# Patient Record
Sex: MALE | Race: WHITE | NOT HISPANIC OR LATINO | Employment: OTHER | URBAN - METROPOLITAN AREA
[De-identification: names, ages, dates, MRNs, and addresses within clinical notes are randomized per-mention and may not be internally consistent; named-entity substitution may affect disease eponyms.]

---

## 2017-06-10 PROBLEM — G61.0 GUILLAIN-BARRE SYNDROME: Status: ACTIVE | Noted: 2017-06-10

## 2017-06-14 ENCOUNTER — HOSPITAL ENCOUNTER (INPATIENT)
Facility: HOSPITAL | Age: 72
LOS: 30 days | Discharge: NURSING, REHAB OR LONG-TERM CARE | DRG: 004 | End: 2017-07-14
Attending: PSYCHIATRY & NEUROLOGY | Admitting: PSYCHIATRY & NEUROLOGY
Payer: MEDICARE

## 2017-06-14 DIAGNOSIS — R21 RASH: ICD-10-CM

## 2017-06-14 DIAGNOSIS — G93.40 ENCEPHALOPATHY: ICD-10-CM

## 2017-06-14 DIAGNOSIS — R40.2433 GLASGOW COMA SCALE TOTAL SCORE 3-8, AT HOSPITAL ADMISSION: ICD-10-CM

## 2017-06-14 DIAGNOSIS — I35.9 NONRHEUMATIC AORTIC VALVE DISORDER: ICD-10-CM

## 2017-06-14 DIAGNOSIS — I67.1 BRAIN ANEURYSM: ICD-10-CM

## 2017-06-14 DIAGNOSIS — J96.01 ACUTE RESPIRATORY FAILURE WITH HYPOXIA AND HYPERCAPNIA: ICD-10-CM

## 2017-06-14 DIAGNOSIS — G93.40 ACUTE ENCEPHALOPATHY: ICD-10-CM

## 2017-06-14 DIAGNOSIS — E86.1 HYPOVOLEMIA: ICD-10-CM

## 2017-06-14 DIAGNOSIS — R00.1 BRADYCARDIA: ICD-10-CM

## 2017-06-14 DIAGNOSIS — R13.12 DYSPHAGIA, OROPHARYNGEAL: ICD-10-CM

## 2017-06-14 DIAGNOSIS — J96.02 ACUTE RESPIRATORY FAILURE WITH HYPOXIA AND HYPERCAPNIA: ICD-10-CM

## 2017-06-14 DIAGNOSIS — G61.0 GUILLAIN-BARRE SYNDROME: Primary | ICD-10-CM

## 2017-06-14 DIAGNOSIS — I47.20 V-TACH: ICD-10-CM

## 2017-06-14 DIAGNOSIS — G82.50: ICD-10-CM

## 2017-06-14 LAB
ALBUMIN SERPL BCP-MCNC: 1.9 G/DL
ALLENS TEST: ABNORMAL
ALLENS TEST: ABNORMAL
ALP SERPL-CCNC: 68 U/L
ALT SERPL W/O P-5'-P-CCNC: 10 U/L
ANION GAP SERPL CALC-SCNC: 4 MMOL/L
AST SERPL-CCNC: 18 U/L
BACTERIA #/AREA URNS AUTO: ABNORMAL /HPF
BASOPHILS # BLD AUTO: 0.01 K/UL
BASOPHILS NFR BLD: 0.1 %
BILIRUB SERPL-MCNC: 0.3 MG/DL
BILIRUB UR QL STRIP: NEGATIVE
BUN SERPL-MCNC: 32 MG/DL
CALCIUM SERPL-MCNC: 8.3 MG/DL
CHLORIDE SERPL-SCNC: 103 MMOL/L
CLARITY UR REFRACT.AUTO: CLEAR
CO2 SERPL-SCNC: 30 MMOL/L
COLOR UR AUTO: YELLOW
CREAT SERPL-MCNC: 0.8 MG/DL
DELSYS: ABNORMAL
DELSYS: ABNORMAL
DIFFERENTIAL METHOD: ABNORMAL
EOSINOPHIL # BLD AUTO: 0 K/UL
EOSINOPHIL NFR BLD: 0 %
ERYTHROCYTE [DISTWIDTH] IN BLOOD BY AUTOMATED COUNT: 13 %
ERYTHROCYTE [SEDIMENTATION RATE] IN BLOOD BY WESTERGREN METHOD: 18 MM/H
ERYTHROCYTE [SEDIMENTATION RATE] IN BLOOD BY WESTERGREN METHOD: 18 MM/H
EST. GFR  (AFRICAN AMERICAN): >60 ML/MIN/1.73 M^2
EST. GFR  (NON AFRICAN AMERICAN): >60 ML/MIN/1.73 M^2
ESTIMATED AVG GLUCOSE: 131 MG/DL
FIO2: 40
FIO2: 40
GLUCOSE SERPL-MCNC: 163 MG/DL
GLUCOSE UR QL STRIP: ABNORMAL
HBA1C MFR BLD HPLC: 6.2 %
HCO3 UR-SCNC: 32.3 MMOL/L (ref 24–28)
HCO3 UR-SCNC: 32.5 MMOL/L (ref 24–28)
HCT VFR BLD AUTO: 35.8 %
HGB BLD-MCNC: 11.9 G/DL
HGB UR QL STRIP: ABNORMAL
HYALINE CASTS UR QL AUTO: 0 /LPF
INR PPP: 1.2
KETONES UR QL STRIP: NEGATIVE
LEUKOCYTE ESTERASE UR QL STRIP: ABNORMAL
LYMPHOCYTES # BLD AUTO: 0.9 K/UL
LYMPHOCYTES NFR BLD: 7.5 %
MAGNESIUM SERPL-MCNC: 2.6 MG/DL
MCH RBC QN AUTO: 32.8 PG
MCHC RBC AUTO-ENTMCNC: 33.2 %
MCV RBC AUTO: 99 FL
MICROSCOPIC COMMENT: ABNORMAL
MIN VOL: 10.7
MODE: ABNORMAL
MODE: ABNORMAL
MONOCYTES # BLD AUTO: 1 K/UL
MONOCYTES NFR BLD: 7.7 %
NEUTROPHILS # BLD AUTO: 10.3 K/UL
NEUTROPHILS NFR BLD: 84 %
NITRITE UR QL STRIP: NEGATIVE
PCO2 BLDA: 49 MMHG (ref 35–45)
PCO2 BLDA: 55.6 MMHG (ref 35–45)
PEEP: 8
PEEP: 8
PH SMN: 7.37 [PH] (ref 7.35–7.45)
PH SMN: 7.43 [PH] (ref 7.35–7.45)
PH UR STRIP: 6 [PH] (ref 5–8)
PHOSPHATE SERPL-MCNC: 2.7 MG/DL
PIP: 20
PLATELET # BLD AUTO: 244 K/UL
PMV BLD AUTO: 9.5 FL
PO2 BLDA: 92 MMHG (ref 80–100)
PO2 BLDA: 98 MMHG (ref 80–100)
POC BE: 7 MMOL/L
POC BE: 8 MMOL/L
POC SATURATED O2: 97 % (ref 95–100)
POC SATURATED O2: 98 % (ref 95–100)
POC TCO2: 34 MMOL/L (ref 23–27)
POC TCO2: 34 MMOL/L (ref 23–27)
POTASSIUM SERPL-SCNC: 4.1 MMOL/L
PROT SERPL-MCNC: 7.8 G/DL
PROT UR QL STRIP: ABNORMAL
PROTHROMBIN TIME: 12.2 SEC
PS: 10
RBC # BLD AUTO: 3.63 M/UL
RBC #/AREA URNS AUTO: 7 /HPF (ref 0–4)
SAMPLE: ABNORMAL
SAMPLE: ABNORMAL
SITE: ABNORMAL
SITE: ABNORMAL
SODIUM SERPL-SCNC: 137 MMOL/L
SP GR UR STRIP: 1.02 (ref 1–1.03)
SP02: 97
SP02: 98
SQUAMOUS #/AREA URNS AUTO: 0 /HPF
T4 FREE SERPL-MCNC: 0.69 NG/DL
TSH SERPL DL<=0.005 MIU/L-ACNC: 1.45 UIU/ML
URN SPEC COLLECT METH UR: ABNORMAL
UROBILINOGEN UR STRIP-ACNC: NEGATIVE EU/DL
VT: 500
VT: 550
WBC # BLD AUTO: 12.29 K/UL
WBC #/AREA URNS AUTO: 6 /HPF (ref 0–5)

## 2017-06-14 PROCEDURE — 85025 COMPLETE CBC W/AUTO DIFF WBC: CPT

## 2017-06-14 PROCEDURE — 63600175 PHARM REV CODE 636 W HCPCS: Performed by: PSYCHIATRY & NEUROLOGY

## 2017-06-14 PROCEDURE — 27000221 HC OXYGEN, UP TO 24 HOURS

## 2017-06-14 PROCEDURE — 5A1955Z RESPIRATORY VENTILATION, GREATER THAN 96 CONSECUTIVE HOURS: ICD-10-PCS | Performed by: PSYCHIATRY & NEUROLOGY

## 2017-06-14 PROCEDURE — 84443 ASSAY THYROID STIM HORMONE: CPT

## 2017-06-14 PROCEDURE — 87070 CULTURE OTHR SPECIMN AEROBIC: CPT

## 2017-06-14 PROCEDURE — 25000003 PHARM REV CODE 250: Performed by: PSYCHIATRY & NEUROLOGY

## 2017-06-14 PROCEDURE — 84439 ASSAY OF FREE THYROXINE: CPT

## 2017-06-14 PROCEDURE — 94002 VENT MGMT INPAT INIT DAY: CPT

## 2017-06-14 PROCEDURE — 87205 SMEAR GRAM STAIN: CPT

## 2017-06-14 PROCEDURE — 99900035 HC TECH TIME PER 15 MIN (STAT)

## 2017-06-14 PROCEDURE — 87186 SC STD MICRODIL/AGAR DIL: CPT

## 2017-06-14 PROCEDURE — 80053 COMPREHEN METABOLIC PANEL: CPT

## 2017-06-14 PROCEDURE — 87040 BLOOD CULTURE FOR BACTERIA: CPT

## 2017-06-14 PROCEDURE — 20000000 HC ICU ROOM

## 2017-06-14 PROCEDURE — 81001 URINALYSIS AUTO W/SCOPE: CPT

## 2017-06-14 PROCEDURE — 84100 ASSAY OF PHOSPHORUS: CPT

## 2017-06-14 PROCEDURE — 94003 VENT MGMT INPAT SUBQ DAY: CPT

## 2017-06-14 PROCEDURE — 25000003 PHARM REV CODE 250: Performed by: NURSE PRACTITIONER

## 2017-06-14 PROCEDURE — 93005 ELECTROCARDIOGRAM TRACING: CPT

## 2017-06-14 PROCEDURE — 85610 PROTHROMBIN TIME: CPT

## 2017-06-14 PROCEDURE — 83036 HEMOGLOBIN GLYCOSYLATED A1C: CPT

## 2017-06-14 PROCEDURE — 27200966 HC CLOSED SUCTION SYSTEM

## 2017-06-14 PROCEDURE — 87077 CULTURE AEROBIC IDENTIFY: CPT

## 2017-06-14 PROCEDURE — 99291 CRITICAL CARE FIRST HOUR: CPT | Mod: GC,,, | Performed by: PSYCHIATRY & NEUROLOGY

## 2017-06-14 PROCEDURE — 93010 ELECTROCARDIOGRAM REPORT: CPT | Mod: S$GLB,,, | Performed by: INTERNAL MEDICINE

## 2017-06-14 PROCEDURE — 83735 ASSAY OF MAGNESIUM: CPT

## 2017-06-14 PROCEDURE — 99900026 HC AIRWAY MAINTENANCE (STAT)

## 2017-06-14 RX ORDER — CHLORHEXIDINE GLUCONATE ORAL RINSE 1.2 MG/ML
15 SOLUTION DENTAL 4 TIMES DAILY
Status: DISCONTINUED | OUTPATIENT
Start: 2017-06-14 | End: 2017-07-14 | Stop reason: HOSPADM

## 2017-06-14 RX ORDER — SODIUM CHLORIDE 9 MG/ML
INJECTION, SOLUTION INTRAVENOUS CONTINUOUS
Status: DISCONTINUED | OUTPATIENT
Start: 2017-06-14 | End: 2017-07-01

## 2017-06-14 RX ORDER — HEPARIN SODIUM 5000 [USP'U]/ML
5000 INJECTION, SOLUTION INTRAVENOUS; SUBCUTANEOUS EVERY 8 HOURS
Status: DISCONTINUED | OUTPATIENT
Start: 2017-06-14 | End: 2017-06-17

## 2017-06-14 RX ORDER — METOPROLOL TARTRATE 25 MG/1
12.5 TABLET ORAL 2 TIMES DAILY
Status: DISCONTINUED | OUTPATIENT
Start: 2017-06-14 | End: 2017-06-14

## 2017-06-14 RX ORDER — AMOXICILLIN 250 MG
1 CAPSULE ORAL DAILY PRN
Status: DISCONTINUED | OUTPATIENT
Start: 2017-06-14 | End: 2017-06-14

## 2017-06-14 RX ORDER — LEVOTHYROXINE SODIUM 75 UG/1
75 TABLET ORAL
Status: DISCONTINUED | OUTPATIENT
Start: 2017-06-15 | End: 2017-06-15

## 2017-06-14 RX ORDER — LEVOTHYROXINE SODIUM 75 UG/1
75 TABLET ORAL
Status: DISCONTINUED | OUTPATIENT
Start: 2017-06-14 | End: 2017-06-14

## 2017-06-14 RX ORDER — LEVOTHYROXINE SODIUM 75 UG/1
75 TABLET ORAL DAILY
Status: ON HOLD | COMMUNITY
End: 2017-07-13 | Stop reason: HOSPADM

## 2017-06-14 RX ORDER — CEFEPIME HYDROCHLORIDE 1 G/50ML
1 INJECTION, SOLUTION INTRAVENOUS
Status: DISCONTINUED | OUTPATIENT
Start: 2017-06-14 | End: 2017-06-21

## 2017-06-14 RX ORDER — PITAVASTATIN CALCIUM 1.04 MG/1
1 TABLET, FILM COATED ORAL DAILY
Status: ON HOLD | COMMUNITY
End: 2017-09-12 | Stop reason: HOSPADM

## 2017-06-14 RX ORDER — FAMOTIDINE 20 MG/1
20 TABLET, FILM COATED ORAL 2 TIMES DAILY
Status: DISCONTINUED | OUTPATIENT
Start: 2017-06-14 | End: 2017-06-14

## 2017-06-14 RX ORDER — ONDANSETRON 2 MG/ML
4 INJECTION INTRAMUSCULAR; INTRAVENOUS EVERY 8 HOURS PRN
Status: DISCONTINUED | OUTPATIENT
Start: 2017-06-14 | End: 2017-07-14 | Stop reason: HOSPADM

## 2017-06-14 RX ORDER — CLOPIDOGREL BISULFATE 75 MG/1
75 TABLET ORAL DAILY
Status: ON HOLD | COMMUNITY
End: 2017-07-13 | Stop reason: HOSPADM

## 2017-06-14 RX ORDER — AMOXICILLIN 250 MG
1 CAPSULE ORAL DAILY
Status: DISCONTINUED | OUTPATIENT
Start: 2017-06-14 | End: 2017-06-14

## 2017-06-14 RX ORDER — ACETAMINOPHEN 325 MG/1
650 TABLET ORAL EVERY 6 HOURS PRN
Status: DISCONTINUED | OUTPATIENT
Start: 2017-06-14 | End: 2017-07-07

## 2017-06-14 RX ORDER — ASPIRIN 325 MG
325 TABLET ORAL DAILY
Status: DISCONTINUED | OUTPATIENT
Start: 2017-06-14 | End: 2017-06-14

## 2017-06-14 RX ORDER — AMOXICILLIN 250 MG
1 CAPSULE ORAL DAILY
Status: DISCONTINUED | OUTPATIENT
Start: 2017-06-15 | End: 2017-06-26

## 2017-06-14 RX ORDER — OMEGA-3-ACID ETHYL ESTERS 1 G/1
2 CAPSULE, LIQUID FILLED ORAL 2 TIMES DAILY
Status: ON HOLD | COMMUNITY
End: 2017-07-13 | Stop reason: HOSPADM

## 2017-06-14 RX ORDER — ASPIRIN 325 MG
325 TABLET ORAL DAILY
Status: DISCONTINUED | OUTPATIENT
Start: 2017-06-15 | End: 2017-06-17

## 2017-06-14 RX ORDER — FAMOTIDINE 20 MG/1
20 TABLET, FILM COATED ORAL 2 TIMES DAILY
Status: DISCONTINUED | OUTPATIENT
Start: 2017-06-14 | End: 2017-06-25

## 2017-06-14 RX ORDER — METOPROLOL TARTRATE 25 MG/1
12.5 TABLET ORAL 2 TIMES DAILY
Status: DISCONTINUED | OUTPATIENT
Start: 2017-06-14 | End: 2017-06-28

## 2017-06-14 RX ORDER — HYDRALAZINE HYDROCHLORIDE 20 MG/ML
10 INJECTION INTRAMUSCULAR; INTRAVENOUS EVERY 6 HOURS PRN
Status: DISCONTINUED | OUTPATIENT
Start: 2017-06-14 | End: 2017-07-14 | Stop reason: HOSPADM

## 2017-06-14 RX ORDER — FENTANYL CITRAT/DEXTROSE 5%/PF 100 MCG/10
PATIENT CONTROLLED ANALGESIA SYRINGE INTRAVENOUS CONTINUOUS
Status: DISCONTINUED | OUTPATIENT
Start: 2017-06-14 | End: 2017-06-15

## 2017-06-14 RX ORDER — METOPROLOL SUCCINATE 25 MG/1
25 TABLET, EXTENDED RELEASE ORAL DAILY
Status: ON HOLD | COMMUNITY
End: 2017-07-13 | Stop reason: HOSPADM

## 2017-06-14 RX ORDER — CEFEPIME HYDROCHLORIDE 1 G/50ML
1 INJECTION, SOLUTION INTRAVENOUS ONCE
Status: COMPLETED | OUTPATIENT
Start: 2017-06-14 | End: 2017-06-14

## 2017-06-14 RX ADMIN — CHLORHEXIDINE GLUCONATE 15 ML: 1.2 RINSE ORAL at 05:06

## 2017-06-14 RX ADMIN — LEVOTHYROXINE SODIUM 75 MCG: 75 TABLET ORAL at 08:06

## 2017-06-14 RX ADMIN — Medication 12.5 MG: at 09:06

## 2017-06-14 RX ADMIN — ACETAMINOPHEN 650 MG: 325 TABLET ORAL at 11:06

## 2017-06-14 RX ADMIN — HEPARIN SODIUM 5000 UNITS: 5000 INJECTION, SOLUTION INTRAVENOUS; SUBCUTANEOUS at 04:06

## 2017-06-14 RX ADMIN — SODIUM CHLORIDE: 0.9 INJECTION, SOLUTION INTRAVENOUS at 05:06

## 2017-06-14 RX ADMIN — HYDRALAZINE HYDROCHLORIDE 10 MG: 20 INJECTION INTRAMUSCULAR; INTRAVENOUS at 04:06

## 2017-06-14 RX ADMIN — HEPARIN SODIUM 5000 UNITS: 5000 INJECTION, SOLUTION INTRAVENOUS; SUBCUTANEOUS at 09:06

## 2017-06-14 RX ADMIN — VANCOMYCIN HYDROCHLORIDE 1250 MG: 100 INJECTION, POWDER, LYOPHILIZED, FOR SOLUTION INTRAVENOUS at 06:06

## 2017-06-14 RX ADMIN — CEFEPIME HYDROCHLORIDE 1 G: 1 INJECTION, SOLUTION INTRAVENOUS at 05:06

## 2017-06-14 RX ADMIN — Medication 12.5 MG: at 08:06

## 2017-06-14 RX ADMIN — FAMOTIDINE 20 MG: 20 TABLET, FILM COATED ORAL at 08:06

## 2017-06-14 RX ADMIN — Medication 50 MCG: at 08:06

## 2017-06-14 RX ADMIN — CHLORHEXIDINE GLUCONATE 15 ML: 1.2 RINSE ORAL at 11:06

## 2017-06-14 RX ADMIN — STANDARDIZED SENNA CONCENTRATE AND DOCUSATE SODIUM 1 TABLET: 8.6; 5 TABLET, FILM COATED ORAL at 08:06

## 2017-06-14 RX ADMIN — VANCOMYCIN HYDROCHLORIDE 1250 MG: 100 INJECTION, POWDER, LYOPHILIZED, FOR SOLUTION INTRAVENOUS at 08:06

## 2017-06-14 RX ADMIN — HEPARIN SODIUM 5000 UNITS: 5000 INJECTION, SOLUTION INTRAVENOUS; SUBCUTANEOUS at 08:06

## 2017-06-14 RX ADMIN — ASPIRIN 325 MG ORAL TABLET 325 MG: 325 PILL ORAL at 08:06

## 2017-06-14 RX ADMIN — FAMOTIDINE 20 MG: 20 TABLET, FILM COATED ORAL at 09:06

## 2017-06-14 NOTE — PROGRESS NOTES
Received intubated pt to room 7090 and placed on  at documented settings.SIze 8.0 ETT secured with commercial tube manuel 24 cm at the lip on the right. Pt tolerated well. Will continue to monitor.

## 2017-06-14 NOTE — H&P
History & Physical  Neurocritical Care    Admit Date: 6/14/2017  LOS: 0    Code Status: No Order     CC: <principal problem not specified>    SUBJECTIVE:     History of Present Illness:   Hx was mainly taken from medical records.  72 year old male with PMH significant for CHARLES, CAD, skin cancer, hypothyroidism, and HLP who was transferred from OSH for further evaluation and management of rapidly progressive ascending paralysis.   The patient was in a trip to [facility] recently included hiking and camping. When he got back he started to have tinglings of the feet and hand that quickly progressed to legs weakness. No clear hx of tick bites or skin lesions.  Off note, few days prior to admission to OSH he had prostate biopsy and was treated with cipro.  In OSH he was diagnosed with GBS and was started on IVIG. After admission his weakness got worse to the point that he had to be intubated     - He completed 5 days of IVIG on 6/12/17  - LP was done in OSH and was reported as 0 rbc, 0 wbc, protein 34, glucose 97  - Lyme ab panel was sent and results are pending.  - He was diagnosed with pneumonia and started on abx  -P Per family he had an MRI brain. cspine and T spine done in OSH and was unremarkable       No past medical history on file.  No past surgical history on file.  No current facility-administered medications on file prior to encounter.      No current outpatient prescriptions on file prior to encounter.     Review of patient's allergies indicates:  Allergies not on file  No family history on file.  Social History   Substance Use Topics    Smoking status: Not on file    Smokeless tobacco: Not on file    Alcohol use Not on file      Review of Symptoms:  Review of Systems   Unable to perform ROS: Intubated       OBJECTIVE:   Vital Signs (Most Recent):        Vital Signs (24h Range):        ICP/CPP (Last 24h):        I & O (Last 24h):  No intake or output data in the 24 hours ending 06/14/17  0006    Physical Exam:  Intubated, NAD  No jaundice  Lungs are clear to auscultation, good air entry bilateral  Normal S1/S2, no murmurs.  Abdomen is soft, lax, hypoactiveBS.  +1 pulse in DP and PT bilateral.  No peripheral edema.    Neuro:  Intubated. No spontaneous movements  Doesn't follow commands   PERRL, EOMI with VOR  Motor: No motor response to noxious stimuli   Diffuse hypotonia  Absent DTR diffusely     Vent Data:        Lines/Drains/Airway:          Nutrition/Tube Feeds:   Current Diet Order   No orders of the defined types were placed in this encounter.       Labs:  ABG: No results for input(s): PH, PO2, PCO2, HCO3, POCSATURATED, BE in the last 24 hours.  BMP:No results for input(s): NA, K, CL, CO2, BUN, CREATININE, GLU, MG, PHOS in the last 24 hours.  LFT: No results found for: AST, ALT, GGT, ALKPHOS, BILITOT, ALBUMIN, PROT  CBC: No results found for: WBC, HGB, HCT, MCV, PLT  Microbiology x 7d:   Microbiology Results (last 7 days)     ** No results found for the last 168 hours. **            ASSESSMENT/PLAN:     1. Suspected GBS  2. Rule out lyme disease  3. Acute respiratory failure  4. HCAP  5. Hx of CAD    A/P:  - Admit to NCC  - Q1H neuro checks  - Request MRI from OSH  - Finished course of IVIG.   - Consult neuromuscular in am   - Get an EMG  - Plan to repeat LP in few days  - F/U lyme serology from OSH.   - Continue current vent settings and f/u CXR and ABG  - SBP<160.  - IVF while NPO  - Continue broad spectrum abx and get cultures   - Check CBC, coag, CMP  - [facility]    Uninterrupted Critical Care/Counseling Time (not including procedures): 40 minutes

## 2017-06-14 NOTE — PROGRESS NOTES
Notified MD Murray that pt had 421 ml of urine upon bladder scan. New orders given will continue to monitor.

## 2017-06-14 NOTE — PROGRESS NOTES
Patient arrived from [facility] via [facility] to [facility] room 7090. NCC notified. VSS. Will continue to monitor.

## 2017-06-14 NOTE — PLAN OF CARE
This CM spoke with patient brother-in-law, able to provide most information. Spouse not at bedside.       06/14/17 1505   Discharge Assessment   Assessment Type Discharge Planning Assessment   Confirmed/corrected address and phone number on facesheet? Yes   Assessment information obtained from? Caregiver;Medical Record   Expected Length of Stay (days) 3   Communicated expected length of stay with patient/caregiver yes   Prior to hospitilization cognitive status: Alert/Oriented   Prior to hospitalization functional status: Independent   Current cognitive status: Unable to Assess   Current Functional Status: Completely Dependent   Arrived From [facility] hospital  ([facility])   Lives With spouse   Able to Return to Prior Arrangements unable to determine at this time (comments)   Is patient able to care for self after discharge? Unable to determine at this time (comments)   How many people do you have in your home that can help with your care after discharge? 1   Who are your caregiver(s) and their phone number(s)? (patient spouse, not at bedside)   Patient's perception of discharge disposition other (comments)  ([place])   Readmission Within The Last 30 Days current reason for admission unrelated to previous admission   Patient currently being followed by outpatient case management? No   Patient currently receives home health services? No   Does the patient currently use HME? No   Patient currently receives private duty nursing? No   Patient currently receives any other outside agency services? No   Equipment Currently Used at Home none   Do you have any problems affording any of your prescribed medications? No   Is the patient taking medications as prescribed? yes   Do you have any financial concerns preventing you from receiving the healthcare you need? No   Does the patient have transportation to healthcare appointments? Yes   Transportation Available family or friend will provide   On Dialysis? No   Does the  patient receive services at the [facility]? No   Are there any open cases? No   Discharge Plan A Long-term acute care facility (LTAC)   Discharge Plan B Skilled Nursing Facility   Patient/Family In Agreement With Plan unable to assess       Shauna Contreras CM  [id number]

## 2017-06-14 NOTE — PLAN OF CARE
Problem: Patient Care Overview  Goal: Plan of Care Review  Outcome: Ongoing (interventions implemented as appropriate)  POC reviewed with pt and family at 1600. Pt verbalized understanding. Questions and concerns addressed. No acute events today. Pt progressing toward goals. Will continue to monitor. See flowsheets for full assessment and VS info.

## 2017-06-15 PROBLEM — J96.02 ACUTE RESPIRATORY FAILURE WITH HYPOXIA AND HYPERCAPNIA: Status: ACTIVE | Noted: 2017-06-15

## 2017-06-15 PROBLEM — G82.50: Status: ACTIVE | Noted: 2017-06-15

## 2017-06-15 PROBLEM — I67.1 BRAIN ANEURYSM: Status: ACTIVE | Noted: 2017-06-15

## 2017-06-15 PROBLEM — J96.01 ACUTE RESPIRATORY FAILURE WITH HYPOXIA AND HYPERCAPNIA: Status: ACTIVE | Noted: 2017-06-15

## 2017-06-15 PROBLEM — E86.1 HYPOVOLEMIA: Status: ACTIVE | Noted: 2017-06-15

## 2017-06-15 PROBLEM — R40.2430 GLASGOW COMA SCALE TOTAL SCORE 3-8: Status: ACTIVE | Noted: 2017-06-15

## 2017-06-15 LAB
ALBUMIN SERPL BCP-MCNC: 1.9 G/DL
ALLENS TEST: ABNORMAL
ALP SERPL-CCNC: 63 U/L
ALT SERPL W/O P-5'-P-CCNC: 8 U/L
ANION GAP SERPL CALC-SCNC: 6 MMOL/L
AST SERPL-CCNC: 17 U/L
BASOPHILS # BLD AUTO: 0.02 K/UL
BASOPHILS NFR BLD: 0.2 %
BILIRUB SERPL-MCNC: 0.4 MG/DL
BUN SERPL-MCNC: 36 MG/DL
CALCIUM SERPL-MCNC: 8.1 MG/DL
CHLORIDE SERPL-SCNC: 106 MMOL/L
CO2 SERPL-SCNC: 30 MMOL/L
CREAT SERPL-MCNC: 0.7 MG/DL
DELSYS: ABNORMAL
DIFFERENTIAL METHOD: ABNORMAL
EOSINOPHIL # BLD AUTO: 0 K/UL
EOSINOPHIL NFR BLD: 0 %
ERYTHROCYTE [DISTWIDTH] IN BLOOD BY AUTOMATED COUNT: 13.5 %
ERYTHROCYTE [SEDIMENTATION RATE] IN BLOOD BY WESTERGREN METHOD: 18 MM/H
EST. GFR  (AFRICAN AMERICAN): >60 ML/MIN/1.73 M^2
EST. GFR  (NON AFRICAN AMERICAN): >60 ML/MIN/1.73 M^2
FIO2: 40
FOLATE SERPL-MCNC: 14.5 NG/ML
GLUCOSE SERPL-MCNC: 136 MG/DL
HCO3 UR-SCNC: 30.5 MMOL/L (ref 24–28)
HCT VFR BLD AUTO: 35.3 %
HGB BLD-MCNC: 11.6 G/DL
LYMPHOCYTES # BLD AUTO: 1.1 K/UL
LYMPHOCYTES NFR BLD: 12.7 %
MAGNESIUM SERPL-MCNC: 2.5 MG/DL
MCH RBC QN AUTO: 32.8 PG
MCHC RBC AUTO-ENTMCNC: 32.9 %
MCV RBC AUTO: 100 FL
MIN VOL: 10.5
MODE: ABNORMAL
MONOCYTES # BLD AUTO: 1 K/UL
MONOCYTES NFR BLD: 11.2 %
NEUTROPHILS # BLD AUTO: 6.6 K/UL
NEUTROPHILS NFR BLD: 75 %
PCO2 BLDA: 42.7 MMHG (ref 35–45)
PEEP: 8
PH SMN: 7.46 [PH] (ref 7.35–7.45)
PHOSPHATE SERPL-MCNC: 2.7 MG/DL
PIP: 21
PLATELET # BLD AUTO: 218 K/UL
PMV BLD AUTO: 9.2 FL
PO2 BLDA: 134 MMHG (ref 80–100)
POC BE: 7 MMOL/L
POC SATURATED O2: 99 % (ref 95–100)
POC TCO2: 32 MMOL/L (ref 23–27)
POCT GLUCOSE: 146 MG/DL (ref 70–110)
POCT GLUCOSE: 165 MG/DL (ref 70–110)
POTASSIUM SERPL-SCNC: 3.9 MMOL/L
PROT SERPL-MCNC: 7.3 G/DL
RBC # BLD AUTO: 3.54 M/UL
SAMPLE: ABNORMAL
SITE: ABNORMAL
SODIUM SERPL-SCNC: 142 MMOL/L
SP02: 100
VT: 55
WBC # BLD AUTO: 8.77 K/UL

## 2017-06-15 PROCEDURE — 63600175 PHARM REV CODE 636 W HCPCS: Performed by: PSYCHIATRY & NEUROLOGY

## 2017-06-15 PROCEDURE — 99900035 HC TECH TIME PER 15 MIN (STAT)

## 2017-06-15 PROCEDURE — 27000221 HC OXYGEN, UP TO 24 HOURS

## 2017-06-15 PROCEDURE — 63600175 PHARM REV CODE 636 W HCPCS: Performed by: NURSE PRACTITIONER

## 2017-06-15 PROCEDURE — 25000003 PHARM REV CODE 250: Performed by: PSYCHIATRY & NEUROLOGY

## 2017-06-15 PROCEDURE — 83519 RIA NONANTIBODY: CPT | Mod: 59

## 2017-06-15 PROCEDURE — 80053 COMPREHEN METABOLIC PANEL: CPT

## 2017-06-15 PROCEDURE — 20000000 HC ICU ROOM

## 2017-06-15 PROCEDURE — 84100 ASSAY OF PHOSPHORUS: CPT

## 2017-06-15 PROCEDURE — 95951 PR EEG MONITORING/VIDEORECORD: CPT | Mod: 26,,, | Performed by: PSYCHIATRY & NEUROLOGY

## 2017-06-15 PROCEDURE — 95957 EEG DIGITAL ANALYSIS: CPT

## 2017-06-15 PROCEDURE — 36600 WITHDRAWAL OF ARTERIAL BLOOD: CPT

## 2017-06-15 PROCEDURE — 82746 ASSAY OF FOLIC ACID SERUM: CPT

## 2017-06-15 PROCEDURE — 99222 1ST HOSP IP/OBS MODERATE 55: CPT | Mod: ,,, | Performed by: PHYSICIAN ASSISTANT

## 2017-06-15 PROCEDURE — 85025 COMPLETE CBC W/AUTO DIFF WBC: CPT

## 2017-06-15 PROCEDURE — 99900026 HC AIRWAY MAINTENANCE (STAT)

## 2017-06-15 PROCEDURE — 99233 SBSQ HOSP IP/OBS HIGH 50: CPT | Mod: ,,, | Performed by: PSYCHIATRY & NEUROLOGY

## 2017-06-15 PROCEDURE — 86256 FLUORESCENT ANTIBODY TITER: CPT

## 2017-06-15 PROCEDURE — 86617 LYME DISEASE ANTIBODY: CPT | Mod: 91

## 2017-06-15 PROCEDURE — 25000003 PHARM REV CODE 250: Performed by: NURSE PRACTITIONER

## 2017-06-15 PROCEDURE — 95951 HC EEG MONITORING/VIDEO RECORD: CPT

## 2017-06-15 PROCEDURE — 97530 THERAPEUTIC ACTIVITIES: CPT

## 2017-06-15 PROCEDURE — 97162 PT EVAL MOD COMPLEX 30 MIN: CPT

## 2017-06-15 PROCEDURE — 86038 ANTINUCLEAR ANTIBODIES: CPT

## 2017-06-15 PROCEDURE — 82803 BLOOD GASES ANY COMBINATION: CPT

## 2017-06-15 PROCEDURE — 84425 ASSAY OF VITAMIN B-1: CPT

## 2017-06-15 PROCEDURE — 83735 ASSAY OF MAGNESIUM: CPT

## 2017-06-15 PROCEDURE — A9585 GADOBUTROL INJECTION: HCPCS | Performed by: PSYCHIATRY & NEUROLOGY

## 2017-06-15 PROCEDURE — 99233 SBSQ HOSP IP/OBS HIGH 50: CPT | Mod: GC,,, | Performed by: PSYCHIATRY & NEUROLOGY

## 2017-06-15 PROCEDURE — 97163 PT EVAL HIGH COMPLEX 45 MIN: CPT

## 2017-06-15 PROCEDURE — 94003 VENT MGMT INPAT SUBQ DAY: CPT

## 2017-06-15 PROCEDURE — 25500020 PHARM REV CODE 255: Performed by: PSYCHIATRY & NEUROLOGY

## 2017-06-15 PROCEDURE — 99291 CRITICAL CARE FIRST HOUR: CPT | Mod: ,,, | Performed by: ANESTHESIOLOGY

## 2017-06-15 RX ORDER — FENTANYL CITRAT/DEXTROSE 5%/PF 100 MCG/10
PATIENT CONTROLLED ANALGESIA SYRINGE INTRAVENOUS CONTINUOUS
Status: DISCONTINUED | OUTPATIENT
Start: 2017-06-15 | End: 2017-06-16

## 2017-06-15 RX ORDER — SYRING-NEEDL,DISP,INSUL,0.3 ML 29 G X1/2"
296 SYRINGE, EMPTY DISPOSABLE MISCELLANEOUS ONCE
Status: COMPLETED | OUTPATIENT
Start: 2017-06-15 | End: 2017-06-15

## 2017-06-15 RX ORDER — THIAMINE HCL 100 MG
100 TABLET ORAL DAILY
Status: DISCONTINUED | OUTPATIENT
Start: 2017-06-16 | End: 2017-06-19

## 2017-06-15 RX ORDER — FOLIC ACID 5 MG/ML
1 INJECTION, SOLUTION INTRAMUSCULAR; INTRAVENOUS; SUBCUTANEOUS ONCE
Status: DISCONTINUED | OUTPATIENT
Start: 2017-06-15 | End: 2017-06-15

## 2017-06-15 RX ORDER — LEVOTHYROXINE SODIUM ANHYDROUS 100 UG/5ML
40 INJECTION, POWDER, LYOPHILIZED, FOR SOLUTION INTRAVENOUS DAILY
Status: DISCONTINUED | OUTPATIENT
Start: 2017-06-15 | End: 2017-06-17

## 2017-06-15 RX ORDER — GADOBUTROL 604.72 MG/ML
9 INJECTION INTRAVENOUS
Status: COMPLETED | OUTPATIENT
Start: 2017-06-15 | End: 2017-06-15

## 2017-06-15 RX ADMIN — CHLORHEXIDINE GLUCONATE 15 ML: 1.2 RINSE ORAL at 05:06

## 2017-06-15 RX ADMIN — CEFEPIME HYDROCHLORIDE 1 G: 1 INJECTION, SOLUTION INTRAVENOUS at 05:06

## 2017-06-15 RX ADMIN — HEPARIN SODIUM 5000 UNITS: 5000 INJECTION, SOLUTION INTRAVENOUS; SUBCUTANEOUS at 01:06

## 2017-06-15 RX ADMIN — MAGESIUM CITRATE 296 ML: 1.75 LIQUID ORAL at 09:06

## 2017-06-15 RX ADMIN — THIAMINE HYDROCHLORIDE 100 MG: 100 INJECTION, SOLUTION INTRAMUSCULAR; INTRAVENOUS at 01:06

## 2017-06-15 RX ADMIN — HEPARIN SODIUM 5000 UNITS: 5000 INJECTION, SOLUTION INTRAVENOUS; SUBCUTANEOUS at 05:06

## 2017-06-15 RX ADMIN — FAMOTIDINE 20 MG: 20 TABLET, FILM COATED ORAL at 08:06

## 2017-06-15 RX ADMIN — Medication 50 MCG: at 09:06

## 2017-06-15 RX ADMIN — VANCOMYCIN HYDROCHLORIDE 1250 MG: 100 INJECTION, POWDER, LYOPHILIZED, FOR SOLUTION INTRAVENOUS at 06:06

## 2017-06-15 RX ADMIN — CHLORHEXIDINE GLUCONATE 15 ML: 1.2 RINSE ORAL at 12:06

## 2017-06-15 RX ADMIN — Medication 12.5 MG: at 08:06

## 2017-06-15 RX ADMIN — LEVOTHYROXINE SODIUM 75 MCG: 75 TABLET ORAL at 05:06

## 2017-06-15 RX ADMIN — HEPARIN SODIUM 5000 UNITS: 5000 INJECTION, SOLUTION INTRAVENOUS; SUBCUTANEOUS at 09:06

## 2017-06-15 RX ADMIN — HYDRALAZINE HYDROCHLORIDE 10 MG: 20 INJECTION INTRAMUSCULAR; INTRAVENOUS at 03:06

## 2017-06-15 RX ADMIN — CHLORHEXIDINE GLUCONATE 15 ML: 1.2 RINSE ORAL at 11:06

## 2017-06-15 RX ADMIN — STANDARDIZED SENNA CONCENTRATE AND DOCUSATE SODIUM 1 TABLET: 8.6; 5 TABLET, FILM COATED ORAL at 08:06

## 2017-06-15 RX ADMIN — ASPIRIN 325 MG ORAL TABLET 325 MG: 325 PILL ORAL at 08:06

## 2017-06-15 RX ADMIN — GADOBUTROL 9 ML: 604.72 INJECTION INTRAVENOUS at 12:06

## 2017-06-15 RX ADMIN — LEVOTHYROXINE SODIUM ANHYDROUS 40 MCG: 100 INJECTION, POWDER, LYOPHILIZED, FOR SOLUTION INTRAVENOUS at 03:06

## 2017-06-15 RX ADMIN — FOLIC ACID 1 MG: 5 INJECTION, SOLUTION INTRAMUSCULAR; INTRAVENOUS; SUBCUTANEOUS at 12:06

## 2017-06-15 NOTE — PT/OT/SLP EVAL
Physical Therapy  Evaluation    Rosalio Cam   MRN: [id number]   Admitting Diagnosis: Guillain-McLouth syndrome    PT Received On: 06/15/17  PT Start Time: 1255     PT Stop Time: 1321    PT Total Time (min): 26 min       Billable Minutes:  Evaluation 10 and Therapeutic Activity 16     Personal factors/comorbidities: none known. Due to the listed comorbidities the patient cannot fully participate in PT POC.  Body systems elements affected: head, lower extremities, upper extremities, trunk, musculoskeletal system, neuromuscular system, cardiovascular, pulmonary system, integumentary system; orientation/level of consciousness  Clinical Presentation: evolving  Functional Outcome Tools: AMPAC, MMT, ROM  Evaluation Complexity Level: MOD      Diagnosis: Guillain-McLouth syndrome      History reviewed. No pertinent past medical history.   No past surgical history on file.    Referring physician: James Blacmkon NP  Date referred to PT: 06/14/17     General Precautions: Standard, fall, NPO  Orthopedic Precautions: N/A   Braces: N/A       Do you have any cultural, spiritual, Yazidi conflicts, given your current situation?: none reported    Patient History:  Pt (I) with all mobility and ADLs, including driving prior to admit. Will confirm living environment next visit.   DME owned (not currently used): none    Previous Level of Function:  Ambulation Skills: independent  Transfer Skills: independent  ADL Skills: independent  Work/Leisure Activity: independent    Subjective:  Communicated with RN prior to session.  Pt intubated and sedated this visit; only appropriate for family education and PROM.   Family present and agreeable to PT visit.     Chief Complaint: unable to state  Patient/Family goals: For pt to get better.     Pain/Comfort  Pain Rating 1: 0/10  Pain Rating Post-Intervention 1: 0/10      Objective:   Patient found with: blood pressure cuff, peripheral IV, ventilator, pulse ox (continuous), telemetry, SCD (condom  catheter)     Cognitive Exam:  Oriented to: intubated/sedated    Follows Commands/attention: does not follow commands  Communication: intubated  Safety awareness/insight to disability: impaired    Physical Exam:  Postural examination/scapula alignment: unable to assess this visit    Skin integrity: Visible skin intact  Edema: Mild bilateral hands    Sensation:   Impaired    Upper Extremity Range of Motion:  Right Upper Extremity: WFL PROM  Left Upper Extremity: WFL PROM    Upper Extremity Strength:  Unable to accurately assess; normal tone noted throughout    Lower Extremity Range of Motion:  Right Lower Extremity: WFL PROM  Left Lower Extremity: WFL PROM    Lower Extremity Strength:  Unable to accurately assess; normal tone noted throughout      Fine motor coordination:  Impaired    Gross motor coordination: Impaired    Functional Mobility:  Not appropriate due to intubated    Therapeutic Activities and Exercises:  Performed PROM to all extremities. Educated pt's son on appropriate handling, stretching, and positioning of extremities. Son demonstrated techniques well. Will review next time.     Education:  Education provided to pt's family regarding: PT role/POC. Verbalized understanding.     Whiteboard updated with correct mobility information. RN/PCT notified.  Not appropriate for transfers at this time due to medical condition.      AM-PAC 6 CLICK MOBILITY  How much help from another person does this patient currently need?   1 = Unable, Total/Dependent Assistance  2 = A lot, Maximum/Moderate Assistance  3 = A little, Minimum/Contact Guard/Supervision  4 = None, Modified [place]/Independent    Turning over in bed (including adjusting bedclothes, sheets and blankets)?: 1  Sitting down on and standing up from a chair with arms (e.g., wheelchair, bedside commode, etc.): 1  Moving from lying on back to sitting on the side of the bed?: 1  Moving to and from a bed to a chair (including a wheelchair)?: 1  Need to  walk in hospital room?: 1  Climbing 3-5 steps with a railing?: 1  Total Score: 6     AM-PAC Raw Score CMS G-Code Modifier Level of Impairment Assistance   6 % Total / Unable   7 - 9 CM 80 - 100% Maximal Assist   10 - 14 CL 60 - 80% Moderate Assist   15 - 19 CK 40 - 60% Moderate Assist   20 - 22 CJ 20 - 40% Minimal Assist   23 CI 1-20% SBA / CGA   24 CH 0% Independent/ Mod I     Patient left supine with all lines intact and call button in reach.    Assessment:   Rosalio Cam is a 72 y.o. male with a medical diagnosis of Guillain-Sarona syndrome and presents with decrease cognition, strength, balance, coordination, and respiratory status impairing overall mobility from PLOF.  Pt tolerated session well, with VSS.  Pt would benefit from continued skilled physical therapy for the listed impairments to improve functional independence and overall safety with mobility prior to d/c. PT recommends d/c disposition to TBD pending progress and extubation.       Rehab identified problem list/impairments: Rehab identified problem list/impairments: weakness, impaired endurance, impaired sensation, gait instability, impaired functional mobilty, impaired self care skills, impaired balance, visual deficits, impaired cognition, decreased lower extremity function, decreased upper extremity function, decreased coordination, decreased safety awareness, impaired fine motor, impaired cardiopulmonary response to activity    Rehab potential is fair.    Activity tolerance: Fair    Discharge recommendations: Discharge Facility/Level Of Care Needs:  (TBD pending extubation/progress)     Barriers to discharge: Barriers to Discharge: Inaccessible home environment, Decreased caregiver support    Equipment recommendations: Equipment Needed After Discharge:  (TBD)     GOALS:    Physical Therapy Goals        Problem: Physical Therapy Goal    Goal Priority Disciplines Outcome Goal Variances Interventions   Physical Therapy Goal     PT/OT, PT       Description:  Goals to be met by: 17    Patient will increase functional independence with mobility by performin. Patient's family will be Independent in performing PROM exercises to all extremities to maintain ROM and decrease swelling. NOT MET  2. Patient's family will demonstrate knowledge/understanding of proper positioning of patient to decrease adverse risks. NOT MET  3. Patient's family will be Independent in performing Passive stretching to maintain ROM and decrease muscle tightness and/or tone. NOT MET     **PT will reassess and establish goals as appropriate when pt is able to participate in EOB/OOB activity**                        PLAN:    Patient to be seen 3 x/week to address the above listed problems via therapeutic activities, therapeutic exercises, neuromuscular re-education  Plan of Care expires: 07/15/17  Plan of Care reviewed with: patient, spouse, family, son          Abigail Stanley, PT  06/15/2017

## 2017-06-15 NOTE — PLAN OF CARE
Problem: Patient Care Overview  Goal: Plan of Care Review  Outcome: Ongoing (interventions implemented as appropriate)  POC reviewed with pt and spouse at 0500. Spouse verbalized understanding. No acute events overnight. Pt progressing toward goals. Will continue to monitor. See flowsheets for full assessment and VS info

## 2017-06-15 NOTE — PROGRESS NOTES
NCC at bedside, MD aware of , other VSS. No new orders. Also notified of positive respiratory culture. Will continue to monitor.

## 2017-06-15 NOTE — PROGRESS NOTES
SCARLETT Gonzalez notified that pt is unable to go to MRI until noon tomorrow since spinal orders are for without contrast. Pt received contrast today at noon, MRI tech states must wait 24 hours for contrast to clear pt's system. Will continue to monitor.

## 2017-06-15 NOTE — HPI
"Mr Rosalio Cam is a 72 year old male with PMH significant for CHARLES, CAD, skin cancer, hypothyroidism, and HLP who was transferred from OSH for further evaluation and management of rapidly progressive ascending paralysis.   Please, note that patient is comatose on the vent, can not contribute to his clinical information, thus all history was obtained from the chart " The patient was in a trip to [place] and [place] recently included hiking and camping. When he got back he started to have tinglings of the feet and hand that quickly progressed to legs weakness. No clear hx of tick bites or skin lesions. Off note, few days prior to admission to OSH he had prostate biopsy and was treated with cipro. In OSH he was diagnosed with GBS and was started on IVIG. After admission his weakness got worse to the point that he had to be intubated    He completed 5 days of IVIG on 6/12/17. LP was done in OSH and was reported as 0 rbc, 0 wbc, protein 34, glucose 97. Lyme ab panel was sent and results are pending. He was diagnosed with pneumonia and started on antibiotics".  Mr Cam has declining mental status that doesn't seem to be resulting from his GBS, and consequently had MRI/MRA brain today.  MRI brain showed no acute abnormality but the presence of a non enhancing 3.6 cm mass centered over the right greater and lesser wings of the sphenoid abutting the ICA, A1 and M1 segments, displacing the right optic nerve. No brain edema.  MRA brain: bilateral supraclinoid internal carotid artery aneurysms. On the right there is a 10 mm aneurysm and separate 2 mm aneurysm.  On the left the supraclinoid ICA aneurysm measures about 7 x 6 mm.  Differential considerations include a thrombosed aneurysm associated with right ICA aneurysm aneurysm,  exophytic hamartoma, or or an exophytic nonenhancing primary brain tumor such as low-grade glioma or oligodendroglioma.  "

## 2017-06-15 NOTE — ASSESSMENT & PLAN NOTE
73 y/o male admitted to the NCC unit with GBS with lack of clinical improvement after a 5 day cycle of IVIG, persistent declined in mental status and abnormal MRI/MRA brain findings as described above.  Patient is profoundly comatose with absent corneal reflexes, no gag, and inability to breath over the vent.  Interesting findings on MRI/MRA brain seem to be unrelated to patient initial clinical presentation. Large primary non enhancing brain tumor without surrounding vasogenic edema seems to be more likely, but question of thrombosed gigantic thrombosed aneurysm associated with right ICA aneurysm aneurysm hs also been rised. Bilateral supraclinoid ICA aneurysm.  Patient is critically ill. Cerebral angiogram seems like the logical next step but unsure if it will  in the context of patient profound comatose state.

## 2017-06-15 NOTE — SUBJECTIVE & OBJECTIVE
History reviewed. No pertinent past medical history.  No past surgical history on file.  No family history on file.  Social History   Substance Use Topics    Smoking status: Unknown If Ever Smoked    Smokeless tobacco: Not on file    Alcohol use Not on file     Review of patient's allergies indicates:  No Known Allergies  Medications: I have reviewed the current medication administration record.    Prescriptions Prior to Admission   Medication Sig Dispense Refill Last Dose    clopidogrel (PLAVIX) 75 mg tablet Take 75 mg by mouth once daily.       levothyroxine (SYNTHROID) 75 MCG tablet Take 75 mcg by mouth once daily.       metoprolol succinate (TOPROL-XL) 25 MG 24 hr tablet Take 25 mg by mouth once daily.       omega-3 acid ethyl esters (LOVAZA) 1 gram capsule Take 2 g by mouth 2 (two) times daily.       pitavastatin 1 mg Tab tablet Take 1 mg by mouth once daily.        Physical exam:  Review of Systems   Reason unable to perform ROS: Coma.   Constitutional: Negative for fever.   HENT: Negative for facial swelling.    Eyes: Negative for discharge.   Respiratory:        Intubated on the vent   Cardiovascular: Negative.    Gastrointestinal: Negative for abdominal distention.   Endocrine:        Unable to assess due to mental status   Genitourinary: Negative for hematuria.   Musculoskeletal: Negative for joint swelling.   Skin: Negative for rash.   Allergic/Immunologic: Negative for immunocompromised state.   Neurological: Negative for tremors, seizures and facial asymmetry.   Hematological: Does not bruise/bleed easily.   Psychiatric/Behavioral:        Unable to assess due to mental status     Objective:     Vital Signs (Most Recent):  Temp: 99 °F (37.2 °C) (06/15/17 1505)  Pulse: 80 (06/15/17 1544)  Resp: 18 (06/15/17 1544)  BP: (!) 156/88 (06/15/17 1505)  SpO2: 100 % (06/15/17 1544)    Vital Signs Range (Last 24H):  Temp:  [98.2 °F (36.8 °C)-99.8 °F (37.7 °C)]   Pulse:  []   Resp:  [18]   BP:  (106-173)/()   SpO2:  [96 %-100 %]     Physical Exam   Constitutional: He appears well-developed and well-nourished.   HENT:   Head: Normocephalic.   Eyes: Pupils are equal, round, and reactive to light.   Neck: Normal range of motion. Neck supple.   Cardiovascular: Normal rate and regular rhythm.    Pulmonary/Chest: Breath sounds normal.   Abdominal: Soft. He exhibits no mass.   Genitourinary:   Genitourinary Comments: No tested   Musculoskeletal: He exhibits no edema or deformity.   Neurological:   Comatose   Skin: Skin is warm. No rash noted.   Psychiatric:   Unable to assess due to coma       Neurological Exam: off sedatives  Mental status: profoundly comatose, intubated on the vent  CN 2-12: pupils 4 mm, reactive. Does not blink to threat. EOM present. Absent corneal reflex bilaterally. Does not breath over the vent.No gag. Tongue: intubated.  Motor: no spontaneous movements  Sensory: no reaction to pain  Tone: flaccid all over  DTR's: unable to elicit  Plantars: mute  Coordination and gait: unable to test due to mental status      NIH Stroke Scale:  Interval: baseline (upon arrival/admit)  Level of Consciousness: 3 - coma  LOC Questions: 2 - answers none correctly  LOC Commands: 2 - performs neither correctly  Best Gaze: 0 - normal  Visual: 0 - no visual loss  Facial Palsy: 0 - normal  Motor Left Arm: 4 - no movement  Motor Right Arm: 4 - no movement  Motor Left Le - no movement  Motor Right Le - no movement  Limb Ataxia: 0 - absent  Sensory: 2 - severe to total loss  Best Language: 3 - mute  Dysarthria: UN - intubated or other barrier  Extinction and Inattention: 2 - complete neglect  NIH Stroke Scale Total: 30  Modified Independence Scale:   Timeline: Prior to symptoms onset  Modified Independence Score: 0 - no symptoms        Laboratory:  No labs today for review    Diagnostic Results:  Brain Imaging: MRI brain with and without contrast 6/15/17 showed a 3.6 cm nonenhancing mass centered over the right  greater and lesser wings of the sphenoid abutting the ICA, A1 and M1 segments, displacing the right optic nerve.     Cerebrovascular Imaging: MRA brain 6/15/17 bilateral supraclinoid internal carotid artery aneurysms.  On the right there is a 10 mm aneurysm in separate 2 mm aneurysm.  On the left the supraclinoid ICA aneurysm measures about 7 x 6 mm.     Cervical Vascular Imaging: no available    Cardiac Evaluation:   EKG/Telemetry: Sinus rhythm

## 2017-06-15 NOTE — PLAN OF CARE
Problem: Physical Therapy Goal  Goal: Physical Therapy Goal  Goals to be met by: 17    Patient will increase functional independence with mobility by performin. Patient's family will be Independent in performing PROM exercises to all extremities to maintain ROM and decrease swelling. NOT MET  2. Patient's family will demonstrate knowledge/understanding of proper positioning of patient to decrease adverse risks. NOT MET  3. Patient's family will be Independent in performing Passive stretching to maintain ROM and decrease muscle tightness and/or tone. NOT MET     **PT will reassess and establish goals as appropriate when pt is able to participate in EOB/OOB activity**            PT evaluation completed. POC and goals established as appropriate at this time. Pt is only appropriate for 1 discipline to receive ROM and family education at this time. Will increase frequency when pt is appropriate to participate in more EOB/OOB activity.     Abigail Stanley, PT, DPT  06/15/2017

## 2017-06-15 NOTE — PROGRESS NOTES
ICU Progress Note  Neurocritical Care    Admit Date: 6/14/2017  LOS: 1    CC: Guillain-Cleveland syndrome    Code Status: Full Code     SUBJECTIVE:     Interval History/Significant Events:   Comatose  No pupillary reflex no corneal  Hypokalemia  Elect. abnl  Acute resp failure; hypoxemia and hypercarbia  Hypercarbia  Hypothyroidism  Bacterial pneumonia; pseudomonas present omn admission    Medications:  Continuous Infusions:   sodium chloride 0.9% 75 mL/hr at 06/15/17 0905    fentanyl 5 mL/hr at 06/15/17 0905     Scheduled Meds:   aspirin  325 mg Per OG tube Daily    ceFEPime (MAXIPIME) IVPB  1 g Intravenous Q12H    chlorhexidine  15 mL Mouth/Throat QID    famotidine  20 mg Per OG tube BID    heparin (porcine)  5,000 Units Subcutaneous Q8H    levothyroxine  75 mcg Per OG tube Before breakfast    metoprolol tartrate  12.5 mg Per OG tube BID    senna-docusate 8.6-50 mg  1 tablet Per OG tube Daily    vancomycin (VANCOCIN) IVPB  15 mg/kg (Dosing Weight) Intravenous Q12H     PRN Meds:.acetaminophen, hydrALAZINE, ondansetron    OBJECTIVE:   Vital Signs (Most Recent):   Temp: 98.2 °F (36.8 °C) (06/15/17 0705)  Pulse: 83 (06/15/17 0906)  Resp: 18 (06/15/17 0906)  BP: (!) 140/79 (06/15/17 0906)  SpO2: 98 % (06/15/17 0906)    Vital Signs (24h Range):   Temp:  [98.2 °F (36.8 °C)-99.8 °F (37.7 °C)] 98.2 °F (36.8 °C)  Pulse:  [] 83  Resp:  [18] 18  SpO2:  [96 %-99 %] 98 %  BP: (106-173)/() 140/79    ICP/CPP (Last 24h):        I & O (Last 24h):   Intake/Output Summary (Last 24 hours) at 06/15/17 0940  Last data filed at 06/15/17 0905   Gross per 24 hour   Intake          2706.67 ml   Output             1700 ml   Net          1006.67 ml     Physical Exam:  GA: Alert, comfortable, no acute distress.   HEENT: No scleral icterus or JVD.   Pulmonary: Clear to auscultation A/P/L. No wheezing, crackles, or rhonchi.  Cardiac: RRR S1 & S2 w/o rubs/murmurs/gallops.   Abdominal: Bowel sounds present x 4. No appreciable  hepatosplenomegaly.  Skin: No jaundice, rashes, or visible lesions.  Neuro:  Comatose  GCS : 3  No corneal  No pupillary reflexes  No gag /cough      Vent Data:   Vent Mode: A/C  Oxygen Concentration (%):  [40] 40  Resp Rate Total:  [18 br/min-22 br/min] 18 br/min  Vt Set:  [550 mL] 550 mL  PEEP/CPAP:  [8 cmH20] 8 cmH20  Pressure Support:  [10 cmH20] 10 cmH20  Mean Airway Pressure:  [12 bwF75-03 cmH20] 13 cmH20    Lines/Drains/Airway:        Airway - Non-Surgical 06/13/17 Endotracheal Tube (Active)   Secured at 23 cm 6/15/2017  9:06 AM   Measured At Lips 6/15/2017  9:06 AM   Secured Location Center 6/15/2017  9:06 AM   Secured by Commercial tube manuel 6/15/2017  9:06 AM   Bite Block none 6/15/2017  9:06 AM   Site Condition Cool;Dry 6/15/2017  9:06 AM   Status Intact;Secured;Patent 6/15/2017  9:06 AM   Site Assessment Clean;Dry 6/15/2017  9:06 AM   Cuff Pressure 25 cm H2O 6/15/2017  8:24 AM           PICC Double Lumen right brachial (Active)   Site Assessment Clean;Dry;Intact;No redness;No swelling 6/15/2017  3:00 AM   Lumen 1 Status Infusing 6/15/2017  3:00 AM   Lumen 2 Status Infusing 6/15/2017  3:00 AM   Dressing Type Transparent 6/15/2017  3:00 AM   Dressing Status Biopatch in place;Clean;Dry;Intact 6/15/2017  3:00 AM   Dressing Intervention Dressing reinforced 6/15/2017  3:00 AM   Dressing Change Due 06/21/17 6/15/2017  3:00 AM   Daily Line Review Performed 6/15/2017  3:00 AM             NG/OG Tube 06/09/17 0130 (Active)   Placement Check placement verified by aspirate characteristics;placement verified by distal tube length measurement;placement verified by x-ray 6/15/2017  3:00 AM   Distal Tube Length (cm) 65 6/14/2017  7:05 AM   Tolerance no signs/symptoms of discomfort 6/15/2017  3:00 AM   Securement taped to commercial device 6/15/2017  3:00 AM   Clamp Status/Tolerance clamped 6/15/2017  3:00 AM   Suction Setting/Drainage Method dependent drainage 6/15/2017  3:00 AM   Insertion Site Appearance no redness,  warmth, tenderness, skin breakdown, drainage 6/15/2017  3:00 AM   Drainage None 6/15/2017  3:00 AM   Flush/Irrigation flushed w/;water;no resistance met 6/15/2017  3:00 AM   Intake (mL) 75 mL 6/15/2017  8:05 AM   Residual Amount (ml) 0 ml 6/14/2017  7:05 AM       Male External Urinary Catheter 06/14/17 0003 Medium (Active)   Collection Container Urimeter 6/15/2017  3:00 AM   Securement Method secured to top of thigh w/ adhesive device 6/15/2017  3:00 AM   Skin no redness;no breakdown;penis/scrotum cleansed w/ soap and water;skin barrier applied 6/15/2017  3:00 AM   Tolerance no signs/symptoms of discomfort 6/15/2017  3:00 AM   Output (mL) 210 mL 6/15/2017  3:00 AM     Nutrition/Tube Feeds (if NPO state why): npo for scans    Labs:  ABG: No results for input(s): PH, PO2, PCO2, HCO3, POCSATURATED, BE in the last 24 hours.  BMP:  Recent Labs  Lab 06/15/17  0242      K 3.9      CO2 30*   BUN 36*   CREATININE 0.7   *   MG 2.5   PHOS 2.7     LFT: Lab Results   Component Value Date    AST 17 06/15/2017    ALT 8 (L) 06/15/2017    ALKPHOS 63 06/15/2017    BILITOT 0.4 06/15/2017    ALBUMIN 1.9 (L) 06/15/2017    PROT 7.3 06/15/2017     CBC:   Lab Results   Component Value Date    WBC 8.77 06/15/2017    HGB 11.6 (L) 06/15/2017    HCT 35.3 (L) 06/15/2017     (H) 06/15/2017     06/15/2017     Microbiology x 7d:   Microbiology Results (last 7 days)     Procedure Component Value Units Date/Time    Blood culture [[id number]] Collected:  06/14/17 0336    Order Status:  Completed Specimen:  Blood from Peripheral, Wrist, Left Updated:  06/15/17 0812     Blood Culture, Routine No Growth to date     Blood Culture, Routine No Growth to date    Culture, Respiratory with Gram Stain [[id number]] Collected:  06/14/17 0500    Order Status:  Completed Specimen:  Respiratory from Endotracheal Aspirate Updated:  06/14/17 1007     Gram Stain (Respiratory) <10 epithelial cells per low power field.     Gram Stain  (Respiratory) Moderate WBC's     Gram Stain (Respiratory) Few Gram negative rods        Imaging:  Cxr; repeat ;   KUB ; constipated    I personally reviewed the above image.    ASSESSMENT/PLAN:     Active Hospital Problems    Diagnosis    *Guillain-Angwin syndrome      Plan:  Neurology consult  synthroid IV  follow abg's  D/c fentanyl  MRI stat  Thiamine/ folic acid stat  MRI ; review  Very concerned about continued comatose state needs stat work-up  Follow lyme results  Thiamine and FA levels  Discussed with Dr Chavez in detail    Total cc time > 32 mins        critically ill; very concerned about continued neurological state.  Time spent communicating with allied services and discussion with family

## 2017-06-15 NOTE — LOPA/MORA/SWTA/AOC/AEB
Thank you for the referral. This patient may be a candidate for donation. Please call [facility] ([phone]) with plans for Brain Death Studies and we will assess for candidacy at that time. Thanks.

## 2017-06-15 NOTE — CONSULTS
Consult Note      Reason for Consult:  Possible GBS     SUBJECTIVE:     History of Present Illness: (From chart)  72 year old male with PMH significant for CHARLES, CAD, skin cancer, hypothyroidism, and HLP who was transferred from OSH for further evaluation and management of rapidly progressive ascending paralysis.   The patient was in a trip to [facility] recently included hiking and camping. When he got back he started to have tinglings of the feet and hand that quickly progressed to legs weakness. No clear hx of tick bites or skin lesions. Off note, few days prior to admission to OSH he had prostate biopsy and was treated with cipro. In OSH he was diagnosed with GBS and was started on IVIG. After admission his weakness got worse to the point that he had to be intubated      - He completed 5 days of IVIG on 6/12/17  - LP was done in OSH and was reported as 0 rbc, 0 wbc, protein 34, glucose 97  - Lyme ab panel was sent and results are pending.  - He was diagnosed with pneumonia and started on abx  - Per family he had an MRI brain. cspine and T spine done in OSH and was unremarkable     History reviewed. No pertinent past medical history.  No past surgical history on file.  No family history on file.  Social History   Substance Use Topics    Smoking status: Unknown If Ever Smoked    Smokeless tobacco: Not on file    Alcohol use Not on file       Review of Systems:  Unable to assess     OBJECTIVE:     Vital Signs:  Temp:  [98.2 °F (36.8 °C)-99.2 °F (37.3 °C)]   Pulse:  []   Resp:  [18]   BP: (106-173)/()   SpO2:  [97 %-99 %]     Physical Exam:  GA: Alert, comfortable, no acute distress.   HEENT: No scleral icterus or JVD.   Pulmonary: Clear to auscultation A/P/L. No wheezing, crackles, or rhonchi.  Cardiac: RRR S1 & S2 w/o rubs/murmurs/gallops.   Abdominal: Bowel sounds present x 4. No appreciable hepatosplenomegaly.  Skin: No jaundice, rashes, or visible lesions.    Neurologic Exam:    Mental status: In coma. No response on verbal or painful stimulus    Cranial nerves: Pupil B/L equal Sluggish reactive, corneal negative, gag negative, oculocephalic negative. Occasional roving eye movements    Sensory: Unable to assess. No response to pain    Motor:Not moving any extremity. No response to pain   Reflexes: Areflexia all over except right ankle reflex 1+, L3 1+ B/L  and C4 1+ B/L   Coordination: Unable to assess  Gait: Unable to assess     Laboratory:  CBC:   Recent Labs  Lab 06/15/17  0242   WBC 8.77   RBC 3.54*   HGB 11.6*   HCT 35.3*      *   MCH 32.8*   MCHC 32.9     CMP:   Recent Labs  Lab 06/15/17  0242   *   CALCIUM 8.1*   ALBUMIN 1.9*   PROT 7.3      K 3.9   CO2 30*      BUN 36*   CREATININE 0.7   ALKPHOS 63   ALT 8*   AST 17   BILITOT 0.4       Diagnostic Results:  MRI Brain:  Bilateral supraclinoid internal carotid artery aneurysms.  On the right there is a 10 mm aneurysm in separate 2 mm aneurysm.  On the left the supraclinoid ICA aneurysm measures about 7 x 6 mm.  3.6 cm nonenhancing mass centered over the right greater and lesser wings of the sphenoid abutting the ICA, A1 and M1 segments, displacing the right optic nerve.  Differential considerations include a thrombosed aneurysm associated with right ICA aneurysm aneurysm,  exophytic hamartoma, or or an exophytic nonenhancing primary brain tumor such as low-grade glioma or oligodendroglioma.    Further investigation to rule out thrombosed aneurysm could be obtained with conventional angiography if clinically warranted.    ASSESSMENT/PLAN:     72 year old male with PMH significant for CHARLES, CAD, skin cancer, hypothyroidism, and HLP who was transferred from [facility] for further evaluation and management of rapidly progressive ascending paralysis and currently in coma.     -- MRI Brain: 3.6 cm nonenhancing mass centered over the right greater and lesser wings of the sphenoid abutting the ICA, A1 and M1  segments, displacing the right optic nerve. ?? primary brain tumor such as low-grade glioma or oligodendroglioma.     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  (https://www.ncbi.nlm.nih.gov/pmc/articles/KTE5213646/pdf/sbygenl02643[address]5095.pdf)    (https://[employer].Keepy/oup/backfile/Content_public/Journal/brain/126/10/10.1093/brain/crj611/2/eua637.pdf?Opljcmn=[email or web address]&Signature=EnNUfS-YbgC2UBYdwT93Jf1a1pLQyXc~jCxU3NxRF7c1hfoV5SoK4qnK6pc4qysTYuGG6nE8EueQwAc2i889pDT7cjFryBV2f5gVuSajWZD4SF04d~RbRMd3GMgCE6sdlScqnxAEAltThyVCTV53-yQ-jL2-lEsep1Oo44MctWJEXfNW8b9TBJPSuV4VcJoEUChLZ-TYOxlOmxNiRwcE8MpqjqIhhoHNVNF1LKw~ilutwRUMalohdcIJsn4-cDOicSzvtZk8jEPJP0d~xtqMpmxDn9vqY6hj9F9AEvx36109NFl-olbX~T1GwuREdEz2zoxgOUApvX6KqSzswtp6UQ__&Elizabeth-Pair-Id=[id number])    -- Neoplastic/Paraneoplastic process considering mass on brain MRI concerning for primary brain tumor   -- Rule out Lyme disease   -- Rule out viral pathology - Zika - herpes vs other   -- Could be just due to long sedation that was OSH       Plan:     -- Recommend starting acyclovir   -- Consider LP and send basic lab Plus autoimmune encephalitis panel and viral panel in CSF  -- MRI C and T spine with and without contrast   -- Consider CT Chest and abdomen pelvis to rule out primary source of tumor    -- Will consider solumedrol 1 g daily for 5 days if no improvement in next 24 hours   -- Will consider PLEX for 5 days  if no improvement in next 24 hours   -- Follow paraneoplastic panel, GQ1b and lyme panel   -- Case discussed with Dr Chavez   -- Will follow        Navneet Castano MD  Neurology Resident   [facility] [address] [place] [address]  Pager: [phone]      ======================  Patient seen and examined.  I agree with the history, exam, assessment and plan within the resident's note as stated above.  Note has been edited by me to  reflect my work and changes.    Discussed with Dr. Brice and Dr. Lancaster as ? of DWI abnml on imaging.  History of ascending numbness, tingling and weakness over 24-48 hours, rapidly progressive, accompanied by radicular back pain.  THen comatose since last Friday.    MRI does show ? R temporal tumor versus thrombosed vein.  Present at OSH films, not clearly directly related to current syndrome.    Exam improved to restoration of pupils and roving eye movements in the setting of off propofol, but secondary progression of Moss variant GBS (Bickerstaff's) or other infectious (viral - need to check for varicellic lesions on his back) or autoimmune is in the picture.    Plan as above.  Will follow with [facility].    Yusuf Chavez MD, MPH  Division of Movement and Memory Disorders  [facility]

## 2017-06-15 NOTE — PT/OT/SLP PROGRESS
Occupational Therapy      Rosalio Cam  MRN: [id number]    OT orders acknowledged.  Patient appropriate for 1 discipline only at this time. Physical therapy to follow.   Will follow up for OT when appropriate. Thank you for the consult.     KATHY Palomino  6/15/2017

## 2017-06-15 NOTE — CONSULTS
"[facility]  Vascular Neurology  Comprehensive Stroke Center  Consult Note      Inpatient consult to Vascular (Stroke) Neurology  Consult performed by: SANDY GANDHI  Consult ordered by: ANTHONY SMALLWOOD  Reason for consult: brain aneurysm        Subjective:     History of Present Illness:  Mr Rosalio Cam is a 72 year old male with PMH significant for CHARLES, CAD, skin cancer, hypothyroidism, and HLP who was transferred from OSH for further evaluation and management of rapidly progressive ascending paralysis.   Please, note that patient is comatose on the vent, can not contribute to his clinical information, thus all history was obtained from the chart " The patient was in a trip to [place] and [place] recently included hiking and camping. When he got back he started to have tinglings of the feet and hand that quickly progressed to legs weakness. No clear hx of tick bites or skin lesions. Off note, few days prior to admission to OSH he had prostate biopsy and was treated with cipro. In OSH he was diagnosed with GBS and was started on IVIG. After admission his weakness got worse to the point that he had to be intubated    He completed 5 days of IVIG on 6/12/17. LP was done in OSH and was reported as 0 rbc, 0 wbc, protein 34, glucose 97. Lyme ab panel was sent and results are pending. He was diagnosed with pneumonia and started on antibiotics".  Mr Cam has declining mental status that doesn't seem to be resulting from his GBS, and consequently had MRI/MRA brain today.  MRI brain showed no acute abnormality but the presence of a non enhancing 3.6 cm mass centered over the right greater and lesser wings of the sphenoid abutting the ICA, A1 and M1 segments, displacing the right optic nerve. No brain edema.  MRA brain: bilateral supraclinoid internal carotid artery aneurysms. On the right there is a 10 mm aneurysm and separate 2 mm aneurysm.  On the left the supraclinoid ICA aneurysm " measures about 7 x 6 mm.  Differential considerations include a thrombosed aneurysm associated with right ICA aneurysm aneurysm,  exophytic hamartoma, or or an exophytic nonenhancing primary brain tumor such as low-grade glioma or oligodendroglioma.         History reviewed. No pertinent past medical history.  No past surgical history on file.  No family history on file.  Social History   Substance Use Topics    Smoking status: Unknown If Ever Smoked    Smokeless tobacco: Not on file    Alcohol use Not on file     Review of patient's allergies indicates:  No Known Allergies  Medications: I have reviewed the current medication administration record.    Prescriptions Prior to Admission   Medication Sig Dispense Refill Last Dose    clopidogrel (PLAVIX) 75 mg tablet Take 75 mg by mouth once daily.       levothyroxine (SYNTHROID) 75 MCG tablet Take 75 mcg by mouth once daily.       metoprolol succinate (TOPROL-XL) 25 MG 24 hr tablet Take 25 mg by mouth once daily.       omega-3 acid ethyl esters (LOVAZA) 1 gram capsule Take 2 g by mouth 2 (two) times daily.       pitavastatin 1 mg Tab tablet Take 1 mg by mouth once daily.        Physical exam:  Review of Systems   Reason unable to perform ROS: Coma.   Constitutional: Negative for fever.   HENT: Negative for facial swelling.    Eyes: Negative for discharge.   Respiratory:        Intubated on the vent   Cardiovascular: Negative.    Gastrointestinal: Negative for abdominal distention.   Endocrine:        Unable to assess due to mental status   Genitourinary: Negative for hematuria.   Musculoskeletal: Negative for joint swelling.   Skin: Negative for rash.   Allergic/Immunologic: Negative for immunocompromised state.   Neurological: Negative for tremors, seizures and facial asymmetry.   Hematological: Does not bruise/bleed easily.   Psychiatric/Behavioral:        Unable to assess due to mental status     Objective:     Vital Signs (Most Recent):  Temp: 99 °F (37.2  °C) (06/15/17 1505)  Pulse: 80 (06/15/17 1544)  Resp: 18 (06/15/17 1544)  BP: (!) 156/88 (06/15/17 1505)  SpO2: 100 % (06/15/17 1544)    Vital Signs Range (Last 24H):  Temp:  [98.2 °F (36.8 °C)-99.8 °F (37.7 °C)]   Pulse:  []   Resp:  [18]   BP: (106-173)/()   SpO2:  [96 %-100 %]     Physical Exam   Constitutional: He appears well-developed and well-nourished.   HENT:   Head: Normocephalic.   Eyes: Pupils are equal, round, and reactive to light.   Neck: Normal range of motion. Neck supple.   Cardiovascular: Normal rate and regular rhythm.    Pulmonary/Chest: Breath sounds normal.   Abdominal: Soft. He exhibits no mass.   Genitourinary:   Genitourinary Comments: No tested   Musculoskeletal: He exhibits no edema or deformity.   Neurological:   Comatose   Skin: Skin is warm. No rash noted.   Psychiatric:   Unable to assess due to coma       Neurological Exam: off sedatives  Mental status: profoundly comatose, intubated on the vent  CN 2-12: pupils 4 mm, reactive. Does not blink to threat. EOM present. Absent corneal reflex bilaterally. Does not breath over the vent.No gag. Tongue: intubated.  Motor: no spontaneous movements  Sensory: no reaction to pain  Tone: flaccid all over  DTR's: unable to elicit  Plantars: mute  Coordination and gait: unable to test due to mental status      NIH Stroke Scale:  Interval: baseline (upon arrival/admit)  Level of Consciousness: 3 - coma  LOC Questions: 2 - answers none correctly  LOC Commands: 2 - performs neither correctly  Best Gaze: 0 - normal  Visual: 0 - no visual loss  Facial Palsy: 0 - normal  Motor Left Arm: 4 - no movement  Motor Right Arm: 4 - no movement  Motor Left Le - no movement  Motor Right Le - no movement  Limb Ataxia: 0 - absent  Sensory: 2 - severe to total loss  Best Language: 3 - mute  Dysarthria: UN - intubated or other barrier  Extinction and Inattention: 2 - complete neglect  NIH Stroke Scale Total: 30  Modified Boone Scale:   Timeline:  Prior to symptoms onset  Modified Farnaz Score: 0 - no symptoms        Laboratory:  No labs today for review    Diagnostic Results:  Brain Imaging: MRI brain with and without contrast 6/15/17 showed a 3.6 cm nonenhancing mass centered over the right greater and lesser wings of the sphenoid abutting the ICA, A1 and M1 segments, displacing the right optic nerve.     Cerebrovascular Imaging: MRA brain 6/15/17 bilateral supraclinoid internal carotid artery aneurysms.  On the right there is a 10 mm aneurysm in separate 2 mm aneurysm.  On the left the supraclinoid ICA aneurysm measures about 7 x 6 mm.     Cervical Vascular Imaging: no available    Cardiac Evaluation:   EKG/Telemetry: Sinus rhythm    Assessment/Plan:     Patient is a 72 y.o. year old male with:    Brain aneurysm    71 y/o male admitted to the [facility] unit with GBS with lack of clinical improvement after a 5 day cycle of IVIG, persistent declined in mental status and abnormal MRI/MRA brain findings as described above.  Patient is profoundly comatose with absent corneal reflexes, no gag, and inability to breath over the vent.  Interesting findings on MRI/MRA brain seem to be unrelated to patient initial clinical presentation. Large primary non enhancing brain tumor without surrounding vasogenic edema seems to be more likely, but question of thrombosed gigantic thrombosed aneurysm associated with right ICA aneurysm aneurysm hs also been rised. Bilateral supraclinoid ICA aneurysm.  Patient is critically ill. Cerebral angiogram seems like the logical next step but unsure if it will  in the context of patient profound comatose state.              Thrombolysis Candidate? No  1. Contraindications: Outside of treament window, presentation no consistent with stroke    Interventional Revascularization Candidate?  No; No large vessel occlusion    Research Candidate? No    Omar Juarez MD  [facility]  Department of Vascular Neurology    [facility]

## 2017-06-16 PROBLEM — E03.9 ACQUIRED HYPOTHYROIDISM: Status: ACTIVE | Noted: 2017-06-16

## 2017-06-16 PROBLEM — G93.89 BRAIN MASS: Status: ACTIVE | Noted: 2017-06-16

## 2017-06-16 LAB
ALBUMIN SERPL BCP-MCNC: 2 G/DL
ALP SERPL-CCNC: 64 U/L
ALT SERPL W/O P-5'-P-CCNC: 14 U/L
ANION GAP SERPL CALC-SCNC: 8 MMOL/L
AST SERPL-CCNC: 28 U/L
B BURGDOR AB PATRN SER IB-IMP: NORMAL
B BURGDOR IGG PATRN SER IB-IMP: NORMAL KDA
B BURGDOR IGG SER QL IB: NEGATIVE
B BURGDOR IGM PATRN SER IB-IMP: NORMAL KDA
B BURGDOR IGM SER QL IB: NEGATIVE
BACTERIA SPEC AEROBE CULT: NORMAL
BASOPHILS # BLD AUTO: 0.02 K/UL
BASOPHILS NFR BLD: 0.2 %
BILIRUB SERPL-MCNC: 0.4 MG/DL
BUN SERPL-MCNC: 35 MG/DL
CALCIUM SERPL-MCNC: 8.2 MG/DL
CHLORIDE SERPL-SCNC: 108 MMOL/L
CO2 SERPL-SCNC: 29 MMOL/L
CORTIS SERPL-MCNC: 13.3 UG/DL
CORTIS SERPL-MCNC: 26.4 UG/DL
CORTIS SERPL-MCNC: 30.4 UG/DL
CREAT SERPL-MCNC: 0.7 MG/DL
DIFFERENTIAL METHOD: ABNORMAL
EOSINOPHIL # BLD AUTO: 0 K/UL
EOSINOPHIL NFR BLD: 0.3 %
ERYTHROCYTE [DISTWIDTH] IN BLOOD BY AUTOMATED COUNT: 13.7 %
EST. GFR  (AFRICAN AMERICAN): >60 ML/MIN/1.73 M^2
EST. GFR  (NON AFRICAN AMERICAN): >60 ML/MIN/1.73 M^2
GLUCOSE SERPL-MCNC: 140 MG/DL
GRAM STN SPEC: NORMAL
HCT VFR BLD AUTO: 36.4 %
HGB BLD-MCNC: 11.6 G/DL
LYMPHOCYTES # BLD AUTO: 1.2 K/UL
LYMPHOCYTES NFR BLD: 11.5 %
MAGNESIUM SERPL-MCNC: 2.7 MG/DL
MCH RBC QN AUTO: 32.4 PG
MCHC RBC AUTO-ENTMCNC: 31.9 %
MCV RBC AUTO: 102 FL
MONOCYTES # BLD AUTO: 1 K/UL
MONOCYTES NFR BLD: 9.1 %
NEUTROPHILS # BLD AUTO: 8.3 K/UL
NEUTROPHILS NFR BLD: 77.6 %
PHOSPHATE SERPL-MCNC: 4 MG/DL
PLATELET # BLD AUTO: 230 K/UL
PMV BLD AUTO: 9.8 FL
POTASSIUM SERPL-SCNC: 4.2 MMOL/L
PROT SERPL-MCNC: 7.1 G/DL
RBC # BLD AUTO: 3.58 M/UL
SODIUM SERPL-SCNC: 145 MMOL/L
VANCOMYCIN TROUGH SERPL-MCNC: 8.2 UG/ML
WBC # BLD AUTO: 10.71 K/UL

## 2017-06-16 PROCEDURE — 83735 ASSAY OF MAGNESIUM: CPT

## 2017-06-16 PROCEDURE — 63600175 PHARM REV CODE 636 W HCPCS: Performed by: NURSE PRACTITIONER

## 2017-06-16 PROCEDURE — 25000003 PHARM REV CODE 250: Performed by: PSYCHIATRY & NEUROLOGY

## 2017-06-16 PROCEDURE — 84100 ASSAY OF PHOSPHORUS: CPT

## 2017-06-16 PROCEDURE — 63600175 PHARM REV CODE 636 W HCPCS: Performed by: PSYCHIATRY & NEUROLOGY

## 2017-06-16 PROCEDURE — 99291 CRITICAL CARE FIRST HOUR: CPT | Mod: ,,, | Performed by: ANESTHESIOLOGY

## 2017-06-16 PROCEDURE — 99900026 HC AIRWAY MAINTENANCE (STAT)

## 2017-06-16 PROCEDURE — 99232 SBSQ HOSP IP/OBS MODERATE 35: CPT | Mod: ,,, | Performed by: NEUROLOGICAL SURGERY

## 2017-06-16 PROCEDURE — 94003 VENT MGMT INPAT SUBQ DAY: CPT

## 2017-06-16 PROCEDURE — 25000003 PHARM REV CODE 250: Performed by: NURSE PRACTITIONER

## 2017-06-16 PROCEDURE — 85025 COMPLETE CBC W/AUTO DIFF WBC: CPT

## 2017-06-16 PROCEDURE — 99232 SBSQ HOSP IP/OBS MODERATE 35: CPT | Mod: ,,, | Performed by: PSYCHIATRY & NEUROLOGY

## 2017-06-16 PROCEDURE — 80202 ASSAY OF VANCOMYCIN: CPT

## 2017-06-16 PROCEDURE — 99900035 HC TECH TIME PER 15 MIN (STAT)

## 2017-06-16 PROCEDURE — 20000000 HC ICU ROOM

## 2017-06-16 PROCEDURE — 83520 IMMUNOASSAY QUANT NOS NONAB: CPT

## 2017-06-16 PROCEDURE — 82533 TOTAL CORTISOL: CPT | Mod: 91

## 2017-06-16 PROCEDURE — 36415 COLL VENOUS BLD VENIPUNCTURE: CPT

## 2017-06-16 PROCEDURE — 80053 COMPREHEN METABOLIC PANEL: CPT

## 2017-06-16 RX ORDER — COSYNTROPIN 0.25 MG/ML
0.25 INJECTION, POWDER, FOR SOLUTION INTRAMUSCULAR; INTRAVENOUS ONCE
Status: COMPLETED | OUTPATIENT
Start: 2017-06-16 | End: 2017-06-16

## 2017-06-16 RX ORDER — DEXMEDETOMIDINE HYDROCHLORIDE 4 UG/ML
0.2 INJECTION, SOLUTION INTRAVENOUS CONTINUOUS
Status: DISCONTINUED | OUTPATIENT
Start: 2017-06-16 | End: 2017-06-16

## 2017-06-16 RX ADMIN — STANDARDIZED SENNA CONCENTRATE AND DOCUSATE SODIUM 1 TABLET: 8.6; 5 TABLET, FILM COATED ORAL at 08:06

## 2017-06-16 RX ADMIN — ASPIRIN 325 MG ORAL TABLET 325 MG: 325 PILL ORAL at 08:06

## 2017-06-16 RX ADMIN — ACYCLOVIR SODIUM 790 MG: 50 INJECTION, SOLUTION INTRAVENOUS at 07:06

## 2017-06-16 RX ADMIN — Medication 1 TABLET: at 08:06

## 2017-06-16 RX ADMIN — LEVOTHYROXINE SODIUM ANHYDROUS 40 MCG: 100 INJECTION, POWDER, LYOPHILIZED, FOR SOLUTION INTRAVENOUS at 08:06

## 2017-06-16 RX ADMIN — VANCOMYCIN HYDROCHLORIDE 1500 MG: 100 INJECTION, POWDER, LYOPHILIZED, FOR SOLUTION INTRAVENOUS at 10:06

## 2017-06-16 RX ADMIN — FAMOTIDINE 20 MG: 20 TABLET, FILM COATED ORAL at 08:06

## 2017-06-16 RX ADMIN — HEPARIN SODIUM 5000 UNITS: 5000 INJECTION, SOLUTION INTRAVENOUS; SUBCUTANEOUS at 10:06

## 2017-06-16 RX ADMIN — CEFEPIME HYDROCHLORIDE 1 G: 1 INJECTION, SOLUTION INTRAVENOUS at 05:06

## 2017-06-16 RX ADMIN — CHLORHEXIDINE GLUCONATE 15 ML: 1.2 RINSE ORAL at 11:06

## 2017-06-16 RX ADMIN — Medication 12.5 MG: at 08:06

## 2017-06-16 RX ADMIN — HEPARIN SODIUM 5000 UNITS: 5000 INJECTION, SOLUTION INTRAVENOUS; SUBCUTANEOUS at 02:06

## 2017-06-16 RX ADMIN — ACETAMINOPHEN 650 MG: 325 TABLET ORAL at 03:06

## 2017-06-16 RX ADMIN — HEPARIN SODIUM 5000 UNITS: 5000 INJECTION, SOLUTION INTRAVENOUS; SUBCUTANEOUS at 05:06

## 2017-06-16 RX ADMIN — COSYNTROPIN 0.25 MG: 0.25 INJECTION, POWDER, LYOPHILIZED, FOR SOLUTION INTRAVENOUS at 10:06

## 2017-06-16 RX ADMIN — ACYCLOVIR SODIUM 790 MG: 50 INJECTION, SOLUTION INTRAVENOUS at 11:06

## 2017-06-16 RX ADMIN — Medication 25 MCG/HR: at 07:06

## 2017-06-16 RX ADMIN — CEFEPIME HYDROCHLORIDE 1 G: 1 INJECTION, SOLUTION INTRAVENOUS at 06:06

## 2017-06-16 RX ADMIN — CHLORHEXIDINE GLUCONATE 15 ML: 1.2 RINSE ORAL at 05:06

## 2017-06-16 RX ADMIN — THIAMINE HCL TAB 100 MG 100 MG: 100 TAB at 08:06

## 2017-06-16 RX ADMIN — CHLORHEXIDINE GLUCONATE 15 ML: 1.2 RINSE ORAL at 12:06

## 2017-06-16 NOTE — HPI
72 year old male with PMHx CAD, skin cancer, hypothyroidism, and HLP who was transferred from OSH for further evaluation and management of rapidly progressive ascending paralysis.  No family present during exam, history obtained primarily from chart.  Patient was on a camping trip to [place] and [place] recently. When he returned he started to have paraesthesias of the feet and hand that quickly progressed to legs weakness. No clear hx of tick bites or skin lesions.  Additionally, a few days prior to admission to OSH he had prostate biopsy and was treated with cipro.  Pt presented to OSH and was diagnosed with GBS and was started on IVIG, he completed 5 days of treatment which ended on 6/12/17.  Pt also being treated for pneumonia.  After admission his weakness got worse to the point that he had to be intubated.  Pt has had poor neurologic exam since.   - LP was done in OSH and was reported as 0 rbc, 0 wbc, protein 34, glucose 97.  Lyme ab panel was sent and results are pending.  MRI Brain done today shows non enhancing lesion abutting the right optic nerve.  Neurosurgery consulted for evaluation.

## 2017-06-16 NOTE — ASSESSMENT & PLAN NOTE
72 male with acute ascending paralysis, newly found sphenoid mass, and B/L ICA aneursyms  - Pt with poor neurologic status  - Doubtful that sphenoid lesion is cause of patients poor neurologic condition.  No evidence of acute hemorrhage or aneurysm rupture.  No neurosurgical interventions indicated at this time, would consider further work up with angiogram if patients condition improves  - Continue current management / work up per [facility] and neurology  - Paraneoplastic panel, GQ1b and lyme panel pending  - MRI C/T spine ordered  - Will continue to follow, please call with questions  - Discussed with Dr. Gomes

## 2017-06-16 NOTE — PLAN OF CARE
Problem: Patient Care Overview  Goal: Plan of Care Review  Outcome: Ongoing (interventions implemented as appropriate)  POC reviewed with pt and spouse at 0500. Spouse verbalized understanding. Questions and concerns addressed. No acute events overnight. Pt progressing toward goals. Will continue to monitor. See flowsheets for full assessment and VS info

## 2017-06-16 NOTE — SUBJECTIVE & OBJECTIVE
Interval History: NAEON    Medications:  Continuous Infusions:   sodium chloride 0.9% 75 mL/hr at 17 1000     Scheduled Meds:   acyclovir  10 mg/kg Intravenous Q8H    aspirin  325 mg Per OG tube Daily    ceFEPime (MAXIPIME) IVPB  1 g Intravenous Q12H    chlorhexidine  15 mL Mouth/Throat QID    famotidine  20 mg Per OG tube BID    folic acid-vit B6-vit B12 2.5-25-2 mg  1 tablet Oral Daily    heparin (porcine)  5,000 Units Subcutaneous Q8H    levothyroxine  40 mcg Intravenous Daily    metoprolol tartrate  12.5 mg Per OG tube BID    senna-docusate 8.6-50 mg  1 tablet Per OG tube Daily    thiamine  100 mg Oral Daily    vancomycin (VANCOCIN) IVPB  1,500 mg Intravenous Q12H     PRN Meds:acetaminophen, hydrALAZINE, ondansetron     Review of Systems  Objective:     Weight: 79.1 kg (174 lb 6.1 oz)  Body mass index is 23.01 kg/m².  Vital Signs (Most Recent):  Temp: 99.2 °F (37.3 °C) (17 0702)  Pulse: 79 (17 1000)  Resp: 18 (17 1000)  BP: (!) 155/91 (17 1000)  SpO2: 98 % (17 1000) Vital Signs (24h Range):  Temp:  [98.5 °F (36.9 °C)-99.2 °F (37.3 °C)] 99.2 °F (37.3 °C)  Pulse:  [68-91] 79  Resp:  [18] 18  SpO2:  [96 %-100 %] 98 %  BP: (121-167)/(65-97) 155/91              Temp (24hrs), Av °F (37.2 °C), Min:98.5 °F (36.9 °C), Max:99.2 °F (37.3 °C)    Vent Mode: A/C  Oxygen Concentration (%):  [40] 40  Resp Rate Total:  [17 br/min-18 br/min] 17 br/min  Vt Set:  [550 mL] 550 mL  PEEP/CPAP:  [8 cmH20] 8 cmH20  Mean Airway Pressure:  [12 dmO78-25 cmH20] 12 cmH20      Date 17 0700 - 17 0659   Shift 4279-4266 1956-7284 4404-9208 24 Hour Total   I  N  T  A  K  E   I.V.  (mL/kg) 315  (4)   315  (4)    Shift Total  (mL/kg) 315  (4)   315  (4)   O  U  T  P  U  T   Urine  (mL/kg/hr) 170   170    Shift Total  (mL/kg) 170  (2.1)   170  (2.1)   Weight (kg) 79.1 79.1 79.1 79.1          NG/OG Tube 17 0130 (Active)   Placement Check placement verified by distal tube length  measurement 6/16/2017  7:01 AM   Distal Tube Length (cm) 65 6/16/2017  7:01 AM   Tolerance no signs/symptoms of discomfort 6/16/2017  7:01 AM   Securement taped to commercial device 6/16/2017  7:01 AM   Clamp Status/Tolerance clamped 6/16/2017  7:01 AM   Suction Setting/Drainage Method dependent drainage 6/15/2017  3:00 AM   Insertion Site Appearance no redness, warmth, tenderness, skin breakdown, drainage 6/15/2017  3:05 PM   Drainage None 6/15/2017  3:00 AM   Flush/Irrigation flushed w/;water;no resistance met 6/16/2017  7:01 AM   Intake (mL) 75 mL 6/15/2017  8:05 AM   Residual Amount (ml) 0 ml 6/15/2017  7:05 AM       Male External Urinary Catheter 06/14/17 0003 Medium (Active)   Collection Container Urimeter 6/16/2017  7:01 AM   Securement Method secured to top of thigh w/ tape 6/16/2017  7:01 AM   Skin no redness 6/16/2017  7:01 AM   Tolerance no signs/symptoms of discomfort 6/16/2017  7:01 AM   Output (mL) 35 mL 6/16/2017  9:00 AM       Neurosurgery Physical Exam   E1VTM1  PERRL, no corneals, no cough/gag  No response to deep pain.     Significant Labs:    Recent Labs  Lab 06/16/17  0213   *      K 4.2      CO2 29   BUN 35*   CREATININE 0.7   CALCIUM 8.2*   MG 2.7*       Recent Labs  Lab 06/15/17  0242 06/16/17  0213   WBC 8.77 10.71   HGB 11.6* 11.6*   HCT 35.3* 36.4*    230     No results for input(s): LABPT, INR, APTT in the last 48 hours.  Microbiology Results (last 7 days)     Procedure Component Value Units Date/Time    Culture, Respiratory with Gram Stain [[id number]]  (Susceptibility) Collected:  06/14/17 0500    Order Status:  Completed Specimen:  Respiratory from Endotracheal Aspirate Updated:  06/16/17 1030     Respiratory Culture --     PSEUDOMONAS AERUGINOSA  Few       Gram Stain (Respiratory) <10 epithelial cells per low power field.     Gram Stain (Respiratory) Moderate WBC's     Gram Stain (Respiratory) Few Gram negative rods    Blood culture [[id number]] Collected:   06/14/17 0336    Order Status:  Completed Specimen:  Blood from Peripheral, Wrist, Left Updated:  06/16/17 0812     Blood Culture, Routine No Growth to date     Blood Culture, Routine No Growth to date     Blood Culture, Routine No Growth to date            Significant Diagnostics:

## 2017-06-16 NOTE — PROCEDURES
ICU EEG/VIDEO MONITORING REPORT    Rosalio Cam  [id number]  1945    DATE OF SERVICE: 6/15-16/17    DATE OF ADMISSION: 6/14/2017 12:01 AM    ADMITTING PROVIDER: Pepe Salgado MD    REASON FOR CONSULT: 71yo M with multiple medical co-morbidities, admitted with GBS.    MEDICATIONS:   Current Facility-Administered Medications   Medication    0.9%  NaCl infusion    acetaminophen tablet 650 mg    acyclovir (ZOVIRAX) 791 mg in dextrose 5 % 250 mL IVPB    aspirin tablet 325 mg    cefepime in dextrose 5 % 1 gram/50 mL IVPB 1 g    chlorhexidine 0.12 % solution 15 mL    cosyntropin injection 0.25 mg    famotidine tablet 20 mg    folic acid-vit B6-vit B12 2.5-25-2 mg tablet 1 tablet    heparin (porcine) injection 5,000 Units    hydrALAZINE injection 10 mg    levothyroxine injection 40 mcg    metoprolol tartrate (LOPRESSOR) split tablet 12.5 mg    ondansetron injection 4 mg    senna-docusate 8.6-50 mg per tablet 1 tablet    thiamine tablet 100 mg    vancomycin (VANCOCIN) 1,500 mg in dextrose 5 % 250 mL IVPB     METHODOLOGY   Electroencephalographic (EEG) recording is with electrodes placed according to the International 10-20 placement system.  Thirty two (32) channels of digital signal are simultaneously recorded from the scalp and may include EKG, EMG, and/or eye monitors.   Recording band pass was 0.1 to 512 hz.  Digital video recording of the patient is simultaneously recorded with the EEG.  The nursing staff report clinical symptoms and may press an event button when the patient has symptoms of clinical interest to the treating physicians.  EEG and video recording is stored and archived in digital format.  The entire recording is visually reviewed and the times identified by computer analysis as being spikes or seizures are reviewed again.  Activation procedures which include photic stimulation, hyperventilation and instructing patients to perform simple task are done in selected patients.    Compresses spectral analysis (CSA) is also performed on the activity recorded from each individual channel.  This is displayed as a power display of frequencies from 0 to 30 Hz over time.   The CSA analysis is done and displayed continuously.  This is reviewed for asymmetries in power between homologous areas of the scalp and for presence of changes in power which canbe seen when seizures occur.  Sections of suspected abnormalities on the CSA is then compared with the original EEG recording.     [employer] software was also utilized in the review of this study.  This software suite analyzes the EEG recording in multiple domains.  Coherence and rhythmicity is computed to identify EEG sections which may contain organized seizures.  Each channel undergoes analysis to detect presence of spike and sharp waves which have special and morphological characteristic of epileptic activity.  The routine EEG recording is converted from spacial into frequency domain.  This is then displayed comparing homologous areas to identify areas of significant asymmetry.  Algorithm to identify non-cortically generated artifact is used to separate eye movement, EMG and other artifact from the EEG.      Recording Times  Start on 6/15/17, 14:39  Stop on 6/16/17, 10:38    A total of 19 hours and 48 minutes of EEG was recorded.    EEG FINDINGS  Background activity:   The background rhythm was characterized by low voltage (<10uV) delta-theta (2-6Hz) activity   No posterior dominant rhythm seen.   Symmetry and continuity: the background was continuous and symmetric    Sleep:   Not seen.    Abnormal activity:   No epileptiform discharges, periodic discharges, lateralized rhythmic delta activity or electrographic seizures were seen.  Irregular heart rate noted on EKG.    IMPRESSION:   This is an abnormal C-EEG due to the low amplitude moderate to severe generalized slowing seen, suggestive of underlying moderate to severe diffuse or multifocal  cerebral dysfunction.    No epileptiform activity or electrographic seizures seen.    Irregular heart rate noted on EKG.  CLINICAL CORRELATION IS RECOMMENDED.    Brissa Saucedo MD, MBA(), [facility].  Neurology-Epilepsy.

## 2017-06-16 NOTE — PROGRESS NOTES
Patient ordered for spinal MRI, MRI department contacted by this nurse to arrange a time to come down. MRI tech states that they will not be able to scan patient until after shift change. SCARLETT Gonzalez notified. Will continue to monitor.

## 2017-06-16 NOTE — PROGRESS NOTES
Progress Note      Reason for Consult:  Possible GBS     SUBJECTIVE:     Interval History:   No acute events overnight. No change in his condition from yesterday.     History of Present Illness: (From chart)  72 year old male with PMH significant for CHARLES, CAD, skin cancer, hypothyroidism, and HLP who was transferred from OSH for further evaluation and management of rapidly progressive ascending paralysis.   The patient was in a trip to [facility] recently included hiking and camping. When he got back he started to have tinglings of the feet and hand that quickly progressed to legs weakness. No clear hx of tick bites or skin lesions. Off note, few days prior to admission to OSH he had prostate biopsy and was treated with cipro. In OSH he was diagnosed with GBS and was started on IVIG. After admission his weakness got worse to the point that he had to be intubated      - He completed 5 days of IVIG on 6/12/17  - LP was done in OSH and was reported as 0 rbc, 0 wbc, protein 34, glucose 97  - Lyme ab panel was sent and results are pending.  - He was diagnosed with pneumonia and started on abx  - Per family he had an MRI brain. cspine and T spine done in OSH and was unremarkable     History reviewed. No pertinent past medical history.  No past surgical history on file.  No family history on file.  Social History   Substance Use Topics    Smoking status: Unknown If Ever Smoked    Smokeless tobacco: Not on file    Alcohol use Not on file       Review of Systems:  Unable to assess     OBJECTIVE:     Vital Signs:  Temp:  [99 °F (37.2 °C)-99.5 °F (37.5 °C)]   Pulse:  [71-90]   Resp:  [18]   BP: (121-170)/(69-94)   SpO2:  [96 %-98 %]     Physical Exam:  GA: Alert, comfortable, no acute distress.   HEENT: No scleral icterus or JVD.   Pulmonary: Clear to auscultation A/P/L. No wheezing, crackles, or rhonchi.  Cardiac: RRR S1 & S2 w/o rubs/murmurs/gallops.   Abdominal: Bowel sounds present x 4. No appreciable  hepatosplenomegaly.  Skin: No jaundice, rashes, or visible lesions.    Neurologic Exam:   Mental status: In coma. No response on verbal or painful stimulus    Cranial nerves: Pupil B/L equal Sluggish reactive, corneal negative, gag negative, oculocephalic negative.   Sensory: Unable to assess. No response to pain    Motor:Not moving any extremity. No response to pain   Reflexes: Areflexia all over except right ankle reflex 1+, L3 1+ B/L  and C4 1+ B/L   Coordination: Unable to assess  Gait: Unable to assess     Laboratory:  CBC:     Recent Labs  Lab 06/16/17 0213   WBC 10.71   RBC 3.58*   HGB 11.6*   HCT 36.4*      *   MCH 32.4*   MCHC 31.9*     CMP:     Recent Labs  Lab 06/16/17 0213   *   CALCIUM 8.2*   ALBUMIN 2.0*   PROT 7.1      K 4.2   CO2 29      BUN 35*   CREATININE 0.7   ALKPHOS 64   ALT 14   AST 28   BILITOT 0.4       Diagnostic Results:  MRI Brain:  Bilateral supraclinoid internal carotid artery aneurysms.  On the right there is a 10 mm aneurysm in separate 2 mm aneurysm.  On the left the supraclinoid ICA aneurysm measures about 7 x 6 mm.  3.6 cm nonenhancing mass centered over the right greater and lesser wings of the sphenoid abutting the ICA, A1 and M1 segments, displacing the right optic nerve.  Differential considerations include a thrombosed aneurysm associated with right ICA aneurysm aneurysm,  exophytic hamartoma, or or an exophytic nonenhancing primary brain tumor such as low-grade glioma or oligodendroglioma.    Further investigation to rule out thrombosed aneurysm could be obtained with conventional angiography if clinically warranted.    ASSESSMENT/PLAN:     72 year old male with PMH significant for CHARLES, CAD, skin cancer, hypothyroidism, and HLP who was transferred from History of ascending numbness, tingling and weakness over 24-48 hours, rapidly progressive, accompanied by radicular back pain. Then comatose since last Friday.    -- MRI Brain: 3.6 cm  nonenhancing mass centered over the right greater and lesser wings of the sphenoid abutting the ICA, A1 and M1 segments, displacing the right optic nerve. ?? primary brain tumor such as low-grade glioma or oligodendroglioma.   -- OSH films, not clearly directly related to current syndrome.    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  (https://www.ncbi.nlm.nih.gov/pmc/articles/RNX4074717/pdf/uhfvfkr34752-0957.pdf)    (https://[email or web address]/backfile/Content_public/Journal/[email or web address]/10/[email or web address]/brain/[email or web address]?Rutymsm=[id number]&Signature=EnNUfS-IxoG6FNHtnO45Gj0k2dCUcHk~oXhP1IrIS1q4xhhK7JsU6ixG3vt2acfKFwQS7zS2InfBqZn9c567oVI2ooSttSF7x3gYzPljQFD2DY74n~YwIYc1OAiQP0nhcPaxhhKIAmdPsfPYDG59-eV-wO8-zZmxe9Iu20MenTGMKdZT0s6ADGTOoS8WtKlRMHzNS-JOKesZkuVaMivS5OcqdqGmfaJUFWY2MLk~ilutwRUMalohdcIJsn4-eVKstCcmaZn7oLFLH7l~tkfXdwaFk1drT4nl7L1ULxm96161GMu-ufmV~T1GwuREdEz2zoxgOUApvX6KqSzswtp6UQ__&Elizabeth-Pair-Id=[id number])    -- Neoplastic/Paraneoplastic process considering mass on brain MRI concerning for primary brain tumor   -- Rule out Lyme disease   -- Rule out viral pathology - Zika - herpes vs other   -- Could be just due to long sedation that was OSH       Plan:     -- Continue acyclovir   -- Consider LP and send basic lab Plus autoimmune encephalitis panel and viral panel in CSF  -- Follow MRI C and T spine with and without contrast   -- Consider CT Chest and abdomen pelvis to rule out primary source of tumor     -- Recommend solumedrol 1 g daily for 5 days if no improvement by tomorrow morning   -- Recommend PLEX for 5 days if no improvement by tomorrow morning    -- Follow paraneoplastic panel, GQ1b and lyme panel   -- Check for varicellic lesions on his back  -- Case discussed with Dr Chavez   -- Will follow        Navneet Castano MD  Neurology Resident   [facility]  [address]  [place] [address]  Pager: [phone]    ================  Patient seen and examined.  I agree with the history, exam, assessment and plan within the resident's note as stated above.  Note has been edited by me to reflect my work and changes.    Yusuf Chavez MD, MPH  Division of Movement and Memory Disorders  [facility]

## 2017-06-16 NOTE — PROCEDURES
[facility] [place] [facility]     PRELIMINARY C-EEG REPORT:  Review: 6/15/17, 14:39 (start) - 20:04     Generalized low voltage (<10uV) delta-theta (2-6Hz) activity seen throughout.  No epileptiform activity or electrographic seizures seen.  Irregular heart rate noted on EKG.    Full report to follow.  Will continue to monitor.     Thank you for involving us in the care of this patient.  Brissa Saucedo MD, YOVANA (), FACNS.  Neurology-Epilepsy.

## 2017-06-16 NOTE — PROGRESS NOTES
[facility]  Neurosurgery  Progress Note    Subjective:     History of Present Illness: 72 year old male with PMHx CAD, skin cancer, hypothyroidism, and HLP who was transferred from OSH for further evaluation and management of rapidly progressive ascending paralysis.  No family present during exam, history obtained primarily from chart.  Patient was on a camping trip to [place] and [place] recently. When he returned he started to have paraesthesias of the feet and hand that quickly progressed to legs weakness. No clear hx of tick bites or skin lesions.  Additionally, a few days prior to admission to OSH he had prostate biopsy and was treated with cipro.  Pt presented to OSH and was diagnosed with GBS and was started on IVIG, he completed 5 days of treatment which ended on 6/12/17.  Pt also being treated for pneumonia.  After admission his weakness got worse to the point that he had to be intubated.  Pt has had poor neurologic exam since.   - LP was done in OSH and was reported as 0 rbc, 0 wbc, protein 34, glucose 97.  Lyme ab panel was sent and results are pending.  MRI Brain done today shows non enhancing lesion abutting the right optic nerve.  Neurosurgery consulted for evaluation.             Post-Op Info:  * No surgery found *         Interval History: NAEON    Medications:  Continuous Infusions:   sodium chloride 0.9% 75 mL/hr at 06/16/17 1000     Scheduled Meds:   acyclovir  10 mg/kg Intravenous Q8H    aspirin  325 mg Per OG tube Daily    ceFEPime (MAXIPIME) IVPB  1 g Intravenous Q12H    chlorhexidine  15 mL Mouth/Throat QID    famotidine  20 mg Per OG tube BID    folic acid-vit B6-vit B12 2.5-25-2 mg  1 tablet Oral Daily    heparin (porcine)  5,000 Units Subcutaneous Q8H    levothyroxine  40 mcg Intravenous Daily    metoprolol tartrate  12.5 mg Per OG tube BID    senna-docusate 8.6-50 mg  1 tablet Per OG tube Daily    thiamine  100 mg Oral Daily    vancomycin  (VANCOCIN) IVPB  1,500 mg Intravenous Q12H     PRN Meds:acetaminophen, hydrALAZINE, ondansetron     Review of Systems  Objective:     Weight: 79.1 kg (174 lb 6.1 oz)  Body mass index is 23.01 kg/m².  Vital Signs (Most Recent):  Temp: 99.2 °F (37.3 °C) (17 0702)  Pulse: 79 (17 1000)  Resp: 18 (17 1000)  BP: (!) 155/91 (17 1000)  SpO2: 98 % (17 1000) Vital Signs (24h Range):  Temp:  [98.5 °F (36.9 °C)-99.2 °F (37.3 °C)] 99.2 °F (37.3 °C)  Pulse:  [68-91] 79  Resp:  [18] 18  SpO2:  [96 %-100 %] 98 %  BP: (121-167)/(65-97) 155/91              Temp (24hrs), Av °F (37.2 °C), Min:98.5 °F (36.9 °C), Max:99.2 °F (37.3 °C)    Vent Mode: A/C  Oxygen Concentration (%):  [40] 40  Resp Rate Total:  [17 br/min-18 br/min] 17 br/min  Vt Set:  [550 mL] 550 mL  PEEP/CPAP:  [8 cmH20] 8 cmH20  Mean Airway Pressure:  [12 fhX06-82 cmH20] 12 cmH20      Date 17 07 - 17 0659   Shift 0951-7739 6341-6299 4883-2317 24 Hour Total   I  N  T  A  K  E   I.V.  (mL/kg) 315  (4)   315  (4)    Shift Total  (mL/kg) 315  (4)   315  (4)   O  U  T  P  U  T   Urine  (mL/kg/hr) 170   170    Shift Total  (mL/kg) 170  (2.1)   170  (2.1)   Weight (kg) 79.1 79.1 79.1 79.1          NG/OG Tube 17 0130 (Active)   Placement Check placement verified by distal tube length measurement 2017  7:01 AM   Distal Tube Length (cm) 65 2017  7:01 AM   Tolerance no signs/symptoms of discomfort 2017  7:01 AM   Securement taped to commercial device 2017  7:01 AM   Clamp Status/Tolerance clamped 2017  7:01 AM   Suction Setting/Drainage Method dependent drainage 6/15/2017  3:00 AM   Insertion Site Appearance no redness, warmth, tenderness, skin breakdown, drainage 6/15/2017  3:05 PM   Drainage None 6/15/2017  3:00 AM   Flush/Irrigation flushed w/;water;no resistance met 2017  7:01 AM   Intake (mL) 75 mL 6/15/2017  8:05 AM   Residual Amount (ml) 0 ml 6/15/2017  7:05 AM       Male External Urinary  Catheter 06/14/17 0003 Medium (Active)   Collection Container Urimeter 6/16/2017  7:01 AM   Securement Method secured to top of thigh w/ tape 6/16/2017  7:01 AM   Skin no redness 6/16/2017  7:01 AM   Tolerance no signs/symptoms of discomfort 6/16/2017  7:01 AM   Output (mL) 35 mL 6/16/2017  9:00 AM       Neurosurgery Physical Exam   E1VTM1  PERRL, no corneals, no cough/gag  No response to deep pain.     Significant Labs:    Recent Labs  Lab 06/16/17  0213   *      K 4.2      CO2 29   BUN 35*   CREATININE 0.7   CALCIUM 8.2*   MG 2.7*       Recent Labs  Lab 06/15/17  0242 06/16/17 0213   WBC 8.77 10.71   HGB 11.6* 11.6*   HCT 35.3* 36.4*    230     No results for input(s): LABPT, INR, APTT in the last 48 hours.  Microbiology Results (last 7 days)     Procedure Component Value Units Date/Time    Culture, Respiratory with Gram Stain [[id number]]  (Susceptibility) Collected:  06/14/17 0500    Order Status:  Completed Specimen:  Respiratory from Endotracheal Aspirate Updated:  06/16/17 1030     Respiratory Culture --     PSEUDOMONAS AERUGINOSA  Few       Gram Stain (Respiratory) <10 epithelial cells per low power field.     Gram Stain (Respiratory) Moderate WBC's     Gram Stain (Respiratory) Few Gram negative rods    Blood culture [[id number]] Collected:  06/14/17 0336    Order Status:  Completed Specimen:  Blood from Peripheral, Wrist, Left Updated:  06/16/17 0812     Blood Culture, Routine No Growth to date     Blood Culture, Routine No Growth to date     Blood Culture, Routine No Growth to date            Significant Diagnostics:      Assessment/Plan:     Flaccid quadriplegia    72 male with acute ascending paralysis, newly found sphenoid mass, and B/L ICA aneursyms  - Pt with poor neurologic status  - Doubtful that sphenoid lesion is cause of patients poor neurologic condition.  No evidence of acute hemorrhage or aneurysm rupture.  No neurosurgical interventions indicated at this time, would  consider further work up with angiogram if patients condition improves  - Continue current management / work up per [facility] and neurology  - Paraneoplastic panel, GQ1b and lyme panel pending  - Fu MRI C/T spine  - Consider repeating LP.                  Thai King, DO  Neurosurgery  [facility]

## 2017-06-16 NOTE — HOSPITAL COURSE
6/16: Remains intubated and obtunded.  6/17: MRI C/T/L spine  6/18: Received PLEX yesterday  6/19: LP done yesterday with elevated opening pressure of 28.  6/21: continued ICU care.   6/23: started on versed overnight.

## 2017-06-16 NOTE — ASSESSMENT & PLAN NOTE
72 male with acute ascending paralysis, newly found sphenoid mass, and B/L ICA aneursyms  - Pt with poor neurologic status  - Doubtful that sphenoid lesion is cause of patients poor neurologic condition.  No evidence of acute hemorrhage or aneurysm rupture.  No neurosurgical interventions indicated at this time, would consider further work up with angiogram if patients condition improves  - Continue current management / work up per Ncc and neurology  - Paraneoplastic panel, GQ1b and lyme panel pending  - Fu MRI C/T spine  - Consider repeating LP.

## 2017-06-16 NOTE — PROGRESS NOTES
ICU Progress Note  Neurocritical Care    Admit Date: 6/14/2017  LOS: 2    CC: Guillain-Stringer syndrome    Code Status: Full Code     SUBJECTIVE:     Interval History/Significant Events:   No chaghe in exam  MRI scan findings noted and discussed in detail with neurology and neurosurgery  Adult ftt  Encephalitis  adult ftt  paralyzed right al-diaphragm  Elect. abnl      Medications:  Continuous Infusions:   sodium chloride 0.9% 75 mL/hr at 06/16/17 0900    dexmedetomidine (PRECEDEX) infusion       Scheduled Meds:   aspirin  325 mg Per OG tube Daily    ceFEPime (MAXIPIME) IVPB  1 g Intravenous Q12H    chlorhexidine  15 mL Mouth/Throat QID    famotidine  20 mg Per OG tube BID    folic acid-vit B6-vit B12 2.5-25-2 mg  1 tablet Oral Daily    heparin (porcine)  5,000 Units Subcutaneous Q8H    levothyroxine  40 mcg Intravenous Daily    metoprolol tartrate  12.5 mg Per OG tube BID    senna-docusate 8.6-50 mg  1 tablet Per OG tube Daily    thiamine  100 mg Oral Daily    vancomycin (VANCOCIN) IVPB  1,500 mg Intravenous Q12H     PRN Meds:.acetaminophen, hydrALAZINE, ondansetron    OBJECTIVE:   Vital Signs (Most Recent):   Temp: 99.2 °F (37.3 °C) (06/16/17 0702)  Pulse: 85 (06/16/17 0916)  Resp: 18 (06/16/17 0916)  BP: (!) 152/86 (06/16/17 0900)  SpO2: 98 % (06/16/17 0916)    Vital Signs (24h Range):   Temp:  [98.5 °F (36.9 °C)-99.2 °F (37.3 °C)] 99.2 °F (37.3 °C)  Pulse:  [] 85  Resp:  [18] 18  SpO2:  [96 %-100 %] 98 %  BP: (121-167)/(65-97) 152/86    ICP/CPP (Last 24h):        I & O (Last 24h):   Intake/Output Summary (Last 24 hours) at 06/16/17 1002  Last data filed at 06/16/17 0900   Gross per 24 hour   Intake             2405 ml   Output             2375 ml   Net               30 ml     Physical Exam:  GA: Alert, comfortable, no acute distress.   HEENT: No scleral icterus or JVD.   Pulmonary: Clear to auscultation A/P/L. No wheezing, crackles, or rhonchi.  Cardiac: RRR S1 & S2 w/o rubs/murmurs/gallops.    Abdominal: Bowel sounds present x 4. No appreciable hepatosplenomegaly.  Skin: No jaundice, rashes, or visible lesions.  Neuro:  Pin point pupils   Sluggish reaction  Not moving any extremity to noxious stimuli  No breathing above the vent        Vent Data:   Vent Mode: A/C  Oxygen Concentration (%):  [40] 40  Resp Rate Total:  [17 br/min-18 br/min] 17 br/min  Vt Set:  [550 mL] 550 mL  PEEP/CPAP:  [8 cmH20] 8 cmH20  Mean Airway Pressure:  [12 ayN03-48 cmH20] 12 cmH20    Lines/Drains/Airway:   R brachial PICC : ? days       Airway - Non-Surgical 06/13/17 Endotracheal Tube (Active)   Secured at 24 cm 6/16/2017  8:45 AM   Measured At Lips 6/16/2017  8:45 AM   Secured Location Center 6/15/2017  8:31 PM   Secured by Commercial tube manuel 6/16/2017  3:00 AM   Bite Block none 6/15/2017  8:31 PM   Site Condition Dry 6/15/2017  8:31 PM   Status Intact;Secured;Patent 6/15/2017  8:31 PM   Site Assessment Clean;Dry;No bleeding 6/15/2017  8:31 PM   Cuff Pressure 28 cm H2O 6/15/2017  8:31 PM           PICC Double Lumen right brachial (Active)   Site Assessment Clean;Dry;Intact 6/16/2017  7:01 AM   Lumen 1 Status Infusing 6/16/2017  7:01 AM   Lumen 2 Status Infusing 6/16/2017  7:01 AM   Dressing Type Transparent 6/16/2017  7:01 AM   Dressing Status Biopatch in place 6/16/2017  7:01 AM   Dressing Intervention Dressing reinforced 6/16/2017  3:00 AM   Dressing Change Due 06/21/17 6/16/2017  7:01 AM   Daily Line Review Performed 6/16/2017  7:01 AM             NG/OG Tube 06/09/17 0130 (Active)   Placement Check placement verified by distal tube length measurement 6/16/2017  7:01 AM   Distal Tube Length (cm) 65 6/16/2017  7:01 AM   Tolerance no signs/symptoms of discomfort 6/16/2017  7:01 AM   Securement taped to commercial device 6/16/2017  7:01 AM   Clamp Status/Tolerance clamped 6/16/2017  7:01 AM   Suction Setting/Drainage Method dependent drainage 6/15/2017  3:00 AM   Insertion Site Appearance no redness, warmth, tenderness,  skin breakdown, drainage 6/15/2017  3:05 PM   Drainage None 6/15/2017  3:00 AM   Flush/Irrigation flushed w/;water;no resistance met 6/16/2017  7:01 AM   Intake (mL) 75 mL 6/15/2017  8:05 AM   Residual Amount (ml) 0 ml 6/15/2017  7:05 AM       Male External Urinary Catheter 06/14/17 0003 Medium (Active)   Collection Container Urimeter 6/16/2017  7:01 AM   Securement Method secured to top of thigh w/ tape 6/16/2017  7:01 AM   Skin no redness 6/16/2017  7:01 AM   Tolerance no signs/symptoms of discomfort 6/16/2017  7:01 AM   Output (mL) 35 mL 6/16/2017  9:00 AM     Nutrition/Tube Feeds (if NPO state why): start t feeds after MRI      Labs:  ABG:   Recent Labs  Lab 06/15/17  1326   PH 7.462*   PO2 134*   PCO2 42.7   HCO3 30.5*   POCSATURATED 99   BE 7     BMP:  Recent Labs  Lab 06/16/17  0213      K 4.2      CO2 29   BUN 35*   CREATININE 0.7   *   MG 2.7*   PHOS 4.0     LFT: Lab Results   Component Value Date    AST 28 06/16/2017    ALT 14 06/16/2017    ALKPHOS 64 06/16/2017    BILITOT 0.4 06/16/2017    ALBUMIN 2.0 (L) 06/16/2017    PROT 7.1 06/16/2017     CBC:   Lab Results   Component Value Date    WBC 10.71 06/16/2017    HGB 11.6 (L) 06/16/2017    HCT 36.4 (L) 06/16/2017     (H) 06/16/2017     06/16/2017     Microbiology x 7d:   Microbiology Results (last 7 days)     Procedure Component Value Units Date/Time    Blood culture [[id number]] Collected:  06/14/17 0336    Order Status:  Completed Specimen:  Blood from Peripheral, Wrist, Left Updated:  06/16/17 0812     Blood Culture, Routine No Growth to date     Blood Culture, Routine No Growth to date     Blood Culture, Routine No Growth to date    Culture, Respiratory with Gram Stain [[id number]] Collected:  06/14/17 0500    Order Status:  Completed Specimen:  Respiratory from Endotracheal Aspirate Updated:  06/15/17 0948     Respiratory Culture --     PRESUMPTIVE PSEUDOMONAS SPECIES  Few  Identification and susceptibility pending        Gram Stain (Respiratory) <10 epithelial cells per low power field.     Gram Stain (Respiratory) Moderate WBC's     Gram Stain (Respiratory) Few Gram negative rods        Imaging:  CXR: paralyzed right hemidiaphragm    I personally reviewed the above image.    ASSESSMENT/PLAN:     Active Hospital Problems    Diagnosis    *Guillain-[place] syndrome    Acute respiratory failure with hypoxia and hypercapnia    Juan coma scale total score 3-8    Hypovolemia    Flaccid quadriplegia    Brain aneurysm      Plan:  Add acy  MRI spine  LP  Start t feeds  D/c fentanyl  precedex if needed  Cortisol stim test    critically ill ; very complex case.  Time spent in management and discussion of case in with neurosurgery staff Dr Gomes and Neurology staff Dr. Chavez      Total time > 32 mins

## 2017-06-16 NOTE — SUBJECTIVE & OBJECTIVE
Prescriptions Prior to Admission   Medication Sig Dispense Refill Last Dose    clopidogrel (PLAVIX) 75 mg tablet Take 75 mg by mouth once daily.       levothyroxine (SYNTHROID) 75 MCG tablet Take 75 mcg by mouth once daily.       metoprolol succinate (TOPROL-XL) 25 MG 24 hr tablet Take 25 mg by mouth once daily.       omega-3 acid ethyl esters (LOVAZA) 1 gram capsule Take 2 g by mouth 2 (two) times daily.       pitavastatin 1 mg Tab tablet Take 1 mg by mouth once daily.          Review of patient's allergies indicates:  No Known Allergies    History reviewed. No pertinent past medical history.  No past surgical history on file.  Family History     None        Social History Main Topics    Smoking status: Unknown If Ever Smoked    Smokeless tobacco: Not on file    Alcohol use Not on file    Drug use: Unknown    Sexual activity: Not on file     Review of Systems   Unable to obtain 2/2 intubation/mental status  Objective:     Weight: 84.6 kg (186 lb 8.2 oz)  Body mass index is 24.61 kg/m².  Vital Signs (Most Recent):  Temp: 99 °F (37.2 °C) (06/15/17 1505)  Pulse: 69 (06/15/17 2056)  Resp: 18 (06/15/17 2056)  BP: (!) 160/96 (06/15/17 1805)  SpO2: 100 % (06/15/17 2056) Vital Signs (24h Range):  Temp:  [98.2 °F (36.8 °C)-99.8 °F (37.7 °C)] 99 °F (37.2 °C)  Pulse:  [] 69  Resp:  [18] 18  SpO2:  [97 %-100 %] 100 %  BP: (106-173)/() 160/96            Temp (24hrs), Av.9 °F (37.2 °C), Min:98.2 °F (36.8 °C), Max:99.8 °F (37.7 °C)    Vent Mode: A/C  Oxygen Concentration (%):  [40] 40  Resp Rate Total:  [18 br/min] 18 br/min  Vt Set:  [550 mL] 550 mL  PEEP/CPAP:  [8 cmH20] 8 cmH20  Mean Airway Pressure:  [12 zrG02-01 cmH20] 13 cmH20      Date 06/15/17 07 - 17 0659   Shift 4008-8179 2765-2494 7793-1946 24 Hour Total   I  N  T  A  K  E   I.V.  (mL/kg) 635  (7.5) 320  (3.8)  955  (11.3)    NG/   105    IV Piggyback 400 300  700    Shift Total  (mL/kg) 1140  (13.5) 620  (7.3)  1760  (20.8)    O  U  T  P  U  T   Urine  (mL/kg/hr) 300  (0.4) 700  1000    Shift Total  (mL/kg) 300  (3.5) 700  (8.3)  1000  (11.8)   Weight (kg) 84.6 84.6 84.6 84.6          NG/OG Tube 06/09/17 0130 (Active)   Placement Check placement verified by distal tube length measurement 6/15/2017  3:05 PM   Distal Tube Length (cm) 65 6/15/2017  7:05 AM   Tolerance no signs/symptoms of discomfort 6/15/2017  3:05 PM   Securement taped to commercial device 6/15/2017  3:05 PM   Clamp Status/Tolerance clamped;no emesis;no residual;no restlessness;no abdominal distention 6/15/2017  3:05 PM   Suction Setting/Drainage Method dependent drainage 6/15/2017  3:00 AM   Insertion Site Appearance no redness, warmth, tenderness, skin breakdown, drainage 6/15/2017  3:05 PM   Drainage None 6/15/2017  3:00 AM   Flush/Irrigation flushed w/;water;no resistance met 6/15/2017  3:05 PM   Intake (mL) 75 mL 6/15/2017  8:05 AM   Residual Amount (ml) 0 ml 6/15/2017  7:05 AM       Male External Urinary Catheter 06/14/17 0003 Medium (Active)   Collection Container Urimeter 6/15/2017  3:05 PM   Securement Method secured to top of thigh w/ adhesive device 6/15/2017  3:05 PM   Skin no redness;no breakdown 6/15/2017  3:05 PM   Tolerance no signs/symptoms of discomfort 6/15/2017  3:05 PM   Output (mL) 300 mL 6/15/2017  6:30 PM       Neurosurgery Physical Exam   General: no distress  Neurologic: Pt is intubated, off sedation  Head: normocephalic  GCS: Motor: 1/Verbal: 1/Eyes: 1  Cranial nerves: PERRL 3 -->2  No response to painful stimuli  Pronator Drift: unable to assess  Finger to nose unable to assess  Lungs:  On vent  Abdomen: soft  Extremities: no cyanosis or edema, or clubbing      Significant Labs:    Recent Labs  Lab 06/15/17  0242   *      K 3.9      CO2 30*   BUN 36*   CREATININE 0.7   CALCIUM 8.1*   MG 2.5       Recent Labs  Lab 06/14/17  0138 06/15/17  0242   WBC 12.29 8.77   HGB 11.9* 11.6*   HCT 35.8* 35.3*    218       Recent  Labs  Lab 06/14/17  0138   INR 1.2     Microbiology Results (last 7 days)     Procedure Component Value Units Date/Time    Culture, Respiratory with Gram Stain [[id number]] Collected:  06/14/17 0500    Order Status:  Completed Specimen:  Respiratory from Endotracheal Aspirate Updated:  06/15/17 0948     Respiratory Culture --     PRESUMPTIVE PSEUDOMONAS SPECIES  Few  Identification and susceptibility pending       Gram Stain (Respiratory) <10 epithelial cells per low power field.     Gram Stain (Respiratory) Moderate WBC's     Gram Stain (Respiratory) Few Gram negative rods    Blood culture [[id number]] Collected:  06/14/17 0336    Order Status:  Completed Specimen:  Blood from Peripheral, Wrist, Left Updated:  06/15/17 0812     Blood Culture, Routine No Growth to date     Blood Culture, Routine No Growth to date        Significant Diagnostics:  MRI: Mri Brain W Wo Contrast    Result Date: 6/15/2017  Bilateral supraclinoid internal carotid artery aneurysms.  On the right there is a 10 mm aneurysm in separate 2 mm aneurysm.  On the left the supraclinoid ICA aneurysm measures about 7 x 6 mm. 3.6 cm nonenhancing mass centered over the right greater and lesser wings of the sphenoid abutting the ICA, A1 and M1 segments, displacing the right optic nerve.  Differential considerations include a thrombosed aneurysm associated with right ICA aneurysm aneurysm,  exophytic hamartoma, or or an exophytic nonenhancing primary brain tumor such as low-grade glioma or oligodendroglioma. Further investigation to rule out thrombosed aneurysm could be obtained with conventional angiography if clinically warranted.   Electronically signed by: AMANDO BROCK MD Date: 06/15/17 Time: 13:45

## 2017-06-16 NOTE — CONSULTS
[facility]  Neurosurgery  Consult Note    Consults  Subjective:     Chief Complaint/Reason for Admission: Brain lesion    History of Present Illness: 72 year old male with PMHx CAD, skin cancer, hypothyroidism, and HLP who was transferred from OSH for further evaluation and management of rapidly progressive ascending paralysis.  No family present during exam, history obtained primarily from chart.  Patient was on a camping trip to [place] and [place] recently. When he returned he started to have paraesthesias of the feet and hand that quickly progressed to legs weakness. No clear hx of tick bites or skin lesions.  Additionally, a few days prior to admission to OSH he had prostate biopsy and was treated with cipro.  Pt presented to OSH and was diagnosed with GBS and was started on IVIG, he completed 5 days of treatment which ended on 6/12/17.  Pt also being treated for pneumonia.  After admission his weakness got worse to the point that he had to be intubated.  Pt has had poor neurologic exam since.   - LP was done in OSH and was reported as 0 rbc, 0 wbc, protein 34, glucose 97.  Lyme ab panel was sent and results are pending.  MRI Brain done today shows non enhancing lesion abutting the right optic nerve.  Neurosurgery consulted for evaluation.             Prescriptions Prior to Admission   Medication Sig Dispense Refill Last Dose    clopidogrel (PLAVIX) 75 mg tablet Take 75 mg by mouth once daily.       levothyroxine (SYNTHROID) 75 MCG tablet Take 75 mcg by mouth once daily.       metoprolol succinate (TOPROL-XL) 25 MG 24 hr tablet Take 25 mg by mouth once daily.       omega-3 acid ethyl esters (LOVAZA) 1 gram capsule Take 2 g by mouth 2 (two) times daily.       pitavastatin 1 mg Tab tablet Take 1 mg by mouth once daily.          Review of patient's allergies indicates:  No Known Allergies    History reviewed. No pertinent past medical history.  No past surgical history on  file.  Family History     None        Social History Main Topics    Smoking status: Unknown If Ever Smoked    Smokeless tobacco: Not on file    Alcohol use Not on file    Drug use: Unknown    Sexual activity: Not on file     Review of Systems   Unable to obtain 2/2 intubation/mental status  Objective:     Weight: 84.6 kg (186 lb 8.2 oz)  Body mass index is 24.61 kg/m².  Vital Signs (Most Recent):  Temp: 99 °F (37.2 °C) (06/15/17 1505)  Pulse: 69 (06/15/17 2056)  Resp: 18 (06/15/17 2056)  BP: (!) 160/96 (06/15/17 180)  SpO2: 100 % (06/15/17 2056) Vital Signs (24h Range):  Temp:  [98.2 °F (36.8 °C)-99.8 °F (37.7 °C)] 99 °F (37.2 °C)  Pulse:  [] 69  Resp:  [18] 18  SpO2:  [97 %-100 %] 100 %  BP: (106-173)/() 160/96            Temp (24hrs), Av.9 °F (37.2 °C), Min:98.2 °F (36.8 °C), Max:99.8 °F (37.7 °C)    Vent Mode: A/C  Oxygen Concentration (%):  [40] 40  Resp Rate Total:  [18 br/min] 18 br/min  Vt Set:  [550 mL] 550 mL  PEEP/CPAP:  [8 cmH20] 8 cmH20  Mean Airway Pressure:  [12 rcB31-08 cmH20] 13 cmH20      Date 06/15/17 07 - 17 0659   Shift 9385-4989 3938-6543 8571-3627 24 Hour Total   I  N  T  A  K  E   I.V.  (mL/kg) 635  (7.5) 320  (3.8)  955  (11.3)    NG/   105    IV Piggyback 400 300  700    Shift Total  (mL/kg) 1140  (13.5) 620  (7.3)  1760  (20.8)   O  U  T  P  U  T   Urine  (mL/kg/hr) 300  (0.4) 700  1000    Shift Total  (mL/kg) 300  (3.5) 700  (8.3)  1000  (11.8)   Weight (kg) 84.6 84.6 84.6 84.6          NG/OG Tube 17 0130 (Active)   Placement Check placement verified by distal tube length measurement 6/15/2017  3:05 PM   Distal Tube Length (cm) 65 6/15/2017  7:05 AM   Tolerance no signs/symptoms of discomfort 6/15/2017  3:05 PM   Securement taped to commercial device 6/15/2017  3:05 PM   Clamp Status/Tolerance clamped;no emesis;no residual;no restlessness;no abdominal distention 6/15/2017  3:05 PM   Suction Setting/Drainage Method dependent drainage 6/15/2017  3:00  AM   Insertion Site Appearance no redness, warmth, tenderness, skin breakdown, drainage 6/15/2017  3:05 PM   Drainage None 6/15/2017  3:00 AM   Flush/Irrigation flushed w/;water;no resistance met 6/15/2017  3:05 PM   Intake (mL) 75 mL 6/15/2017  8:05 AM   Residual Amount (ml) 0 ml 6/15/2017  7:05 AM       Male External Urinary Catheter 06/14/17 0003 Medium (Active)   Collection Container Urimeter 6/15/2017  3:05 PM   Securement Method secured to top of thigh w/ adhesive device 6/15/2017  3:05 PM   Skin no redness;no breakdown 6/15/2017  3:05 PM   Tolerance no signs/symptoms of discomfort 6/15/2017  3:05 PM   Output (mL) 300 mL 6/15/2017  6:30 PM       Neurosurgery Physical Exam   General: no distress  Neurologic: Pt is intubated, off sedation  Head: normocephalic  GCS: Motor: 1/Verbal: 1/Eyes: 1  Cranial nerves: PERRL 3 -->2  No response to painful stimuli  Pronator Drift: unable to assess  Finger to nose unable to assess  Lungs:  On vent  Abdomen: soft  Extremities: no cyanosis or edema, or clubbing      Significant Labs:    Recent Labs  Lab 06/15/17  0242   *      K 3.9      CO2 30*   BUN 36*   CREATININE 0.7   CALCIUM 8.1*   MG 2.5       Recent Labs  Lab 06/14/17  0138 06/15/17  0242   WBC 12.29 8.77   HGB 11.9* 11.6*   HCT 35.8* 35.3*    218       Recent Labs  Lab 06/14/17  0138   INR 1.2     Microbiology Results (last 7 days)     Procedure Component Value Units Date/Time    Culture, Respiratory with Gram Stain [[id number]] Collected:  06/14/17 0500    Order Status:  Completed Specimen:  Respiratory from Endotracheal Aspirate Updated:  06/15/17 0948     Respiratory Culture --     PRESUMPTIVE PSEUDOMONAS SPECIES  Few  Identification and susceptibility pending       Gram Stain (Respiratory) <10 epithelial cells per low power field.     Gram Stain (Respiratory) Moderate WBC's     Gram Stain (Respiratory) Few Gram negative rods    Blood culture [[id number]] Collected:  06/14/17 0336    Order  Status:  Completed Specimen:  Blood from Peripheral, Wrist, Left Updated:  06/15/17 0812     Blood Culture, Routine No Growth to date     Blood Culture, Routine No Growth to date        Significant Diagnostics:  MRI: Mri Brain W Wo Contrast    Result Date: 6/15/2017  Bilateral supraclinoid internal carotid artery aneurysms.  On the right there is a 10 mm aneurysm in separate 2 mm aneurysm.  On the left the supraclinoid ICA aneurysm measures about 7 x 6 mm. 3.6 cm nonenhancing mass centered over the right greater and lesser wings of the sphenoid abutting the ICA, A1 and M1 segments, displacing the right optic nerve.  Differential considerations include a thrombosed aneurysm associated with right ICA aneurysm aneurysm,  exophytic hamartoma, or or an exophytic nonenhancing primary brain tumor such as low-grade glioma or oligodendroglioma. Further investigation to rule out thrombosed aneurysm could be obtained with conventional angiography if clinically warranted.   Electronically signed by: AMANDO BROCK MD Date: 06/15/17 Time: 13:45     Assessment/Plan:     Flaccid quadriplegia    72 male with acute ascending paralysis, newly found sphenoid mass, and B/L ICA aneursyms  - Pt with poor neurologic status  - Doubtful that sphenoid lesion is cause of patients poor neurologic condition.  No evidence of acute hemorrhage or aneurysm rupture.  No neurosurgical interventions indicated at this time, would consider further work up with angiogram if patients condition improves  - Continue current management / work up per [facility] and neurology  - Paraneoplastic panel, GQ1b and lyme panel pending  - MRI C/T spine ordered  - Will continue to follow, please call with questions  - Discussed with TRACIE Moreno  Neurosurgery  [facility]

## 2017-06-17 LAB
ALBUMIN SERPL BCP-MCNC: 1.9 G/DL
ALLENS TEST: ABNORMAL
ALP SERPL-CCNC: 61 U/L
ALT SERPL W/O P-5'-P-CCNC: 26 U/L
ANION GAP SERPL CALC-SCNC: 4 MMOL/L
AST SERPL-CCNC: 40 U/L
BASOPHILS # BLD AUTO: 0.01 K/UL
BASOPHILS NFR BLD: 0.1 %
BILIRUB SERPL-MCNC: 0.4 MG/DL
BUN SERPL-MCNC: 34 MG/DL
CALCIUM SERPL-MCNC: 8.1 MG/DL
CHLORIDE SERPL-SCNC: 107 MMOL/L
CO2 SERPL-SCNC: 33 MMOL/L
CREAT SERPL-MCNC: 0.8 MG/DL
DELSYS: ABNORMAL
DIASTOLIC DYSFUNCTION: NO
DIFFERENTIAL METHOD: ABNORMAL
EOSINOPHIL # BLD AUTO: 0 K/UL
EOSINOPHIL NFR BLD: 0.4 %
ERYTHROCYTE [DISTWIDTH] IN BLOOD BY AUTOMATED COUNT: 13.6 %
ERYTHROCYTE [SEDIMENTATION RATE] IN BLOOD BY WESTERGREN METHOD: 18 MM/H
EST. GFR  (AFRICAN AMERICAN): >60 ML/MIN/1.73 M^2
EST. GFR  (NON AFRICAN AMERICAN): >60 ML/MIN/1.73 M^2
ESTIMATED PA SYSTOLIC PRESSURE: 10.76
FIO2: 40
GLOBAL PERICARDIAL EFFUSION: NORMAL
GLUCOSE SERPL-MCNC: 155 MG/DL
HCO3 UR-SCNC: 33.2 MMOL/L (ref 24–28)
HCT VFR BLD AUTO: 33 %
HGB BLD-MCNC: 10.8 G/DL
LYMPHOCYTES # BLD AUTO: 1.6 K/UL
LYMPHOCYTES NFR BLD: 20.2 %
MAGNESIUM SERPL-MCNC: 2.7 MG/DL
MCH RBC QN AUTO: 33.2 PG
MCHC RBC AUTO-ENTMCNC: 32.7 %
MCV RBC AUTO: 102 FL
MODE: ABNORMAL
MONOCYTES # BLD AUTO: 0.6 K/UL
MONOCYTES NFR BLD: 7.8 %
NEUTROPHILS # BLD AUTO: 5.4 K/UL
NEUTROPHILS NFR BLD: 69.6 %
PCO2 BLDA: 45.7 MMHG (ref 35–45)
PEEP: 8
PH SMN: 7.47 [PH] (ref 7.35–7.45)
PHOSPHATE SERPL-MCNC: 3.4 MG/DL
PLATELET # BLD AUTO: 223 K/UL
PMV BLD AUTO: 9.5 FL
PO2 BLDA: 96 MMHG (ref 80–100)
POC BE: 9 MMOL/L
POC SATURATED O2: 98 % (ref 95–100)
POC TCO2: 35 MMOL/L (ref 23–27)
POCT GLUCOSE: 169 MG/DL (ref 70–110)
POTASSIUM SERPL-SCNC: 4.4 MMOL/L
PROT SERPL-MCNC: 6.4 G/DL
RBC # BLD AUTO: 3.25 M/UL
RETIRED EF AND QEF - SEE NOTES: 50 (ref 55–65)
SAMPLE: ABNORMAL
SITE: ABNORMAL
SODIUM SERPL-SCNC: 144 MMOL/L
SP02: 99
T4 SERPL-MCNC: 5 UG/DL
TRICUSPID VALVE REGURGITATION: NORMAL
TSH SERPL DL<=0.005 MIU/L-ACNC: 3.48 UIU/ML
VANCOMYCIN TROUGH SERPL-MCNC: 8.6 UG/ML
VT: 550
WBC # BLD AUTO: 7.72 K/UL

## 2017-06-17 PROCEDURE — 25000003 PHARM REV CODE 250: Performed by: PSYCHIATRY & NEUROLOGY

## 2017-06-17 PROCEDURE — 25000003 PHARM REV CODE 250: Performed by: NURSE PRACTITIONER

## 2017-06-17 PROCEDURE — 63600175 PHARM REV CODE 636 W HCPCS: Performed by: NURSE PRACTITIONER

## 2017-06-17 PROCEDURE — 99232 SBSQ HOSP IP/OBS MODERATE 35: CPT | Mod: ,,, | Performed by: NEUROLOGICAL SURGERY

## 2017-06-17 PROCEDURE — 84443 ASSAY THYROID STIM HORMONE: CPT

## 2017-06-17 PROCEDURE — 6A551Z3 PHERESIS OF PLASMA, MULTIPLE: ICD-10-PCS | Performed by: PATHOLOGY

## 2017-06-17 PROCEDURE — 36415 COLL VENOUS BLD VENIPUNCTURE: CPT

## 2017-06-17 PROCEDURE — 84100 ASSAY OF PHOSPHORUS: CPT

## 2017-06-17 PROCEDURE — 83735 ASSAY OF MAGNESIUM: CPT

## 2017-06-17 PROCEDURE — 27200966 HC CLOSED SUCTION SYSTEM

## 2017-06-17 PROCEDURE — 27000221 HC OXYGEN, UP TO 24 HOURS

## 2017-06-17 PROCEDURE — 96365 THER/PROPH/DIAG IV INF INIT: CPT

## 2017-06-17 PROCEDURE — 36514 APHERESIS PLASMA: CPT

## 2017-06-17 PROCEDURE — 99233 SBSQ HOSP IP/OBS HIGH 50: CPT | Mod: ,,, | Performed by: PSYCHIATRY & NEUROLOGY

## 2017-06-17 PROCEDURE — 80202 ASSAY OF VANCOMYCIN: CPT

## 2017-06-17 PROCEDURE — 99900026 HC AIRWAY MAINTENANCE (STAT)

## 2017-06-17 PROCEDURE — 94761 N-INVAS EAR/PLS OXIMETRY MLT: CPT

## 2017-06-17 PROCEDURE — 80053 COMPREHEN METABOLIC PANEL: CPT

## 2017-06-17 PROCEDURE — 36514 APHERESIS PLASMA: CPT | Mod: ,,, | Performed by: PATHOLOGY

## 2017-06-17 PROCEDURE — 63600175 PHARM REV CODE 636 W HCPCS: Performed by: PSYCHIATRY & NEUROLOGY

## 2017-06-17 PROCEDURE — 82803 BLOOD GASES ANY COMBINATION: CPT

## 2017-06-17 PROCEDURE — 63600175 PHARM REV CODE 636 W HCPCS: Performed by: PATHOLOGY

## 2017-06-17 PROCEDURE — 99233 SBSQ HOSP IP/OBS HIGH 50: CPT | Mod: 25,,, | Performed by: ANESTHESIOLOGY

## 2017-06-17 PROCEDURE — 36600 WITHDRAWAL OF ARTERIAL BLOOD: CPT

## 2017-06-17 PROCEDURE — 25000003 PHARM REV CODE 250: Performed by: PATHOLOGY

## 2017-06-17 PROCEDURE — 84436 ASSAY OF TOTAL THYROXINE: CPT

## 2017-06-17 PROCEDURE — 80500 PR  LAB PATHOLOGY CONSULT-LTD: CPT | Mod: ,,, | Performed by: PATHOLOGY

## 2017-06-17 PROCEDURE — 20000000 HC ICU ROOM

## 2017-06-17 PROCEDURE — 85025 COMPLETE CBC W/AUTO DIFF WBC: CPT

## 2017-06-17 PROCEDURE — 96366 THER/PROPH/DIAG IV INF ADDON: CPT

## 2017-06-17 PROCEDURE — 93306 TTE W/DOPPLER COMPLETE: CPT | Mod: 26,,, | Performed by: INTERNAL MEDICINE

## 2017-06-17 PROCEDURE — 93306 TTE W/DOPPLER COMPLETE: CPT

## 2017-06-17 PROCEDURE — P9045 ALBUMIN (HUMAN), 5%, 250 ML: HCPCS | Performed by: PATHOLOGY

## 2017-06-17 RX ORDER — ALBUMIN HUMAN 50 G/1000ML
200 SOLUTION INTRAVENOUS ONCE
Status: COMPLETED | OUTPATIENT
Start: 2017-06-17 | End: 2017-06-17

## 2017-06-17 RX ORDER — ACYCLOVIR 200 MG/5ML
40 SUSPENSION, ORAL (FINAL DOSE FORM) ORAL DAILY
Status: DISCONTINUED | OUTPATIENT
Start: 2017-06-18 | End: 2017-06-18 | Stop reason: DRUGHIGH

## 2017-06-17 RX ORDER — HEPARIN SODIUM 1000 [USP'U]/ML
1000 INJECTION INTRAVENOUS; SUBCUTANEOUS
Status: DISCONTINUED | OUTPATIENT
Start: 2017-06-17 | End: 2017-07-14 | Stop reason: HOSPADM

## 2017-06-17 RX ORDER — MODAFINIL 100 MG/1
200 TABLET ORAL 2 TIMES DAILY
Status: DISCONTINUED | OUTPATIENT
Start: 2017-06-17 | End: 2017-06-19

## 2017-06-17 RX ORDER — HYDROCODONE BITARTRATE AND ACETAMINOPHEN 500; 5 MG/1; MG/1
TABLET ORAL
Status: ACTIVE | OUTPATIENT
Start: 2017-06-17 | End: 2017-06-18

## 2017-06-17 RX ADMIN — CHLORHEXIDINE GLUCONATE 15 ML: 1.2 RINSE ORAL at 06:06

## 2017-06-17 RX ADMIN — VANCOMYCIN HYDROCHLORIDE 1500 MG: 100 INJECTION, POWDER, LYOPHILIZED, FOR SOLUTION INTRAVENOUS at 09:06

## 2017-06-17 RX ADMIN — ACYCLOVIR SODIUM 790 MG: 50 INJECTION, SOLUTION INTRAVENOUS at 04:06

## 2017-06-17 RX ADMIN — ALBUMIN (HUMAN) 200 G: 12.5 SOLUTION INTRAVENOUS at 05:06

## 2017-06-17 RX ADMIN — MODAFINIL 200 MG: 100 TABLET ORAL at 09:06

## 2017-06-17 RX ADMIN — LEVOTHYROXINE SODIUM ANHYDROUS 40 MCG: 100 INJECTION, POWDER, LYOPHILIZED, FOR SOLUTION INTRAVENOUS at 09:06

## 2017-06-17 RX ADMIN — FAMOTIDINE 20 MG: 20 TABLET, FILM COATED ORAL at 09:06

## 2017-06-17 RX ADMIN — Medication 12.5 MG: at 09:06

## 2017-06-17 RX ADMIN — ASPIRIN 325 MG ORAL TABLET 325 MG: 325 PILL ORAL at 09:06

## 2017-06-17 RX ADMIN — CEFEPIME HYDROCHLORIDE 1 G: 1 INJECTION, SOLUTION INTRAVENOUS at 06:06

## 2017-06-17 RX ADMIN — CHLORHEXIDINE GLUCONATE 15 ML: 1.2 RINSE ORAL at 07:06

## 2017-06-17 RX ADMIN — HYDRALAZINE HYDROCHLORIDE 10 MG: 20 INJECTION INTRAMUSCULAR; INTRAVENOUS at 01:06

## 2017-06-17 RX ADMIN — CHLORHEXIDINE GLUCONATE 15 ML: 1.2 RINSE ORAL at 11:06

## 2017-06-17 RX ADMIN — CALCIUM GLUCONATE 2000 MG: 94 INJECTION, SOLUTION INTRAVENOUS at 05:06

## 2017-06-17 RX ADMIN — ACETAMINOPHEN 650 MG: 325 TABLET ORAL at 02:06

## 2017-06-17 RX ADMIN — Medication 1 TABLET: at 09:06

## 2017-06-17 RX ADMIN — ACYCLOVIR SODIUM 790 MG: 50 INJECTION, SOLUTION INTRAVENOUS at 11:06

## 2017-06-17 RX ADMIN — CEFEPIME HYDROCHLORIDE 1 G: 1 INJECTION, SOLUTION INTRAVENOUS at 05:06

## 2017-06-17 RX ADMIN — ACYCLOVIR SODIUM 790 MG: 50 INJECTION, SOLUTION INTRAVENOUS at 07:06

## 2017-06-17 RX ADMIN — STANDARDIZED SENNA CONCENTRATE AND DOCUSATE SODIUM 1 TABLET: 8.6; 5 TABLET, FILM COATED ORAL at 09:06

## 2017-06-17 RX ADMIN — VANCOMYCIN HYDROCHLORIDE 1500 MG: 100 INJECTION, POWDER, LYOPHILIZED, FOR SOLUTION INTRAVENOUS at 10:06

## 2017-06-17 RX ADMIN — HEPARIN SODIUM 2500 UNITS: 1000 INJECTION, SOLUTION INTRAVENOUS; SUBCUTANEOUS at 06:06

## 2017-06-17 RX ADMIN — HEPARIN SODIUM 5000 UNITS: 5000 INJECTION, SOLUTION INTRAVENOUS; SUBCUTANEOUS at 06:06

## 2017-06-17 RX ADMIN — THIAMINE HCL TAB 100 MG 100 MG: 100 TAB at 09:06

## 2017-06-17 NOTE — PROGRESS NOTES
Pt transported to MRI via bed on portable vent and cardiac monitoring. Pt tolerated scan well. Returned to room and placed back on vent and room monitor. VSS. Will continue to monitor.

## 2017-06-17 NOTE — SUBJECTIVE & OBJECTIVE
Interval History: NAEON    Medications:  Continuous Infusions:   sodium chloride 0.9% 75 mL/hr at 17    fentanyl 25 mcg/hr (17)     Scheduled Meds:   acyclovir  10 mg/kg Intravenous Q8H    aspirin  325 mg Per OG tube Daily    ceFEPime (MAXIPIME) IVPB  1 g Intravenous Q12H    chlorhexidine  15 mL Mouth/Throat QID    famotidine  20 mg Per OG tube BID    folic acid-vit B6-vit B12 2.5-25-2 mg  1 tablet Oral Daily    heparin (porcine)  5,000 Units Subcutaneous Q8H    levothyroxine  40 mcg Intravenous Daily    metoprolol tartrate  12.5 mg Per OG tube BID    senna-docusate 8.6-50 mg  1 tablet Per OG tube Daily    thiamine  100 mg Oral Daily    vancomycin (VANCOCIN) IVPB  1,500 mg Intravenous Q12H     PRN Meds:acetaminophen, hydrALAZINE, ondansetron     Review of Systems  Objective:     Weight: 89.2 kg (196 lb 10.4 oz)  Body mass index is 25.94 kg/m².  Vital Signs (Most Recent):  Temp: 99.4 °F (37.4 °C) (17)  Pulse: 76 (17)  Resp: 18 (17)  BP: 134/78 (17)  SpO2: 98 % (17) Vital Signs (24h Range):  Temp:  [98.7 °F (37.1 °C)-99.6 °F (37.6 °C)] 99.4 °F (37.4 °C)  Pulse:  [68-90] 76  Resp:  [18] 18  SpO2:  [96 %-100 %] 98 %  BP: (105-170)/(58-98) 134/78              Temp (24hrs), Av.3 °F (37.4 °C), Min:98.7 °F (37.1 °C), Max:99.6 °F (37.6 °C)    Vent Mode: A/C  Oxygen Concentration (%):  [40] 40  Resp Rate Total:  [17 br/min-18 br/min] 18 br/min  Vt Set:  [550 mL] 550 mL  PEEP/CPAP:  [8 cmH20] 8 cmH20  Mean Airway Pressure:  [11 yzV60-60 cmH20] 12 cmH20      Date 17 0700 - 17 0659   Shift 3105-4730 9436-2050 1119-4649 24 Hour Total   I  N  T  A  K  E   I.V.  (mL/kg) 78.8  (0.9)   78.8  (0.9)    Shift Total  (mL/kg) 78.8  (0.9)   78.8  (0.9)   O  U  T  P  U  T   Urine  (mL/kg/hr) 250   250    Shift Total  (mL/kg) 250  (2.8)   250  (2.8)   Weight (kg) 89.2 89.2 89.2 89.2          NG/OG Tube 17 0130 (Active)   Placement  Check placement verified by x-ray 6/17/2017  3:01 AM   Distal Tube Length (cm) 65 6/16/2017  7:01 PM   Tolerance no signs/symptoms of discomfort 6/17/2017  3:01 AM   Securement taped to commercial device 6/16/2017  3:00 PM   Clamp Status/Tolerance no abdominal discomfort;no abdominal distention;clamped 6/17/2017  3:01 AM   Suction Setting/Drainage Method dependent drainage 6/17/2017  3:01 AM   Insertion Site Appearance no redness, warmth, tenderness, skin breakdown, drainage 6/17/2017  3:01 AM   Drainage None 6/15/2017  3:00 AM   Flush/Irrigation flushed w/;water;no resistance met 6/17/2017  3:01 AM   Intake (mL) 75 mL 6/15/2017  8:05 AM   Residual Amount (ml) 0 ml 6/15/2017  7:05 AM       Male External Urinary Catheter 06/14/17 0003 Medium (Active)   Collection Container Urimeter 6/17/2017  3:01 AM   Securement Method secured to top of thigh w/ adhesive device 6/17/2017  3:01 AM   Skin no redness;no breakdown 6/17/2017  3:01 AM   Tolerance no signs/symptoms of discomfort 6/17/2017  3:01 AM   Output (mL) 250 mL 6/17/2017  7:01 AM       Neurosurgery Physical Exam   E1VTM1  PERRL, corneals/cough/gag absent.  No motor response to deep pain    Significant Labs:    Recent Labs  Lab 06/17/17  0118   *      K 4.4      CO2 33*   BUN 34*   CREATININE 0.8   CALCIUM 8.1*   MG 2.7*       Recent Labs  Lab 06/16/17  0213 06/17/17  0118   WBC 10.71 7.72   HGB 11.6* 10.8*   HCT 36.4* 33.0*    223     No results for input(s): LABPT, INR, APTT in the last 48 hours.  Microbiology Results (last 7 days)     Procedure Component Value Units Date/Time    Culture, Respiratory with Gram Stain [[id number]]  (Susceptibility) Collected:  06/14/17 0500    Order Status:  Completed Specimen:  Respiratory from Endotracheal Aspirate Updated:  06/16/17 1030     Respiratory Culture --     PSEUDOMONAS AERUGINOSA  Few       Gram Stain (Respiratory) <10 epithelial cells per low power field.     Gram Stain (Respiratory) Moderate  WBC's     Gram Stain (Respiratory) Few Gram negative rods    Blood culture [[id number]] Collected:  06/14/17 0336    Order Status:  Completed Specimen:  Blood from Peripheral, Wrist, Left Updated:  06/16/17 0812     Blood Culture, Routine No Growth to date     Blood Culture, Routine No Growth to date     Blood Culture, Routine No Growth to date            Significant Diagnostics:  MRI C/T/L: no significant canal stenosis or spinal cord intensity seen

## 2017-06-17 NOTE — PROGRESS NOTES
[facility]  Neurosurgery  Progress Note    Subjective:     History of Present Illness: 72 year old male with PMHx CAD, skin cancer, hypothyroidism, and HLP who was transferred from OSH for further evaluation and management of rapidly progressive ascending paralysis.  No family present during exam, history obtained primarily from chart.  Patient was on a camping trip to [place] and [place] recently. When he returned he started to have paraesthesias of the feet and hand that quickly progressed to legs weakness. No clear hx of tick bites or skin lesions.  Additionally, a few days prior to admission to OSH he had prostate biopsy and was treated with cipro.  Pt presented to OSH and was diagnosed with GBS and was started on IVIG, he completed 5 days of treatment which ended on 6/12/17.  Pt also being treated for pneumonia.  After admission his weakness got worse to the point that he had to be intubated.  Pt has had poor neurologic exam since.   - LP was done in OSH and was reported as 0 rbc, 0 wbc, protein 34, glucose 97.  Lyme ab panel was sent and results are pending.  MRI Brain done today shows non enhancing lesion abutting the right optic nerve.  Neurosurgery consulted for evaluation.             Post-Op Info:  * No surgery found *         Interval History: NAEON    Medications:  Continuous Infusions:   sodium chloride 0.9% 75 mL/hr at 06/17/17 0701    fentanyl 25 mcg/hr (06/17/17 0701)     Scheduled Meds:   acyclovir  10 mg/kg Intravenous Q8H    aspirin  325 mg Per OG tube Daily    ceFEPime (MAXIPIME) IVPB  1 g Intravenous Q12H    chlorhexidine  15 mL Mouth/Throat QID    famotidine  20 mg Per OG tube BID    folic acid-vit B6-vit B12 2.5-25-2 mg  1 tablet Oral Daily    heparin (porcine)  5,000 Units Subcutaneous Q8H    levothyroxine  40 mcg Intravenous Daily    metoprolol tartrate  12.5 mg Per OG tube BID    senna-docusate 8.6-50 mg  1 tablet Per OG tube Daily    thiamine   100 mg Oral Daily    vancomycin (VANCOCIN) IVPB  1,500 mg Intravenous Q12H     PRN Meds:acetaminophen, hydrALAZINE, ondansetron     Review of Systems  Objective:     Weight: 89.2 kg (196 lb 10.4 oz)  Body mass index is 25.94 kg/m².  Vital Signs (Most Recent):  Temp: 99.4 °F (37.4 °C) (17)  Pulse: 76 (17)  Resp: 18 (17)  BP: 134/78 (17)  SpO2: 98 % (17) Vital Signs (24h Range):  Temp:  [98.7 °F (37.1 °C)-99.6 °F (37.6 °C)] 99.4 °F (37.4 °C)  Pulse:  [68-90] 76  Resp:  [18] 18  SpO2:  [96 %-100 %] 98 %  BP: (105-170)/(58-98) 134/78              Temp (24hrs), Av.3 °F (37.4 °C), Min:98.7 °F (37.1 °C), Max:99.6 °F (37.6 °C)    Vent Mode: A/C  Oxygen Concentration (%):  [40] 40  Resp Rate Total:  [17 br/min-18 br/min] 18 br/min  Vt Set:  [550 mL] 550 mL  PEEP/CPAP:  [8 cmH20] 8 cmH20  Mean Airway Pressure:  [11 arF50-19 cmH20] 12 cmH20      Date 17 07 - 17 0659   Shift 8288-7123 6081-1981 4741-1993 24 Hour Total   I  N  T  A  K  E   I.V.  (mL/kg) 78.8  (0.9)   78.8  (0.9)    Shift Total  (mL/kg) 78.8  (0.9)   78.8  (0.9)   O  U  T  P  U  T   Urine  (mL/kg/hr) 250   250    Shift Total  (mL/kg) 250  (2.8)   250  (2.8)   Weight (kg) 89.2 89.2 89.2 89.2          NG/OG Tube 17 0130 (Active)   Placement Check placement verified by x-ray 2017  3:01 AM   Distal Tube Length (cm) 65 2017  7:01 PM   Tolerance no signs/symptoms of discomfort 2017  3:01 AM   Securement taped to commercial device 2017  3:00 PM   Clamp Status/Tolerance no abdominal discomfort;no abdominal distention;clamped 2017  3:01 AM   Suction Setting/Drainage Method dependent drainage 2017  3:01 AM   Insertion Site Appearance no redness, warmth, tenderness, skin breakdown, drainage 2017  3:01 AM   Drainage None 6/15/2017  3:00 AM   Flush/Irrigation flushed w/;water;no resistance met 2017  3:01 AM   Intake (mL) 75 mL 6/15/2017  8:05 AM   Residual  Amount (ml) 0 ml 6/15/2017  7:05 AM       Male External Urinary Catheter 06/14/17 0003 Medium (Active)   Collection Container Urimeter 6/17/2017  3:01 AM   Securement Method secured to top of thigh w/ adhesive device 6/17/2017  3:01 AM   Skin no redness;no breakdown 6/17/2017  3:01 AM   Tolerance no signs/symptoms of discomfort 6/17/2017  3:01 AM   Output (mL) 250 mL 6/17/2017  7:01 AM       Neurosurgery Physical Exam   E1VTM1  PERRL, corneals/cough/gag absent.  No motor response to deep pain    Significant Labs:    Recent Labs  Lab 06/17/17  0118   *      K 4.4      CO2 33*   BUN 34*   CREATININE 0.8   CALCIUM 8.1*   MG 2.7*       Recent Labs  Lab 06/16/17  0213 06/17/17  0118   WBC 10.71 7.72   HGB 11.6* 10.8*   HCT 36.4* 33.0*    223     No results for input(s): LABPT, INR, APTT in the last 48 hours.  Microbiology Results (last 7 days)     Procedure Component Value Units Date/Time    Culture, Respiratory with Gram Stain [[id number]]  (Susceptibility) Collected:  06/14/17 0500    Order Status:  Completed Specimen:  Respiratory from Endotracheal Aspirate Updated:  06/16/17 1030     Respiratory Culture --     PSEUDOMONAS AERUGINOSA  Few       Gram Stain (Respiratory) <10 epithelial cells per low power field.     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  No evidence of acute hemorrhage or aneurysm rupture.  No neurosurgical interventions indicated at this time, would consider further work up with angiogram if patients condition improves  - Continue current management / work up per Ncc and neurology  - Fu paraneoplastic panel.  - Repeat LP  - Consider solumedrol and PLEX   - Will follow.                Thai King, DO  Neurosurgery  [facility]

## 2017-06-17 NOTE — CONSULTS
[facility]  Transfusion Medicine  Consult Note    Patient Name: Rosalio Cam  MRN: [id number]  Admission Date: 6/14/2017  Hospital Length of Stay: 3 days  Attending Physician: Pepe Salgado MD  Primary Care Provider: Sheila Vidal MD     Inpatient consult to [facility]  Consult performed by: KORTNEY GONZALEZ  Consult ordered by: ANTHONY SMALLWOOD  Reason for consult: Guillain [place] Status Post IVIG        Subjective:     Principal Problem:Guillain-[place] syndrome    History of Present Illness:  72 year old male with Guillian [place] diagnosis status post IVIG treatment presents for therapeutic plasma exchange. Symptoms have progressed from peripheral paresthesias, to weakness, to a rapidly ascending paralysis now requiring intubation.     PMH and PSH reviewed 06/17/2017 and relevant items addressed in HPI.    Review of patient's allergies indicates:  No Known Allergies    All medications reviewed 06/17/2017 and ace inhibitors not identified.    Family History     None        Social History Main Topics    Smoking status: Unknown If Ever Smoked    Smokeless tobacco: Not on file    Alcohol use Not on file    Drug use: Unknown    Sexual activity: Not on file     Review of Systems   Respiratory:        Intubation   Neurological: Positive for weakness (which has progressed to paralysis requiring intubation).     Objective:     Vital Signs (Most Recent):  Temp: 99.3 °F (37.4 °C) (06/17/17 1100)  Pulse: 81 (06/17/17 1250)  Resp: 18 (06/17/17 1250)  BP: 116/63 (06/17/17 1200)  SpO2: 100 % (06/17/17 1250) Vital Signs (24h Range):  Temp:  [98.7 °F (37.1 °C)-99.6 °F (37.6 °C)] 99.3 °F (37.4 °C)  Pulse:  [64-90] 81  Resp:  [18] 18  SpO2:  [96 %-100 %] 100 %  BP: (105-169)/(58-98) 116/63     Weight: 89.2 kg (196 lb 10.4 oz)    Physical Exam   Nursing note and vitals reviewed.      Significant Labs:   CBC:   Recent Labs  Lab 06/16/17  0213 06/17/17  0118   WBC 10.71 7.72   HGB 11.6* 10.8*    HCT 36.4* 33.0*    223     Assessment/Plan:     Guillain Needles status post IVIG carries a Category III Grade 2C indication for therapeutic plasma exchange via the 2016 Journal of Clinical Apheresis Guidelines ([place] et al. Journal of Clinical Apheresis 2016; 31:149-162.) Retrospective studies have suggested that TPE in the setting of IVIG failure has limited therapeutic benefit, but given the patient's current neurologic status we believe the risks outweigh the benefits. The TPE plan is as follows:  * Guillain-Needles syndrome    Access: Subclavian Brevia  Number of Procedures: 5  Schedule: Every other day  Volume: 4.0 Liters  Replacement Fluid: Albumin  Recommended Laboratory Studies: Complete Blood Count and Basic Metabolic Panel            Marcela Pradhan MD  Transfusion Medicine  [facility]

## 2017-06-17 NOTE — PROGRESS NOTES
ICU Progress Note  Neurocritical Care    Admit Date: 6/14/2017  LOS: 3    CC: Guillain-[place] syndrome    Code Status: Full Code     SUBJECTIVE:     Interval History/Significant Events:   Comatose  Will D/C fentanyl gtt  Acute resp failure  Adult ftt  GCS ; 3  Elect. abnl      Medications:  Continuous Infusions:   sodium chloride 0.9% 75 mL/hr at 06/17/17 1100     Scheduled Meds:   acyclovir  10 mg/kg Intravenous Q8H    ceFEPime (MAXIPIME) IVPB  1 g Intravenous Q12H    chlorhexidine  15 mL Mouth/Throat QID    famotidine  20 mg Per OG tube BID    folic acid-vit B6-vit B12 2.5-25-2 mg  1 tablet Oral Daily    heparin (porcine)  5,000 Units Subcutaneous Q8H    levothyroxine  40 mcg Intravenous Daily    metoprolol tartrate  12.5 mg Per OG tube BID    senna-docusate 8.6-50 mg  1 tablet Per OG tube Daily    thiamine  100 mg Oral Daily    vancomycin (VANCOCIN) IVPB  1,500 mg Intravenous Q12H     PRN Meds:.acetaminophen, hydrALAZINE, ondansetron    OBJECTIVE:   Vital Signs (Most Recent):   Temp: 99.3 °F (37.4 °C) (06/17/17 1100)  Pulse: 64 (06/17/17 1127)  Resp: 18 (06/17/17 1127)  BP: 132/76 (06/17/17 1100)  SpO2: 99 % (06/17/17 1127)    Vital Signs (24h Range):   Temp:  [98.7 °F (37.1 °C)-99.6 °F (37.6 °C)] 99.3 °F (37.4 °C)  Pulse:  [64-90] 64  Resp:  [18] 18  SpO2:  [96 %-100 %] 99 %  BP: (105-169)/(58-98) 132/76    ICP/CPP (Last 24h):        I & O (Last 24h):   Intake/Output Summary (Last 24 hours) at 06/17/17 1220  Last data filed at 06/17/17 1100   Gross per 24 hour   Intake           3062.5 ml   Output             1939 ml   Net           1123.5 ml     Physical Exam:  GA: Alert, comfortable, no acute distress.   HEENT: No scleral icterus or JVD.   Pulmonary: Clear to auscultation A/P/L. No wheezing, crackles, or rhonchi.  Cardiac: RRR S1 & S2 w/o rubs/murmurs/gallops.   Abdominal: Bowel sounds present x 4. No appreciable hepatosplenomegaly.  Skin: No jaundice, rashes, or visible lesions.  Neuro:  Pupils 3  mm reactive  No gag/cough/coerneal/resp  No response to noxious stimuli    Vent Data:   Vent Mode: A/C  Oxygen Concentration (%):  [40] 40  Resp Rate Total:  [18 br/min] 18 br/min  Vt Set:  [550 mL] 550 mL  PEEP/CPAP:  [8 cmH20] 8 cmH20  Mean Airway Pressure:  [11 mnN81-53 cmH20] 12 cmH20    Lines/Drains/Airway:   R PICC       Airway - Non-Surgical 06/13/17 Endotracheal Tube (Active)   Secured at 24 cm 6/17/2017 11:27 AM   Measured At Lips 6/17/2017 11:27 AM   Secured Location Right 6/17/2017 11:27 AM   Secured by Commercial tube manuel 6/17/2017 11:27 AM   Bite Block none 6/17/2017 11:27 AM   Site Condition Cool;Dry 6/17/2017 11:27 AM   Status Intact;Secured;Patent 6/17/2017 11:27 AM   Site Assessment Clean;Dry;No bleeding;No drainage 6/17/2017 11:27 AM   Cuff Pressure 28 cm H2O 6/17/2017  3:53 AM           PICC Double Lumen 06/12/17 right brachial (Active)   Site Assessment Clean;Dry;Intact 6/17/2017 11:00 AM   Lumen 1 Status Infusing 6/17/2017 11:00 AM   Lumen 2 Status Infusing 6/17/2017 11:00 AM   Dressing Type Transparent 6/17/2017 11:00 AM   Dressing Status Biopatch in place;Clean;Dry;Intact 6/17/2017 11:00 AM   Dressing Intervention Dressing reinforced 6/16/2017  3:00 AM   Dressing Change Due 06/21/17 6/17/2017 11:00 AM   Daily Line Review Performed 6/17/2017 11:00 AM             NG/OG Tube 06/09/17 0130 (Active)   Placement Check placement verified by distal tube length measurement 6/17/2017 11:00 AM   Distal Tube Length (cm) 65 6/17/2017  7:01 AM   Tolerance no signs/symptoms of discomfort 6/17/2017 11:00 AM   Securement taped to commercial device 6/17/2017 11:00 AM   Clamp Status/Tolerance abdominal distention 6/17/2017 11:00 AM   Suction Setting/Drainage Method dependent drainage 6/17/2017  3:01 AM   Insertion Site Appearance no redness, warmth, tenderness, skin breakdown, drainage 6/17/2017 11:00 AM   Drainage None 6/17/2017 11:00 AM   Flush/Irrigation flushed w/;water;no resistance met 6/17/2017  3:01  AM   Intake (mL) 75 mL 6/15/2017  8:05 AM   Residual Amount (ml) 0 ml 6/15/2017  7:05 AM       Male External Urinary Catheter 06/14/17 0003 Medium (Active)   Collection Container Urimeter 6/17/2017 11:00 AM   Securement Method secured to top of thigh w/ tape 6/17/2017 11:00 AM   Skin no redness;no breakdown;skin barrier applied 6/17/2017 11:00 AM   Tolerance no signs/symptoms of discomfort 6/17/2017 11:00 AM   Output (mL) 275 mL 6/17/2017 11:00 AM     Nutrition/Tube Feeds (if NPO state why): start t feeds    Labs:  ABG:   Recent Labs  Lab 06/17/17  0352   PH 7.469*   PO2 96   PCO2 45.7*   HCO3 33.2*   POCSATURATED 98   BE 9     BMP:  Recent Labs  Lab 06/17/17  0118      K 4.4      CO2 33*   BUN 34*   CREATININE 0.8   *   MG 2.7*   PHOS 3.4     LFT: Lab Results   Component Value Date    AST 40 06/17/2017    ALT 26 06/17/2017    ALKPHOS 61 06/17/2017    BILITOT 0.4 06/17/2017    ALBUMIN 1.9 (L) 06/17/2017    PROT 6.4 06/17/2017     CBC:   Lab Results   Component Value Date    WBC 7.72 06/17/2017    HGB 10.8 (L) 06/17/2017    HCT 33.0 (L) 06/17/2017     (H) 06/17/2017     06/17/2017     Microbiology x 7d:   Microbiology Results (last 7 days)     Procedure Component Value Units Date/Time    Blood culture [[id number]] Collected:  06/14/17 0336    Order Status:  Completed Specimen:  Blood from Peripheral, Wrist, Left Updated:  06/17/17 0812     Blood Culture, Routine No Growth to date     Blood Culture, Routine No Growth to date     Blood Culture, Routine No Growth to date     Blood Culture, Routine No Growth to date    Culture, Respiratory with Gram Stain [[id number]]  (Susceptibility) Collected:  06/14/17 0500    Order Status:  Completed Specimen:  Respiratory from Endotracheal Aspirate Updated:  06/16/17 1030     Respiratory Culture --     PSEUDOMONAS AERUGINOSA  Few       Gram Stain (Respiratory) <10 epithelial cells per low power field.     Gram Stain (Respiratory) Moderate WBC's      Gram Stain (Respiratory) Few Gram negative rods        Imaging:  Cxr; clear    I personally reviewed the above image.    ASSESSMENT/PLAN:     Active Hospital Problems    Diagnosis    *Guillain-[place] syndrome    Brain mass    Acquired hypothyroidism    Acute respiratory failure with hypoxia and hypercapnia    [place] coma scale total score 3-8    Hypovolemia    Flaccid quadriplegia    Brain aneurysm      Plan:  Place PLEX cath  Hold heparin and ASA  LP in am  Follow exam  Duplex le's  Change synthroid to PO  Start provigil      Critically ill concerned about on going neurological state  Time spent in discussion with family/NS and Neurology

## 2017-06-17 NOTE — PROGRESS NOTES
Progress Note    Reason for Consult:  Possible GBS     SUBJECTIVE:     Interval History: (6/17/2017)   No acute events overnight. Patient was on fentanyl this morning. Per family no change from yesterday.       Interval History: (6/16/2017)   No acute events overnight. No change in his condition from yesterday.     History of Present Illness: (From chart)  72 year old male with PMH significant for CHARLES, CAD, skin cancer, hypothyroidism, and HLP who was transferred from OSH for further evaluation and management of rapidly progressive ascending paralysis.   The patient was in a trip to [facility] recently included hiking and camping. When he got back he started to have tinglings of the feet and hand that quickly progressed to legs weakness. No clear hx of tick bites or skin lesions. Off note, few days prior to admission to OSH he had prostate biopsy and was treated with cipro. In OSH he was diagnosed with GBS and was started on IVIG. After admission his weakness got worse to the point that he had to be intubated      - He completed 5 days of IVIG on 6/12/17  - LP was done in OSH and was reported as 0 rbc, 0 wbc, protein 34, glucose 97  - Lyme ab panel was sent and results are pending.  - He was diagnosed with pneumonia and started on abx  - Per family he had an MRI brain. cspine and T spine done in OSH and was unremarkable     History reviewed. No pertinent past medical history.  No past surgical history on file.  No family history on file.  Social History   Substance Use Topics    Smoking status: Unknown If Ever Smoked    Smokeless tobacco: Not on file    Alcohol use Not on file       Review of Systems:  Unable to assess     OBJECTIVE:     Vital Signs:  Temp:  [99.2 °F (37.3 °C)-99.4 °F (37.4 °C)]   Pulse:  [68-90]   Resp:  [18]   BP: (105-152)/(58-89)   SpO2:  [96 %-100 %]     Physical Exam:  GA: Alert, comfortable, no acute distress.   HEENT: No scleral icterus or JVD.   Pulmonary: Clear to  auscultation A/P/L. No wheezing, crackles, or rhonchi.  Cardiac: RRR S1 & S2 w/o rubs/murmurs/gallops.   Abdominal: Bowel sounds present x 4. No appreciable hepatosplenomegaly.  Skin: No jaundice, rashes, or visible lesions.    Neurologic Exam:   Mental status: In coma. No response on verbal or painful stimulus    Cranial nerves: Pupil B/L equal slight reactive, corneal negative, gag negative, oculocephalic negative.   Sensory: Unable to assess. No response to pain    Motor:Not moving any extremity. No response to pain   Reflexes: Areflexia all over except right ankle reflex 1+, L3 1+ B/L  and C4 1+ B/L on admission - today morning Areflexia   Coordination: Unable to assess  Gait: Unable to assess     Laboratory:  CBC:     Recent Labs  Lab 06/17/17  0118   WBC 7.72   RBC 3.25*   HGB 10.8*   HCT 33.0*      *   MCH 33.2*   MCHC 32.7     CMP:     Recent Labs  Lab 06/17/17  0118   *   CALCIUM 8.1*   ALBUMIN 1.9*   PROT 6.4      K 4.4   CO2 33*      BUN 34*   CREATININE 0.8   ALKPHOS 61   ALT 26   AST 40   BILITOT 0.4       Diagnostic Results:  MRI Brain:  Bilateral supraclinoid internal carotid artery aneurysms.  On the right there is a 10 mm aneurysm in separate 2 mm aneurysm.  On the left the supraclinoid ICA aneurysm measures about 7 x 6 mm.  3.6 cm nonenhancing mass centered over the right greater and lesser wings of the sphenoid abutting the ICA, A1 and M1 segments, displacing the right optic nerve.  Differential considerations include a thrombosed aneurysm associated with right ICA aneurysm aneurysm,  exophytic hamartoma, or or an exophytic nonenhancing primary brain tumor such as low-grade glioma or oligodendroglioma.    Further investigation to rule out thrombosed aneurysm could be obtained with conventional angiography if clinically warranted.    ASSESSMENT/PLAN:     72 year old male with PMH significant for CHARLES, CAD, skin cancer, hypothyroidism, and HLP who was transferred from  History of ascending numbness, tingling and weakness over 24-48 hours, rapidly progressive, accompanied by radicular back pain. Then comatose since last Friday.    -- MRI Brain: 3.6 cm nonenhancing mass centered over the right greater and lesser wings of the sphenoid abutting the ICA, A1 and M1 segments, displacing the right optic nerve. ?? primary brain tumor such as low-grade glioma or oligodendroglioma.   -- MRI C and T Spine: No acute process   -- OSH films, not clearly directly related to current syndrome.    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  (https://www.ncbi.nlm.nih.gov/pmc/articles/RLI9791926/pdf/klewbxs06498-6433.pdf)    (https://oup.[employer]/oup/backfile/Content_public/Journal/brain/126/10/10.1093/brain/uxz588/2/thd060.pdf?Qtgennn=[email or web address]&Signature=EnNUfS-OjiT8WASwuJ79Nd8y7bQOsOa~dXuE8KwCE6k2idpH0VdV9ycO3vj3qucBGsEL2vE3RfoLtDn7b829tAH7qxWxxZQ6y2rQrGyjYIM0IS95n~NjNIn1LWrZB5djfIrhxrGXShkGwvRUJU58-cG-mW8-hUdzv2Wv87BgjFODZyYZ6l6BRWQGkF8MdYfHSTyCU-LHTvyBcnCkSuzD0ZvtfeZhhsVNKQC2GZn~ilutwRUMalohdcIJsn4-mQWidPakdHj2bPSDT1g~uptFfpcWm1keD8zq3T7RVrx41367TRs-vpwI~T1GwuREdEz2zoxgOUApvX6KqSzswtp6UQ__&Elizabeth-Pair-Id=[id number])    -- Neoplastic/Paraneoplastic process considering mass on brain MRI concerning for primary brain tumor   -- Rule out Lyme disease   -- Rule out viral pathology - Zika - herpes vs other   -- Could be just due to long sedation that was OSH - less likely considering he was off sedation for last 48 hours.       Plan:     -- Recommend solumedrol 1 g daily for 5 days starting today   -- Recommend PLEX for 5 days starting today    -- Recommend LP and send basic labs Plus autoimmune encephalitis panel and viral panel in CSF  -- Acyclovir as per primary team   -- Consider CT Chest and abdomen pelvis for occult malignancy - after PLEX     -- Follow paraneoplastic panel, GQ1b   -- Case  discussed with Dr Chavez   -- Will follow          Navneet Castano MD  Neurology Resident   [facility] [address] [place] [address]  Pager: [phone]

## 2017-06-17 NOTE — HPI
72 year old male with Guillian Aristes diagnosis status post IVIG treatment presents for therapeutic plasma exchange. Symptoms have progressed from peripheral paresthesias, to weakness, to a rapidly ascending paralysis now requiring intubation.

## 2017-06-17 NOTE — ASSESSMENT & PLAN NOTE
72 male with acute ascending paralysis, newly found sphenoid mass, and B/L ICA aneursyms  - Pt with poor neurologic status  - Doubtful that sphenoid lesion is cause of patients poor neurologic condition.  No evidence of acute hemorrhage or aneurysm rupture.  No neurosurgical interventions indicated at this time, would consider further work up with angiogram if patients condition improves  - Continue current management / work up per Ncc and neurology  - Fu paraneoplastic panel.  - Consider solumedrol and PLEX   - Will follow.

## 2017-06-17 NOTE — SUBJECTIVE & OBJECTIVE
PMH and PSH reviewed 06/17/2017 and relevant items addressed in HPI.    Review of patient's allergies indicates:  No Known Allergies    All medications reviewed 06/17/2017 and ace inhibitors not identified.    Family History     None        Social History Main Topics    Smoking status: Unknown If Ever Smoked    Smokeless tobacco: Not on file    Alcohol use Not on file    Drug use: Unknown    Sexual activity: Not on file     Review of Systems   Respiratory:        Intubation   Neurological: Positive for weakness (which has progressed to paralysis requiring intubation).     Objective:     Vital Signs (Most Recent):  Temp: 99.3 °F (37.4 °C) (06/17/17 1100)  Pulse: 81 (06/17/17 1250)  Resp: 18 (06/17/17 1250)  BP: 116/63 (06/17/17 1200)  SpO2: 100 % (06/17/17 1250) Vital Signs (24h Range):  Temp:  [98.7 °F (37.1 °C)-99.6 °F (37.6 °C)] 99.3 °F (37.4 °C)  Pulse:  [64-90] 81  Resp:  [18] 18  SpO2:  [96 %-100 %] 100 %  BP: (105-169)/(58-98) 116/63     Weight: 89.2 kg (196 lb 10.4 oz)    Physical Exam   Nursing note and vitals reviewed.      Significant Labs:   CBC:   Recent Labs  Lab 06/16/17  0213 06/17/17  0118   WBC 10.71 7.72   HGB 11.6* 10.8*   HCT 36.4* 33.0*    223

## 2017-06-17 NOTE — PLAN OF CARE
Problem: Patient Care Overview  Goal: Plan of Care Review  Outcome: Ongoing (interventions implemented as appropriate)  POC reviewed with pt and wife at 0500. Pt's wife verbalized understanding. Questions and concerns addressed. No acute events overnight. MRI completed. Fentanyl gtt restarted over night. Pt progressing toward goals. Will continue to monitor. See flowsheets for full assessment and VS info

## 2017-06-17 NOTE — ASSESSMENT & PLAN NOTE
Access: Subclavian Brevia  Number of Procedures: 5  Schedule: Every other day  Volume: 4.0 Liters  Replacement Fluid: Albumin  Recommended Laboratory Studies: Complete Blood Count and Basic Metabolic Panel

## 2017-06-18 LAB
ABO + RH BLD: NORMAL
ALBUMIN SERPL BCP-MCNC: 3.5 G/DL
ALLENS TEST: ABNORMAL
ALP SERPL-CCNC: 37 U/L
ALT SERPL W/O P-5'-P-CCNC: 117 U/L
ANION GAP SERPL CALC-SCNC: 7 MMOL/L
AST SERPL-CCNC: 171 U/L
BASOPHILS # BLD AUTO: 0.01 K/UL
BASOPHILS NFR BLD: 0.1 %
BILIRUB SERPL-MCNC: 0.9 MG/DL
BLD GP AB SCN CELLS X3 SERPL QL: NORMAL
BLD PROD TYP BPU: NORMAL
BLOOD UNIT EXPIRATION DATE: NORMAL
BLOOD UNIT TYPE CODE: 5100
BLOOD UNIT TYPE: NORMAL
BUN SERPL-MCNC: 24 MG/DL
CALCIUM SERPL-MCNC: 7.6 MG/DL
CHLORIDE SERPL-SCNC: 111 MMOL/L
CLARITY CSF: CLEAR
CO2 SERPL-SCNC: 25 MMOL/L
CODING SYSTEM: NORMAL
COLOR CSF: COLORLESS
CREAT SERPL-MCNC: 0.6 MG/DL
DELSYS: ABNORMAL
DIFFERENTIAL METHOD: ABNORMAL
DISPENSE STATUS: NORMAL
EOSINOPHIL # BLD AUTO: 0.1 K/UL
EOSINOPHIL NFR BLD: 1.2 %
ERYTHROCYTE [DISTWIDTH] IN BLOOD BY AUTOMATED COUNT: 13.4 %
ERYTHROCYTE [SEDIMENTATION RATE] IN BLOOD BY WESTERGREN METHOD: 18 MM/H
EST. GFR  (AFRICAN AMERICAN): >60 ML/MIN/1.73 M^2
EST. GFR  (NON AFRICAN AMERICAN): >60 ML/MIN/1.73 M^2
FIO2: 40
GLUCOSE CSF-MCNC: 92 MG/DL
GLUCOSE SERPL-MCNC: 144 MG/DL
HCO3 UR-SCNC: 30.6 MMOL/L (ref 24–28)
HCT VFR BLD AUTO: 34.7 %
HGB BLD-MCNC: 11.1 G/DL
LYMPHOCYTES # BLD AUTO: 1.6 K/UL
LYMPHOCYTES NFR BLD: 18.7 %
LYMPHOCYTES NFR CSF MANUAL: 86 %
MAGNESIUM SERPL-MCNC: 2.3 MG/DL
MCH RBC QN AUTO: 32.3 PG
MCHC RBC AUTO-ENTMCNC: 32 %
MCV RBC AUTO: 101 FL
MIN VOL: 10.6
MODE: ABNORMAL
MONOCYTES # BLD AUTO: 0.5 K/UL
MONOCYTES NFR BLD: 6.2 %
MONOS+MACROS NFR CSF MANUAL: 14 %
NEUTROPHILS # BLD AUTO: 6.3 K/UL
NEUTROPHILS NFR BLD: 72.3 %
PCO2 BLDA: 44.8 MMHG (ref 35–45)
PEEP: 8
PH SMN: 7.44 [PH] (ref 7.35–7.45)
PHOSPHATE SERPL-MCNC: 3 MG/DL
PIP: 21
PLATELET # BLD AUTO: 195 K/UL
PMV BLD AUTO: 9.7 FL
PO2 BLDA: 97 MMHG (ref 80–100)
POC BE: 7 MMOL/L
POC SATURATED O2: 98 % (ref 95–100)
POC TCO2: 32 MMOL/L (ref 23–27)
POTASSIUM SERPL-SCNC: 4.1 MMOL/L
PROT CSF-MCNC: 114 MG/DL
PROT SERPL-MCNC: 5.3 G/DL
RBC # BLD AUTO: 3.44 M/UL
RBC # CSF: 8 /CU MM
SAMPLE: ABNORMAL
SITE: ABNORMAL
SODIUM SERPL-SCNC: 143 MMOL/L
SP02: 98
SPECIMEN VOL CSF: 2 ML
TRANS PLATPHERESIS VOL PATIENT: NORMAL ML
VT: 550
WBC # BLD AUTO: 8.67 K/UL
WBC # CSF: 1 /CU MM

## 2017-06-18 PROCEDURE — 83520 IMMUNOASSAY QUANT NOS NONAB: CPT

## 2017-06-18 PROCEDURE — 27000221 HC OXYGEN, UP TO 24 HOURS

## 2017-06-18 PROCEDURE — 86738 MYCOPLASMA ANTIBODY: CPT

## 2017-06-18 PROCEDURE — 89051 BODY FLUID CELL COUNT: CPT | Mod: 91

## 2017-06-18 PROCEDURE — 87798 DETECT AGENT NOS DNA AMP: CPT | Mod: 91

## 2017-06-18 PROCEDURE — 86901 BLOOD TYPING SEROLOGIC RH(D): CPT

## 2017-06-18 PROCEDURE — 05H333Z INSERTION OF INFUSION DEVICE INTO RIGHT INNOMINATE VEIN, PERCUTANEOUS APPROACH: ICD-10-PCS | Performed by: ANESTHESIOLOGY

## 2017-06-18 PROCEDURE — 86256 FLUORESCENT ANTIBODY TITER: CPT

## 2017-06-18 PROCEDURE — 86900 BLOOD TYPING SEROLOGIC ABO: CPT

## 2017-06-18 PROCEDURE — 82803 BLOOD GASES ANY COMBINATION: CPT

## 2017-06-18 PROCEDURE — 87498 ENTEROVIRUS PROBE&REVRS TRNS: CPT

## 2017-06-18 PROCEDURE — 86255 FLUORESCENT ANTIBODY SCREEN: CPT

## 2017-06-18 PROCEDURE — 87533 HHV-6 DNA QUANT: CPT

## 2017-06-18 PROCEDURE — P9035 PLATELET PHERES LEUKOREDUCED: HCPCS

## 2017-06-18 PROCEDURE — 87529 HSV DNA AMP PROBE: CPT

## 2017-06-18 PROCEDURE — 86341 ISLET CELL ANTIBODY: CPT

## 2017-06-18 PROCEDURE — 63600175 PHARM REV CODE 636 W HCPCS: Performed by: NURSE PRACTITIONER

## 2017-06-18 PROCEDURE — 86592 SYPHILIS TEST NON-TREP QUAL: CPT

## 2017-06-18 PROCEDURE — 99000 SPECIMEN HANDLING OFFICE-LAB: CPT

## 2017-06-18 PROCEDURE — 80053 COMPREHEN METABOLIC PANEL: CPT

## 2017-06-18 PROCEDURE — 20000000 HC ICU ROOM

## 2017-06-18 PROCEDURE — 25000003 PHARM REV CODE 250: Performed by: PSYCHIATRY & NEUROLOGY

## 2017-06-18 PROCEDURE — 86698 HISTOPLASMA ANTIBODY: CPT | Mod: 91

## 2017-06-18 PROCEDURE — 86651 ENCEPHALITIS CALIFORN ANTBDY: CPT

## 2017-06-18 PROCEDURE — 87205 SMEAR GRAM STAIN: CPT

## 2017-06-18 PROCEDURE — 27200966 HC CLOSED SUCTION SYSTEM

## 2017-06-18 PROCEDURE — 87529 HSV DNA AMP PROBE: CPT | Mod: 59

## 2017-06-18 PROCEDURE — 99232 SBSQ HOSP IP/OBS MODERATE 35: CPT | Mod: ,,, | Performed by: NEUROLOGICAL SURGERY

## 2017-06-18 PROCEDURE — 83615 LACTATE (LD) (LDH) ENZYME: CPT

## 2017-06-18 PROCEDURE — 83873 ASSAY OF CSF PROTEIN: CPT

## 2017-06-18 PROCEDURE — 87798 DETECT AGENT NOS DNA AMP: CPT

## 2017-06-18 PROCEDURE — 86256 FLUORESCENT ANTIBODY TITER: CPT | Mod: 91

## 2017-06-18 PROCEDURE — 25000003 PHARM REV CODE 250: Performed by: NURSE PRACTITIONER

## 2017-06-18 PROCEDURE — 99900035 HC TECH TIME PER 15 MIN (STAT)

## 2017-06-18 PROCEDURE — 87210 SMEAR WET MOUNT SALINE/INK: CPT

## 2017-06-18 PROCEDURE — 86617 LYME DISEASE ANTIBODY: CPT | Mod: 91

## 2017-06-18 PROCEDURE — 87070 CULTURE OTHR SPECIMN AEROBIC: CPT

## 2017-06-18 PROCEDURE — 83916 OLIGOCLONAL BANDS: CPT | Mod: 91

## 2017-06-18 PROCEDURE — 63600175 PHARM REV CODE 636 W HCPCS: Performed by: PSYCHIATRY & NEUROLOGY

## 2017-06-18 PROCEDURE — 36600 WITHDRAWAL OF ARTERIAL BLOOD: CPT

## 2017-06-18 PROCEDURE — 94761 N-INVAS EAR/PLS OXIMETRY MLT: CPT

## 2017-06-18 PROCEDURE — 82945 GLUCOSE OTHER FLUID: CPT

## 2017-06-18 PROCEDURE — 99233 SBSQ HOSP IP/OBS HIGH 50: CPT | Mod: 25,,, | Performed by: ANESTHESIOLOGY

## 2017-06-18 PROCEDURE — 82164 ANGIOTENSIN I ENZYME TEST: CPT

## 2017-06-18 PROCEDURE — 62270 DX LMBR SPI PNXR: CPT | Mod: ,,, | Performed by: ANESTHESIOLOGY

## 2017-06-18 PROCEDURE — 84157 ASSAY OF PROTEIN OTHER: CPT

## 2017-06-18 PROCEDURE — 88108 CYTOPATH CONCENTRATE TECH: CPT | Performed by: PATHOLOGY

## 2017-06-18 PROCEDURE — 88108 CYTOPATH CONCENTRATE TECH: CPT | Mod: 26,,, | Performed by: PATHOLOGY

## 2017-06-18 PROCEDURE — 83519 RIA NONANTIBODY: CPT

## 2017-06-18 PROCEDURE — 84100 ASSAY OF PHOSPHORUS: CPT

## 2017-06-18 PROCEDURE — 83735 ASSAY OF MAGNESIUM: CPT

## 2017-06-18 PROCEDURE — 85025 COMPLETE CBC W/AUTO DIFF WBC: CPT

## 2017-06-18 PROCEDURE — 94003 VENT MGMT INPAT SUBQ DAY: CPT

## 2017-06-18 RX ORDER — LEVOTHYROXINE SODIUM 75 UG/1
75 TABLET ORAL DAILY
Status: DISCONTINUED | OUTPATIENT
Start: 2017-06-18 | End: 2017-07-07

## 2017-06-18 RX ORDER — HYDROCODONE BITARTRATE AND ACETAMINOPHEN 500; 5 MG/1; MG/1
TABLET ORAL
Status: DISCONTINUED | OUTPATIENT
Start: 2017-06-18 | End: 2017-07-14 | Stop reason: HOSPADM

## 2017-06-18 RX ADMIN — CEFEPIME HYDROCHLORIDE 1 G: 1 INJECTION, SOLUTION INTRAVENOUS at 05:06

## 2017-06-18 RX ADMIN — Medication 12.5 MG: at 09:06

## 2017-06-18 RX ADMIN — Medication 1 TABLET: at 09:06

## 2017-06-18 RX ADMIN — CHLORHEXIDINE GLUCONATE 15 ML: 1.2 RINSE ORAL at 11:06

## 2017-06-18 RX ADMIN — CHLORHEXIDINE GLUCONATE 15 ML: 1.2 RINSE ORAL at 12:06

## 2017-06-18 RX ADMIN — HYDRALAZINE HYDROCHLORIDE 10 MG: 20 INJECTION INTRAMUSCULAR; INTRAVENOUS at 09:06

## 2017-06-18 RX ADMIN — MODAFINIL 200 MG: 100 TABLET ORAL at 09:06

## 2017-06-18 RX ADMIN — MODAFINIL 200 MG: 100 TABLET ORAL at 08:06

## 2017-06-18 RX ADMIN — STANDARDIZED SENNA CONCENTRATE AND DOCUSATE SODIUM 1 TABLET: 8.6; 5 TABLET, FILM COATED ORAL at 09:06

## 2017-06-18 RX ADMIN — FAMOTIDINE 20 MG: 20 TABLET, FILM COATED ORAL at 08:06

## 2017-06-18 RX ADMIN — ACYCLOVIR SODIUM 790 MG: 50 INJECTION, SOLUTION INTRAVENOUS at 07:06

## 2017-06-18 RX ADMIN — VANCOMYCIN HYDROCHLORIDE 1500 MG: 100 INJECTION, POWDER, LYOPHILIZED, FOR SOLUTION INTRAVENOUS at 09:06

## 2017-06-18 RX ADMIN — ACYCLOVIR SODIUM 790 MG: 50 INJECTION, SOLUTION INTRAVENOUS at 02:06

## 2017-06-18 RX ADMIN — Medication 12.5 MG: at 08:06

## 2017-06-18 RX ADMIN — THIAMINE HCL TAB 100 MG 100 MG: 100 TAB at 09:06

## 2017-06-18 RX ADMIN — CHLORHEXIDINE GLUCONATE 15 ML: 1.2 RINSE ORAL at 06:06

## 2017-06-18 RX ADMIN — FAMOTIDINE 20 MG: 20 TABLET, FILM COATED ORAL at 09:06

## 2017-06-18 RX ADMIN — LEVOTHYROXINE SODIUM 75 MCG: 75 TABLET ORAL at 10:06

## 2017-06-18 RX ADMIN — CHLORHEXIDINE GLUCONATE 15 ML: 1.2 RINSE ORAL at 05:06

## 2017-06-18 RX ADMIN — ACYCLOVIR SODIUM 790 MG: 50 INJECTION, SOLUTION INTRAVENOUS at 12:06

## 2017-06-18 RX ADMIN — CEFEPIME HYDROCHLORIDE 1 G: 1 INJECTION, SOLUTION INTRAVENOUS at 06:06

## 2017-06-18 NOTE — ASSESSMENT & PLAN NOTE
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  Bilateral ICA aneurysms incidental findings.  - Continue current management / work up per Ncc and neurology  - Fu paraneoplastic panel.  - PLEX, solumedrol per NCC/neurology  - Repeat LP would be beneficial

## 2017-06-18 NOTE — PROGRESS NOTES
[facility]  Neurosurgery  Progress Note    Subjective:     History of Present Illness: 72 year old male with PMHx CAD, skin cancer, hypothyroidism, and HLP who was transferred from OSH for further evaluation and management of rapidly progressive ascending paralysis.  No family present during exam, history obtained primarily from chart.  Patient was on a camping trip to [place] and [place] recently. When he returned he started to have paraesthesias of the feet and hand that quickly progressed to legs weakness. No clear hx of tick bites or skin lesions.  Additionally, a few days prior to admission to OSH he had prostate biopsy and was treated with cipro.  Pt presented to OSH and was diagnosed with GBS and was started on IVIG, he completed 5 days of treatment which ended on 6/12/17.  Pt also being treated for pneumonia.  After admission his weakness got worse to the point that he had to be intubated.  Pt has had poor neurologic exam since.   - LP was done in OSH and was reported as 0 rbc, 0 wbc, protein 34, glucose 97.  Lyme ab panel was sent and results are pending.  MRI Brain done today shows non enhancing lesion abutting the right optic nerve.  Neurosurgery consulted for evaluation.             Post-Op Info:  * No surgery found *         Interval History: PLEX yesterday    Medications:  Continuous Infusions:   sodium chloride 0.9% 75 mL/hr at 06/18/17 0701     Scheduled Meds:   acyclovir  10 mg/kg Intravenous Q8H    ceFEPime (MAXIPIME) IVPB  1 g Intravenous Q12H    chlorhexidine  15 mL Mouth/Throat QID    famotidine  20 mg Per OG tube BID    folic acid-vit B6-vit B12 2.5-25-2 mg  1 tablet Oral Daily    levothyroxine  40 mcg Per NG tube Daily    metoprolol tartrate  12.5 mg Per OG tube BID    modafinil  200 mg Oral BID    senna-docusate 8.6-50 mg  1 tablet Per OG tube Daily    thiamine  100 mg Oral Daily    vancomycin (VANCOCIN) IVPB  1,500 mg Intravenous Q12H     PRN  Meds:sodium chloride, acetaminophen, heparin, porcine (PF), hydrALAZINE, ondansetron     Review of Systems  Objective:     Weight: 88.8 kg (195 lb 12.3 oz)  Body mass index is 25.83 kg/m².  Vital Signs (Most Recent):  Temp: 99.1 °F (37.3 °C) (17)  Pulse: 88 (17)  Resp: 18 (17)  BP: (!) 167/95 (17)  SpO2: 99 % (17) Vital Signs (24h Range):  Temp:  [98.7 °F (37.1 °C)-99.4 °F (37.4 °C)] 99.1 °F (37.3 °C)  Pulse:  [64-91] 88  Resp:  [18] 18  SpO2:  [97 %-100 %] 99 %  BP: (116-190)/() 167/95              Temp (24hrs), Av.1 °F (37.3 °C), Min:98.7 °F (37.1 °C), Max:99.4 °F (37.4 °C)    Vent Mode: A/C  Oxygen Concentration (%):  [40] 40  Resp Rate Total:  [18 br/min] 18 br/min  Vt Set:  [550 mL] 550 mL  PEEP/CPAP:  [8 cmH20] 8 cmH20  Mean Airway Pressure:  [12 cmH20] 12 cmH20      Date 17 07 - 17 0659   Shift 6421-6243 6564-6650 6993-0975 24 Hour Total   I  N  T  A  K  E   I.V.  (mL/kg) 70  (0.8)   70  (0.8)    Shift Total  (mL/kg) 70  (0.8)   70  (0.8)   O  U  T  P  U  T   Shift Total  (mL/kg)       Weight (kg) 88.8 88.8 88.8 88.8          NG/OG Tube 17 0130 (Active)   Placement Check placement verified by distal tube length measurement 2017  7:01 AM   Distal Tube Length (cm) 65 2017  7:01 AM   Tolerance no signs/symptoms of discomfort 2017  7:01 AM   Securement taped to nostril center 2017  7:01 AM   Clamp Status/Tolerance unclamped 2017  7:01 AM   Suction Setting/Drainage Method dependent drainage 2017  3:01 AM   Insertion Site Appearance no redness, warmth, tenderness, skin breakdown, drainage 2017  7:01 AM   Drainage None 2017  3:05 AM   Flush/Irrigation flushed w/;water 2017  7:01 AM   Current Rate (mL/hr) 10 mL/hr 2017  7:05 PM   Goal Rate (mL/hr) 50 mL/hr 2017  7:05 PM   Intake (mL) 80 mL 2017  9:05 PM   Intake (mL) - Formula Tube Feeding 0 2017  5:09 AM   Residual  Amount (ml) 0 ml 6/15/2017  7:05 AM       Male External Urinary Catheter 06/14/17 0003 Medium (Active)   Collection Container Urimeter 6/18/2017  7:01 AM   Securement Method secured to top of thigh w/ tape 6/18/2017  7:01 AM   Skin no redness;no breakdown 6/18/2017  7:01 AM   Tolerance no signs/symptoms of discomfort 6/18/2017  7:01 AM   Output (mL) 350 mL 6/18/2017  2:05 AM       Neurosurgery Physical Exam   E1VTM1  PERRL, no corneals, cough/gag  No motor response to deep pain    Significant Labs:    Recent Labs  Lab 06/18/17  0210   *      K 4.1   *   CO2 25   BUN 24*   CREATININE 0.6   CALCIUM 7.6*   MG 2.3       Recent Labs  Lab 06/17/17  0118 06/18/17  0210   WBC 7.72 8.67   HGB 10.8* 11.1*   HCT 33.0* 34.7*    195     No results for input(s): LABPT, INR, APTT in the last 48 hours.  Microbiology Results (last 7 days)     Procedure Component Value Units Date/Time    Blood culture [[id number]] Collected:  06/14/17 0336    Order Status:  Completed Specimen:  Blood from Peripheral, Wrist, Left Updated:  06/17/17 0812     Blood Culture, Routine No Growth to date     Blood Culture, Routine No Growth to date     Blood Culture, Routine No Growth to date     Blood Culture, Routine No Growth to date    Culture, Respiratory with Gram Stain [[id number]]  (Susceptibility) Collected:  06/14/17 0500    Order Status:  Completed Specimen:  Respiratory from Endotracheal Aspirate Updated:  06/16/17 1030     Respiratory Culture --     PSEUDOMONAS AERUGINOSA  Few       Gram Stain (Respiratory) <10 epithelial cells per low power field.     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  Bilateral ICA  aneurysms incidental findings.  - Continue current management / work up per Ncc and neurology  - Fu paraneoplastic panel.  - PLEX, solumedrol per NCC/neurology  - Repeat LP would be beneficial                Thai King DO  Neurosurgery  [facility]

## 2017-06-18 NOTE — SUBJECTIVE & OBJECTIVE
Interval History: PLEX yesterday    Medications:  Continuous Infusions:   sodium chloride 0.9% 75 mL/hr at 17     Scheduled Meds:   acyclovir  10 mg/kg Intravenous Q8H    ceFEPime (MAXIPIME) IVPB  1 g Intravenous Q12H    chlorhexidine  15 mL Mouth/Throat QID    famotidine  20 mg Per OG tube BID    folic acid-vit B6-vit B12 2.5-25-2 mg  1 tablet Oral Daily    levothyroxine  40 mcg Per NG tube Daily    metoprolol tartrate  12.5 mg Per OG tube BID    modafinil  200 mg Oral BID    senna-docusate 8.6-50 mg  1 tablet Per OG tube Daily    thiamine  100 mg Oral Daily    vancomycin (VANCOCIN) IVPB  1,500 mg Intravenous Q12H     PRN Meds:sodium chloride, acetaminophen, heparin, porcine (PF), hydrALAZINE, ondansetron     Review of Systems  Objective:     Weight: 88.8 kg (195 lb 12.3 oz)  Body mass index is 25.83 kg/m².  Vital Signs (Most Recent):  Temp: 99.1 °F (37.3 °C) (17)  Pulse: 88 (17)  Resp: 18 (17)  BP: (!) 167/95 (17)  SpO2: 99 % (17) Vital Signs (24h Range):  Temp:  [98.7 °F (37.1 °C)-99.4 °F (37.4 °C)] 99.1 °F (37.3 °C)  Pulse:  [64-91] 88  Resp:  [18] 18  SpO2:  [97 %-100 %] 99 %  BP: (116-190)/() 167/95              Temp (24hrs), Av.1 °F (37.3 °C), Min:98.7 °F (37.1 °C), Max:99.4 °F (37.4 °C)    Vent Mode: A/C  Oxygen Concentration (%):  [40] 40  Resp Rate Total:  [18 br/min] 18 br/min  Vt Set:  [550 mL] 550 mL  PEEP/CPAP:  [8 cmH20] 8 cmH20  Mean Airway Pressure:  [12 cmH20] 12 cmH20      Date 17 0700 - 17 0659   Shift 9028-5666 [id number] 1832-1324 24 Hour Total   I  N  T  A  K  E   I.V.  (mL/kg) 70  (0.8)   70  (0.8)    Shift Total  (mL/kg) 70  (0.8)   70  (0.8)   O  U  T  P  U  T   Shift Total  (mL/kg)       Weight (kg) 88.8 88.8 88.8 88.8          NG/OG Tube 17 0130 (Active)   Placement Check placement verified by distal tube length measurement 2017  7:01 AM   Distal Tube Length (cm) 65 2017   7:01 AM   Tolerance no signs/symptoms of discomfort 6/18/2017  7:01 AM   Securement taped to nostril center 6/18/2017  7:01 AM   Clamp Status/Tolerance unclamped 6/18/2017  7:01 AM   Suction Setting/Drainage Method dependent drainage 6/17/2017  3:01 AM   Insertion Site Appearance no redness, warmth, tenderness, skin breakdown, drainage 6/18/2017  7:01 AM   Drainage None 6/18/2017  3:05 AM   Flush/Irrigation flushed w/;water 6/18/2017  7:01 AM   Current Rate (mL/hr) 10 mL/hr 6/17/2017  7:05 PM   Goal Rate (mL/hr) 50 mL/hr 6/17/2017  7:05 PM   Intake (mL) 80 mL 6/17/2017  9:05 PM   Intake (mL) - Formula Tube Feeding 0 6/18/2017  5:09 AM   Residual Amount (ml) 0 ml 6/15/2017  7:05 AM       Male External Urinary Catheter 06/14/17 0003 Medium (Active)   Collection Container Urimeter 6/18/2017  7:01 AM   Securement Method secured to top of thigh w/ tape 6/18/2017  7:01 AM   Skin no redness;no breakdown 6/18/2017  7:01 AM   Tolerance no signs/symptoms of discomfort 6/18/2017  7:01 AM   Output (mL) 350 mL 6/18/2017  2:05 AM       Neurosurgery Physical Exam   E1VTM1  PERRL, no corneals, cough/gag  No motor response to deep pain    Significant Labs:    Recent Labs  Lab 06/18/17  0210   *      K 4.1   *   CO2 25   BUN 24*   CREATININE 0.6   CALCIUM 7.6*   MG 2.3       Recent Labs  Lab 06/17/17  0118 06/18/17  0210   WBC 7.72 8.67   HGB 10.8* 11.1*   HCT 33.0* 34.7*    195     No results for input(s): LABPT, INR, APTT in the last 48 hours.  Microbiology Results (last 7 days)     Procedure Component Value Units Date/Time    Blood culture [[id number]] Collected:  06/14/17 0336    Order Status:  Completed Specimen:  Blood from Peripheral, Wrist, Left Updated:  06/17/17 0812     Blood Culture, Routine No Growth to date     Blood Culture, Routine No Growth to date     Blood Culture, Routine No Growth to date     Blood Culture, Routine No Growth to date    Culture, Respiratory with Gram Stain [[id number]]   (Susceptibility) Collected:  06/14/17 0500    Order Status:  Completed Specimen:  Respiratory from Endotracheal Aspirate Updated:  06/16/17 1030     Respiratory Culture --     PSEUDOMONAS AERUGINOSA  Few       Gram Stain (Respiratory) <10 epithelial cells per low power field.     Gram Stain (Respiratory) Moderate WBC's     Gram Stain (Respiratory) Few Gram negative rods            Significant Diagnostics:

## 2017-06-18 NOTE — PROGRESS NOTES
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    Code Status: Full Code     SUBJECTIVE:     Interval History/Significant Events:   No change in exam  Plex done yesterday  arf  adult ftt  Hyperchloremia  LFT's rising  Metabolic alkalosis      Medications:  Continuous Infusions:   sodium chloride 0.9% 75 mL/hr at 06/18/17 0701     Scheduled Meds:   acyclovir  10 mg/kg Intravenous Q8H    ceFEPime (MAXIPIME) IVPB  1 g Intravenous Q12H    chlorhexidine  15 mL Mouth/Throat QID    famotidine  20 mg Per OG tube BID    folic acid-vit B6-vit B12 2.5-25-2 mg  1 tablet Oral Daily    levothyroxine  40 mcg Per NG tube Daily    metoprolol tartrate  12.5 mg Per OG tube BID    modafinil  200 mg Oral BID    senna-docusate 8.6-50 mg  1 tablet Per OG tube Daily    thiamine  100 mg Oral Daily    vancomycin (VANCOCIN) IVPB  1,500 mg Intravenous Q12H     PRN Meds:.sodium chloride, sodium chloride, acetaminophen, heparin, porcine (PF), hydrALAZINE, ondansetron    OBJECTIVE:   Vital Signs (Most Recent):   Temp: 99.1 °F (37.3 °C) (06/18/17 0702)  Pulse: 92 (06/18/17 0739)  Resp: 18 (06/18/17 0739)  BP: (!) 167/95 (06/18/17 0702)  SpO2: 99 % (06/18/17 0739)    Vital Signs (24h Range):   Temp:  [98.7 °F (37.1 °C)-99.4 °F (37.4 °C)] 99.1 °F (37.3 °C)  Pulse:  [64-92] 92  Resp:  [18] 18  SpO2:  [97 %-100 %] 99 %  BP: (116-190)/() 167/95    ICP/CPP (Last 24h):        I & O (Last 24h):   Intake/Output Summary (Last 24 hours) at 06/18/17 0844  Last data filed at 06/18/17 0701   Gross per 24 hour   Intake          3455.25 ml   Output             2065 ml   Net          1390.25 ml     Physical Exam:  GA: Alert, comfortable, no acute distress.   HEENT: No scleral icterus or JVD.   Pulmonary: Clear to auscultation A/P/L. No wheezing, crackles, or rhonchi.  Cardiac: RRR S1 & S2 w/o rubs/murmurs/gallops.   Abdominal: Bowel sounds present x 4. No appreciable  hepatosplenomegaly.  Skin: No jaundice, rashes, or visible lesions.  Neuro:  Pupils reactive  No change in exam    Vent Data:   Vent Mode: A/C  Oxygen Concentration (%):  [40] 40  Resp Rate Total:  [18 br/min] 18 br/min  Vt Set:  [550 mL] 550 mL  PEEP/CPAP:  [8 cmH20] 8 cmH20  Mean Airway Pressure:  [12 cmH20] 12 cmH20    Lines/Drains/Airway:   L SCL ;1       Airway - Non-Surgical 06/13/17 Endotracheal Tube (Active)   Secured at 24 cm 6/18/2017  7:39 AM   Measured At Lips 6/18/2017  7:39 AM   Secured Location Center 6/18/2017  7:39 AM   Secured by Commercial tube manuel 6/18/2017  7:39 AM   Bite Block none 6/18/2017  7:39 AM   Site Condition Cool;Dry 6/18/2017  7:39 AM   Status Intact;Secured;Patent 6/18/2017  7:39 AM   Site Assessment Clean;Dry;No bleeding;No drainage 6/18/2017  7:39 AM   Cuff Pressure 28 cm H2O 6/18/2017  7:39 AM           PICC Double Lumen 06/12/17 right brachial (Active)   Site Assessment Clean;Dry;Intact 6/18/2017  7:01 AM   Lumen 1 Status Infusing 6/18/2017  7:01 AM   Lumen 2 Status Infusing 6/18/2017  7:01 AM   Dressing Type Transparent 6/18/2017  7:01 AM   Dressing Status Biopatch in place;Clean;Dry;Intact 6/18/2017  7:01 AM   Dressing Intervention Dressing reinforced 6/18/2017  3:05 AM   Dressing Change Due 06/21/17 6/18/2017  7:01 AM   Daily Line Review Performed 6/18/2017  7:01 AM             NG/OG Tube 06/09/17 0130 (Active)   Placement Check placement verified by distal tube length measurement 6/18/2017  7:01 AM   Distal Tube Length (cm) 65 6/18/2017  7:01 AM   Tolerance no signs/symptoms of discomfort 6/18/2017  7:01 AM   Securement taped to nostril center 6/18/2017  7:01 AM   Clamp Status/Tolerance unclamped 6/18/2017  7:01 AM   Suction Setting/Drainage Method dependent drainage 6/17/2017  3:01 AM   Insertion Site Appearance no redness, warmth, tenderness, skin breakdown, drainage 6/18/2017  7:01 AM   Drainage None 6/18/2017  3:05 AM   Flush/Irrigation flushed w/;water 6/18/2017  7:01  AM   Current Rate (mL/hr) 10 mL/hr 6/17/2017  7:05 PM   Goal Rate (mL/hr) 50 mL/hr 6/17/2017  7:05 PM   Intake (mL) 80 mL 6/17/2017  9:05 PM   Intake (mL) - Formula Tube Feeding 0 6/18/2017  5:09 AM   Residual Amount (ml) 0 ml 6/15/2017  7:05 AM       Male External Urinary Catheter 06/14/17 0003 Medium (Active)   Collection Container Urimeter 6/18/2017  7:01 AM   Securement Method secured to top of thigh w/ tape 6/18/2017  7:01 AM   Skin no redness;no breakdown 6/18/2017  7:01 AM   Tolerance no signs/symptoms of discomfort 6/18/2017  7:01 AM   Output (mL) 350 mL 6/18/2017  2:05 AM     Nutrition/Tube Feeds (if NPO state why): start t feeds    Labs:  ABG:   Recent Labs  Lab 06/18/17  0322   PH 7.443   PO2 97   PCO2 44.8   HCO3 30.6*   POCSATURATED 98   BE 7     BMP:  Recent Labs  Lab 06/18/17  0210      K 4.1   *   CO2 25   BUN 24*   CREATININE 0.6   *   MG 2.3   PHOS 3.0     LFT: Lab Results   Component Value Date     (H) 06/18/2017     (H) 06/18/2017    ALKPHOS 37 (L) 06/18/2017    BILITOT 0.9 06/18/2017    ALBUMIN 3.5 06/18/2017    PROT 5.3 (L) 06/18/2017     CBC:   Lab Results   Component Value Date    WBC 8.67 06/18/2017    HGB 11.1 (L) 06/18/2017    HCT 34.7 (L) 06/18/2017     (H) 06/18/2017     06/18/2017     Microbiology x 7d:   Microbiology Results (last 7 days)     Procedure Component Value Units Date/Time    Blood culture [[id number]] Collected:  06/14/17 0336    Order Status:  Completed Specimen:  Blood from Peripheral, Wrist, Left Updated:  06/18/17 0812     Blood Culture, Routine No Growth to date     Blood Culture, Routine No Growth to date     Blood Culture, Routine No Growth to date     Blood Culture, Routine No Growth to date     Blood Culture, Routine No Growth to date    Culture, Respiratory with Gram Stain [[id number]]  (Susceptibility) Collected:  06/14/17 0500    Order Status:  Completed Specimen:  Respiratory from Endotracheal Aspirate Updated:   06/16/17 1030     Respiratory Culture --     PSEUDOMONAS AERUGINOSA  Few       Gram Stain (Respiratory) <10 epithelial cells per low power field.     Gram Stain (Respiratory) Moderate WBC's     Gram Stain (Respiratory) Few Gram negative rods        Imaging:  Cxr; slight congestion    I personally reviewed the above image.    ASSESSMENT/PLAN:     Active Hospital Problems    Diagnosis    *Guillain-[place] syndrome    Brain mass    Acquired hypothyroidism    Acute respiratory failure with hypoxia and hypercapnia    [place] coma scale total score 3-8    Hypovolemia    Flaccid quadriplegia    Brain aneurysm      Plan:  LP  Platelets  Follow LFT's  Follow exam  Restart t feeds    Level;3

## 2017-06-18 NOTE — PROCEDURES
[facility]  Pathology  Procedure Note    SUMMARY   Therapeutic Plasma Exchange (Apheresis)  Date/Time: 6/17/2017 10:43 PM  Performed by: MARCELA GONZALEZ  Authorized by: MARCELA GONZALEZ   Consent: Written consent obtained.      Date of Procedure: 6/17/2017     Procedure: Plasma Exchange    Provider: Marcela Gonzalez MD     Assisting Provider: None    Pre-Procedure Diagnosis: Guillain-[place] syndrome    Post-Procedure Diagnosis: Guillain-[place] syndrome    Follow-up Assessment: 72 year old male with Guillian [place] diagnosis status post IVIG treatment presents for therapeutic plasma exchange. Symptoms have progressed from peripheral paresthesias, to weakness, to a rapidly ascending paralysis now requiring intubation.Guillain [place] status post IVIG carries a Category III Grade 2C indication for therapeutic plasma exchange via the 2016 Journal of Clinical Apheresis Guidelines (Mena PARDO et al. Journal of Clinical Apheresis 2016; 31:149-162.) Five TPE are planned, scheduled every other day.    Today's procedure was well tolerated and without complication. The next scheduled procedure will be Monday.    Pertinent Laboratory Data:   Complete Blood Count:   Lab Results   Component Value Date    HGB 10.8 (L) 06/17/2017    HCT 33.0 (L) 06/17/2017     06/17/2017    WBC 7.72 06/17/2017       Pertinent Medications: None contraindicated    Review of patient's allergies indicates:  No Known Allergies    Anesthesia: None     Technical Procedures Used:  Plasma Exchange: Volume exchanged - 4.0 Liters; Replacement fluid - Albumin; Number of procedures 1 of 5; Date of next procedure 6/19.    Description of the Findings of the Procedure:     Please see Apheresis Nurse flowsheet for details.    The patient was evaluated and all clinical and laboratory data relevant to the treatment was reviewed, and a decision was made to proceed with the Apheresis procedure.    I was available to the clinical staff  throughout the procedure.    Significant Surgical Tasks Conducted by the Assistant(s): Not applicable  Complications: None  Estimated Blood Loss (EBL): None  Implants: None   Specimens: None    Marcela Pradhan MD, PhD  Section of Transfusion Medicine & Histocompatibility  Department of Pathology and Laboratory Medicine  [facility]  [phone] (HLA & Blood Bank Offices)  06/17/2017

## 2017-06-18 NOTE — EICU
Called  into room by bedside team for lumbar puncture. LP performed by Dr. Brice on first attempt,  pt tolerated well. VSS.

## 2017-06-18 NOTE — PLAN OF CARE
Problem: Patient Care Overview  Goal: Plan of Care Review  Outcome: Ongoing (interventions implemented as appropriate)  No acute events occurred throughout the shift. See flowsheets for assessments and VS. Plan of care reviewed with Rosalio Cam and Rosalio Cam's family. All questions answered in turn. Pt progressing towards goals as noted. Will continue to monitor.

## 2017-06-18 NOTE — PROCEDURES
"Rosalio Cam is a 72 y.o. male patient.    Temp: 99.1 °F (37.3 °C) (06/18/17 0702)  Pulse: 92 (06/18/17 0739)  Resp: 18 (06/18/17 0739)  BP: (!) 167/95 (06/18/17 0702)  SpO2: 99 % (06/18/17 0739)  Weight: 88.8 kg (195 lb 12.3 oz) (06/18/17 0257)  Height: 6' 1" (185.4 cm) (06/14/17 0200)       Central Line  Date/Time: 6/18/2017 8:43 AM  Performed by: GUILLERMO WHITE  Consent Done: Yes  Time out: Immediately prior to procedure a "time out" was called to verify the correct patient, procedure, equipment, support staff and site/side marked as required.  Indications: hemodialysis  Preparation: skin prepped with ChloraPrep  Skin prep agent dried: skin prep agent completely dried prior to procedure  Sterile barriers: all five maximum sterile barriers used - cap, mask, sterile gown, sterile gloves, and large sterile sheet  Hand hygiene: hand hygiene performed prior to central venous catheter insertion  Location details: left subclavian  Catheter type: double lumen  Catheter size: 14 Fr  Ultrasound guidance: no  Manometry: Yes  Number of attempts: 1  Assessment: placement verified by x-ray and no pneumothorax on x-ray  Post-procedure: line sutured,  chlorhexidine patch,  sterile dressing applied and blood return through all ports  Complications: No          Guillermo White  06/17/17  "

## 2017-06-19 LAB
ALBUMIN SERPL BCP-MCNC: 2.9 G/DL
ALLENS TEST: ABNORMAL
ALP SERPL-CCNC: 50 U/L
ALT SERPL W/O P-5'-P-CCNC: 104 U/L
ANA SER QL IF: NORMAL
ANION GAP SERPL CALC-SCNC: 5 MMOL/L
AST SERPL-CCNC: 73 U/L
BACTERIA BLD CULT: NORMAL
BASOPHILS # BLD AUTO: 0.02 K/UL
BASOPHILS NFR BLD: 0.2 %
BILIRUB SERPL-MCNC: 0.5 MG/DL
BUN SERPL-MCNC: 23 MG/DL
CALCIUM SERPL-MCNC: 7.5 MG/DL
CHLORIDE SERPL-SCNC: 108 MMOL/L
CLARITY CSF: CLEAR
CO2 SERPL-SCNC: 25 MMOL/L
COLOR CSF: COLORLESS
CREAT SERPL-MCNC: 0.6 MG/DL
DELSYS: ABNORMAL
DIFFERENTIAL METHOD: ABNORMAL
EOSINOPHIL # BLD AUTO: 0.1 K/UL
EOSINOPHIL NFR BLD: 0.8 %
ERYTHROCYTE [DISTWIDTH] IN BLOOD BY AUTOMATED COUNT: 13.1 %
ERYTHROCYTE [SEDIMENTATION RATE] IN BLOOD BY WESTERGREN METHOD: 18 MM/H
EST. GFR  (AFRICAN AMERICAN): >60 ML/MIN/1.73 M^2
EST. GFR  (NON AFRICAN AMERICAN): >60 ML/MIN/1.73 M^2
FIO2: 40
GLUCOSE SERPL-MCNC: 166 MG/DL
HCO3 UR-SCNC: 32.2 MMOL/L (ref 24–28)
HCT VFR BLD AUTO: 32.4 %
HGB BLD-MCNC: 10.5 G/DL
INDIA INK PREP CSF: NORMAL
LYMPHOCYTES # BLD AUTO: 1.2 K/UL
LYMPHOCYTES NFR BLD: 13.2 %
LYMPHOCYTES NFR CSF MANUAL: 45 %
MAGNESIUM SERPL-MCNC: 2.3 MG/DL
MCH RBC QN AUTO: 32.2 PG
MCHC RBC AUTO-ENTMCNC: 32.4 %
MCV RBC AUTO: 99 FL
MODE: ABNORMAL
MONOCYTES # BLD AUTO: 0.6 K/UL
MONOCYTES NFR BLD: 5.8 %
MONOS+MACROS NFR CSF MANUAL: 55 %
NEUTROPHILS # BLD AUTO: 7.5 K/UL
NEUTROPHILS NFR BLD: 79.2 %
PCO2 BLDA: 41.2 MMHG (ref 35–45)
PEEP: 8
PH SMN: 7.5 [PH] (ref 7.35–7.45)
PHOSPHATE SERPL-MCNC: 2.8 MG/DL
PLATELET # BLD AUTO: 214 K/UL
PMV BLD AUTO: 10 FL
PO2 BLDA: 114 MMHG (ref 80–100)
POC BE: 9 MMOL/L
POC SATURATED O2: 99 % (ref 95–100)
POC TCO2: 33 MMOL/L (ref 23–27)
POCT GLUCOSE: 154 MG/DL (ref 70–110)
POCT GLUCOSE: 222 MG/DL (ref 70–110)
POTASSIUM SERPL-SCNC: 4 MMOL/L
PROT SERPL-MCNC: 5.2 G/DL
RBC # BLD AUTO: 3.26 M/UL
RBC # CSF: 11 /CU MM
SAMPLE: ABNORMAL
SITE: ABNORMAL
SODIUM SERPL-SCNC: 138 MMOL/L
SP02: 98
SPECIMEN VOL CSF: 0.2 ML
VT: 550
WBC # BLD AUTO: 9.42 K/UL
WBC # CSF: 3 /CU MM

## 2017-06-19 PROCEDURE — 63600175 PHARM REV CODE 636 W HCPCS: Performed by: NURSE PRACTITIONER

## 2017-06-19 PROCEDURE — 36514 APHERESIS PLASMA: CPT | Mod: ,,, | Performed by: PATHOLOGY

## 2017-06-19 PROCEDURE — 93005 ELECTROCARDIOGRAM TRACING: CPT

## 2017-06-19 PROCEDURE — 99232 SBSQ HOSP IP/OBS MODERATE 35: CPT | Mod: ,,, | Performed by: NEUROLOGICAL SURGERY

## 2017-06-19 PROCEDURE — 99232 SBSQ HOSP IP/OBS MODERATE 35: CPT | Mod: ,,, | Performed by: PSYCHIATRY & NEUROLOGY

## 2017-06-19 PROCEDURE — 85025 COMPLETE CBC W/AUTO DIFF WBC: CPT

## 2017-06-19 PROCEDURE — 97802 MEDICAL NUTRITION INDIV IN: CPT

## 2017-06-19 PROCEDURE — 63600175 PHARM REV CODE 636 W HCPCS: Performed by: PSYCHIATRY & NEUROLOGY

## 2017-06-19 PROCEDURE — 93010 ELECTROCARDIOGRAM REPORT: CPT | Mod: S$GLB,,, | Performed by: INTERNAL MEDICINE

## 2017-06-19 PROCEDURE — 96365 THER/PROPH/DIAG IV INF INIT: CPT

## 2017-06-19 PROCEDURE — 20000000 HC ICU ROOM

## 2017-06-19 PROCEDURE — 25000003 PHARM REV CODE 250: Performed by: PSYCHIATRY & NEUROLOGY

## 2017-06-19 PROCEDURE — 25000003 PHARM REV CODE 250: Performed by: PATHOLOGY

## 2017-06-19 PROCEDURE — 99233 SBSQ HOSP IP/OBS HIGH 50: CPT | Mod: ,,, | Performed by: ANESTHESIOLOGY

## 2017-06-19 PROCEDURE — 63600175 PHARM REV CODE 636 W HCPCS: Performed by: PATHOLOGY

## 2017-06-19 PROCEDURE — 94761 N-INVAS EAR/PLS OXIMETRY MLT: CPT

## 2017-06-19 PROCEDURE — 27200966 HC CLOSED SUCTION SYSTEM

## 2017-06-19 PROCEDURE — 96366 THER/PROPH/DIAG IV INF ADDON: CPT

## 2017-06-19 PROCEDURE — 36514 APHERESIS PLASMA: CPT

## 2017-06-19 PROCEDURE — 84100 ASSAY OF PHOSPHORUS: CPT

## 2017-06-19 PROCEDURE — 25000003 PHARM REV CODE 250: Performed by: NURSE PRACTITIONER

## 2017-06-19 PROCEDURE — 94003 VENT MGMT INPAT SUBQ DAY: CPT

## 2017-06-19 PROCEDURE — 82803 BLOOD GASES ANY COMBINATION: CPT

## 2017-06-19 PROCEDURE — 37799 UNLISTED PX VASCULAR SURGERY: CPT

## 2017-06-19 PROCEDURE — 009U3ZX DRAINAGE OF SPINAL CANAL, PERCUTANEOUS APPROACH, DIAGNOSTIC: ICD-10-PCS | Performed by: ANESTHESIOLOGY

## 2017-06-19 PROCEDURE — 83735 ASSAY OF MAGNESIUM: CPT

## 2017-06-19 PROCEDURE — 80053 COMPREHEN METABOLIC PANEL: CPT

## 2017-06-19 PROCEDURE — 63600175 PHARM REV CODE 636 W HCPCS: Performed by: PHYSICIAN ASSISTANT

## 2017-06-19 PROCEDURE — P9045 ALBUMIN (HUMAN), 5%, 250 ML: HCPCS | Performed by: PATHOLOGY

## 2017-06-19 PROCEDURE — 25000003 PHARM REV CODE 250: Performed by: PHYSICIAN ASSISTANT

## 2017-06-19 RX ORDER — SERTRALINE HYDROCHLORIDE 50 MG/1
50 TABLET, FILM COATED ORAL DAILY
Status: DISCONTINUED | OUTPATIENT
Start: 2017-06-19 | End: 2017-07-07

## 2017-06-19 RX ORDER — ALBUMIN HUMAN 50 G/1000ML
200 SOLUTION INTRAVENOUS ONCE
Status: COMPLETED | OUTPATIENT
Start: 2017-06-19 | End: 2017-06-19

## 2017-06-19 RX ORDER — GLUCAGON 1 MG
1 KIT INJECTION
Status: DISCONTINUED | OUTPATIENT
Start: 2017-06-19 | End: 2017-07-14 | Stop reason: HOSPADM

## 2017-06-19 RX ORDER — DIAZEPAM 10 MG/2ML
5 INJECTION INTRAMUSCULAR EVERY 4 HOURS PRN
Status: DISCONTINUED | OUTPATIENT
Start: 2017-06-19 | End: 2017-06-19

## 2017-06-19 RX ORDER — ALPRAZOLAM 0.5 MG/1
1 TABLET ORAL EVERY 8 HOURS
Status: DISCONTINUED | OUTPATIENT
Start: 2017-06-19 | End: 2017-06-22

## 2017-06-19 RX ORDER — HEPARIN SODIUM 1000 [USP'U]/ML
4000 INJECTION, SOLUTION INTRAVENOUS; SUBCUTANEOUS ONCE
Status: COMPLETED | OUTPATIENT
Start: 2017-06-19 | End: 2017-06-19

## 2017-06-19 RX ORDER — THIAMINE HCL 100 MG
100 TABLET ORAL DAILY
Status: DISCONTINUED | OUTPATIENT
Start: 2017-06-20 | End: 2017-07-01

## 2017-06-19 RX ORDER — METHYLPREDNISOLONE SOD SUCC 125 MG
1000 VIAL (EA) INJECTION DAILY
Status: DISCONTINUED | OUTPATIENT
Start: 2017-06-19 | End: 2017-06-19

## 2017-06-19 RX ORDER — INSULIN ASPART 100 [IU]/ML
1-10 INJECTION, SOLUTION INTRAVENOUS; SUBCUTANEOUS EVERY 6 HOURS PRN
Status: DISCONTINUED | OUTPATIENT
Start: 2017-06-19 | End: 2017-06-20

## 2017-06-19 RX ORDER — POLYETHYLENE GLYCOL 3350 17 G/17G
17 POWDER, FOR SOLUTION ORAL DAILY
Status: DISCONTINUED | OUTPATIENT
Start: 2017-06-19 | End: 2017-07-07

## 2017-06-19 RX ORDER — ALPRAZOLAM 0.5 MG/1
1 TABLET ORAL EVERY 8 HOURS
Status: DISCONTINUED | OUTPATIENT
Start: 2017-06-19 | End: 2017-06-19

## 2017-06-19 RX ADMIN — Medication 1 TABLET: at 09:06

## 2017-06-19 RX ADMIN — CHLORHEXIDINE GLUCONATE 15 ML: 1.2 RINSE ORAL at 05:06

## 2017-06-19 RX ADMIN — ACYCLOVIR SODIUM 790 MG: 50 INJECTION, SOLUTION INTRAVENOUS at 07:06

## 2017-06-19 RX ADMIN — Medication 12.5 MG: at 09:06

## 2017-06-19 RX ADMIN — CHLORHEXIDINE GLUCONATE 15 ML: 1.2 RINSE ORAL at 12:06

## 2017-06-19 RX ADMIN — SERTRALINE HYDROCHLORIDE 50 MG: 50 TABLET ORAL at 11:06

## 2017-06-19 RX ADMIN — STANDARDIZED SENNA CONCENTRATE AND DOCUSATE SODIUM 1 TABLET: 8.6; 5 TABLET, FILM COATED ORAL at 09:06

## 2017-06-19 RX ADMIN — CALCIUM GLUCONATE 2000 MG: 94 INJECTION, SOLUTION INTRAVENOUS at 12:06

## 2017-06-19 RX ADMIN — ALBUMIN (HUMAN) 200 G: 12.5 SOLUTION INTRAVENOUS at 12:06

## 2017-06-19 RX ADMIN — FAMOTIDINE 20 MG: 20 TABLET, FILM COATED ORAL at 09:06

## 2017-06-19 RX ADMIN — SODIUM CHLORIDE: 0.9 INJECTION, SOLUTION INTRAVENOUS at 05:06

## 2017-06-19 RX ADMIN — HEPARIN SODIUM 2500 UNITS: 1000 INJECTION, SOLUTION INTRAVENOUS; SUBCUTANEOUS at 01:06

## 2017-06-19 RX ADMIN — CEFEPIME HYDROCHLORIDE 1 G: 1 INJECTION, SOLUTION INTRAVENOUS at 05:06

## 2017-06-19 RX ADMIN — ALPRAZOLAM 1 MG: 0.5 TABLET ORAL at 09:06

## 2017-06-19 RX ADMIN — MODAFINIL 200 MG: 100 TABLET ORAL at 09:06

## 2017-06-19 RX ADMIN — INSULIN ASPART 4 UNITS: 100 INJECTION, SOLUTION INTRAVENOUS; SUBCUTANEOUS at 05:06

## 2017-06-19 RX ADMIN — POLYETHYLENE GLYCOL 3350 17 G: 17 POWDER, FOR SOLUTION ORAL at 11:06

## 2017-06-19 RX ADMIN — THIAMINE HCL TAB 100 MG 100 MG: 100 TAB at 09:06

## 2017-06-19 RX ADMIN — DEXTROSE: 50 INJECTION, SOLUTION INTRAVENOUS at 11:06

## 2017-06-19 RX ADMIN — LEVOTHYROXINE SODIUM 75 MCG: 75 TABLET ORAL at 05:06

## 2017-06-19 RX ADMIN — ACYCLOVIR SODIUM 790 MG: 50 INJECTION, SOLUTION INTRAVENOUS at 03:06

## 2017-06-19 RX ADMIN — ACYCLOVIR SODIUM 790 MG: 50 INJECTION, SOLUTION INTRAVENOUS at 11:06

## 2017-06-19 RX ADMIN — ALPRAZOLAM 1 MG: 0.5 TABLET ORAL at 11:06

## 2017-06-19 NOTE — PROGRESS NOTES
[facility]  Adult Nutrition  Consult Note    SUMMARY     Recommendations    Recommendation/Intervention:   Recommend continuing Isosource 1.5 @ goal rate 50mL/hr. Add 4 packets Beneprotein daily to meet protein needs.   -Hold for residuals >500ml.     RD to monitor.      Goals: Pt to tolerate >90% EEN and EPN via TF  Nutrition Goal Status: new  Communication of RD Recs: reviewed with RN    Reason for Assessment    Reason for Assessment: new tube feeding  Diagnosis: other (see comments) (acute ascending paralysis, sphenoid mass)  Relevent Medical History: CHARLES, CAD, skin cancer, HLP         General Information Comments: Family at bedside. Pt remains intubated with poor neurologic exam. TF initiate, running at goal. 150mL residuals noted    Nutrition Discharge Planning: unable to determine at this time.     Nutrition Prescription Ordered    Current Diet Order: NPO  Nutrition Order Comments: TF running at goal  Current Nutrition Support Formula Ordered: Isosource 1.5  Current Nutrition Support Rate Ordered: 50 (ml)  Current Nutrition Support Frequency Ordered: mL/hr        Evaluation of Received Nutrients/Fluid Intake    Enteral Calories (kcal): 1800  Enteral Protein (gm): 82  Enteral (Free Water) Fluid (mL): 917      Energy Calories Required: meeting needs  % Kcal Needs: 98     Protein Required: not meeting needs  % Protein Needs: 79     IV Fluid (mL): 1800         Fluid Required: meeting needs  Comments: +I/O, no BM documented, 150mL residuals noted  Tolerance: tolerating     Nutrition Risk Screen     Nutrition Risk Screen: no indicators present    Nutrition/Diet History       Typical Food/Fluid Intake: ANASTASIIA           Factors Affecting Nutritional Intake: NPO, on mechanical ventilation                Labs/Tests/Procedures/Meds       Pertinent Labs Reviewed: reviewed     Pertinent Medications Reviewed: reviewed  Pertinent Medications Comments: folic acid, thiamine, IVF    Physical  "Findings    Overall Physical Appearance: overweight, on ventilator support  Tubes: orogastric tube     Skin: intact    Anthropometrics    Temp: 99.2 °F (37.3 °C)     Height: 6' 1" (185.4 cm)  Weight Method: Bed Scale  Weight: 88.8 kg (195 lb 12.3 oz)  Ideal Body Weight (IBW), Male: 184 lb     % Ideal Body Weight, Male (lb): 106.4 lb     BMI (Calculated): 25.9  BMI Grade: 25 - 29.9 - overweight     Estimated/Assessed Needs    Weight Used For Calorie Calculations: 86.4 kg (190 lb 7.6 oz)         Energy Need Method: [facility] (1840kcals)     RMR ([place]. Jeor Equation): 1701.88        Weight Used For Protein Calculations: 86.4 kg (190 lb 7.6 oz)  Protein Requirements: 104-130g (1.2-1.5g/kg)    Fluid Need Method: RDA Method     Assessment and Plan    Nutr dx/problem: Inadequate energy intake  Related to/etiology: inability to consume sufficient energy  As evidenced by: NPO requiring alternative means of nutrition.  Status: New      Monitor and Evaluation    Food and Nutrient Intake: enteral nutrition intake  Food and Nutrient Adminstration: enteral and parenteral nutrition administration        Anthropometric Measurements: weight, weight change, body mass index  Biochemical Data, Medical Tests and Procedures: electrolyte and renal panel, gastrointestinal profile, glucose/endocrine profile, inflammatory profile, lipid profile  Nutrition-Focused Physical Findings: overall appearance  % Intake of Estimated Energy Needs: 75 - 100 %  % Meal Intake: NPO    Nutrition Risk    Level of Risk: other (see comments) (f/u 2x/week)    Nutrition Follow-Up    RD Follow-up?: Yes          "

## 2017-06-19 NOTE — PROGRESS NOTES
[facility]  Neurology  Progress Note    Patient Name: Rosalio Cam  MRN: [id number]  Admission Date: 6/14/2017  Hospital Length of Stay: 5 days  Code Status: Full Code   Attending Provider: Pepe Salgado MD  Primary Care Physician: Sheila Vidal MD   Principal Problem:Guillain-[place] syndrome      Subjective:     Interval History: No acute events reported overnight.  Son at bedside.  Planning for Day 2/5 PLEX today.    Current Neurological Medications:   Synthroid 75 mcg Qdaily  Modafinil 200 BID  Acyclovir  Cefepime  Vancomycin    Current Facility-Administered Medications   Medication Dose Route Frequency Provider Last Rate Last Dose    0.9%  NaCl infusion (for blood administration)   Intravenous Q24H PRN Lisa Gutierrez NP        0.9%  NaCl infusion   Intravenous Continuous Brian Christensen MD 75 mL/hr at 06/19/17 0800      acetaminophen tablet 650 mg  650 mg Per NG tube Q6H PRN James Blackmon NP   650 mg at 06/17/17 1437    acyclovir (ZOVIRAX) 790 mg in dextrose 5 % 250 mL IVPB  10 mg/kg Intravenous Q8H Pepe Salgado  mL/hr at 06/19/17 0330 790 mg at 06/19/17 0330    cefepime in dextrose 5 % 1 gram/50 mL IVPB 1 g  1 g Intravenous Q12H Brian Christensen MD   1 g at 06/19/17 0524    chlorhexidine 0.12 % solution 15 mL  15 mL Mouth/Throat QID James Blackmon NP   15 mL at 06/19/17 0523    famotidine tablet 20 mg  20 mg Per OG tube BID Brian Christensen MD   20 mg at 06/19/17 0904    folic acid-vit B6-vit B12 2.5-25-2 mg tablet 1 tablet  1 tablet Oral Daily Lisa Gutierrez NP   1 tablet at 06/19/17 0905    heparin, porcine (PF) injection 1,000 Units  1,000 Units Intravenous PRN Marcela Pradhan MD   2,500 Units at 06/17/17 1850    hydrALAZINE injection 10 mg  10 mg Intravenous Q6H PRN Brian Christensen MD   10 mg at 06/18/17 2106    levothyroxine tablet 75 mcg  75 mcg Per NG tube Daily Lisa Gutierrez NP   75 mcg at 06/19/17 0523    metoprolol tartrate (LOPRESSOR)  split tablet 12.5 mg  12.5 mg Per OG tube BID Brian Christensen MD   12.5 mg at 06/19/17 0905    modafinil tablet 200 mg  200 mg Oral BID Lisa Gutierrez NP   200 mg at 06/19/17 0905    ondansetron injection 4 mg  4 mg Intravenous Q8H PRN James Blackmon NP        senna-docusate 8.6-50 mg per tablet 1 tablet  1 tablet Per OG tube Daily Brian Christensen MD   1 tablet at 06/19/17 0904    thiamine tablet 100 mg  100 mg Oral Daily Lisa Gutierrez NP   100 mg at 06/19/17 0905    vancomycin (VANCOCIN) 1,500 mg in dextrose 5 % 250 mL IVPB  1,500 mg Intravenous Q12H Lisa Gutierrez .7 mL/hr at 06/18/17 2106 1,500 mg at 06/18/17 2106       Review of Systems   Unable to perform ROS: Intubated     Objective:     Vital Signs (Most Recent):  Temp: 99.1 °F (37.3 °C) (06/19/17 0732)  Pulse: 81 (06/19/17 0802)  Resp: 18 (06/19/17 0802)  BP: 122/70 (06/19/17 0802)  SpO2: 98 % (06/19/17 0802) Vital Signs (24h Range):  Temp:  [97.8 °F (36.6 °C)-99.1 °F (37.3 °C)] 99.1 °F (37.3 °C)  Pulse:  [70-97] 81  Resp:  [18-29] 18  SpO2:  [96 %-100 %] 98 %  BP: (109-176)/() 122/70     Weight: 88.8 kg (195 lb 12.3 oz)  Body mass index is 25.83 kg/m².    Physical Exam   Constitutional: He appears well-developed and well-nourished. No distress.   Does not respond to verbal or painful stimuli   Cardiovascular: Normal rate, regular rhythm and normal heart sounds.    No murmur heard.  Pulmonary/Chest:   Intubated, stable vent settings  Mechanical breath sounds b/l lung fields   Abdominal: Soft. Bowel sounds are normal. He exhibits no distension. There is no tenderness.   Musculoskeletal: He exhibits no edema.   Neurological:   Reflex Scores:       Tricep reflexes are 0 on the right side and 0 on the left side.       Bicep reflexes are 0 on the right side and 0 on the left side.       Brachioradialis reflexes are 0 on the right side and 0 on the left side.       Patellar reflexes are 0 on the right side and 0 on the left side.        Achilles reflexes are 0 on the right side and 0 on the left side.      NEUROLOGICAL EXAMINATION:     CRANIAL NERVES     CN III, IV, VI   Pupils: (Pupils round, minimally reactive and sluggish to light)    CN V   Right corneal reflex: abnormal  Left corneal reflex: abnormal (Lateral rectus activation upon testing of corneal relfex without blink)       Oculocephalic reflex absent  Gag reflex absent     MOTOR EXAM   Muscle bulk: normal  Overall muscle tone: decreased    REFLEXES     Reflexes   Right brachioradialis: 0  Left brachioradialis: 0  Right biceps: 0  Left biceps: 0  Right triceps: 0  Left triceps: 0  Right patellar: 0  Left patellar: 0  Right achilles: 0  Left achilles: 0  Right plantar: equivocal  Left plantar: equivocal  Right ankle clonus: absent  Left ankle clonus: absent      Significant Labs:   Recent Results (from the past 24 hour(s))   HSV by Rapid PCR, Non-Blood    Collection Time: 06/18/17 12:16 PM   Result Value Ref Range    HSV PCR Source CSF    CSF cell count with differential    Collection Time: 06/18/17 12:45 PM   Result Value Ref Range    Heme Aliquot 2.0 mL    Appearance, CSF Clear Clear    Color, CSF Colorless Colorless    WBC, CSF 1 0 - 5 /cu mm    RBC, CSF 8 (A) 0 /cu mm    Lymphs, CSF 86 (H) 40 - 80 %    Mono/Macrophage, CSF 14 (L) 15 - 45 %   Glucose, CSF    Collection Time: 06/18/17 12:45 PM   Result Value Ref Range    Glucose, CSF 92 (H) 40 - 70 mg/dL   LDH, CSF    Collection Time: 06/18/17 12:45 PM   Result Value Ref Range    Body Fluid Source, Refrigerated 3    Protein, CSF    Collection Time: 06/18/17 12:45 PM   Result Value Ref Range    Protein,  (H) 15 - 40 mg/dL   Herpes Virus 6, Quant., PCR - CSF    Collection Time: 06/18/17 12:45 PM   Result Value Ref Range    HH6V Source, CSF CSF    Comprehensive metabolic panel    Collection Time: 06/19/17  2:10 AM   Result Value Ref Range    Sodium 138 136 - 145 mmol/L    Potassium 4.0 3.5 - 5.1 mmol/L    Chloride 108 95 - 110 mmol/L     CO2 25 23 - 29 mmol/L    Glucose 166 (H) 70 - 110 mg/dL    BUN, Bld 23 8 - 23 mg/dL    Creatinine 0.6 0.5 - 1.4 mg/dL    Calcium 7.5 (L) 8.7 - 10.5 mg/dL    Total Protein 5.2 (L) 6.0 - 8.4 g/dL    Albumin 2.9 (L) 3.5 - 5.2 g/dL    Total Bilirubin 0.5 0.1 - 1.0 mg/dL    Alkaline Phosphatase 50 (L) 55 - 135 U/L    AST 73 (H) 10 - 40 U/L     (H) 10 - 44 U/L    Anion Gap 5 (L) 8 - 16 mmol/L    eGFR if African American >60.0 >60 mL/min/1.73 m^2    eGFR if non African American >60.0 >60 mL/min/1.73 m^2   Magnesium    Collection Time: 06/19/17  2:10 AM   Result Value Ref Range    Magnesium 2.3 1.6 - 2.6 mg/dL   Phosphorus    Collection Time: 06/19/17  2:10 AM   Result Value Ref Range    Phosphorus 2.8 2.7 - 4.5 mg/dL   CBC auto differential    Collection Time: 06/19/17  2:10 AM   Result Value Ref Range    WBC 9.42 3.90 - 12.70 K/uL    RBC 3.26 (L) 4.60 - 6.20 M/uL    Hemoglobin 10.5 (L) 14.0 - 18.0 g/dL    Hematocrit 32.4 (L) 40.0 - 54.0 %    MCV 99 (H) 82 - 98 fL    MCH 32.2 (H) 27.0 - 31.0 pg    MCHC 32.4 32.0 - 36.0 %    RDW 13.1 11.5 - 14.5 %    Platelets 214 150 - 350 K/uL    MPV 10.0 9.2 - 12.9 fL    Gran # 7.5 1.8 - 7.7 K/uL    Lymph # 1.2 1.0 - 4.8 K/uL    Mono # 0.6 0.3 - 1.0 K/uL    Eos # 0.1 0.0 - 0.5 K/uL    Baso # 0.02 0.00 - 0.20 K/uL    Gran% 79.2 (H) 38.0 - 73.0 %    Lymph% 13.2 (L) 18.0 - 48.0 %    Mono% 5.8 4.0 - 15.0 %    Eosinophil% 0.8 0.0 - 8.0 %    Basophil% 0.2 0.0 - 1.9 %    Differential Method Automated    ISTAT PROCEDURE    Collection Time: 06/19/17  3:59 AM   Result Value Ref Range    POC PH 7.502 (H) 7.35 - 7.45    POC PCO2 41.2 35 - 45 mmHg    POC PO2 114 (H) 80 - 100 mmHg    POC HCO3 32.2 (H) 24 - 28 mmol/L    POC BE 9 -2 to 2 mmol/L    POC SATURATED O2 99 95 - 100 %    POC TCO2 33 (H) 23 - 27 mmol/L    Rate 18     Sample ARTERIAL     Site [place]/UAC     Allens Test N/A     DelSys Adult Vent     Mode AC/PRVC     Vt 550     PEEP 8     FiO2 40     Sp02 98      Microbiology Results  (last 7 days)     Procedure Component Value Units Date/Time    Blood culture [[id number]] Collected:  06/14/17 0336    Order Status:  Completed Specimen:  Blood from Peripheral, Wrist, Left Updated:  06/19/17 0812     Blood Culture, Routine No growth after 5 days.    Laurita Ink (CSF) [[id number]] Collected:  06/18/17 1212    Order Status:  Sent Specimen:  CSF (Spinal Fluid) from CSF Tap, Tube 3 Updated:  06/18/17 1213    CSF culture [[id number]] Collected:  06/18/17 1212    Order Status:  Sent Specimen:  CSF (Spinal Fluid) from CSF Tap, Tube 3 Updated:  06/18/17 1213    Cryptococcal antigen, CSF [[id number]] Collected:  06/18/17 1212    Order Status:  Sent Specimen:  CSF (Spinal Fluid) from CSF Tap, Tube 3 Updated:  06/18/17 1213    Culture, Respiratory with Gram Stain [[id number]]  (Susceptibility) Collected:  06/14/17 0500    Order Status:  Completed Specimen:  Respiratory from Endotracheal Aspirate Updated:  06/16/17 1030     Respiratory Culture --     PSEUDOMONAS AERUGINOSA  Few       Gram Stain (Respiratory) <10 epithelial cells per low power field.     Gram Stain (Respiratory) Moderate WBC's     Gram Stain (Respiratory) Few Gram negative rods          Significant Imaging:   No new pertinent imaging overnight      Assessment and Plan:     Mr. Cam is a 72 male w/ PMH significant for CHARLES, CAD, skin cancer, hypothyroidism, presenting with complaints of rapidly ascending paralysis, s/p 1x round IVIG, intubated for worsening weakness.  PLEX QOD x5 days (day 1 = 6/17).  Solumedrol day 1 = 6/19.    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    -- Neoplastic/Paraneoplastic process considering mass on brain MRI concerning for primary brain tumor   -- Rule out Lyme disease   -- Rule out viral pathology - Zika - herpes vs other     * Guillain-[place] syndrome    -- Recommend solumedrol 1 g daily x5 (day 1 = 6/19)  -- Recommend PLEX for 5 days (QOD, day 1 = 6/17)  -- Recommend repeat EEG; consider  auditory evoked potentials  -- Acyclovir as per primary team   -- Consider CT Chest and abdomen pelvis for occult malignancy - after PLEX     -- Ordered/in process: paraneoplastic panel, GQ1b, viral labs, lyme, EMG                VTE Risk Mitigation         Ordered     High Risk of VTE  Once      06/14/17 0117          Denzel Mesa MD  Neurology  [facility]

## 2017-06-19 NOTE — ASSESSMENT & PLAN NOTE
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  Bilateral ICA aneurysms incidental findings.  - Continue current management / work up per [facility] and neurology  - Fu paraneoplastic panel.  - PLEX, solumedrol per [facility]/neurology  - Fu cultures of LP.

## 2017-06-19 NOTE — SUBJECTIVE & OBJECTIVE
Interval History: NAEON    Medications:  Continuous Infusions:   sodium chloride 0.9% 75 mL/hr at 17 1000     Scheduled Meds:   acyclovir  10 mg/kg Intravenous Q8H    albumin human 5%  200 g Intravenous Once    alprazolam  1 mg Per NG tube Q8H    calcium gluconate IVPB  2,000 mg Intravenous Once    ceFEPime (MAXIPIME) IVPB  1 g Intravenous Q12H    chlorhexidine  15 mL Mouth/Throat QID    famotidine  20 mg Per OG tube BID    [START ON 2017] folic acid-vit B6-vit B12 2.5-25-2 mg  1 tablet Per NG tube Daily    heparin (porcine)  4,000 Units Intravenous Once    levothyroxine  75 mcg Per NG tube Daily    methylPREDNISolone (SOLU-Medrol) IVPB (doses > 250 mg)   Intravenous Daily    metoprolol tartrate  12.5 mg Per OG tube BID    polyethylene glycol  17 g Per NG tube Daily    senna-docusate 8.6-50 mg  1 tablet Per OG tube Daily    sertraline  50 mg Per NG tube Daily    [START ON 2017] thiamine  100 mg Per NG tube Daily     PRN Meds:sodium chloride, acetaminophen, heparin, porcine (PF), hydrALAZINE, ondansetron     Review of Systems  Objective:     Weight: 88.8 kg (195 lb 12.3 oz)  Body mass index is 25.83 kg/m².  Vital Signs (Most Recent):  Temp: 99.1 °F (37.3 °C) (17 0732)  Pulse: 92 (17 1002)  Resp: 18 (17 1002)  BP: (!) 156/76 (17 1002)  SpO2: 98 % (17 1002) Vital Signs (24h Range):  Temp:  [97.8 °F (36.6 °C)-99.1 °F (37.3 °C)] 99.1 °F (37.3 °C)  Pulse:  [70-97] 92  Resp:  [18-29] 18  SpO2:  [96 %-100 %] 98 %  BP: (109-176)/() 156/76              Temp (24hrs), Av.4 °F (36.9 °C), Min:97.8 °F (36.6 °C), Max:99.1 °F (37.3 °C)    Vent Mode: A/C  Oxygen Concentration (%):  [40] 40  Resp Rate Total:  [18 br/min] 18 br/min  Vt Set:  [550 mL] 550 mL  PEEP/CPAP:  [8 cmH20] 8 cmH20  Pressure Support:  [10 cmH20] 10 cmH20  Mean Airway Pressure:  [12 cmH20] 12 cmH20      Date 17 07 - 17 0659   Shift 6986-9968 8791-2094 3667-6858 24 Hour Total    I  N  T  A  K  E   I.V.  (mL/kg) 293.8  (3.3)   293.8  (3.3)    NG/   230    Shift Total  (mL/kg) 523.8  (5.9)   523.8  (5.9)   O  U  T  P  U  T   Urine  (mL/kg/hr) 550   550    Shift Total  (mL/kg) 550  (6.2)   550  (6.2)   Weight (kg) 88.8 88.8 88.8 88.8          NG/OG Tube 06/09/17 0130 (Active)   Placement Check placement verified by distal tube length measurement 6/19/2017  7:00 AM   Distal Tube Length (cm) 65 6/19/2017  7:00 AM   Tolerance no signs/symptoms of discomfort 6/19/2017  7:00 AM   Securement taped to commercial device 6/19/2017  7:00 AM   Clamp Status/Tolerance unclamped;residual 6/19/2017  7:00 AM   Suction Setting/Drainage Method dependent drainage 6/17/2017  3:01 AM   Insertion Site Appearance no redness, warmth, tenderness, skin breakdown, drainage 6/19/2017  7:00 AM   Drainage None 6/19/2017  3:05 AM   Flush/Irrigation flushed w/;water 6/19/2017  7:00 AM   Feeding Method continuous 6/19/2017  7:00 AM   Current Rate (mL/hr) 50 mL/hr 6/19/2017  7:00 AM   Goal Rate (mL/hr) 50 mL/hr 6/19/2017  7:00 AM   Intake (mL) 80 mL 6/19/2017  9:00 AM   Rate Formula Tube Feeding (mL/hr) 50 mL/hr 6/19/2017  7:00 AM   Intake (mL) - Formula Tube Feeding 50 6/19/2017 10:00 AM   Residual Amount (ml) 150 ml 6/19/2017  7:00 AM       Male External Urinary Catheter 06/14/17 0003 Medium (Active)   Collection Container Urimeter 6/19/2017  7:00 AM   Securement Method secured to top of thigh w/ adhesive device 6/19/2017  7:00 AM   Skin no redness;no breakdown;penis/scrotum cleansed w/ soap and water 6/19/2017  7:00 AM   Tolerance no signs/symptoms of discomfort 6/19/2017  7:00 AM   Output (mL) 150 mL 6/19/2017  9:00 AM            Trialysis (Dialysis) Catheter 06/17/17 1300 left subclavian (Active)   IV Device Securement catheter securement device 6/19/2017  7:00 AM   Additional Site Signs no erythema;no warmth;no edema 6/19/2017  7:00 AM   Patency/Care flushed w/o difficulty 6/19/2017  3:05 AM   Site Assessment  Clean;Dry;Intact 6/19/2017  7:00 AM   Status Deaccessed 6/19/2017  7:00 AM   Flows Good 6/19/2017  3:05 AM   Dressing Intervention Dressing reinforced 6/19/2017  3:05 AM   Dressing Status Biopatch in place;Clean;Dry;Intact 6/19/2017  7:00 AM   Dressing Change Due 06/24/17 6/19/2017  7:00 AM   Verification by X-ray Yes 6/18/2017  3:05 AM   Site Condition No complications 6/19/2017  7:00 AM   Dressing Other (Comment) 6/19/2017  3:05 AM   Daily Line Review Performed 6/19/2017  7:00 AM       Neurosurgery Physical Exam   E1VTM1  PERRL, no corneals, cough/gag  No motor response to deep pain    Significant Labs:    Recent Labs  Lab 06/19/17 0210   *      K 4.0      CO2 25   BUN 23   CREATININE 0.6   CALCIUM 7.5*   MG 2.3       Recent Labs  Lab 06/18/17 0210 06/19/17  0210   WBC 8.67 9.42   HGB 11.1* 10.5*   HCT 34.7* 32.4*    214     No results for input(s): LABPT, INR, APTT in the last 48 hours.  Microbiology Results (last 7 days)     Procedure Component Value Units Date/Time    Blood culture [[id number]] Collected:  06/14/17 0336    Order Status:  Completed Specimen:  Blood from Peripheral, Wrist, Left Updated:  06/19/17 0812     Blood Culture, Routine No growth after 5 days.    Laurita Ink (CSF) [[id number]] Collected:  06/18/17 1212    Order Status:  Sent Specimen:  CSF (Spinal Fluid) from CSF Tap, Tube 3 Updated:  06/18/17 1213    CSF culture [[id number]] Collected:  06/18/17 1212    Order Status:  Sent Specimen:  CSF (Spinal Fluid) from CSF Tap, Tube 3 Updated:  06/18/17 1213    Cryptococcal antigen, CSF [[id number]] Collected:  06/18/17 1212    Order Status:  Sent Specimen:  CSF (Spinal Fluid) from CSF Tap, Tube 3 Updated:  06/18/17 1213    Culture, Respiratory with Gram Stain [[id number]]  (Susceptibility) Collected:  06/14/17 0500    Order Status:  Completed Specimen:  Respiratory from Endotracheal Aspirate Updated:  06/16/17 1030     Respiratory Culture --     PSEUDOMONAS AERUGINOSA  Few        Gram Stain (Respiratory) <10 epithelial cells per low power field.     Gram Stain (Respiratory) Moderate WBC's     Gram Stain (Respiratory) Few Gram negative rods            Significant Diagnostics:

## 2017-06-19 NOTE — PROGRESS NOTES
4 liter plasma exchange complete by CTA staff nurse SHAN Morel per blood bank protocol.  5% albumin used as replacement fluid.  See flowsheet on chart details.  Tolerated well.  Next tx 6/19/2017.

## 2017-06-19 NOTE — HPI
Mr. Cam is a 72 male w/ PMH significant for CHARLES, CAD, skin cancer, hypothyroidism, presenting with complaints of rapidly ascending paralysis.    Prior to presentation, [place]  Son reports no clear history of tick bites, rashes, skin lesions.  ~1 week prior to presentation, pt had a prostate biopsy performed and was started on cipro.  At OSH, he was diagnosed with GBS and started on IVIG and subsequently had worsening weakness.  Shortly after admission to [facility], he was intubated.     - Completed 5 days of IVIG on 6/12/17  - LP was done in OSH and was reported as 0 rbc, 0 wbc, protein 34, glucose 97  - Lyme ab panel was sent and results are pending.  - He was diagnosed with pneumonia and started on abx  - Per family he had an MRI brain. cspine and T spine done in OSH and was unremarkable

## 2017-06-19 NOTE — PROCEDURES
[facility]  Pathology  Procedure Note    SUMMARY   Therapeutic Plasma Exchange (Apheresis)  Date/Time: 6/19/2017 9:40 PM  Performed by: GWEN GILL  Authorized by: KORTNEY GONZALEZ         Date of Procedure: 6/19/2017     Procedure: Plasma Exchange    Provider: Marissa Lynn MD     Assisting Provider: None    Pre-Procedure Diagnosis: Guillain-[place] syndrome    Post-Procedure Diagnosis: Guillain-[place] syndrome    Follow-up Assessment: 72 year old male with Guillian [place] diagnosis status post IVIG treatment presents for therapeutic plasma exchange. Symptoms have progressed from peripheral paresthesias, to weakness, to a rapidly ascending paralysis now requiring intubation.    Guillain [place] status post IVIG carries a Category III Grade 2C indication for therapeutic plasma exchange via the 2016 Journal of Clinical Apheresis Guidelines (San J et al. Journal of Clinical Apheresis 2016; 31:149-162.) Five TPE are planned, scheduled every other day.     Interval History:  Patient remains intubated and sedated. Overall, today's procedure (#2 of 5) was well tolerated, with transient hypotension that corrected after a decrease in rate. The next scheduled procedure will be Wednesday, 6/21/17.      Pertinent Laboratory Data:   Complete Blood Count:   Lab Results   Component Value Date    HGB 10.5 (L) 06/19/2017    HCT 32.4 (L) 06/19/2017     06/19/2017    WBC 9.42 06/19/2017       Pertinent Medications: None contraindicated.    Review of patient's allergies indicates:  No Known Allergies    Anesthesia: None     Technical Procedures Used: Plasma Exchange: Volume exchanged - 4.0 Liters; Replacement fluid - Albumin; Number of procedures 2 of 5; Date of next procedure 06/21/17.    Description of the Findings of the Procedure:     Please see Apheresis Nurse flowsheet for details.    The patient was evaluated and all clinical and laboratory data relevant to the treatment was reviewed, and  a decision was made to proceed with the Apheresis procedure.    I was available to the clinical staff throughout the procedure.    Significant Surgical Tasks Conducted by the Assistant(s): Not applicable    Complications: None.    Estimated Blood Loss (EBL): None    Implants: None     Specimens: None    ATTENDING MD ATTESTATION:  The apheresis procedure was performed under my supervision. I was available throughout the procedure. The note has been edited, where and when necessary, to reflect my agreement.  I reviewed all clinical and laboratory data and reviewed the note written by Dr. Lynn and agree with the findings, assessment and plan.   The patient tolerated the procedure without significant complications The next scheduled procedure will be 6/21/17.    Mauricio Quiles MD, MS  Section of Transfusion Medicine & Blood Bank  Department of Pathology and Laboratory Medicine  [facility]  [phone] (Blood Bank Offices)  06/19/2017

## 2017-06-19 NOTE — ASSESSMENT & PLAN NOTE
-- Recommend solumedrol 1 g daily for 5 days  -- Recommend PLEX for 5 days  -- Acyclovir as per primary team   -- Consider CT Chest and abdomen pelvis for occult malignancy - after PLEX     -- Follow paraneoplastic panel, GQ1b, viral labs, lyme - all in process  -- Will follow

## 2017-06-19 NOTE — PLAN OF CARE
Problem: Patient Care Overview  Goal: Plan of Care Review  Outcome: Ongoing (interventions implemented as appropriate)  Nutrition assessment completed. Please see RD note for details.    Recommendation/Intervention:   Recommend continuing Isosource 1.5 @ goal rate 50mL/hr. Add 4 packets Beneprotein daily to meet protein needs.   -Hold for residuals >500ml.      RD to monitor.

## 2017-06-19 NOTE — PROGRESS NOTES
[facility]  Neurosurgery  Progress Note    Subjective:     History of Present Illness: 72 year old male with PMHx CAD, skin cancer, hypothyroidism, and HLP who was transferred from OSH for further evaluation and management of rapidly progressive ascending paralysis.  No family present during exam, history obtained primarily from chart.  Patient was on a camping trip to [place] and [place] recently. When he returned he started to have paraesthesias of the feet and hand that quickly progressed to legs weakness. No clear hx of tick bites or skin lesions.  Additionally, a few days prior to admission to OSH he had prostate biopsy and was treated with cipro.  Pt presented to OSH and was diagnosed with GBS and was started on IVIG, he completed 5 days of treatment which ended on 6/12/17.  Pt also being treated for pneumonia.  After admission his weakness got worse to the point that he had to be intubated.  Pt has had poor neurologic exam since.   - LP was done in OSH and was reported as 0 rbc, 0 wbc, protein 34, glucose 97.  Lyme ab panel was sent and results are pending.  MRI Brain done today shows non enhancing lesion abutting the right optic nerve.  Neurosurgery consulted for evaluation.             Post-Op Info:  * No surgery found *         Interval History: NAEON    Medications:  Continuous Infusions:   sodium chloride 0.9% 75 mL/hr at 06/19/17 1000     Scheduled Meds:   acyclovir  10 mg/kg Intravenous Q8H    albumin human 5%  200 g Intravenous Once    alprazolam  1 mg Per NG tube Q8H    calcium gluconate IVPB  2,000 mg Intravenous Once    ceFEPime (MAXIPIME) IVPB  1 g Intravenous Q12H    chlorhexidine  15 mL Mouth/Throat QID    famotidine  20 mg Per OG tube BID    [START ON 6/20/2017] folic acid-vit B6-vit B12 2.5-25-2 mg  1 tablet Per NG tube Daily    heparin (porcine)  4,000 Units Intravenous Once    levothyroxine  75 mcg Per NG tube Daily    methylPREDNISolone  (SOLU-Medrol) IVPB (doses > 250 mg)   Intravenous Daily    metoprolol tartrate  12.5 mg Per OG tube BID    polyethylene glycol  17 g Per NG tube Daily    senna-docusate 8.6-50 mg  1 tablet Per OG tube Daily    sertraline  50 mg Per NG tube Daily    [START ON 2017] thiamine  100 mg Per NG tube Daily     PRN Meds:sodium chloride, acetaminophen, heparin, porcine (PF), hydrALAZINE, ondansetron     Review of Systems  Objective:     Weight: 88.8 kg (195 lb 12.3 oz)  Body mass index is 25.83 kg/m².  Vital Signs (Most Recent):  Temp: 99.1 °F (37.3 °C) (17 0732)  Pulse: 92 (17 1002)  Resp: 18 (17 1002)  BP: (!) 156/76 (17 1002)  SpO2: 98 % (17 1002) Vital Signs (24h Range):  Temp:  [97.8 °F (36.6 °C)-99.1 °F (37.3 °C)] 99.1 °F (37.3 °C)  Pulse:  [70-97] 92  Resp:  [18-29] 18  SpO2:  [96 %-100 %] 98 %  BP: (109-176)/() 156/76              Temp (24hrs), Av.4 °F (36.9 °C), Min:97.8 °F (36.6 °C), Max:99.1 °F (37.3 °C)    Vent Mode: A/C  Oxygen Concentration (%):  [40] 40  Resp Rate Total:  [18 br/min] 18 br/min  Vt Set:  [550 mL] 550 mL  PEEP/CPAP:  [8 cmH20] 8 cmH20  Pressure Support:  [10 cmH20] 10 cmH20  Mean Airway Pressure:  [12 cmH20] 12 cmH20      Date 17 0700 - 17 0659   Shift 3663-5215 0624-7112 4968-2073 24 Hour Total   I  N  T  A  K  E   I.V.  (mL/kg) 293.8  (3.3)   293.8  (3.3)    NG/   230    Shift Total  (mL/kg) 523.8  (5.9)   523.8  (5.9)   O  U  T  P  U  T   Urine  (mL/kg/hr) 550   550    Shift Total  (mL/kg) 550  (6.2)   550  (6.2)   Weight (kg) 88.8 88.8 88.8 88.8          NG/OG Tube 17 0130 (Active)   Placement Check placement verified by distal tube length measurement 2017  7:00 AM   Distal Tube Length (cm) 65 2017  7:00 AM   Tolerance no signs/symptoms of discomfort 2017  7:00 AM   Securement taped to commercial device 2017  7:00 AM   Clamp Status/Tolerance unclamped;residual 2017  7:00 AM   Suction  Setting/Drainage Method dependent drainage 6/17/2017  3:01 AM   Insertion Site Appearance no redness, warmth, tenderness, skin breakdown, drainage 6/19/2017  7:00 AM   Drainage None 6/19/2017  3:05 AM   Flush/Irrigation flushed w/;water 6/19/2017  7:00 AM   Feeding Method continuous 6/19/2017  7:00 AM   Current Rate (mL/hr) 50 mL/hr 6/19/2017  7:00 AM   Goal Rate (mL/hr) 50 mL/hr 6/19/2017  7:00 AM   Intake (mL) 80 mL 6/19/2017  9:00 AM   Rate Formula Tube Feeding (mL/hr) 50 mL/hr 6/19/2017  7:00 AM   Intake (mL) - Formula Tube Feeding 50 6/19/2017 10:00 AM   Residual Amount (ml) 150 ml 6/19/2017  7:00 AM       Male External Urinary Catheter 06/14/17 0003 Medium (Active)   Collection Container Urimeter 6/19/2017  7:00 AM   Securement Method secured to top of thigh w/ adhesive device 6/19/2017  7:00 AM   Skin no redness;no breakdown;penis/scrotum cleansed w/ soap and water 6/19/2017  7:00 AM   Tolerance no signs/symptoms of discomfort 6/19/2017  7:00 AM   Output (mL) 150 mL 6/19/2017  9:00 AM            Trialysis (Dialysis) Catheter 06/17/17 1300 left subclavian (Active)   IV Device Securement catheter securement device 6/19/2017  7:00 AM   Additional Site Signs no erythema;no warmth;no edema 6/19/2017  7:00 AM   Patency/Care flushed w/o difficulty 6/19/2017  3:05 AM   Site Assessment Clean;Dry;Intact 6/19/2017  7:00 AM   Status Deaccessed 6/19/2017  7:00 AM   Flows Good 6/19/2017  3:05 AM   Dressing Intervention Dressing reinforced 6/19/2017  3:05 AM   Dressing Status Biopatch in place;Clean;Dry;Intact 6/19/2017  7:00 AM   Dressing Change Due 06/24/17 6/19/2017  7:00 AM   Verification by X-ray Yes 6/18/2017  3:05 AM   Site Condition No complications 6/19/2017  7:00 AM   Dressing Other (Comment) 6/19/2017  3:05 AM   Daily Line Review Performed 6/19/2017  7:00 AM       Neurosurgery Physical Exam   E1VTM1  PERRL, no corneals, cough/gag  No motor response to deep pain    Significant Labs:    Recent Labs  Lab  06/19/17  0210   *      K 4.0      CO2 25   BUN 23   CREATININE 0.6   CALCIUM 7.5*   MG 2.3       Recent Labs  Lab 06/18/17  0210 06/19/17  0210   WBC 8.67 9.42   HGB 11.1* 10.5*   HCT 34.7* 32.4*    214     No results for input(s): LABPT, INR, APTT in the last 48 hours.  Microbiology Results (last 7 days)     Procedure Component Value Units Date/Time    Blood culture [[id number]] Collected:  06/14/17 0336    Order Status:  Completed Specimen:  Blood from Peripheral, Wrist, Left Updated:  06/19/17 0812     Blood Culture, Routine No growth after 5 days.    Laurita Ink (CSF) [[id number]] Collected:  06/18/17 1212    Order Status:  Sent Specimen:  CSF (Spinal Fluid) from CSF Tap, Tube 3 Updated:  06/18/17 1213    CSF culture [[id number]] Collected:  06/18/17 1212    Order Status:  Sent Specimen:  CSF (Spinal Fluid) from CSF Tap, Tube 3 Updated:  06/18/17 1213    Cryptococcal antigen, CSF [[id number]] Collected:  06/18/17 1212    Order Status:  Sent Specimen:  CSF (Spinal Fluid) from CSF Tap, Tube 3 Updated:  06/18/17 1213    Culture, Respiratory with Gram Stain [[id number]]  (Susceptibility) Collected:  06/14/17 0500    Order Status:  Completed Specimen:  Respiratory from Endotracheal Aspirate Updated:  06/16/17 1030     Respiratory Culture --     PSEUDOMONAS AERUGINOSA  Few       Gram Stain (Respiratory) <10 epithelial cells per low power field.     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  Bilateral ICA aneurysms incidental findings.  - Continue current management / work up per [facility] and neurology  - Fu paraneoplastic panel.  - PLEX, solumedrol per [facility]/neurology  - Fu cultures of LP.                 Thai King, DO  Neurosurgery  [facility]

## 2017-06-19 NOTE — PROGRESS NOTES
Plasmapheresis tx #2 started at 1200 without difficulty.  Pt sedated and intubated, unable to assess pain and orientation. 4 Liter exchange.  Replacement fluids 5% albumin via L SC CVC.  Dressing dry and intact with small amount old blood under biopatch, staff nurse will change dressing after bath. 2 grams calcium gluconate infusing IVPB throughout procedure.  Tx ended at 1355.  Cath flushed and locked.  Pt tolerated tx well.  Next tx 05/21/17. Family at bedside during treatment.

## 2017-06-19 NOTE — PLAN OF CARE
Problem: Patient Care Overview  Goal: Plan of Care Review  Outcome: Ongoing (interventions implemented as appropriate)  POC reviewed with pt and family at 1100. Family verbalized understanding. Questions and concerns addressed. Plasmapharesis done. Movement in both eyes. Plan for EMG tomorrow Will continue to monitor. See flowsheets for full assessment and VS info.

## 2017-06-19 NOTE — PROGRESS NOTES
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 abnl  LFT's now decreasing  Metabolic alkalosis      Medications:  Continuous Infusions:   sodium chloride 0.9% 75 mL/hr at 06/19/17 0900     Scheduled Meds:   acyclovir  10 mg/kg Intravenous Q8H    ceFEPime (MAXIPIME) IVPB  1 g Intravenous Q12H    chlorhexidine  15 mL Mouth/Throat QID    famotidine  20 mg Per OG tube BID    folic acid-vit B6-vit B12 2.5-25-2 mg  1 tablet Oral Daily    levothyroxine  75 mcg Per NG tube Daily    metoprolol tartrate  12.5 mg Per OG tube BID    modafinil  200 mg Oral BID    senna-docusate 8.6-50 mg  1 tablet Per OG tube Daily    thiamine  100 mg Oral Daily    vancomycin (VANCOCIN) IVPB  1,500 mg Intravenous Q12H     PRN Meds:.sodium chloride, acetaminophen, heparin, porcine (PF), hydrALAZINE, ondansetron    OBJECTIVE:   Vital Signs (Most Recent):   Temp: 99.1 °F (37.3 °C) (06/19/17 0732)  Pulse: 81 (06/19/17 0902)  Resp: 18 (06/19/17 0902)  BP: 137/79 (06/19/17 0902)  SpO2: 99 % (06/19/17 0902)    Vital Signs (24h Range):   Temp:  [97.8 °F (36.6 °C)-99.1 °F (37.3 °C)] 99.1 °F (37.3 °C)  Pulse:  [70-97] 81  Resp:  [18-29] 18  SpO2:  [96 %-100 %] 99 %  BP: (109-176)/() 137/79    ICP/CPP (Last 24h):        I & O (Last 24h):   Intake/Output Summary (Last 24 hours) at 06/19/17 0953  Last data filed at 06/19/17 0900   Gross per 24 hour   Intake             3740 ml   Output             1990 ml   Net             1750 ml     Physical Exam:  GA: Alert, comfortable, no acute distress.   HEENT: No scleral icterus or JVD.   Pulmonary: Clear to auscultation A/P/L. No wheezing, crackles, or rhonchi.  Cardiac: RRR S1 & S2 w/o rubs/murmurs/gallops.   Abdominal: Bowel sounds present x 4. No appreciable hepatosplenomegaly.  Skin: No jaundice,  rashes, or visible lesions.  Neuro:  Pupils reactive 4 mm   Roving movement both eyes now  No other change in exam    Vent Data:   Vent Mode: A/C  Oxygen Concentration (%):  [40] 40  Resp Rate Total:  [18 br/min] 18 br/min  Vt Set:  [550 mL] 550 mL  PEEP/CPAP:  [8 cmH20] 8 cmH20  Pressure Support:  [10 cmH20] 10 cmH20  Mean Airway Pressure:  [12 cmH20] 12 cmH20    Lines/Drains/Airway:   R brachial;7  R scl ; cath plex       Airway - Non-Surgical 06/13/17 Endotracheal Tube (Active)   Secured at 23 cm 6/19/2017  8:12 AM   Measured At Lips 6/19/2017  8:12 AM   Secured Location Center 6/19/2017  8:12 AM   Secured by Commercial tube manuel 6/19/2017  8:12 AM   Bite Block none 6/19/2017  8:12 AM   Site Condition Cool;Dry 6/19/2017  8:12 AM   Status Intact;Secured 6/19/2017  8:12 AM   Site Assessment Clean;Dry 6/19/2017  8:12 AM   Cuff Pressure 24 cm H2O 6/18/2017 11:57 PM           Trialysis (Dialysis) Catheter 06/17/17 1300 left subclavian (Active)   IV Device Securement catheter securement device 6/19/2017  7:00 AM   Additional Site Signs no erythema;no warmth;no edema 6/19/2017  7:00 AM   Patency/Care flushed w/o difficulty 6/19/2017  3:05 AM   Site Assessment Clean;Dry;Intact 6/19/2017  7:00 AM   Status Deaccessed 6/19/2017  7:00 AM   Flows Good 6/19/2017  3:05 AM   Dressing Intervention Dressing reinforced 6/19/2017  3:05 AM   Dressing Status Biopatch in place;Clean;Dry;Intact 6/19/2017  7:00 AM   Dressing Change Due 06/24/17 6/19/2017  7:00 AM   Verification by X-ray Yes 6/18/2017  3:05 AM   Site Condition No complications 6/19/2017  7:00 AM   Dressing Other (Comment) 6/19/2017  3:05 AM   Daily Line Review Performed 6/19/2017  7:00 AM            PICC Double Lumen 06/12/17 right brachial (Active)   Site Assessment Clean;Dry;Intact 6/19/2017  7:00 AM   Lumen 1 Status Infusing 6/19/2017  7:00 AM   Lumen 2 Status Normal saline locked;Flushed 6/19/2017  7:00 AM   Dressing Type Transparent 6/19/2017  7:00 AM   Dressing  Status Biopatch in place;Clean;Dry 6/19/2017  7:00 AM   Dressing Intervention Dressing reinforced 6/19/2017  3:05 AM   Dressing Change Due 06/21/17 6/19/2017  7:00 AM   Daily Line Review Performed 6/19/2017  7:00 AM             NG/OG Tube 06/09/17 0130 (Active)   Placement Check placement verified by distal tube length measurement 6/19/2017  7:00 AM   Distal Tube Length (cm) 65 6/19/2017  7:00 AM   Tolerance no signs/symptoms of discomfort 6/19/2017  7:00 AM   Securement taped to commercial device 6/19/2017  7:00 AM   Clamp Status/Tolerance unclamped;residual 6/19/2017  7:00 AM   Suction Setting/Drainage Method dependent drainage 6/17/2017  3:01 AM   Insertion Site Appearance no redness, warmth, tenderness, skin breakdown, drainage 6/19/2017  7:00 AM   Drainage None 6/19/2017  3:05 AM   Flush/Irrigation flushed w/;water 6/19/2017  7:00 AM   Feeding Method continuous 6/19/2017  7:00 AM   Current Rate (mL/hr) 50 mL/hr 6/19/2017  7:00 AM   Goal Rate (mL/hr) 50 mL/hr 6/19/2017  7:00 AM   Intake (mL) 80 mL 6/19/2017  9:00 AM   Rate Formula Tube Feeding (mL/hr) 50 mL/hr 6/19/2017  7:00 AM   Intake (mL) - Formula Tube Feeding 50 6/19/2017  9:00 AM   Residual Amount (ml) 150 ml 6/19/2017  7:00 AM       Male External Urinary Catheter 06/14/17 0003 Medium (Active)   Collection Container Urimeter 6/19/2017  7:00 AM   Securement Method secured to top of thigh w/ adhesive device 6/19/2017  7:00 AM   Skin no redness;no breakdown;penis/scrotum cleansed w/ soap and water 6/19/2017  7:00 AM   Tolerance no signs/symptoms of discomfort 6/19/2017  7:00 AM   Output (mL) 150 mL 6/19/2017  9:00 AM            Trialysis (Dialysis) Catheter 06/17/17 1300 left subclavian (Active)   IV Device Securement catheter securement device 6/19/2017  7:00 AM   Additional Site Signs no erythema;no warmth;no edema 6/19/2017  7:00 AM   Patency/Care flushed w/o difficulty 6/19/2017  3:05 AM   Site Assessment Clean;Dry;Intact 6/19/2017  7:00 AM   Status  Deaccessed 6/19/2017  7:00 AM   Flows Good 6/19/2017  3:05 AM   Dressing Intervention Dressing reinforced 6/19/2017  3:05 AM   Dressing Status Biopatch in place;Clean;Dry;Intact 6/19/2017  7:00 AM   Dressing Change Due 06/24/17 6/19/2017  7:00 AM   Verification by X-ray Yes 6/18/2017  3:05 AM   Site Condition No complications 6/19/2017  7:00 AM   Dressing Other (Comment) 6/19/2017  3:05 AM   Daily Line Review Performed 6/19/2017  7:00 AM     Nutrition/Tube Feeds (if NPO state why):  t feeds at goal    Labs:  ABG:   Recent Labs  Lab 06/19/17  0359   PH 7.502*   PO2 114*   PCO2 41.2   HCO3 32.2*   POCSATURATED 99   BE 9     BMP:  Recent Labs  Lab 06/19/17  0210      K 4.0      CO2 25   BUN 23   CREATININE 0.6   *   MG 2.3   PHOS 2.8     LFT: Lab Results   Component Value Date    AST 73 (H) 06/19/2017     (H) 06/19/2017    ALKPHOS 50 (L) 06/19/2017    BILITOT 0.5 06/19/2017    ALBUMIN 2.9 (L) 06/19/2017    PROT 5.2 (L) 06/19/2017     CBC:   Lab Results   Component Value Date    WBC 9.42 06/19/2017    HGB 10.5 (L) 06/19/2017    HCT 32.4 (L) 06/19/2017    MCV 99 (H) 06/19/2017     06/19/2017     Microbiology x 7d:   Microbiology Results (last 7 days)     Procedure Component Value Units Date/Time    Blood culture [[id number]] Collected:  06/14/17 0336    Order Status:  Completed Specimen:  Blood from Peripheral, Wrist, Left Updated:  06/19/17 0812     Blood Culture, Routine No growth after 5 days.    Laurita Ink (CSF) [[id number]] Collected:  06/18/17 1212    Order Status:  Sent Specimen:  CSF (Spinal Fluid) from CSF Tap, Tube 3 Updated:  06/18/17 1213    CSF culture [[id number]] Collected:  06/18/17 1212    Order Status:  Sent Specimen:  CSF (Spinal Fluid) from CSF Tap, Tube 3 Updated:  06/18/17 1213    Cryptococcal antigen, CSF [[id number]] Collected:  06/18/17 1212    Order Status:  Sent Specimen:  CSF (Spinal Fluid) from CSF Tap, Tube 3 Updated:  06/18/17 1213    Culture, Respiratory with  Gram Stain [[id number]]  (Susceptibility) Collected:  06/14/17 0500    Order Status:  Completed Specimen:  Respiratory from Endotracheal Aspirate Updated:  06/16/17 1030     Respiratory Culture --     PSEUDOMONAS AERUGINOSA  Few       Gram Stain (Respiratory) <10 epithelial cells per low power field.     Gram Stain (Respiratory) Moderate WBC's     Gram Stain (Respiratory) Few Gram negative rods        Imaging:  cxr ; clear  I personally reviewed the above image.    ASSESSMENT/PLAN:     Active Hospital Problems    Diagnosis    *Guillain-[place] syndrome    Brain mass    Acquired hypothyroidism    Acute respiratory failure with hypoxia and hypercapnia    [place] coma scale total score 3-8    Hypovolemia    Flaccid quadriplegia    Brain aneurysm      Plan:  follow CSF results  Follow exam  t feeds  Add sertraline  change vent settings  Follow LP results from OSH  EMG      Level;3

## 2017-06-19 NOTE — PROCEDURES
"Rosalio Cam is a 72 y.o. male patient.    Temp: 99.1 °F (37.3 °C) (06/19/17 0732)  Pulse: 81 (06/19/17 0902)  Resp: 18 (06/19/17 0902)  BP: 137/79 (06/19/17 0902)  SpO2: 99 % (06/19/17 0902)  Weight: 88.8 kg (195 lb 12.3 oz) (06/18/17 0257)  Height: 6' 1" (185.4 cm) (06/14/17 0200)       Lumbar Puncture  Date/Time: 6/19/2017 9:50 AM  Performed by: GUILLERMO WHITE  Authorized by: GUILLERMO WHITE   Consent Done: Yes  Indications: evaluation for infection and evaluation for altered mental status  Patient sedated: no  Lumbar space: L4-L5 interspace  Patient's position: left lateral decubitus  Needle gauge: 20  Needle type: spinal needle - Quincke tip  Needle length: 3.5 in  Number of attempts: 1  Opening pressure: 28 cm H2O  Closing pressure: 17 cm H2O  Fluid appearance: clear  Tubes of fluid: 4  Total volume: 16 ml  Post-procedure: adhesive bandage applied, pressure dressing applied and site cleaned  Complications: No          Guillermo White  6/19/2017    "

## 2017-06-19 NOTE — SUBJECTIVE & OBJECTIVE
Subjective:     Interval History: No acute events reported overnight.  Son at bedside.      Current Neurological Medications:   Synthroid 75 mcg Qdaily  Modafinil 200 BID  Acyclovir  Cefepime  Vancomycin    Current Facility-Administered Medications   Medication Dose Route Frequency Provider Last Rate Last Dose    0.9%  NaCl infusion (for blood administration)   Intravenous Q24H PRN Lisa Gutierrez NP        0.9%  NaCl infusion   Intravenous Continuous Brian Christensen MD 75 mL/hr at 06/19/17 0800      acetaminophen tablet 650 mg  650 mg Per NG tube Q6H PRN James Blackmon NP   650 mg at 06/17/17 1437    acyclovir (ZOVIRAX) 790 mg in dextrose 5 % 250 mL IVPB  10 mg/kg Intravenous Q8H Pepe Salgado  mL/hr at 06/19/17 0330 790 mg at 06/19/17 0330    cefepime in dextrose 5 % 1 gram/50 mL IVPB 1 g  1 g Intravenous Q12H Brian Christensen MD   1 g at 06/19/17 0524    chlorhexidine 0.12 % solution 15 mL  15 mL Mouth/Throat QID James Blackmon NP   15 mL at 06/19/17 0523    famotidine tablet 20 mg  20 mg Per OG tube BID Brian Christensen MD   20 mg at 06/19/17 0904    folic acid-vit B6-vit B12 2.5-25-2 mg tablet 1 tablet  1 tablet Oral Daily Lisa Gutierrez NP   1 tablet at 06/19/17 0905    heparin, porcine (PF) injection 1,000 Units  1,000 Units Intravenous PRN Marcela Pradhan MD   2,500 Units at 06/17/17 1850    hydrALAZINE injection 10 mg  10 mg Intravenous Q6H PRN Brian Christensen MD   10 mg at 06/18/17 2106    levothyroxine tablet 75 mcg  75 mcg Per NG tube Daily Lisa Gutierrez NP   75 mcg at 06/19/17 0523    metoprolol tartrate (LOPRESSOR) split tablet 12.5 mg  12.5 mg Per OG tube BID Brian Christensen MD   12.5 mg at 06/19/17 0905    modafinil tablet 200 mg  200 mg Oral BID Lisa Gutierrez NP   200 mg at 06/19/17 0905    ondansetron injection 4 mg  4 mg Intravenous Q8H PRN James Blackmon NP        senna-docusate 8.6-50 mg per tablet 1 tablet  1 tablet Per OG tube Daily Brian PERAZA  MD Amparo   1 tablet at 06/19/17 0904    thiamine tablet 100 mg  100 mg Oral Daily Lisa Gutierrez NP   100 mg at 06/19/17 0905    vancomycin (VANCOCIN) 1,500 mg in dextrose 5 % 250 mL IVPB  1,500 mg Intravenous Q12H Lisa Gutierrez .7 mL/hr at 06/18/17 2106 1,500 mg at 06/18/17 2106       Review of Systems   Unable to perform ROS: Intubated     Objective:     Vital Signs (Most Recent):  Temp: 99.1 °F (37.3 °C) (06/19/17 0732)  Pulse: 81 (06/19/17 0802)  Resp: 18 (06/19/17 0802)  BP: 122/70 (06/19/17 0802)  SpO2: 98 % (06/19/17 0802) Vital Signs (24h Range):  Temp:  [97.8 °F (36.6 °C)-99.1 °F (37.3 °C)] 99.1 °F (37.3 °C)  Pulse:  [70-97] 81  Resp:  [18-29] 18  SpO2:  [96 %-100 %] 98 %  BP: (109-176)/() 122/70     Weight: 88.8 kg (195 lb 12.3 oz)  Body mass index is 25.83 kg/m².    Physical Exam   Constitutional: He appears well-developed and well-nourished. No distress.   Does not respond to verbal or painful stimuli   Cardiovascular: Normal rate, regular rhythm and normal heart sounds.    No murmur heard.  Pulmonary/Chest:   Intubated, stable vent settings  Mechanical breath sounds b/l lung fields   Abdominal: Soft. Bowel sounds are normal. He exhibits no distension. There is no tenderness.   Musculoskeletal: He exhibits no edema.   Neurological:   Reflex Scores:       Tricep reflexes are 0 on the right side and 0 on the left side.       Bicep reflexes are 0 on the right side and 0 on the left side.       Brachioradialis reflexes are 0 on the right side and 0 on the left side.       Patellar reflexes are 0 on the right side and 0 on the left side.       Achilles reflexes are 0 on the right side and 0 on the left side.      NEUROLOGICAL EXAMINATION:     CRANIAL NERVES     CN III, IV, VI   Pupils: (Pupils round, minimally reactive and sluggish to light)    CN V   Right corneal reflex: abnormal  Left corneal reflex: abnormal (Lateral rectus activation upon testing of corneal relfex without blink)        Oculocephalic reflex absent  Gag reflex absent     MOTOR EXAM   Muscle bulk: normal  Overall muscle tone: decreased    REFLEXES     Reflexes   Right brachioradialis: 0  Left brachioradialis: 0  Right biceps: 0  Left biceps: 0  Right triceps: 0  Left triceps: 0  Right patellar: 0  Left patellar: 0  Right achilles: 0  Left achilles: 0  Right plantar: equivocal  Left plantar: equivocal  Right ankle clonus: absent  Left ankle clonus: absent      Significant Labs:   Recent Results (from the past 24 hour(s))   HSV by Rapid PCR, Non-Blood    Collection Time: 06/18/17 12:16 PM   Result Value Ref Range    HSV PCR Source CSF    CSF cell count with differential    Collection Time: 06/18/17 12:45 PM   Result Value Ref Range    Heme Aliquot 2.0 mL    Appearance, CSF Clear Clear    Color, CSF Colorless Colorless    WBC, CSF 1 0 - 5 /cu mm    RBC, CSF 8 (A) 0 /cu mm    Lymphs, CSF 86 (H) 40 - 80 %    Mono/Macrophage, CSF 14 (L) 15 - 45 %   Glucose, CSF    Collection Time: 06/18/17 12:45 PM   Result Value Ref Range    Glucose, CSF 92 (H) 40 - 70 mg/dL   LDH, CSF    Collection Time: 06/18/17 12:45 PM   Result Value Ref Range    Body Fluid Source, Refrigerated 3    Protein, CSF    Collection Time: 06/18/17 12:45 PM   Result Value Ref Range    Protein,  (H) 15 - 40 mg/dL   Herpes Virus 6, Quant., PCR - CSF    Collection Time: 06/18/17 12:45 PM   Result Value Ref Range    HH6V Source, CSF CSF    Comprehensive metabolic panel    Collection Time: 06/19/17  2:10 AM   Result Value Ref Range    Sodium 138 136 - 145 mmol/L    Potassium 4.0 3.5 - 5.1 mmol/L    Chloride 108 95 - 110 mmol/L    CO2 25 23 - 29 mmol/L    Glucose 166 (H) 70 - 110 mg/dL    BUN, Bld 23 8 - 23 mg/dL    Creatinine 0.6 0.5 - 1.4 mg/dL    Calcium 7.5 (L) 8.7 - 10.5 mg/dL    Total Protein 5.2 (L) 6.0 - 8.4 g/dL    Albumin 2.9 (L) 3.5 - 5.2 g/dL    Total Bilirubin 0.5 0.1 - 1.0 mg/dL    Alkaline Phosphatase 50 (L) 55 - 135 U/L    AST 73 (H) 10 - 40 U/L      (H) 10 - 44 U/L    Anion Gap 5 (L) 8 - 16 mmol/L    eGFR if African American >60.0 >60 mL/min/1.73 m^2    eGFR if non African American >60.0 >60 mL/min/1.73 m^2   Magnesium    Collection Time: 06/19/17  2:10 AM   Result Value Ref Range    Magnesium 2.3 1.6 - 2.6 mg/dL   Phosphorus    Collection Time: 06/19/17  2:10 AM   Result Value Ref Range    Phosphorus 2.8 2.7 - 4.5 mg/dL   CBC auto differential    Collection Time: 06/19/17  2:10 AM   Result Value Ref Range    WBC 9.42 3.90 - 12.70 K/uL    RBC 3.26 (L) 4.60 - 6.20 M/uL    Hemoglobin 10.5 (L) 14.0 - 18.0 g/dL    Hematocrit 32.4 (L) 40.0 - 54.0 %    MCV 99 (H) 82 - 98 fL    MCH 32.2 (H) 27.0 - 31.0 pg    MCHC 32.4 32.0 - 36.0 %    RDW 13.1 11.5 - 14.5 %    Platelets 214 150 - 350 K/uL    MPV 10.0 9.2 - 12.9 fL    Gran # 7.5 1.8 - 7.7 K/uL    Lymph # 1.2 1.0 - 4.8 K/uL    Mono # 0.6 0.3 - 1.0 K/uL    Eos # 0.1 0.0 - 0.5 K/uL    Baso # 0.02 0.00 - 0.20 K/uL    Gran% 79.2 (H) 38.0 - 73.0 %    Lymph% 13.2 (L) 18.0 - 48.0 %    Mono% 5.8 4.0 - 15.0 %    Eosinophil% 0.8 0.0 - 8.0 %    Basophil% 0.2 0.0 - 1.9 %    Differential Method Automated    ISTAT PROCEDURE    Collection Time: 06/19/17  3:59 AM   Result Value Ref Range    POC PH 7.502 (H) 7.35 - 7.45    POC PCO2 41.2 35 - 45 mmHg    POC PO2 114 (H) 80 - 100 mmHg    POC HCO3 32.2 (H) 24 - 28 mmol/L    POC BE 9 -2 to 2 mmol/L    POC SATURATED O2 99 95 - 100 %    POC TCO2 33 (H) 23 - 27 mmol/L    Rate 18     Sample ARTERIAL     Site Chandler/UAC     Allens Test N/A     DelSys Adult Vent     Mode AC/PRVC     Vt 550     PEEP 8     FiO2 40     Sp02 98      Microbiology Results (last 7 days)     Procedure Component Value Units Date/Time    Blood culture [[id number]] Collected:  06/14/17 0336    Order Status:  Completed Specimen:  Blood from Peripheral, Wrist, Left Updated:  06/19/17 0812     Blood Culture, Routine No growth after 5 days.    Laurita Ink (CSF) [[id number]] Collected:  06/18/17 1212    Order Status:  Sent Specimen:   CSF (Spinal Fluid) from CSF Tap, Tube 3 Updated:  06/18/17 1213    CSF culture [[id number]] Collected:  06/18/17 1212    Order Status:  Sent Specimen:  CSF (Spinal Fluid) from CSF Tap, Tube 3 Updated:  06/18/17 1213    Cryptococcal antigen, CSF [[id number]] Collected:  06/18/17 1212    Order Status:  Sent Specimen:  CSF (Spinal Fluid) from CSF Tap, Tube 3 Updated:  06/18/17 1213    Culture, Respiratory with Gram Stain [[id number]]  (Susceptibility) Collected:  06/14/17 0500    Order Status:  Completed Specimen:  Respiratory from Endotracheal Aspirate Updated:  06/16/17 1030     Respiratory Culture --     PSEUDOMONAS AERUGINOSA  Few       Gram Stain (Respiratory) <10 epithelial cells per low power field.     Gram Stain (Respiratory) Moderate WBC's     Gram Stain (Respiratory) Few Gram negative rods          Significant Imaging:   No new pertinent imaging overnight

## 2017-06-19 NOTE — HOSPITAL COURSE
- Completed 5 days of IVIG on 6/12/17  - LP was done in OSH and was reported as 0 rbc, 0 wbc, protein 34, glucose 97  - Lyme ab panel was sent and results are pending.  - He was diagnosed with pneumonia and started on abx (6/14 resp culture w/ pseudomonas)  - Per family he had an MRI brain. cspine and T spine done in OSH and was unremarkable   - 6/18 LP 1 WBC, 8 RBC, 92 gluc, 114 protein

## 2017-06-20 DIAGNOSIS — G61.0 GBS (GUILLAIN BARRE SYNDROME): Primary | ICD-10-CM

## 2017-06-20 LAB
ALBUMIN CSF-MCNC: 49.3 MG/DL
ALBUMIN SERPL BCP-MCNC: 3.6 G/DL
ALBUMIN SERPL-MCNC: 3020 MG/DL
ALLENS TEST: ABNORMAL
ALP SERPL-CCNC: 29 U/L
ALT SERPL W/O P-5'-P-CCNC: 46 U/L
ANION GAP SERPL CALC-SCNC: 2 MMOL/L
ANISOCYTOSIS BLD QL SMEAR: SLIGHT
AST SERPL-CCNC: 28 U/L
BASOPHILS # BLD AUTO: 0.01 K/UL
BASOPHILS NFR BLD: 0.1 %
BILIRUB SERPL-MCNC: 0.5 MG/DL
BUN SERPL-MCNC: 25 MG/DL
CALCIUM SERPL-MCNC: 7.5 MG/DL
CHLORIDE SERPL-SCNC: 109 MMOL/L
CO2 SERPL-SCNC: 26 MMOL/L
CREAT SERPL-MCNC: 0.7 MG/DL
DELSYS: ABNORMAL
DIFFERENTIAL METHOD: ABNORMAL
EOSINOPHIL # BLD AUTO: 0 K/UL
EOSINOPHIL NFR BLD: 0 %
ERYTHROCYTE [DISTWIDTH] IN BLOOD BY AUTOMATED COUNT: 13.2 %
ERYTHROCYTE [SEDIMENTATION RATE] IN BLOOD BY WESTERGREN METHOD: 18 MM/H
EST. GFR  (AFRICAN AMERICAN): >60 ML/MIN/1.73 M^2
EST. GFR  (NON AFRICAN AMERICAN): >60 ML/MIN/1.73 M^2
FIO2: 40
GLUCOSE SERPL-MCNC: 255 MG/DL
HCO3 UR-SCNC: 27 MMOL/L (ref 24–28)
HCT VFR BLD AUTO: 31 %
HGB BLD-MCNC: 10 G/DL
HSV1 DNA SPEC QL NAA+PROBE: NORMAL
HSV1, PCR, CSF: NEGATIVE
HSV2 DNA SPEC QL NAA+PROBE: NORMAL
HSV2, PCR, CSF: NEGATIVE
HYPOCHROMIA BLD QL SMEAR: ABNORMAL
IGG CSF-MCNC: 15.4 MG/DL
IGG SERPL-MCNC: 754 MG/DL
IGG SYNTH RATE SER+CSF CALC-MRATE: 49.07 MG/24 H
IGG/ALB CLEAR SER+CSF-RTO: 1.24
IGG/ALB CSF: 0.31 {RATIO}
IGG/ALB SER: 0.25 {RATIO}
LYMPHOCYTES # BLD AUTO: 0.6 K/UL
LYMPHOCYTES NFR BLD: 3.9 %
MAGNESIUM SERPL-MCNC: 2.3 MG/DL
MCH RBC QN AUTO: 31.7 PG
MCHC RBC AUTO-ENTMCNC: 32.3 %
MCV RBC AUTO: 98 FL
MODE: ABNORMAL
MONOCYTES # BLD AUTO: 0.2 K/UL
MONOCYTES NFR BLD: 1.3 %
NEUTROPHILS # BLD AUTO: 13.3 K/UL
NEUTROPHILS NFR BLD: 94.1 %
OLIGOCLONAL BANDS CSF ELPH-IMP: 0 BANDS
OLIGOCLONAL BANDS CSF ELPH-IMP: 0 BANDS
OLIGOCLONAL BANDS SERPL: 0 BANDS
OVALOCYTES BLD QL SMEAR: ABNORMAL
PCO2 BLDA: 46.6 MMHG (ref 35–45)
PEEP: 8
PH SMN: 7.37 [PH] (ref 7.35–7.45)
PHOSPHATE SERPL-MCNC: 2.6 MG/DL
PIP: 20
PLATELET # BLD AUTO: 221 K/UL
PMV BLD AUTO: ABNORMAL FL
PO2 BLDA: 126 MMHG (ref 80–100)
POC BE: 2 MMOL/L
POC SATURATED O2: 99 % (ref 95–100)
POC TCO2: 28 MMOL/L (ref 23–27)
POCT GLUCOSE: 275 MG/DL (ref 70–110)
POCT GLUCOSE: 282 MG/DL (ref 70–110)
POCT GLUCOSE: 290 MG/DL (ref 70–110)
POIKILOCYTOSIS BLD QL SMEAR: SLIGHT
POLYCHROMASIA BLD QL SMEAR: ABNORMAL
POTASSIUM SERPL-SCNC: 4.9 MMOL/L
PROT SERPL-MCNC: 5 G/DL
PS: 15
RBC # BLD AUTO: 3.15 M/UL
SAMPLE: ABNORMAL
SITE: ABNORMAL
SODIUM SERPL-SCNC: 137 MMOL/L
SP02: 98
SPECIMEN SOURCE: NORMAL
VIT B1 SERPL-MCNC: 49 UG/L (ref 38–122)
VT: 550
WBC # BLD AUTO: 14.11 K/UL

## 2017-06-20 PROCEDURE — 25000003 PHARM REV CODE 250: Performed by: NURSE PRACTITIONER

## 2017-06-20 PROCEDURE — 99900035 HC TECH TIME PER 15 MIN (STAT)

## 2017-06-20 PROCEDURE — 82803 BLOOD GASES ANY COMBINATION: CPT

## 2017-06-20 PROCEDURE — 20000000 HC ICU ROOM

## 2017-06-20 PROCEDURE — 94761 N-INVAS EAR/PLS OXIMETRY MLT: CPT

## 2017-06-20 PROCEDURE — 94003 VENT MGMT INPAT SUBQ DAY: CPT

## 2017-06-20 PROCEDURE — 25000003 PHARM REV CODE 250: Performed by: PSYCHIATRY & NEUROLOGY

## 2017-06-20 PROCEDURE — 80053 COMPREHEN METABOLIC PANEL: CPT

## 2017-06-20 PROCEDURE — 83520 IMMUNOASSAY QUANT NOS NONAB: CPT

## 2017-06-20 PROCEDURE — 36600 WITHDRAWAL OF ARTERIAL BLOOD: CPT

## 2017-06-20 PROCEDURE — 84100 ASSAY OF PHOSPHORUS: CPT

## 2017-06-20 PROCEDURE — 25000003 PHARM REV CODE 250: Performed by: PHYSICIAN ASSISTANT

## 2017-06-20 PROCEDURE — 27200966 HC CLOSED SUCTION SYSTEM

## 2017-06-20 PROCEDURE — 85025 COMPLETE CBC W/AUTO DIFF WBC: CPT

## 2017-06-20 PROCEDURE — 63600175 PHARM REV CODE 636 W HCPCS: Performed by: PSYCHIATRY & NEUROLOGY

## 2017-06-20 PROCEDURE — 83735 ASSAY OF MAGNESIUM: CPT

## 2017-06-20 PROCEDURE — 27000221 HC OXYGEN, UP TO 24 HOURS

## 2017-06-20 PROCEDURE — 86625 CAMPYLOBACTER ANTIBODY: CPT

## 2017-06-20 PROCEDURE — 99291 CRITICAL CARE FIRST HOUR: CPT | Mod: ,,, | Performed by: PSYCHIATRY & NEUROLOGY

## 2017-06-20 PROCEDURE — 99232 SBSQ HOSP IP/OBS MODERATE 35: CPT | Mod: ,,, | Performed by: NEUROLOGICAL SURGERY

## 2017-06-20 RX ORDER — INSULIN ASPART 100 [IU]/ML
1-10 INJECTION, SOLUTION INTRAVENOUS; SUBCUTANEOUS EVERY 4 HOURS PRN
Status: DISCONTINUED | OUTPATIENT
Start: 2017-06-20 | End: 2017-07-14 | Stop reason: HOSPADM

## 2017-06-20 RX ORDER — LANOLIN ALCOHOL/MO/W.PET/CERES
800 CREAM (GRAM) TOPICAL
Status: DISCONTINUED | OUTPATIENT
Start: 2017-06-20 | End: 2017-07-07

## 2017-06-20 RX ORDER — HEPARIN SODIUM 5000 [USP'U]/ML
5000 INJECTION, SOLUTION INTRAVENOUS; SUBCUTANEOUS EVERY 8 HOURS
Status: DISCONTINUED | OUTPATIENT
Start: 2017-06-20 | End: 2017-06-23

## 2017-06-20 RX ORDER — BISACODYL 10 MG
10 SUPPOSITORY, RECTAL RECTAL DAILY
Status: DISCONTINUED | OUTPATIENT
Start: 2017-06-20 | End: 2017-07-04

## 2017-06-20 RX ORDER — SODIUM,POTASSIUM PHOSPHATES 280-250MG
2 POWDER IN PACKET (EA) ORAL
Status: DISCONTINUED | OUTPATIENT
Start: 2017-06-20 | End: 2017-07-07

## 2017-06-20 RX ORDER — ASPIRIN 325 MG
325 TABLET ORAL DAILY
Status: DISCONTINUED | OUTPATIENT
Start: 2017-06-20 | End: 2017-07-06

## 2017-06-20 RX ORDER — POTASSIUM CHLORIDE 20 MEQ/15ML
40 SOLUTION ORAL
Status: DISCONTINUED | OUTPATIENT
Start: 2017-06-20 | End: 2017-07-07

## 2017-06-20 RX ORDER — POTASSIUM CHLORIDE 20 MEQ/15ML
60 SOLUTION ORAL
Status: DISCONTINUED | OUTPATIENT
Start: 2017-06-20 | End: 2017-07-07

## 2017-06-20 RX ADMIN — THIAMINE HCL TAB 100 MG 100 MG: 100 TAB at 08:06

## 2017-06-20 RX ADMIN — CEFEPIME HYDROCHLORIDE 1 G: 1 INJECTION, SOLUTION INTRAVENOUS at 05:06

## 2017-06-20 RX ADMIN — INSULIN ASPART 8 UNITS: 100 INJECTION, SOLUTION INTRAVENOUS; SUBCUTANEOUS at 07:06

## 2017-06-20 RX ADMIN — LEVOTHYROXINE SODIUM 75 MCG: 75 TABLET ORAL at 05:06

## 2017-06-20 RX ADMIN — ACYCLOVIR SODIUM 790 MG: 50 INJECTION, SOLUTION INTRAVENOUS at 07:06

## 2017-06-20 RX ADMIN — Medication 12.5 MG: at 08:06

## 2017-06-20 RX ADMIN — POTASSIUM & SODIUM PHOSPHATES POWDER PACK 280-160-250 MG 2 PACKET: 280-160-250 PACK at 05:06

## 2017-06-20 RX ADMIN — POTASSIUM & SODIUM PHOSPHATES POWDER PACK 280-160-250 MG 2 PACKET: 280-160-250 PACK at 08:06

## 2017-06-20 RX ADMIN — FAMOTIDINE 20 MG: 20 TABLET, FILM COATED ORAL at 09:06

## 2017-06-20 RX ADMIN — ALPRAZOLAM 1 MG: 0.5 TABLET ORAL at 05:06

## 2017-06-20 RX ADMIN — CHLORHEXIDINE GLUCONATE 15 ML: 1.2 RINSE ORAL at 11:06

## 2017-06-20 RX ADMIN — DEXTROSE: 50 INJECTION, SOLUTION INTRAVENOUS at 08:06

## 2017-06-20 RX ADMIN — ACYCLOVIR SODIUM 790 MG: 50 INJECTION, SOLUTION INTRAVENOUS at 04:06

## 2017-06-20 RX ADMIN — CHLORHEXIDINE GLUCONATE 15 ML: 1.2 RINSE ORAL at 05:06

## 2017-06-20 RX ADMIN — INSULIN ASPART 6 UNITS: 100 INJECTION, SOLUTION INTRAVENOUS; SUBCUTANEOUS at 12:06

## 2017-06-20 RX ADMIN — ACYCLOVIR SODIUM 790 MG: 50 INJECTION, SOLUTION INTRAVENOUS at 11:06

## 2017-06-20 RX ADMIN — HEPARIN SODIUM 5000 UNITS: 5000 INJECTION, SOLUTION INTRAVENOUS; SUBCUTANEOUS at 09:06

## 2017-06-20 RX ADMIN — BISACODYL 10 MG: 10 SUPPOSITORY RECTAL at 04:06

## 2017-06-20 RX ADMIN — ALPRAZOLAM 1 MG: 0.5 TABLET ORAL at 09:06

## 2017-06-20 RX ADMIN — SERTRALINE HYDROCHLORIDE 50 MG: 50 TABLET ORAL at 08:06

## 2017-06-20 RX ADMIN — CHLORHEXIDINE GLUCONATE 15 ML: 1.2 RINSE ORAL at 12:06

## 2017-06-20 RX ADMIN — POLYETHYLENE GLYCOL 3350 17 G: 17 POWDER, FOR SOLUTION ORAL at 08:06

## 2017-06-20 RX ADMIN — Medication 12.5 MG: at 09:06

## 2017-06-20 RX ADMIN — FAMOTIDINE 20 MG: 20 TABLET, FILM COATED ORAL at 08:06

## 2017-06-20 RX ADMIN — ACETAMINOPHEN 650 MG: 325 TABLET ORAL at 08:06

## 2017-06-20 RX ADMIN — STANDARDIZED SENNA CONCENTRATE AND DOCUSATE SODIUM 1 TABLET: 8.6; 5 TABLET, FILM COATED ORAL at 08:06

## 2017-06-20 RX ADMIN — INSULIN ASPART 6 UNITS: 100 INJECTION, SOLUTION INTRAVENOUS; SUBCUTANEOUS at 05:06

## 2017-06-20 RX ADMIN — INSULIN ASPART 4 UNITS: 100 INJECTION, SOLUTION INTRAVENOUS; SUBCUTANEOUS at 11:06

## 2017-06-20 RX ADMIN — Medication 1 TABLET: at 08:06

## 2017-06-20 RX ADMIN — HEPARIN SODIUM 5000 UNITS: 5000 INJECTION, SOLUTION INTRAVENOUS; SUBCUTANEOUS at 04:06

## 2017-06-20 RX ADMIN — ASPIRIN 325 MG ORAL TABLET 325 MG: 325 PILL ORAL at 04:06

## 2017-06-20 RX ADMIN — CHLORHEXIDINE GLUCONATE 15 ML: 1.2 RINSE ORAL at 06:06

## 2017-06-20 RX ADMIN — INSULIN ASPART 3 UNITS: 100 INJECTION, SOLUTION INTRAVENOUS; SUBCUTANEOUS at 12:06

## 2017-06-20 RX ADMIN — ALPRAZOLAM 1 MG: 0.5 TABLET ORAL at 01:06

## 2017-06-20 NOTE — ASSESSMENT & PLAN NOTE
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  Bilateral ICA aneurysms incidental findings.  - Continue current management / work up per Ncc and neurology  - Fu paraneoplastic panel.  - PLEX, solumedrol per N[facility]/neurology  - Fu cultures of LP.  - EMG today

## 2017-06-20 NOTE — PROGRESS NOTES
[facility]  Neurosurgery  Progress Note    Subjective:     History of Present Illness: 72 year old male with PMHx CAD, skin cancer, hypothyroidism, and HLP who was transferred from OSH for further evaluation and management of rapidly progressive ascending paralysis.  No family present during exam, history obtained primarily from chart.  Patient was on a camping trip to [place] and [place] recently. When he returned he started to have paraesthesias of the feet and hand that quickly progressed to legs weakness. No clear hx of tick bites or skin lesions.  Additionally, a few days prior to admission to OSH he had prostate biopsy and was treated with cipro.  Pt presented to OSH and was diagnosed with GBS and was started on IVIG, he completed 5 days of treatment which ended on 6/12/17.  Pt also being treated for pneumonia.  After admission his weakness got worse to the point that he had to be intubated.  Pt has had poor neurologic exam since.   - LP was done in OSH and was reported as 0 rbc, 0 wbc, protein 34, glucose 97.  Lyme ab panel was sent and results are pending.  MRI Brain done today shows non enhancing lesion abutting the right optic nerve.  Neurosurgery consulted for evaluation.             Post-Op Info:  * No surgery found *         Interval History: NAEON    Medications:  Continuous Infusions:   sodium chloride 0.9% 75 mL/hr at 06/20/17 0800     Scheduled Meds:   acyclovir  10 mg/kg Intravenous Q8H    alprazolam  1 mg Per NG tube Q8H    ceFEPime (MAXIPIME) IVPB  1 g Intravenous Q12H    chlorhexidine  15 mL Mouth/Throat QID    famotidine  20 mg Per OG tube BID    folic acid-vit B6-vit B12 2.5-25-2 mg  1 tablet Per NG tube Daily    levothyroxine  75 mcg Per NG tube Daily    methylPREDNISolone (SOLU-Medrol) IVPB (doses > 250 mg)   Intravenous Daily    metoprolol tartrate  12.5 mg Per OG tube BID    polyethylene glycol  17 g Per NG tube Daily    senna-docusate 8.6-50 mg  1  tablet Per OG tube Daily    sertraline  50 mg Per NG tube Daily    thiamine  100 mg Per NG tube Daily     PRN Meds:sodium chloride, acetaminophen, dextrose 50%, glucagon (human recombinant), heparin, porcine (PF), hydrALAZINE, insulin aspart, magnesium oxide, magnesium oxide, ondansetron, potassium chloride 10%, potassium chloride 10%, potassium chloride 10%, potassium, sodium phosphates, potassium, sodium phosphates, potassium, sodium phosphates     Review of Systems  Objective:     Weight: 88.8 kg (195 lb 12.3 oz)  Body mass index is 25.83 kg/m².  Vital Signs (Most Recent):  Temp: 99.8 °F (37.7 °C) (17 0854)  Pulse: 84 (17 0900)  Resp: 18 (17 09)  BP: 134/70 (17 0900)  SpO2: 99 % (17 09) Vital Signs (24h Range):  Temp:  [97.9 °F (36.6 °C)-99.8 °F (37.7 °C)] 99.8 °F (37.7 °C)  Pulse:  [] 84  Resp:  [18] 18  SpO2:  [79 %-100 %] 99 %  BP: ()/(51-84) 134/70              Temp (24hrs), Av.1 °F (37.3 °C), Min:97.9 °F (36.6 °C), Max:99.8 °F (37.7 °C)    Vent Mode: SIMV (VC) + PS  Oxygen Concentration (%):  [40] 40  Resp Rate Total:  [18 br/min-20 br/min] 18 br/min  Vt Set:  [550 mL] 550 mL  PEEP/CPAP:  [8 cmH20] 8 cmH20  Pressure Support:  [10 cmH20-15 cmH20] 15 cmH20  Mean Airway Pressure:  [12 zpU21-44 cmH20] 12 cmH20      Date 17 0700 - 17 0659   Shift 7505-9849 9178-7590 5943-3265 24 Hour Total   I  N  T  A  K  E   I.V.  (mL/kg) 150  (1.7)   150  (1.7)    NG/   100    Shift Total  (mL/kg) 250  (2.8)   250  (2.8)   O  U  T  P  U  T   Urine  (mL/kg/hr) 125   125    Shift Total  (mL/kg) 125  (1.4)   125  (1.4)   Weight (kg) 88.8 88.8 88.8 88.8          NG/OG Tube 17 0130 (Active)   Placement Check placement verified by x-ray 2017  7:05 AM   Distal Tube Length (cm) 65 2017  7:05 AM   Tolerance no signs/symptoms of discomfort 2017  7:05 AM   Securement taped to commercial device 2017  7:05 AM   Clamp Status/Tolerance  unclamped;no abdominal discomfort;no abdominal distention;no emesis;no nausea;no residual;no restlessness 6/20/2017  7:05 AM   Suction Setting/Drainage Method dependent drainage 6/17/2017  3:01 AM   Insertion Site Appearance no redness, warmth, tenderness, skin breakdown, drainage 6/20/2017  3:05 AM   Drainage None 6/20/2017  3:05 AM   Flush/Irrigation flushed w/;water;no resistance met 6/20/2017  3:05 AM   Feeding Method continuous 6/20/2017  7:05 AM   Current Rate (mL/hr) 50 mL/hr 6/20/2017  7:05 AM   Goal Rate (mL/hr) 50 mL/hr 6/20/2017  7:05 AM   Intake (mL) 80 mL 6/20/2017  5:15 AM   Rate Formula Tube Feeding (mL/hr) 50 mL/hr 6/19/2017  7:05 PM   Intake (mL) - Formula Tube Feeding 50 6/20/2017  8:00 AM   Residual Amount (ml) 55 ml 6/19/2017  7:05 PM       Male External Urinary Catheter 06/14/17 0003 Medium (Active)   Collection Container Urimeter 6/20/2017  7:05 AM   Securement Method secured to top of thigh w/ adhesive device 6/20/2017  7:05 AM   Skin no redness;no breakdown 6/20/2017  7:05 AM   Tolerance no signs/symptoms of discomfort 6/20/2017  7:05 AM   Output (mL) 125 mL 6/20/2017  8:00 AM            Trialysis (Dialysis) Catheter 06/17/17 1300 left subclavian (Active)   IV Device Securement catheter securement device 6/20/2017  7:05 AM   Additional Site Signs no erythema;no warmth;no edema;no pain;no palpable cord;no streak formation;no drainage 6/20/2017  7:05 AM   Patency/Care flushed w/o difficulty 6/20/2017  3:05 AM   Site Assessment Clean;Intact;Dry;No redness;No swelling 6/20/2017  7:05 AM   Status Deaccessed 6/20/2017  7:05 AM   Flows Good 6/20/2017  3:05 AM   Dressing Intervention Dressing reinforced 6/20/2017  3:05 AM   Dressing Status Biopatch in place;Clean;Dry;Intact 6/20/2017  7:05 AM   Dressing Change Due 06/26/17 6/20/2017  7:05 AM   Verification by X-ray Yes 6/20/2017  7:05 AM   Site Condition No complications 6/20/2017  7:05 AM   Dressing Occlusive 6/20/2017  7:05 AM   Daily Line Review  Performed 6/20/2017  3:05 AM       Neurosurgery Physical Exam   E1VTM1  PERRL, no corneals, cough/gag  No motor response    Significant Labs:    Recent Labs  Lab 06/20/17  0215   *      K 4.9      CO2 26   BUN 25*   CREATININE 0.7   CALCIUM 7.5*   MG 2.3       Recent Labs  Lab 06/19/17  0210 06/20/17  0215   WBC 9.42 14.11*   HGB 10.5* 10.0*   HCT 32.4* 31.0*    221     No results for input(s): LABPT, INR, APTT in the last 48 hours.  Microbiology Results (last 7 days)     Procedure Component Value Units Date/Time    CSF culture [[id number]] Collected:  06/18/17 1212    Order Status:  Completed Specimen:  CSF (Spinal Fluid) from CSF Tap, Tube 3 Updated:  06/20/17 0730     CSF CULTURE No Growth to date     Gram Stain Result No WBC's      No organisms seen    Narrative:       On which sequentially labeled tube should this analysis be  performed?->3    Laurita Ink (CSF) [[id number]] Collected:  06/18/17 1212    Order Status:  Completed Specimen:  CSF (Spinal Fluid) from CSF Tap, Tube 3 Updated:  06/19/17 1417     Laurita Ink No encapsulated yeast seen    Cryptococcal antigen, CSF [[id number]] Collected:  06/18/17 1212    Order Status:  Canceled Specimen:  CSF (Spinal Fluid) from CSF Tap, Tube 3 Updated:  06/19/17 1243    Blood culture [[id number]] Collected:  06/14/17 0336    Order Status:  Completed Specimen:  Blood from Peripheral, Wrist, Left Updated:  06/19/17 0812     Blood Culture, Routine No growth after 5 days.    Culture, Respiratory with Gram Stain [[id number]]  (Susceptibility) Collected:  06/14/17 0500    Order Status:  Completed Specimen:  Respiratory from Endotracheal Aspirate Updated:  06/16/17 1030     Respiratory Culture --     PSEUDOMONAS AERUGINOSA  Few       Gram Stain (Respiratory) <10 epithelial cells per low power field.     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  Bilateral ICA aneurysms incidental findings.  - Continue current management / work up per Ncc and neurology  - Fu paraneoplastic panel.  - PLEX, solumedrol per NCC/neurology  - Fu cultures of LP.  - EMG today                Thai King, DO  Neurosurgery  [facility]

## 2017-06-20 NOTE — SUBJECTIVE & OBJECTIVE
Interval History: NAEON    Medications:  Continuous Infusions:   sodium chloride 0.9% 75 mL/hr at 17 0800     Scheduled Meds:   acyclovir  10 mg/kg Intravenous Q8H    alprazolam  1 mg Per NG tube Q8H    ceFEPime (MAXIPIME) IVPB  1 g Intravenous Q12H    chlorhexidine  15 mL Mouth/Throat QID    famotidine  20 mg Per OG tube BID    folic acid-vit B6-vit B12 2.5-25-2 mg  1 tablet Per NG tube Daily    levothyroxine  75 mcg Per NG tube Daily    methylPREDNISolone (SOLU-Medrol) IVPB (doses > 250 mg)   Intravenous Daily    metoprolol tartrate  12.5 mg Per OG tube BID    polyethylene glycol  17 g Per NG tube Daily    senna-docusate 8.6-50 mg  1 tablet Per OG tube Daily    sertraline  50 mg Per NG tube Daily    thiamine  100 mg Per NG tube Daily     PRN Meds:sodium chloride, acetaminophen, dextrose 50%, glucagon (human recombinant), heparin, porcine (PF), hydrALAZINE, insulin aspart, magnesium oxide, magnesium oxide, ondansetron, potassium chloride 10%, potassium chloride 10%, potassium chloride 10%, potassium, sodium phosphates, potassium, sodium phosphates, potassium, sodium phosphates     Review of Systems  Objective:     Weight: 88.8 kg (195 lb 12.3 oz)  Body mass index is 25.83 kg/m².  Vital Signs (Most Recent):  Temp: 99.8 °F (37.7 °C) (17 0854)  Pulse: 84 (17 0900)  Resp: 18 (17 0900)  BP: 134/70 (17 0900)  SpO2: 99 % (17 0900) Vital Signs (24h Range):  Temp:  [97.9 °F (36.6 °C)-99.8 °F (37.7 °C)] 99.8 °F (37.7 °C)  Pulse:  [] 84  Resp:  [18] 18  SpO2:  [79 %-100 %] 99 %  BP: ()/(51-84) 134/70              Temp (24hrs), Av.1 °F (37.3 °C), Min:97.9 °F (36.6 °C), Max:99.8 °F (37.7 °C)    Vent Mode: SIMV (VC) + PS  Oxygen Concentration (%):  [40] 40  Resp Rate Total:  [18 br/min-20 br/min] 18 br/min  Vt Set:  [550 mL] 550 mL  PEEP/CPAP:  [8 cmH20] 8 cmH20  Pressure Support:  [10 cmH20-15 cmH20] 15 cmH20  Mean Airway Pressure:  [12 kzJ10-83 cmH20] 12  cmH20      Date 06/20/17 0700 - 06/21/17 0659   Shift 5411-37882900 2771-5676 5691-4213 24 Hour Total   I  N  T  A  K  E   I.V.  (mL/kg) 150  (1.7)   150  (1.7)    NG/   100    Shift Total  (mL/kg) 250  (2.8)   250  (2.8)   O  U  T  P  U  T   Urine  (mL/kg/hr) 125   125    Shift Total  (mL/kg) 125  (1.4)   125  (1.4)   Weight (kg) 88.8 88.8 88.8 88.8          NG/OG Tube 06/09/17 0130 (Active)   Placement Check placement verified by x-ray 6/20/2017  7:05 AM   Distal Tube Length (cm) 65 6/20/2017  7:05 AM   Tolerance no signs/symptoms of discomfort 6/20/2017  7:05 AM   Securement taped to commercial device 6/20/2017  7:05 AM   Clamp Status/Tolerance unclamped;no abdominal discomfort;no abdominal distention;no emesis;no nausea;no residual;no restlessness 6/20/2017  7:05 AM   Suction Setting/Drainage Method dependent drainage 6/17/2017  3:01 AM   Insertion Site Appearance no redness, warmth, tenderness, skin breakdown, drainage 6/20/2017  3:05 AM   Drainage None 6/20/2017  3:05 AM   Flush/Irrigation flushed w/;water;no resistance met 6/20/2017  3:05 AM   Feeding Method continuous 6/20/2017  7:05 AM   Current Rate (mL/hr) 50 mL/hr 6/20/2017  7:05 AM   Goal Rate (mL/hr) 50 mL/hr 6/20/2017  7:05 AM   Intake (mL) 80 mL 6/20/2017  5:15 AM   Rate Formula Tube Feeding (mL/hr) 50 mL/hr 6/19/2017  7:05 PM   Intake (mL) - Formula Tube Feeding 50 6/20/2017  8:00 AM   Residual Amount (ml) 55 ml 6/19/2017  7:05 PM       Male External Urinary Catheter 06/14/17 0003 Medium (Active)   Collection Container Urimeter 6/20/2017  7:05 AM   Securement Method secured to top of thigh w/ adhesive device 6/20/2017  7:05 AM   Skin no redness;no breakdown 6/20/2017  7:05 AM   Tolerance no signs/symptoms of discomfort 6/20/2017  7:05 AM   Output (mL) 125 mL 6/20/2017  8:00 AM            Trialysis (Dialysis) Catheter 06/17/17 1300 left subclavian (Active)   IV Device Securement catheter securement device 6/20/2017  7:05 AM   Additional Site Signs  no erythema;no warmth;no edema;no pain;no palpable cord;no streak formation;no drainage 6/20/2017  7:05 AM   Patency/Care flushed w/o difficulty 6/20/2017  3:05 AM   Site Assessment Clean;Intact;Dry;No redness;No swelling 6/20/2017  7:05 AM   Status Deaccessed 6/20/2017  7:05 AM   Flows Good 6/20/2017  3:05 AM   Dressing Intervention Dressing reinforced 6/20/2017  3:05 AM   Dressing Status Biopatch in place;Clean;Dry;Intact 6/20/2017  7:05 AM   Dressing Change Due 06/26/17 6/20/2017  7:05 AM   Verification by X-ray Yes 6/20/2017  7:05 AM   Site Condition No complications 6/20/2017  7:05 AM   Dressing Occlusive 6/20/2017  7:05 AM   Daily Line Review Performed 6/20/2017  3:05 AM       Neurosurgery Physical Exam   E1VTM1  PERRL, no corneals, cough/gag  No motor response    Significant Labs:    Recent Labs  Lab 06/20/17  0215   *      K 4.9      CO2 26   BUN 25*   CREATININE 0.7   CALCIUM 7.5*   MG 2.3       Recent Labs  Lab 06/19/17  0210 06/20/17  0215   WBC 9.42 14.11*   HGB 10.5* 10.0*   HCT 32.4* 31.0*    221     No results for input(s): LABPT, INR, APTT in the last 48 hours.  Microbiology Results (last 7 days)     Procedure Component Value Units Date/Time    CSF culture [[id number]] Collected:  06/18/17 1212    Order Status:  Completed Specimen:  CSF (Spinal Fluid) from CSF Tap, Tube 3 Updated:  06/20/17 0730     CSF CULTURE No Growth to date     Gram Stain Result No WBC's      No organisms seen    Narrative:       On which sequentially labeled tube should this analysis be  performed?->3    Laurita Ink (CSF) [[id number]] Collected:  06/18/17 1212    Order Status:  Completed Specimen:  CSF (Spinal Fluid) from CSF Tap, Tube 3 Updated:  06/19/17 1417     Laurita Ink No encapsulated yeast seen    Cryptococcal antigen, CSF [[id number]] Collected:  06/18/17 1212    Order Status:  Canceled Specimen:  CSF (Spinal Fluid) from CSF Tap, Tube 3 Updated:  06/19/17 1243    Blood culture [[id number]] Collected:   06/14/17 0336    Order Status:  Completed Specimen:  Blood from Peripheral, Wrist, Left Updated:  06/19/17 0812     Blood Culture, Routine No growth after 5 days.    Culture, Respiratory with Gram Stain [[id number]]  (Susceptibility) Collected:  06/14/17 0500    Order Status:  Completed Specimen:  Respiratory from Endotracheal Aspirate Updated:  06/16/17 1030     Respiratory Culture --     PSEUDOMONAS AERUGINOSA  Few       Gram Stain (Respiratory) <10 epithelial cells per low power field.     Gram Stain (Respiratory) Moderate WBC's     Gram Stain (Respiratory) Few Gram negative rods            Significant Diagnostics:

## 2017-06-21 LAB
ACHR BIND AB SER-SCNC: 0 NMOL/L
ALBUMIN SERPL BCP-MCNC: 3.1 G/DL
ALLENS TEST: ABNORMAL
ALP SERPL-CCNC: 34 U/L
ALT SERPL W/O P-5'-P-CCNC: 51 U/L
AMPHIPHYSIN AB TITR CSF: NORMAL {TITER}
AMPHIPHYSIN AB TITR SER: NEGATIVE TITER
ANION GAP SERPL CALC-SCNC: 5 MMOL/L
AST SERPL-CCNC: 32 U/L
BASOPHILS # BLD AUTO: 0.01 K/UL
BASOPHILS NFR BLD: 0.1 %
BILIRUB SERPL-MCNC: 0.4 MG/DL
BUN SERPL-MCNC: 31 MG/DL
CALCIUM SERPL-MCNC: 7.6 MG/DL
CHLORIDE SERPL-SCNC: 106 MMOL/L
CO2 SERPL-SCNC: 25 MMOL/L
CREAT SERPL-MCNC: 0.7 MG/DL
CV2 IGG TITR CSF: NORMAL {TITER}
CV2 IGG TITR SER: NEGATIVE TITER
DELSYS: ABNORMAL
DIFFERENTIAL METHOD: ABNORMAL
EOSINOPHIL # BLD AUTO: 0 K/UL
EOSINOPHIL NFR BLD: 0 %
ERYTHROCYTE [DISTWIDTH] IN BLOOD BY AUTOMATED COUNT: 13.1 %
ERYTHROCYTE [SEDIMENTATION RATE] IN BLOOD BY WESTERGREN METHOD: 18 MM/H
EST. GFR  (AFRICAN AMERICAN): >60 ML/MIN/1.73 M^2
EST. GFR  (NON AFRICAN AMERICAN): >60 ML/MIN/1.73 M^2
EV RNA SPEC QL NAA+PROBE: NEGATIVE
FIO2: 40
GLIAL NUC TYPE 1 AB TITR CSF: NORMAL {TITER}
GLIAL NUC TYPE 1 AB TITR SER: NEGATIVE TITER
GLUCOSE SERPL-MCNC: 255 MG/DL
HCO3 UR-SCNC: 28.8 MMOL/L (ref 24–28)
HCT VFR BLD AUTO: 27.3 %
HGB BLD-MCNC: 9.2 G/DL
HU1 AB TITR CSF IF: NORMAL {TITER}
HU1 AB TITR SER: NEGATIVE TITER
HU2 AB TITR CSF IF: NORMAL {TITER}
HU2 AB TITR SER IF: NEGATIVE TITER
HU3 AB TITR CSF: NORMAL {TITER}
HU3 AB TITR SER: NEGATIVE TITER
IMMUNOLOGIST REVIEW: NORMAL
JCPYV DNA CSF QL NAA+PROBE: NEGATIVE
LDH FLD L TO P-CCNC: 65 U/L
LYMPHOCYTES # BLD AUTO: 0.8 K/UL
LYMPHOCYTES NFR BLD: 5.9 %
MAGNESIUM SERPL-MCNC: 2.4 MG/DL
MCH RBC QN AUTO: 33.2 PG
MCHC RBC AUTO-ENTMCNC: 33.7 %
MCV RBC AUTO: 99 FL
MIN VOL: 10.6
MODE: ABNORMAL
MONOCYTES # BLD AUTO: 0.8 K/UL
MONOCYTES NFR BLD: 5.6 %
NACHR AB SER-SCNC: 0 NMOL/L
NEUTROPHILS # BLD AUTO: 11.9 K/UL
NEUTROPHILS NFR BLD: 88 %
PARANEOPLASTIC INTERPRETATION,CSF: NORMAL
PAVAL REFLEX TEST ADDED: NORMAL
PCA-1 AB TITR SER: NEGATIVE TITER
PCA-2 AB TITR CSF: NORMAL {TITER}
PCA-2 AB TITR SER: NEGATIVE TITER
PCA-TR AB TITR CSF: NORMAL {TITER}
PCA-TR AB TITR SER: NEGATIVE TITER
PCO2 BLDA: 49.3 MMHG (ref 35–45)
PEEP: 8
PH SMN: 7.37 [PH] (ref 7.35–7.45)
PHOSPHATE SERPL-MCNC: 2.2 MG/DL
PIP: 20
PLATELET # BLD AUTO: 223 K/UL
PMV BLD AUTO: 10.2 FL
PNEOE REFLEX TEST ADDED: NORMAL
PO2 BLDA: 113 MMHG (ref 80–100)
POC BE: 4 MMOL/L
POC SATURATED O2: 98 % (ref 95–100)
POC TCO2: 30 MMOL/L (ref 23–27)
POCT GLUCOSE: 247 MG/DL (ref 70–110)
POCT GLUCOSE: 297 MG/DL (ref 70–110)
POCT GLUCOSE: 299 MG/DL (ref 70–110)
POCT GLUCOSE: 302 MG/DL (ref 70–110)
POCT GLUCOSE: 322 MG/DL (ref 70–110)
POCT GLUCOSE: 324 MG/DL (ref 70–110)
POCT GLUCOSE: 331 MG/DL (ref 70–110)
POCT GLUCOSE: 357 MG/DL (ref 70–110)
POTASSIUM SERPL-SCNC: 4.8 MMOL/L
PROT SERPL-MCNC: 4.7 G/DL
PURKINJE CELLS AB TITR CSF IF: NORMAL {TITER}
RBC # BLD AUTO: 2.77 M/UL
SAMPLE: ABNORMAL
SITE: ABNORMAL
SODIUM SERPL-SCNC: 136 MMOL/L
SP02: 97
SPECIMEN SOURCE: NORMAL
SPECIMEN SOURCE: NORMAL
STRIA MUS AB TITR SER: NEGATIVE TITER
VDRL CSF QL: NORMAL
VGCC-N BIND AB SER-SCNC: 0.02 NMOL/L
VGCC-P/Q BIND AB SER-SCNC: 0.02 NMOL/L
VGKC AB SER-SCNC: 0 NMOL/L
VT: 550
WBC # BLD AUTO: 13.51 K/UL
ZIKA VIRUS PCR SERUM: NOT DETECTED

## 2017-06-21 PROCEDURE — 36514 APHERESIS PLASMA: CPT | Mod: ,,, | Performed by: PATHOLOGY

## 2017-06-21 PROCEDURE — 83519 RIA NONANTIBODY: CPT | Mod: 59

## 2017-06-21 PROCEDURE — 63600175 PHARM REV CODE 636 W HCPCS: Performed by: PSYCHIATRY & NEUROLOGY

## 2017-06-21 PROCEDURE — 84100 ASSAY OF PHOSPHORUS: CPT

## 2017-06-21 PROCEDURE — 99291 CRITICAL CARE FIRST HOUR: CPT | Mod: ,,, | Performed by: PSYCHIATRY & NEUROLOGY

## 2017-06-21 PROCEDURE — 96365 THER/PROPH/DIAG IV INF INIT: CPT

## 2017-06-21 PROCEDURE — 36514 APHERESIS PLASMA: CPT

## 2017-06-21 PROCEDURE — 25000003 PHARM REV CODE 250: Performed by: PSYCHIATRY & NEUROLOGY

## 2017-06-21 PROCEDURE — 80053 COMPREHEN METABOLIC PANEL: CPT

## 2017-06-21 PROCEDURE — 95813 EEG EXTND MNTR 61-119 MIN: CPT

## 2017-06-21 PROCEDURE — 25000003 PHARM REV CODE 250: Performed by: PATHOLOGY

## 2017-06-21 PROCEDURE — 27000221 HC OXYGEN, UP TO 24 HOURS

## 2017-06-21 PROCEDURE — 96366 THER/PROPH/DIAG IV INF ADDON: CPT

## 2017-06-21 PROCEDURE — 94761 N-INVAS EAR/PLS OXIMETRY MLT: CPT

## 2017-06-21 PROCEDURE — 25000242 PHARM REV CODE 250 ALT 637 W/ HCPCS: Performed by: PHYSICIAN ASSISTANT

## 2017-06-21 PROCEDURE — 94003 VENT MGMT INPAT SUBQ DAY: CPT

## 2017-06-21 PROCEDURE — 85025 COMPLETE CBC W/AUTO DIFF WBC: CPT

## 2017-06-21 PROCEDURE — 25000003 PHARM REV CODE 250: Performed by: PHYSICIAN ASSISTANT

## 2017-06-21 PROCEDURE — 25000003 PHARM REV CODE 250: Performed by: NURSE PRACTITIONER

## 2017-06-21 PROCEDURE — 27200966 HC CLOSED SUCTION SYSTEM

## 2017-06-21 PROCEDURE — 83735 ASSAY OF MAGNESIUM: CPT

## 2017-06-21 PROCEDURE — 86256 FLUORESCENT ANTIBODY TITER: CPT

## 2017-06-21 PROCEDURE — 82803 BLOOD GASES ANY COMBINATION: CPT

## 2017-06-21 PROCEDURE — 82300 ASSAY OF CADMIUM: CPT

## 2017-06-21 PROCEDURE — 63600175 PHARM REV CODE 636 W HCPCS: Performed by: PATHOLOGY

## 2017-06-21 PROCEDURE — 20000000 HC ICU ROOM

## 2017-06-21 PROCEDURE — 95813 EEG EXTND MNTR 61-119 MIN: CPT | Mod: 26,,, | Performed by: PSYCHIATRY & NEUROLOGY

## 2017-06-21 PROCEDURE — 99900026 HC AIRWAY MAINTENANCE (STAT)

## 2017-06-21 PROCEDURE — 99900035 HC TECH TIME PER 15 MIN (STAT)

## 2017-06-21 PROCEDURE — P9045 ALBUMIN (HUMAN), 5%, 250 ML: HCPCS | Performed by: PATHOLOGY

## 2017-06-21 PROCEDURE — 25500020 PHARM REV CODE 255: Performed by: PSYCHIATRY & NEUROLOGY

## 2017-06-21 PROCEDURE — 36600 WITHDRAWAL OF ARTERIAL BLOOD: CPT

## 2017-06-21 RX ORDER — ACETYLCYSTEINE 200 MG/ML
600 SOLUTION ORAL; RESPIRATORY (INHALATION) 2 TIMES DAILY
Status: COMPLETED | OUTPATIENT
Start: 2017-06-21 | End: 2017-06-23

## 2017-06-21 RX ORDER — HEPARIN SODIUM 1000 [USP'U]/ML
2500 INJECTION, SOLUTION INTRAVENOUS; SUBCUTANEOUS ONCE
Status: COMPLETED | OUTPATIENT
Start: 2017-06-21 | End: 2017-06-21

## 2017-06-21 RX ORDER — ALBUMIN HUMAN 50 G/1000ML
200 SOLUTION INTRAVENOUS ONCE
Status: COMPLETED | OUTPATIENT
Start: 2017-06-21 | End: 2017-06-21

## 2017-06-21 RX ADMIN — Medication 12.5 MG: at 09:06

## 2017-06-21 RX ADMIN — Medication 1 TABLET: at 09:06

## 2017-06-21 RX ADMIN — ACYCLOVIR SODIUM 790 MG: 50 INJECTION, SOLUTION INTRAVENOUS at 03:06

## 2017-06-21 RX ADMIN — ALPRAZOLAM 1 MG: 0.5 TABLET ORAL at 09:06

## 2017-06-21 RX ADMIN — INSULIN ASPART 6 UNITS: 100 INJECTION, SOLUTION INTRAVENOUS; SUBCUTANEOUS at 07:06

## 2017-06-21 RX ADMIN — CHLORHEXIDINE GLUCONATE 15 ML: 1.2 RINSE ORAL at 12:06

## 2017-06-21 RX ADMIN — LEVOTHYROXINE SODIUM 75 MCG: 75 TABLET ORAL at 05:06

## 2017-06-21 RX ADMIN — STANDARDIZED SENNA CONCENTRATE AND DOCUSATE SODIUM 1 TABLET: 8.6; 5 TABLET, FILM COATED ORAL at 09:06

## 2017-06-21 RX ADMIN — ACYCLOVIR SODIUM 790 MG: 50 INJECTION, SOLUTION INTRAVENOUS at 11:06

## 2017-06-21 RX ADMIN — DEXTROSE: 50 INJECTION, SOLUTION INTRAVENOUS at 09:06

## 2017-06-21 RX ADMIN — POTASSIUM & SODIUM PHOSPHATES POWDER PACK 280-160-250 MG 2 PACKET: 280-160-250 PACK at 07:06

## 2017-06-21 RX ADMIN — INSULIN DETEMIR 12 UNITS: 100 INJECTION, SOLUTION SUBCUTANEOUS at 04:06

## 2017-06-21 RX ADMIN — HEPARIN SODIUM 5000 UNITS: 5000 INJECTION, SOLUTION INTRAVENOUS; SUBCUTANEOUS at 09:06

## 2017-06-21 RX ADMIN — THIAMINE HCL TAB 100 MG 100 MG: 100 TAB at 09:06

## 2017-06-21 RX ADMIN — CALCIUM GLUCONATE 2000 MG: 94 INJECTION, SOLUTION INTRAVENOUS at 02:06

## 2017-06-21 RX ADMIN — ALPRAZOLAM 1 MG: 0.5 TABLET ORAL at 02:06

## 2017-06-21 RX ADMIN — ASPIRIN 325 MG ORAL TABLET 325 MG: 325 PILL ORAL at 09:06

## 2017-06-21 RX ADMIN — ACETYLCYSTEINE 600 MG: 200 INHALANT RESPIRATORY (INHALATION) at 09:06

## 2017-06-21 RX ADMIN — INSULIN ASPART 4 UNITS: 100 INJECTION, SOLUTION INTRAVENOUS; SUBCUTANEOUS at 03:06

## 2017-06-21 RX ADMIN — CHLORHEXIDINE GLUCONATE 15 ML: 1.2 RINSE ORAL at 05:06

## 2017-06-21 RX ADMIN — ALBUMIN (HUMAN) 200 G: 12.5 SOLUTION INTRAVENOUS at 02:06

## 2017-06-21 RX ADMIN — IOHEXOL 75 ML: 350 INJECTION, SOLUTION INTRAVENOUS at 05:06

## 2017-06-21 RX ADMIN — INSULIN ASPART 6 UNITS: 100 INJECTION, SOLUTION INTRAVENOUS; SUBCUTANEOUS at 11:06

## 2017-06-21 RX ADMIN — BISACODYL 10 MG: 10 SUPPOSITORY RECTAL at 09:06

## 2017-06-21 RX ADMIN — SERTRALINE HYDROCHLORIDE 50 MG: 50 TABLET ORAL at 09:06

## 2017-06-21 RX ADMIN — HYDRALAZINE HYDROCHLORIDE 10 MG: 20 INJECTION INTRAMUSCULAR; INTRAVENOUS at 09:06

## 2017-06-21 RX ADMIN — HEPARIN SODIUM 5000 UNITS: 5000 INJECTION, SOLUTION INTRAVENOUS; SUBCUTANEOUS at 05:06

## 2017-06-21 RX ADMIN — HEPARIN SODIUM 2500 UNITS: 1000 INJECTION, SOLUTION INTRAVENOUS; SUBCUTANEOUS at 03:06

## 2017-06-21 RX ADMIN — ALPRAZOLAM 1 MG: 0.5 TABLET ORAL at 05:06

## 2017-06-21 RX ADMIN — POTASSIUM & SODIUM PHOSPHATES POWDER PACK 280-160-250 MG 2 PACKET: 280-160-250 PACK at 03:06

## 2017-06-21 RX ADMIN — INSULIN ASPART 8 UNITS: 100 INJECTION, SOLUTION INTRAVENOUS; SUBCUTANEOUS at 07:06

## 2017-06-21 RX ADMIN — CEFEPIME HYDROCHLORIDE 1 G: 1 INJECTION, SOLUTION INTRAVENOUS at 05:06

## 2017-06-21 RX ADMIN — POLYETHYLENE GLYCOL 3350 17 G: 17 POWDER, FOR SOLUTION ORAL at 09:06

## 2017-06-21 RX ADMIN — FAMOTIDINE 20 MG: 20 TABLET, FILM COATED ORAL at 09:06

## 2017-06-21 RX ADMIN — HEPARIN SODIUM 5000 UNITS: 5000 INJECTION, SOLUTION INTRAVENOUS; SUBCUTANEOUS at 01:06

## 2017-06-21 RX ADMIN — INSULIN ASPART 6 UNITS: 100 INJECTION, SOLUTION INTRAVENOUS; SUBCUTANEOUS at 03:06

## 2017-06-21 RX ADMIN — POTASSIUM & SODIUM PHOSPHATES POWDER PACK 280-160-250 MG 2 PACKET: 280-160-250 PACK at 11:06

## 2017-06-21 NOTE — ASSESSMENT & PLAN NOTE
72 male with acute ascending paralysis, newly found sphenoid mass, and B/L ICA aneursyms    Pt with poor neurologic status, w/o sedation  - Intracranial lesion unlikely cause of obtundation.  Bilateral ICA aneurysms incidental findings.  - Continue current management / work up per Ncc and neurology  - Fu paraneoplastic panel.  - PLEX, solumedrol per NCC/neurology  - Fu cultures of LP  - EMG today

## 2017-06-21 NOTE — HPI
72 y man who presents with rapidly progressive ascending paralysis. Found GBS. Past CAD, skin cancer, hypothyroidism. Incidental brain aneurysms.

## 2017-06-21 NOTE — HOSPITAL COURSE
"6/7-6/12 IVIG  6/15 transfer to [facility]; MRI c/t/l spine: without cord enhancement. MRI brain with "3.6 cm exophytic lesion extending from the right inferior frontal lobe, unchanged from prior examination. Potentially a large exophytic hamartoma, however location is somewhat atypical as it is off midline and not in direct continuity with the tumor cinereum. Low-grade glioma not excluded". 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   6/16 NSGY consulted for lesion - no acute intervention - follow up in clinic for re-imaging.   6/17-6/25 PLEX qOD x5 rounds  6/18 repeat LP with albuminocytologic dissociation (1WBC; protein 114) consistent with GBS  6/22 CTA head done for better visualization of cerebral vasculature in setting of aneursyms identified on prior imaging - no acute intervention - to be followed in clinic.   6/23 EMG diffuse sensory-motor peripheral neuropathy; concomittant myopathy that could be contributing to symptoms not assessed secondary to poor patient participation. Patient to follow up with Dr. Byrne in Neuromuscular clinic in 4-6 weeks from discharge.   6/23 right subclavian thrombosis noted on upper extremity ultrasound and started on heparin drip  6/28 runs of VT  6/29 trach/PEG  6/30 Dermatology consulted for erythematous pustules involving the abdomen, pelvis and lower extremities; punch biopsy and culture done - dx of suppurative folliculitis; culture grew pseudomonas; was treated with 7 days of zosyn.   7/6 hematochezia; anticoagulation discontinued.   7/10-7/14 IVIG   7/14 Transfer to [facility]  "

## 2017-06-21 NOTE — NURSING
Pt transported back to room from CT w/ RN & RT. VSS. No acute events in CT. Pt placed back on bedside monitor. TF restarted. Will continue to monitor.

## 2017-06-21 NOTE — PROCEDURES
[facility]  Pathology  Procedure Note    SUMMARY   Therapeutic Plasma Exchange (Apheresis)  Date/Time: 6/22/2017 10:55 AM  Performed by: KORTNEY GONZALEZ  Authorized by: GWEN GILL       Date of Procedure: 6/21/2017     Procedure: Plasma Exchange    Fellow: Samy Baltazar MD     Assisting Provider: None    Pre-Procedure Diagnosis: Guillain-Waiteville syndrome    Post-Procedure Diagnosis: Guillain-Waiteville syndrome    Follow-up Assessment: 72 year old male with Guillian [place] diagnosis status post IVIG treatment presents for therapeutic plasma exchange. Symptoms have progressed from peripheral paresthesias, to weakness, to a rapidly ascending paralysis now requiring intubation.     Guillain [place] status post IVIG carries a Category III Grade 2C indication for therapeutic plasma exchange via the 2016 Journal of Clinical Apheresis Guidelines (San J et al. Journal of Clinical Apheresis 2016; 31:149-162.) Five TPE are planned, scheduled every other day per the aforementioned guidelines.    Interval History:   Patient remains intubated and sedated. Overall, today's procedure (#3 of 5). The next scheduled procedure will be 6/23/17.    Pertinent Laboratory Data:   Complete Blood Count:   Lab Results   Component Value Date    HGB 9.2 (L) 06/21/2017    HCT 27.3 (L) 06/21/2017     06/21/2017    WBC 13.51 (H) 06/21/2017     Comprehensive Metabolic Panel:   Lab Results   Component Value Date     06/21/2017    K 4.8 06/21/2017     06/21/2017    CO2 25 06/21/2017     (H) 06/21/2017    BUN 31 (H) 06/21/2017    CREATININE 0.7 06/21/2017    CALCIUM 7.6 (L) 06/21/2017    PROT 4.7 (L) 06/21/2017    ALBUMIN 3.1 (L) 06/21/2017    BILITOT 0.4 06/21/2017    ALKPHOS 34 (L) 06/21/2017    AST 32 06/21/2017    ALT 51 (H) 06/21/2017    ANIONGAP 5 (L) 06/21/2017    EGFRNONAA >60.0 06/21/2017       Pertinent Medications: None contraindicated.     Review of patient's allergies indicates:  No  Known Allergies    Anesthesia: None     Technical Procedures Used: Plasma Exchange: Volume exchanged - 4.0 Liters; Replacement fluid - Albumin; Number of procedures 3; Date of next procedure 6/23/17.    Description of the Findings of the Procedure:     Please see Apheresis Nurse flowsheet for details.    The patient was evaluated and all clinical and laboratory data relevant to the treatment was reviewed, and a decision was made to proceed with the Apheresis procedure.    I was available to the clinical staff throughout the procedure.    Significant Surgical Tasks Conducted by the Assistant(s): Not applicable    Complications: None  Estimated Blood Loss (EBL): None  Implants: None   Specimens: None    ATTENDING MD ATTESTATION:  The apheresis procedure was performed under my supervision. I was available throughout the procedure. The note has been edited, where and when necessary, to reflect my agreement.  I reviewed all clinical and laboratory data and reviewed the note written by Dr. Baltazar and agree with the findings, assessment and plan.   The patient tolerated the procedure without complications. The next scheduled procedure will be 6/23/17.     Marcela Pradhan MD, PhD  Section of Transfusion Medicine & Histocompatibility  Department of Pathology and Laboratory Medicine  [facility]  [phone] (HLA & Blood Bank Offices)

## 2017-06-21 NOTE — PROGRESS NOTES
Plasmapheresis tx #3 started at 1400 without difficulty.  Pt sedated and intubated, unable to assess pain and orientation. 4 Liter exchange.  Replacement fluids 5% albumin via L SC CVC. 2 grams calcium gluconate infusing IVPB throughout procedure.  Tx ended at 1459.  Cath flushed and locked.  Pt tolerated tx well.  Next tx 06/23/17. Wife at bedside during treatment.

## 2017-06-21 NOTE — PROGRESS NOTES
MD Hancock notified of pt's accuchecks remaining >300 for entirety of shift despite insulin administration. MD to place order for levemir daily. Will continue to monitor.

## 2017-06-21 NOTE — PROGRESS NOTES
[facility]  Neurosurgery  Progress Note    Subjective:     History of Present Illness: 72 year old male with PMHx CAD, skin cancer, hypothyroidism, and HLP who was transferred from OSH for further evaluation and management of rapidly progressive ascending paralysis.  No family present during exam, history obtained primarily from chart.  Patient was on a camping trip to [place] and [place] recently. When he returned he started to have paraesthesias of the feet and hand that quickly progressed to legs weakness. No clear hx of tick bites or skin lesions.  Additionally, a few days prior to admission to OSH he had prostate biopsy and was treated with cipro.  Pt presented to OSH and was diagnosed with GBS and was started on IVIG, he completed 5 days of treatment which ended on 6/12/17.  Pt also being treated for pneumonia.  After admission his weakness got worse to the point that he had to be intubated.  Pt has had poor neurologic exam since.   - LP was done in OSH and was reported as 0 rbc, 0 wbc, protein 34, glucose 97.  Lyme ab panel was sent and results are pending.  MRI Brain done today shows non enhancing lesion abutting the right optic nerve.  Neurosurgery consulted for evaluation.             Post-Op Info:  * No surgery found *         Interval History: no AE. AFVSS.     Medications:  Continuous Infusions:   sodium chloride 0.9% 75 mL/hr at 06/21/17 1300     Scheduled Meds:   acetylcysteine 200 mg/ml (20%)  600 mg Per NG tube BID    albumin human 5%  200 g Intravenous Once    alprazolam  1 mg Per NG tube Q8H    aspirin  325 mg Per NG tube Daily    bisacodyl  10 mg Rectal Daily    calcium gluconate IVPB  2,000 mg Intravenous Once    chlorhexidine  15 mL Mouth/Throat QID    famotidine  20 mg Per OG tube BID    folic acid-vit B6-vit B12 2.5-25-2 mg  1 tablet Per NG tube Daily    heparin (porcine)  2,500 Units Intravenous Once    heparin (porcine)  5,000 Units Subcutaneous Q8H     insulin detemir  12 Units Subcutaneous Daily    levothyroxine  75 mcg Per NG tube Daily    methylPREDNISolone (SOLU-Medrol) IVPB (doses > 250 mg)   Intravenous Daily    metoprolol tartrate  12.5 mg Per OG tube BID    polyethylene glycol  17 g Per NG tube Daily    senna-docusate 8.6-50 mg  1 tablet Per OG tube Daily    sertraline  50 mg Per NG tube Daily    thiamine  100 mg Per NG tube Daily     PRN Meds:sodium chloride, acetaminophen, dextrose 50%, glucagon (human recombinant), heparin, porcine (PF), hydrALAZINE, insulin aspart, magnesium oxide, magnesium oxide, ondansetron, potassium chloride 10%, potassium chloride 10%, potassium chloride 10%, potassium, sodium phosphates, potassium, sodium phosphates, potassium, sodium phosphates     Review of Systems  Objective:     Weight: 86.6 kg (190 lb 14.7 oz)  Body mass index is 25.19 kg/m².  Vital Signs (Most Recent):  Temp: 98.5 °F (36.9 °C) (17 1200)  Pulse: 86 (17 1308)  Resp: 18 (17 1308)  BP: (!) 153/74 (17 1300)  SpO2: 97 % (17 1308) Vital Signs (24h Range):  Temp:  [98.5 °F (36.9 °C)-99.4 °F (37.4 °C)] 98.5 °F (36.9 °C)  Pulse:  [] 86  Resp:  [18] 18  SpO2:  [96 %-99 %] 97 %  BP: (111-161)/(59-83) 153/74              Temp (24hrs), Av.1 °F (37.3 °C), Min:98.5 °F (36.9 °C), Max:99.4 °F (37.4 °C)    Vent Mode: A/C  Oxygen Concentration (%):  [40] 40  Resp Rate Total:  [18 br/min] 18 br/min  Vt Set:  [550 mL] 550 mL  PEEP/CPAP:  [8 cmH20] 8 cmH20  Pressure Support:  [15 cmH20] 15 cmH20  Mean Airway Pressure:  [12 cmH20] 12 cmH20      Date 17 0700 - 17 0659   Shift 4898-5563 1038-4173 8254-7846 24 Hour Total   I  N  T  A  K  E   I.V.  (mL/kg) 518.8  (6)   518.8  (6)    NG/   530    IV Piggyback 250   250    Shift Total  (mL/kg) 1298.8  (15)   1298.8  (15)   O  U  T  P  U  T   Urine  (mL/kg/hr) 625   625    Shift Total  (mL/kg) 625  (7.2)   625  (7.2)   Weight (kg) 86.6 86.6 86.6 86.6          NG/OG  "Tube 06/09/17 0130 (Active)   Placement Check placement verified by distal tube length measurement;placement verified by aspirate characteristics 6/21/2017 11:05 AM   Distal Tube Length (cm) 65 6/21/2017  7:05 AM   Tolerance no signs/symptoms of discomfort 6/21/2017 11:05 AM   Securement taped to commercial device 6/21/2017 11:05 AM   Clamp Status/Tolerance unclamped;no abdominal discomfort;no abdominal distention;no emesis;no nausea;no restlessness 6/21/2017 11:05 AM   Suction Setting/Drainage Method dependent drainage 6/17/2017  3:01 AM   Insertion Site Appearance no redness, warmth, tenderness, skin breakdown, drainage 6/21/2017 11:05 AM   Drainage None 6/21/2017 11:05 AM   Flush/Irrigation flushed w/;water;no resistance met 6/21/2017 11:05 AM   Feeding Method continuous 6/21/2017 11:05 AM   Current Rate (mL/hr) 50 mL/hr 6/21/2017  7:05 AM   Goal Rate (mL/hr) 50 mL/hr 6/21/2017  7:05 AM   Intake (mL) 120 mL 6/21/2017  9:00 AM   Rate Formula Tube Feeding (mL/hr) 50 mL/hr 6/20/2017  7:05 PM   Intake (mL) - Formula Tube Feeding 50 6/21/2017  1:00 PM   Residual Amount (ml) 120 ml 6/21/2017  7:05 AM            Urethral Catheter 06/20/17 2220 (Active)   Site Assessment Clean;Intact 6/21/2017 11:05 AM   Collection Container Urimeter 6/21/2017 11:05 AM   Securement Method secured to top of thigh w/ adhesive device 6/21/2017 11:05 AM   Catheter Care Performed yes 6/21/2017 11:05 AM   Reason for Continuing Urinary Catheterization Urinary retention 6/21/2017 11:05 AM   CAUTI Prevention Bundle StatLock in place w 1" slack;Intact seal between catheter & drainage tubing;Drainage bag off the floor;Green sheeting clip in use;No dependent loops or kinks;Drainage bag not overfilled (<2/3 full);Drainage bag below bladder 6/21/2017  7:05 AM   Output (mL) 100 mL 6/21/2017  1:00 PM            Trialysis (Dialysis) Catheter 06/17/17 1300 left subclavian (Active)   IV Device Securement catheter securement device 6/21/2017 11:05 AM "   Additional Site Signs no erythema;no warmth;no edema;no pain;no palpable cord;no streak formation;no drainage 6/21/2017 11:05 AM   Patency/Care heparin locked 6/21/2017 11:05 AM   Site Assessment Clean;Dry;Intact;No redness;No swelling 6/21/2017 11:05 AM   Status Deaccessed 6/21/2017 11:05 AM   Flows Good 6/21/2017  3:05 AM   Dressing Intervention Dressing reinforced 6/21/2017  3:05 AM   Dressing Status Biopatch in place;Clean;Intact;Dry 6/21/2017 11:05 AM   Dressing Change Due 06/26/17 6/21/2017 11:05 AM   Verification by X-ray Yes 6/20/2017  7:05 PM   Site Condition No complications 6/21/2017 11:05 AM   Dressing Other (Comment) 6/21/2017  3:05 AM   Daily Line Review Performed 6/21/2017 11:05 AM       Neurosurgery Physical Exam  E1VTM1  PERRL, no corneals, cough/gag  No motor response    Significant Labs:    Recent Labs  Lab 06/21/17  0200   *      K 4.8      CO2 25   BUN 31*   CREATININE 0.7   CALCIUM 7.6*   MG 2.4       Recent Labs  Lab 06/20/17  0215 06/21/17  0200   WBC 14.11* 13.51*   HGB 10.0* 9.2*   HCT 31.0* 27.3*    223     No results for input(s): LABPT, INR, APTT in the last 48 hours.  Microbiology Results (last 7 days)     Procedure Component Value Units Date/Time    CSF culture [[id number]] Collected:  06/18/17 1212    Order Status:  Completed Specimen:  CSF (Spinal Fluid) from CSF Tap, Tube 3 Updated:  06/21/17 0727     CSF CULTURE No Growth to date     Gram Stain Result No WBC's      No organisms seen    Narrative:       On which sequentially labeled tube should this analysis be  performed?->3    Laurita Ink (CSF) [[id number]] Collected:  06/18/17 1212    Order Status:  Completed Specimen:  CSF (Spinal Fluid) from CSF Tap, Tube 3 Updated:  06/19/17 1417     Laurita Ink No encapsulated yeast seen    Cryptococcal antigen, CSF [[id number]] Collected:  06/18/17 1212    Order Status:  Canceled Specimen:  CSF (Spinal Fluid) from CSF Tap, Tube 3 Updated:  06/19/17 1243    Blood culture  [[id number]] Collected:  06/14/17 0336    Order Status:  Completed Specimen:  Blood from Peripheral, Wrist, Left Updated:  06/19/17 0812     Blood Culture, Routine No growth after 5 days.    Culture, Respiratory with Gram Stain [[id number]]  (Susceptibility) Collected:  06/14/17 0500    Order Status:  Completed Specimen:  Respiratory from Endotracheal Aspirate Updated:  06/16/17 1030     Respiratory Culture --     PSEUDOMONAS AERUGINOSA  Few       Gram Stain (Respiratory) <10 epithelial cells per low power field.     Gram Stain (Respiratory) Moderate WBC's     Gram Stain (Respiratory) Few Gram negative rods        ABGs:   Recent Labs  Lab 06/21/17  0335   PH 7.374   PCO2 49.3*   PO2 113*   HCO3 28.8*   POCSATURATED 98   BE 4     Cardiac markers: No results for input(s): CKMB, CPKMB, TROPONINT, TROPONINI, MYOGLOBIN in the last 48 hours.  CMP:   Recent Labs  Lab 06/21/17  0200   *   CALCIUM 7.6*   ALBUMIN 3.1*   PROT 4.7*      K 4.8   CO2 25      BUN 31*   CREATININE 0.7   ALKPHOS 34*   ALT 51*   AST 32   BILITOT 0.4     CRP: No results for input(s): CRP in the last 48 hours.  ESR: No results for input(s): POCESR, ERYTHROCYTES in the last 48 hours.  LFTs:   Recent Labs  Lab 06/21/17  0200   ALT 51*   AST 32   ALKPHOS 34*   BILITOT 0.4   PROT 4.7*   ALBUMIN 3.1*     Procalcitonin: No results for input(s): PROCAL in the last 48 hours.    Significant Diagnostics:      Assessment/Plan:     Flaccid quadriplegia    72 male with acute ascending paralysis, newly found sphenoid mass, and B/L ICA aneursyms    Pt with poor neurologic status, w/o sedation  - Intracranial lesion unlikely cause of obtundation.  Bilateral ICA aneurysms incidental findings.  - Continue current management / work up per Ncc and neurology  - Fu paraneoplastic panel.  - PLEX, solumedrol per NCC/neurology  - Fu cultures of LP  - EMG today            Narciso Parks MD  Neurosurgery  [facility]

## 2017-06-21 NOTE — SUBJECTIVE & OBJECTIVE
Interval History:  >4 elements OR status of 3 inpatient conditions  Initial LP with protein of 34 and most recent LP.  Pending CTA of the head or EEG.. Got IVIG x 45 and plasma exchanges x 2,. No clinical improvement.  Review of Systems  2 systems OR Unable to obtain a complete ROS due to level of consciousness.  Objective:     Vitals:  Temp: 99.2 °F (37.3 °C) (06/20/17 1905)  Pulse: 88 (06/20/17 2205)  Resp: 18 (06/20/17 2205)  BP: (!) 158/83 (06/20/17 2205)  SpO2: 97 % (06/20/17 2205)    Temp:  [98.9 °F (37.2 °C)-99.8 °F (37.7 °C)] 99.2 °F (37.3 °C)  Pulse:  [] 88  Resp:  [18] 18  SpO2:  [97 %-99 %] 97 %  BP: ()/(54-83) 158/83         Vent Mode: A/C  Oxygen Concentration (%):  [40] 40  Resp Rate Total:  [18 br/min] 18 br/min  Vt Set:  [550 mL] 550 mL  PEEP/CPAP:  [8 cmH20] 8 cmH20  Pressure Support:  [15 cmH20] 15 cmH20  Mean Airway Pressure:  [12 spL50-45 cmH20] 12 cmH20    06/19 0701 - 06/20 0700  In: 8600.3 [I.V.:1731.3]  Out: 5580 [Urine:1455]    Physical Exam   Unable to test orientation, language, memory, judgment, insight, fund of knowledge, hearing, shoulder shrug, tongue protrusion, coordination, gait due to level of consciousness.  General   HEENT: ETT  Chest Heart RRR / Lungs Clear to auscultation  Adbomen: Soft nontender + BS  Extremities: OK distal pulses.  Skin: UK  Neurological Exam:  MS; Paralyzed..  CN: II-XII Pupils 2mm sluggish, trace corneal's. Facial paralysis and ophthalmoplegia bilaterally,  Motor: LUE   0/5 / RUE 0 /5               LLE   0/5 /  RLE 0 /5   Sensory: LT/PP/T/ Vibration Limited                 Complex sensory modalities: not tested  DTR:  normal throughout.  Coordination /Fine motor: Limited.  Gait: Not tested.  Meningeal signs: Absent  Medications:  Continuous  sodium chloride 0.9% Last Rate: 75 mL/hr at 06/20/17 2205   Scheduled  acyclovir 10 mg/kg Q8H   alprazolam 1 mg Q8H   aspirin 325 mg Daily   bisacodyl 10 mg Daily   ceFEPime (MAXIPIME) IVPB 1 g Q12H    chlorhexidine 15 mL QID   famotidine 20 mg BID   folic acid-vit B6-vit B12 2.5-25-2 mg 1 tablet Daily   heparin (porcine) 5,000 Units Q8H   levothyroxine 75 mcg Daily   methylPREDNISolone (SOLU-Medrol) IVPB (doses > 250 mg)  Daily   metoprolol tartrate 12.5 mg BID   polyethylene glycol 17 g Daily   senna-docusate 8.6-50 mg 1 tablet Daily   sertraline 50 mg Daily   thiamine 100 mg Daily   PRN  sodium chloride  Q24H PRN   acetaminophen 650 mg Q6H PRN   dextrose 50% 12.5 g PRN   glucagon (human recombinant) 1 mg PRN   heparin, porcine (PF) 1,000 Units PRN   hydrALAZINE 10 mg Q6H PRN   insulin aspart 1-10 Units Q4H PRN   magnesium oxide 800 mg PRN   magnesium oxide 800 mg PRN   ondansetron 4 mg Q8H PRN   potassium chloride 10% 40 mEq PRN   potassium chloride 10% 40 mEq PRN   potassium chloride 10% 60 mEq PRN   potassium, sodium phosphates 2 packet PRN   potassium, sodium phosphates 2 packet PRN   potassium, sodium phosphates 2 packet PRN     Today I personally reviewed pertinent medications, lines/drains/airways, imaging, cardiology, lab results, microbiology results, notably:

## 2017-06-21 NOTE — SUBJECTIVE & OBJECTIVE
Interval History: no AE. AFVSS.     Medications:  Continuous Infusions:   sodium chloride 0.9% 75 mL/hr at 17 1300     Scheduled Meds:   acetylcysteine 200 mg/ml (20%)  600 mg Per NG tube BID    albumin human 5%  200 g Intravenous Once    alprazolam  1 mg Per NG tube Q8H    aspirin  325 mg Per NG tube Daily    bisacodyl  10 mg Rectal Daily    calcium gluconate IVPB  2,000 mg Intravenous Once    chlorhexidine  15 mL Mouth/Throat QID    famotidine  20 mg Per OG tube BID    folic acid-vit B6-vit B12 2.5-25-2 mg  1 tablet Per NG tube Daily    heparin (porcine)  2,500 Units Intravenous Once    heparin (porcine)  5,000 Units Subcutaneous Q8H    insulin detemir  12 Units Subcutaneous Daily    levothyroxine  75 mcg Per NG tube Daily    methylPREDNISolone (SOLU-Medrol) IVPB (doses > 250 mg)   Intravenous Daily    metoprolol tartrate  12.5 mg Per OG tube BID    polyethylene glycol  17 g Per NG tube Daily    senna-docusate 8.6-50 mg  1 tablet Per OG tube Daily    sertraline  50 mg Per NG tube Daily    thiamine  100 mg Per NG tube Daily     PRN Meds:sodium chloride, acetaminophen, dextrose 50%, glucagon (human recombinant), heparin, porcine (PF), hydrALAZINE, insulin aspart, magnesium oxide, magnesium oxide, ondansetron, potassium chloride 10%, potassium chloride 10%, potassium chloride 10%, potassium, sodium phosphates, potassium, sodium phosphates, potassium, sodium phosphates     Review of Systems  Objective:     Weight: 86.6 kg (190 lb 14.7 oz)  Body mass index is 25.19 kg/m².  Vital Signs (Most Recent):  Temp: 98.5 °F (36.9 °C) (17 1200)  Pulse: 86 (17 1308)  Resp: 18 (17 1308)  BP: (!) 153/74 (17 1300)  SpO2: 97 % (17 1308) Vital Signs (24h Range):  Temp:  [98.5 °F (36.9 °C)-99.4 °F (37.4 °C)] 98.5 °F (36.9 °C)  Pulse:  [] 86  Resp:  [18] 18  SpO2:  [96 %-99 %] 97 %  BP: (111-161)/(59-83) 153/74              Temp (24hrs), Av.1 °F (37.3 °C), Min:98.5 °F  (36.9 °C), Max:99.4 °F (37.4 °C)    Vent Mode: A/C  Oxygen Concentration (%):  [40] 40  Resp Rate Total:  [18 br/min] 18 br/min  Vt Set:  [550 mL] 550 mL  PEEP/CPAP:  [8 cmH20] 8 cmH20  Pressure Support:  [15 cmH20] 15 cmH20  Mean Airway Pressure:  [12 cmH20] 12 cmH20      Date 06/21/17 0700 - 06/22/17 0659   Shift 8322-3073 1691-8953 7803-9043 24 Hour Total   I  N  T  A  K  E   I.V.  (mL/kg) 518.8  (6)   518.8  (6)    NG/   530    IV Piggyback 250   250    Shift Total  (mL/kg) 1298.8  (15)   1298.8  (15)   O  U  T  P  U  T   Urine  (mL/kg/hr) 625   625    Shift Total  (mL/kg) 625  (7.2)   625  (7.2)   Weight (kg) 86.6 86.6 86.6 86.6          NG/OG Tube 06/09/17 0130 (Active)   Placement Check placement verified by distal tube length measurement;placement verified by aspirate characteristics 6/21/2017 11:05 AM   Distal Tube Length (cm) 65 6/21/2017  7:05 AM   Tolerance no signs/symptoms of discomfort 6/21/2017 11:05 AM   Securement taped to commercial device 6/21/2017 11:05 AM   Clamp Status/Tolerance unclamped;no abdominal discomfort;no abdominal distention;no emesis;no nausea;no restlessness 6/21/2017 11:05 AM   Suction Setting/Drainage Method dependent drainage 6/17/2017  3:01 AM   Insertion Site Appearance no redness, warmth, tenderness, skin breakdown, drainage 6/21/2017 11:05 AM   Drainage None 6/21/2017 11:05 AM   Flush/Irrigation flushed w/;water;no resistance met 6/21/2017 11:05 AM   Feeding Method continuous 6/21/2017 11:05 AM   Current Rate (mL/hr) 50 mL/hr 6/21/2017  7:05 AM   Goal Rate (mL/hr) 50 mL/hr 6/21/2017  7:05 AM   Intake (mL) 120 mL 6/21/2017  9:00 AM   Rate Formula Tube Feeding (mL/hr) 50 mL/hr 6/20/2017  7:05 PM   Intake (mL) - Formula Tube Feeding 50 6/21/2017  1:00 PM   Residual Amount (ml) 120 ml 6/21/2017  7:05 AM            Urethral Catheter 06/20/17 2220 (Active)   Site Assessment Clean;Intact 6/21/2017 11:05 AM   Collection Container Urimeter 6/21/2017 11:05 AM   Securement  "Method secured to top of thigh w/ adhesive device 6/21/2017 11:05 AM   Catheter Care Performed yes 6/21/2017 11:05 AM   Reason for Continuing Urinary Catheterization Urinary retention 6/21/2017 11:05 AM   CAUTI Prevention Bundle StatLock in place w 1" slack;Intact seal between catheter & drainage tubing;Drainage bag off the floor;Green sheeting clip in use;No dependent loops or kinks;Drainage bag not overfilled (<2/3 full);Drainage bag below bladder 6/21/2017  7:05 AM   Output (mL) 100 mL 6/21/2017  1:00 PM            Trialysis (Dialysis) Catheter 06/17/17 1300 left subclavian (Active)   IV Device Securement catheter securement device 6/21/2017 11:05 AM   Additional Site Signs no erythema;no warmth;no edema;no pain;no palpable cord;no streak formation;no drainage 6/21/2017 11:05 AM   Patency/Care heparin locked 6/21/2017 11:05 AM   Site Assessment Clean;Dry;Intact;No redness;No swelling 6/21/2017 11:05 AM   Status Deaccessed 6/21/2017 11:05 AM   Flows Good 6/21/2017  3:05 AM   Dressing Intervention Dressing reinforced 6/21/2017  3:05 AM   Dressing Status Biopatch in place;Clean;Intact;Dry 6/21/2017 11:05 AM   Dressing Change Due 06/26/17 6/21/2017 11:05 AM   Verification by X-ray Yes 6/20/2017  7:05 PM   Site Condition No complications 6/21/2017 11:05 AM   Dressing Other (Comment) 6/21/2017  3:05 AM   Daily Line Review Performed 6/21/2017 11:05 AM       Neurosurgery Physical Exam  E1VTM1  PERRL, no corneals, cough/gag  No motor response    Significant Labs:    Recent Labs  Lab 06/21/17  0200   *      K 4.8      CO2 25   BUN 31*   CREATININE 0.7   CALCIUM 7.6*   MG 2.4       Recent Labs  Lab 06/20/17  0215 06/21/17  0200   WBC 14.11* 13.51*   HGB 10.0* 9.2*   HCT 31.0* 27.3*    223     No results for input(s): LABPT, INR, APTT in the last 48 hours.  Microbiology Results (last 7 days)     Procedure Component Value Units Date/Time    CSF culture [[id number]] Collected:  06/18/17 1212    Order " Status:  Completed Specimen:  CSF (Spinal Fluid) from CSF Tap, Tube 3 Updated:  06/21/17 0727     CSF CULTURE No Growth to date     Gram Stain Result No WBC's      No organisms seen    Narrative:       On which sequentially labeled tube should this analysis be  performed?->3    Laurita Ink (CSF) [[id number]] Collected:  06/18/17 1212    Order Status:  Completed Specimen:  CSF (Spinal Fluid) from CSF Tap, Tube 3 Updated:  06/19/17 1417     Laurita Ink No encapsulated yeast seen    Cryptococcal antigen, CSF [[id number]] Collected:  06/18/17 1212    Order Status:  Canceled Specimen:  CSF (Spinal Fluid) from CSF Tap, Tube 3 Updated:  06/19/17 1243    Blood culture [[id number]] Collected:  06/14/17 0336    Order Status:  Completed Specimen:  Blood from Peripheral, Wrist, Left Updated:  06/19/17 0812     Blood Culture, Routine No growth after 5 days.    Culture, Respiratory with Gram Stain [[id number]]  (Susceptibility) Collected:  06/14/17 0500    Order Status:  Completed Specimen:  Respiratory from Endotracheal Aspirate Updated:  06/16/17 1030     Respiratory Culture --     PSEUDOMONAS AERUGINOSA  Few       Gram Stain (Respiratory) <10 epithelial cells per low power field.     Gram Stain (Respiratory) Moderate WBC's     Gram Stain (Respiratory) Few Gram negative rods        ABGs:   Recent Labs  Lab 06/21/17  0335   PH 7.374   PCO2 49.3*   PO2 113*   HCO3 28.8*   POCSATURATED 98   BE 4     Cardiac markers: No results for input(s): CKMB, CPKMB, TROPONINT, TROPONINI, MYOGLOBIN in the last 48 hours.  CMP:   Recent Labs  Lab 06/21/17  0200   *   CALCIUM 7.6*   ALBUMIN 3.1*   PROT 4.7*      K 4.8   CO2 25      BUN 31*   CREATININE 0.7   ALKPHOS 34*   ALT 51*   AST 32   BILITOT 0.4     CRP: No results for input(s): CRP in the last 48 hours.  ESR: No results for input(s): POCESR, ERYTHROCYTES in the last 48 hours.  LFTs:   Recent Labs  Lab 06/21/17  0200   ALT 51*   AST 32   ALKPHOS 34*   BILITOT 0.4   PROT 4.7*    ALBUMIN 3.1*     Procalcitonin: No results for input(s): PROCAL in the last 48 hours.    Significant Diagnostics:

## 2017-06-21 NOTE — PLAN OF CARE
Problem: Patient Care Overview  Goal: Plan of Care Review  Outcome: Ongoing (interventions implemented as appropriate)  POC reviewed with pt and family at 1400. Pt did not verbalize understanding due to ETT/communication impairments. Questions and concerns addressed with richardson. Pt's family verbalized understanding. Plasmaphoresis performed during shift. EEG obtained. Awaiting call back from CT for CTA of Head. 24 hr urine will be complete at 1600.  Pt progressing toward goals. Will continue to monitor. See flowsheets for full assessment and VS info.

## 2017-06-21 NOTE — PROGRESS NOTES
MD Hancock notified of pt improving by following commands/answering questions with eye movements from left to right. Remains with no movement in extremities and no reflexes. Will continue to monitor.

## 2017-06-21 NOTE — PROGRESS NOTES
Pt bladder scanned as ordered at 22:00. Bladder scan showed >1177mL in bladder. Karissa notified, ordered to place mendez catheter in pt.

## 2017-06-21 NOTE — PROCEDURES
DATE OF PROCEDURE:  06/21/2017    EEG #:  [id number].    REFERRING PHYSICIAN:  Pepe Salgado M.D.    LOCATION OF SERVICE:  [facility].    ICU EEG/VIDEO MONITORING REPORT     METHODOLOGY:  Electroencephalographic (EEG) is recorded with electrodes placed   according to the International 10-20 placement system.  Thirty two (32) channels   of digital signal (sampling rate of 512/sec), including T1 and T2, were   simultaneously recorded from the scalp and may include EKG, EMG, and/or eye   monitors.  Recording band pass was 0.1 to 512 Hz.  Digital video recording of   the patient is simultaneously recorded with the EEG.  The patient is instructed   to report clinical symptoms which may occur during the recording session.  EEG   and video recording are stored and archived in digital format.  Activation   procedures, which include photic stimulation, hyperventilation and instructing   patients to perform simple tasks, are done in selected patients  The EEG is displayed on a monitor screen and can be reviewed using different   montages.  Computer-assisted analysis is employed to detect spike and   electrographic seizure activity.   The entire record is submitted for computer   analysis.  The entire recording is visually reviewed, and the times identified   by computer analysis as being spikes or seizures are reviewed again.    Compressed spectral analysis (CSA) is also performed on the activity recorded   from each individual channel.  This is displayed as a power display of   frequencies from 0 to 30 Hz over time.   The CSA is reviewed looking for   asymmetries in power between homologous areas of the scalp, then compared with   the original EEG recording.    Itandi software was also utilized in the review of this study.  This software   suite analyzes the EEG recording in multiple domains.  Coherence and rhythmicity   are computed to identify EEG sections which may contain organized seizures.    Each channel undergoes analysis  to detect the presence of spike and sharp waves   which have special and morphological characteristics of epileptic activity.  The   routine EEG recording is converted from special into frequency domain.  This is   then displayed comparing homologous areas to identify areas of significant   asymmetry.  Algorithm to identify non-cortically generated artifact is used to   separate artifact from the EEG.      RECORDING TIMES:  Start on 06/21/2017 at 10:15.  End on 06/21/2017 at 15:29.    A total of 5 hours and 13 minutes of EEG monitoring was obtained.    EEG FINDINGS:  Recording was obtained at the patient's bedside in the ICU.  The   patient was unresponsive, intubated and on a respirator.  EEG showed a   background rhythm, which was symmetrical over both hemispheres and consisted of   a mixture of 8 to 10 Hz alpha with intermixed low voltage mid-range beta.  There   is also intermittent 1 to 2 Hz delta seen at times along with some midrange   theta frequencies.  Overall, the background was symmetrical over the two   hemispheres.  There is no significant fluctuation during the monitoring.    Activation procedures were not carried out.  His eyes were passively opened on   two occasions and this did not change the background rhythms.  No lateralized or   focal changes were noted and no spike or sharp wave activity was seen.    IMPRESSION:  Markedly abnormal EEG with background consisting of mixture of   frequencies most prominently  irregular alpha and beta activity indicative of an   encephalopathic pattern and the background is not reactive to eye opening.  The   pattern is somewhat nonspecific, but is most likely related to medication or   toxic etiology.  No epileptic activity was seen.    CLINICAL CORRELATION:  The patient is a 72-year-old male who was unresponsive to   pain and other stimuli.  The recording was requested to rule out nonconvulsive   status epilepticus and there is no evidence for either focal  cortical changes   nor an epileptic process.  The diffuse encephalopathic pattern was present.      RR/HN  dd: 06/21/2017 16:22:39 (CDT)  td: 06/21/2017 16:53:46 (CDT)  Doc ID   #[id number]  Job ID #[id number]    CC:

## 2017-06-21 NOTE — PROGRESS NOTES
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  No family present during exam, history obtained primarily from chart.  Patient was on a camping trip to [place] and [place] recently. When he returned he started to have paraesthesias of the feet and hand that quickly progressed to legs weakness. No clear hx of tick bites or skin lesions.  Additionally, a few days prior to admission to OSH he had prostate biopsy and was treated with cipro.  Pt presented to OSH and was diagnosed with GBS and was started on IVIG, he completed 5 days of treatment which ended on 6/12/17.  Pt also being treated for pneumonia.  After admission his weakness got worse to the point that he had to be intubated.  Pt has had poor neurologic exam since.   - LP was done in OSH and was reported as 0 rbc, 0 wbc, protein 34, glucose 97.  Lyme ab panel was sent and results are pending.  MRI Brain done today shows non enhancing lesion abutting the right optic nerve.  Neurosurgery consulted for evaluation.    MRI of the brain:  Bilateral supraclinoid internal carotid artery aneurysms.  On the right there is a 10 mm aneurysm in separate 2 mm aneurysm.  On the left the supraclinoid ICA aneurysm measures about 7 x 6 mm.3.6 cm nonenhancing mass centered over the right greater and lesser wings of the sphenoid abutting the ICA, A1 and M1 segments, displacing the right optic nerve.  Differential considerations include a thrombosed aneurysm associated with right ICA aneurysm aneurysm,  exophytic hamartoma, or or an exophytic nonenhancing primary brain tumor such as low-grade glioma or  oligodendroglioma.    Hospital Course: 5/20:Initial LP with protein of 34 and most recent LP.  Pending CTA of the head or EEG.. Got IVIG x 45 and plasma exchanges x 2,. No clinical improvement.    Interval History:  >4 elements OR status of 3 inpatient conditions  Initial LP with protein of 34 and most recent LP.  Pending CTA of the head or EEG.. Got IVIG x 45 and plasma exchanges x 2,. No clinical improvement.  Review of Systems  2 systems OR Unable to obtain a complete ROS due to level of consciousness.  Objective:     Vitals:  Temp: 99.2 °F (37.3 °C) (06/20/17 1905)  Pulse: 88 (06/20/17 2205)  Resp: 18 (06/20/17 2205)  BP: (!) 158/83 (06/20/17 2205)  SpO2: 97 % (06/20/17 2205)    Temp:  [98.9 °F (37.2 °C)-99.8 °F (37.7 °C)] 99.2 °F (37.3 °C)  Pulse:  [] 88  Resp:  [18] 18  SpO2:  [97 %-99 %] 97 %  BP: ()/(54-83) 158/83         Vent Mode: A/C  Oxygen Concentration (%):  [40] 40  Resp Rate Total:  [18 br/min] 18 br/min  Vt Set:  [550 mL] 550 mL  PEEP/CPAP:  [8 cmH20] 8 cmH20  Pressure Support:  [15 cmH20] 15 cmH20  Mean Airway Pressure:  [12 ctF46-07 cmH20] 12 cmH20    06/19 0701 - 06/20 0700  In: 8600.3 [I.V.:1731.3]  Out: 5580 [Urine:1455]    Physical Exam   Unable to test orientation, language, memory, judgment, insight, fund of knowledge, hearing, shoulder shrug, tongue protrusion, coordination, gait due to level of consciousness.  General   HEENT: ETT  Chest Heart RRR / Lungs Clear to auscultation  Adbomen: Soft nontender + BS  Extremities: OK distal pulses.  Skin: UK  Neurological Exam:  MS; Paralyzed..  CN: II-XII Pupils 2mm sluggish, trace corneal's. Facial paralysis and ophthalmoplegia bilaterally,  Motor: LUE   0/5 / RUE 0 /5               LLE   0/5 /  RLE 0 /5   Sensory: LT/PP/T/ Vibration Limited                 Complex sensory modalities: not tested  DTR:  normal throughout.  Coordination /Fine motor: Limited.  Gait: Not tested.  Meningeal signs: Absent  Medications:  Continuous  sodium  chloride 0.9% Last Rate: 75 mL/hr at 06/20/17 2205   Scheduled  acyclovir 10 mg/kg Q8H   alprazolam 1 mg Q8H   aspirin 325 mg Daily   bisacodyl 10 mg Daily   ceFEPime (MAXIPIME) IVPB 1 g Q12H   chlorhexidine 15 mL QID   famotidine 20 mg BID   folic acid-vit B6-vit B12 2.5-25-2 mg 1 tablet Daily   heparin (porcine) 5,000 Units Q8H   levothyroxine 75 mcg Daily   methylPREDNISolone (SOLU-Medrol) IVPB (doses > 250 mg)  Daily   metoprolol tartrate 12.5 mg BID   polyethylene glycol 17 g Daily   senna-docusate 8.6-50 mg 1 tablet Daily   sertraline 50 mg Daily   thiamine 100 mg Daily   PRN  sodium chloride  Q24H PRN   acetaminophen 650 mg Q6H PRN   dextrose 50% 12.5 g PRN   glucagon (human recombinant) 1 mg PRN   heparin, porcine (PF) 1,000 Units PRN   hydrALAZINE 10 mg Q6H PRN   insulin aspart 1-10 Units Q4H PRN   magnesium oxide 800 mg PRN   magnesium oxide 800 mg PRN   ondansetron 4 mg Q8H PRN   potassium chloride 10% 40 mEq PRN   potassium chloride 10% 40 mEq PRN   potassium chloride 10% 60 mEq PRN   potassium, sodium phosphates 2 packet PRN   potassium, sodium phosphates 2 packet PRN   potassium, sodium phosphates 2 packet PRN     Today I personally reviewed pertinent medications, lines/drains/airways, imaging, cardiology, lab results, microbiology results, notably:      Assessment/Plan:     No new Assessment & Plan notes have been filed under this hospital service since the last note was generated.  Service: Neuro Critical Care      Active Problem List:   1.GBS   2. CNS tumor  3. CNS aneurysms.     Assessment / Plan:     Neuro:  -Complete PLEX, tomorrow.  -Solumedrol 1gr qd x 5 days  -Alprazolam 1gr q 8hrs  -EMG/NCS tomorrow AM  -24 hr EEG monitoring.  -24 hrs urine collection for heavy metal screen.  -Anti GM1, anti-GQ1B and  Campylobacter antibodies. (already getting PLEX)  -PT/OT range of motion.  Resp: ABG /CXR OK. Pneumonia with Pseudomonas.  -Change to AC  -Albuterol /atrovent q 6hrs  -Chest PT q 6hrs  PRN  -VAP  CV: Keep SBP <=160 mmHg. TTE normal.  -Metoprolol 12.5 bid.   IVF/nutrition/Renal/GI: Prerenal/Postrenal.  -TF at goal   -Bladder scanning.  -BR Suppository.  Hem / ID: Pseudomonas.  -Acyclovir IVD  -HSV PCRs pending.  -Cefepime 1gr q 8hrs x 1 week.  Endo:  -SSI q4hrs  -Famotidine 20mg bid.  -Levothryroxine 0.075 mg qd.  Prophylaxis:  -SC Heparin 5000 U q 8hrs  -U/S 4 limbs.  Advance Directives and Disposition:    Full Code.              Uninterrupted Critical Care/Counseling Time (directly spent today by me not including procedures 30-74 min (47274)): =  35  min        Activity Orders          Up with assistance starting at 06/14 0111        Full Code    Pepe Salgado MD  Neurocritical Care  [facility]

## 2017-06-22 LAB
ABO + RH BLD: NORMAL
ACE CSF-CCNC: 1.8 U/L (ref 0–2.5)
ALBUMIN SERPL BCP-MCNC: 4 G/DL
ALLENS TEST: ABNORMAL
ALP SERPL-CCNC: 29 U/L
ALT SERPL W/O P-5'-P-CCNC: 30 U/L
ANION GAP SERPL CALC-SCNC: 7 MMOL/L
AST SERPL-CCNC: 21 U/L
B BURGDOR IGG CSF-ACNC: NORMAL BANDS
B BURGDOR IGG PATRN CSF IB-IMP: NORMAL KDA
B BURGDOR IGM CSF IB-ACNC: NORMAL BANDS
B BURGDOR IGM PATRN CSF IB-IMP: NORMAL KDA
BASOPHILS # BLD AUTO: 0 K/UL
BASOPHILS NFR BLD: 0 %
BILIRUB SERPL-MCNC: 0.7 MG/DL
BLD GP AB SCN CELLS X3 SERPL QL: NORMAL
BUN SERPL-MCNC: 30 MG/DL
CALCIUM SERPL-MCNC: 8.1 MG/DL
CHLORIDE SERPL-SCNC: 106 MMOL/L
CO2 SERPL-SCNC: 24 MMOL/L
CREAT SERPL-MCNC: 0.7 MG/DL
DELSYS: ABNORMAL
DIFFERENTIAL METHOD: ABNORMAL
EEEV IGG TITR CSF IF: NORMAL {TITER}
EEEV IGM TITR CSF IF: NORMAL {TITER}
EOSINOPHIL # BLD AUTO: 0 K/UL
EOSINOPHIL NFR BLD: 0 %
ERYTHROCYTE [DISTWIDTH] IN BLOOD BY AUTOMATED COUNT: 13.4 %
ERYTHROCYTE [SEDIMENTATION RATE] IN BLOOD BY WESTERGREN METHOD: 18 MM/H
EST. GFR  (AFRICAN AMERICAN): >60 ML/MIN/1.73 M^2
EST. GFR  (NON AFRICAN AMERICAN): >60 ML/MIN/1.73 M^2
FIO2: 40
GLUCOSE SERPL-MCNC: 221 MG/DL
HCO3 UR-SCNC: 29.3 MMOL/L (ref 24–28)
HCT VFR BLD AUTO: 30.4 %
HGB BLD-MCNC: 10.3 G/DL
HH6V DNA INTERPRETATION, CSF: NOT DETECTED
HH6V QUANT (COPY/ML),CSF: <1000 CPY/ML
HH6V QUANT (LOG COPY/ML), CSF: <3 LOG
HH6V SOURCE, CSF: NORMAL
HH6V TYPE, CSF: NORMAL
LACV IGG CSF QL IF: NORMAL
LACV IGM CSF QL IF: NORMAL
LYMPHOCYTES # BLD AUTO: 0.8 K/UL
LYMPHOCYTES NFR BLD: 4.3 %
MAGNESIUM SERPL-MCNC: 2.5 MG/DL
MCH RBC QN AUTO: 33.2 PG
MCHC RBC AUTO-ENTMCNC: 33.9 %
MCV RBC AUTO: 98 FL
MIN VOL: 10.6
MODE: ABNORMAL
MONOCYTES # BLD AUTO: 1 K/UL
MONOCYTES NFR BLD: 5.1 %
NEUTROPHILS # BLD AUTO: 17.7 K/UL
NEUTROPHILS NFR BLD: 90.1 %
PCO2 BLDA: 51.3 MMHG (ref 35–45)
PEEP: 8
PH SMN: 7.36 [PH] (ref 7.35–7.45)
PHOSPHATE SERPL-MCNC: 3.3 MG/DL
PIP: 20
PLATELET # BLD AUTO: 260 K/UL
PMV BLD AUTO: 10.9 FL
PO2 BLDA: 102 MMHG (ref 80–100)
POC BE: 4 MMOL/L
POC SATURATED O2: 98 % (ref 95–100)
POC TCO2: 31 MMOL/L (ref 23–27)
POCT GLUCOSE: 229 MG/DL (ref 70–110)
POCT GLUCOSE: 241 MG/DL (ref 70–110)
POCT GLUCOSE: 244 MG/DL (ref 70–110)
POCT GLUCOSE: 252 MG/DL (ref 70–110)
POCT GLUCOSE: 274 MG/DL (ref 70–110)
POCT GLUCOSE: 283 MG/DL (ref 70–110)
POCT GLUCOSE: 309 MG/DL (ref 70–110)
POTASSIUM SERPL-SCNC: 4.7 MMOL/L
PROCALCITONIN SERPL IA-MCNC: 0.18 NG/ML
PROT SERPL-MCNC: 5.1 G/DL
RBC # BLD AUTO: 3.1 M/UL
SAMPLE: ABNORMAL
SITE: ABNORMAL
SLEV IGG TITR CSF IF: NORMAL {TITER}
SLEV IGM TITR CSF IF: NORMAL {TITER}
SODIUM SERPL-SCNC: 137 MMOL/L
SP02: 96
VT: 550
WBC # BLD AUTO: 19.68 K/UL
WEEV IGG TITR CSF IF: NORMAL {TITER}
WEEV IGM TITR CSF IF: NORMAL {TITER}

## 2017-06-22 PROCEDURE — C1751 CATH, INF, PER/CENT/MIDLINE: HCPCS

## 2017-06-22 PROCEDURE — 80053 COMPREHEN METABOLIC PANEL: CPT

## 2017-06-22 PROCEDURE — 85025 COMPLETE CBC W/AUTO DIFF WBC: CPT

## 2017-06-22 PROCEDURE — 25000242 PHARM REV CODE 250 ALT 637 W/ HCPCS: Performed by: PHYSICIAN ASSISTANT

## 2017-06-22 PROCEDURE — 25000003 PHARM REV CODE 250: Performed by: NURSE PRACTITIONER

## 2017-06-22 PROCEDURE — 86920 COMPATIBILITY TEST SPIN: CPT

## 2017-06-22 PROCEDURE — 27000221 HC OXYGEN, UP TO 24 HOURS

## 2017-06-22 PROCEDURE — 20000000 HC ICU ROOM

## 2017-06-22 PROCEDURE — 86900 BLOOD TYPING SEROLOGIC ABO: CPT

## 2017-06-22 PROCEDURE — 25000003 PHARM REV CODE 250: Performed by: PSYCHIATRY & NEUROLOGY

## 2017-06-22 PROCEDURE — 76937 US GUIDE VASCULAR ACCESS: CPT

## 2017-06-22 PROCEDURE — 25000003 PHARM REV CODE 250: Performed by: PHYSICIAN ASSISTANT

## 2017-06-22 PROCEDURE — 94003 VENT MGMT INPAT SUBQ DAY: CPT

## 2017-06-22 PROCEDURE — 99232 SBSQ HOSP IP/OBS MODERATE 35: CPT | Mod: ,,, | Performed by: NEUROLOGICAL SURGERY

## 2017-06-22 PROCEDURE — 36600 WITHDRAWAL OF ARTERIAL BLOOD: CPT

## 2017-06-22 PROCEDURE — 82803 BLOOD GASES ANY COMBINATION: CPT

## 2017-06-22 PROCEDURE — 63600175 PHARM REV CODE 636 W HCPCS: Performed by: NURSE PRACTITIONER

## 2017-06-22 PROCEDURE — 94761 N-INVAS EAR/PLS OXIMETRY MLT: CPT

## 2017-06-22 PROCEDURE — 99900026 HC AIRWAY MAINTENANCE (STAT)

## 2017-06-22 PROCEDURE — 84100 ASSAY OF PHOSPHORUS: CPT

## 2017-06-22 PROCEDURE — 99900035 HC TECH TIME PER 15 MIN (STAT)

## 2017-06-22 PROCEDURE — 86901 BLOOD TYPING SEROLOGIC RH(D): CPT

## 2017-06-22 PROCEDURE — 84145 PROCALCITONIN (PCT): CPT

## 2017-06-22 PROCEDURE — 27200966 HC CLOSED SUCTION SYSTEM

## 2017-06-22 PROCEDURE — 99291 CRITICAL CARE FIRST HOUR: CPT | Mod: ,,, | Performed by: PSYCHIATRY & NEUROLOGY

## 2017-06-22 PROCEDURE — 63600175 PHARM REV CODE 636 W HCPCS: Performed by: PSYCHIATRY & NEUROLOGY

## 2017-06-22 PROCEDURE — 36569 INSJ PICC 5 YR+ W/O IMAGING: CPT

## 2017-06-22 PROCEDURE — 83735 ASSAY OF MAGNESIUM: CPT

## 2017-06-22 RX ORDER — GABAPENTIN 300 MG/1
300 CAPSULE ORAL 3 TIMES DAILY
Status: DISCONTINUED | OUTPATIENT
Start: 2017-06-22 | End: 2017-06-25

## 2017-06-22 RX ADMIN — LEVOTHYROXINE SODIUM 75 MCG: 75 TABLET ORAL at 06:06

## 2017-06-22 RX ADMIN — STANDARDIZED SENNA CONCENTRATE AND DOCUSATE SODIUM 1 TABLET: 8.6; 5 TABLET, FILM COATED ORAL at 08:06

## 2017-06-22 RX ADMIN — HEPARIN SODIUM 5000 UNITS: 5000 INJECTION, SOLUTION INTRAVENOUS; SUBCUTANEOUS at 09:06

## 2017-06-22 RX ADMIN — BISACODYL 10 MG: 10 SUPPOSITORY RECTAL at 08:06

## 2017-06-22 RX ADMIN — CHLORHEXIDINE GLUCONATE 15 ML: 1.2 RINSE ORAL at 12:06

## 2017-06-22 RX ADMIN — ALPRAZOLAM 1 MG: 0.5 TABLET ORAL at 06:06

## 2017-06-22 RX ADMIN — INSULIN ASPART 6 UNITS: 100 INJECTION, SOLUTION INTRAVENOUS; SUBCUTANEOUS at 06:06

## 2017-06-22 RX ADMIN — INSULIN ASPART 6 UNITS: 100 INJECTION, SOLUTION INTRAVENOUS; SUBCUTANEOUS at 01:06

## 2017-06-22 RX ADMIN — Medication 1 TABLET: at 08:06

## 2017-06-22 RX ADMIN — SODIUM CHLORIDE: 0.9 INJECTION, SOLUTION INTRAVENOUS at 09:06

## 2017-06-22 RX ADMIN — GABAPENTIN 300 MG: 300 CAPSULE ORAL at 09:06

## 2017-06-22 RX ADMIN — INSULIN ASPART 3 UNITS: 100 INJECTION, SOLUTION INTRAVENOUS; SUBCUTANEOUS at 09:06

## 2017-06-22 RX ADMIN — MIDAZOLAM 1 MG/HR: 5 INJECTION INTRAMUSCULAR; INTRAVENOUS at 01:06

## 2017-06-22 RX ADMIN — SERTRALINE HYDROCHLORIDE 50 MG: 50 TABLET ORAL at 08:06

## 2017-06-22 RX ADMIN — FAMOTIDINE 20 MG: 20 TABLET, FILM COATED ORAL at 08:06

## 2017-06-22 RX ADMIN — GABAPENTIN 300 MG: 300 CAPSULE ORAL at 01:06

## 2017-06-22 RX ADMIN — HEPARIN SODIUM 5000 UNITS: 5000 INJECTION, SOLUTION INTRAVENOUS; SUBCUTANEOUS at 06:06

## 2017-06-22 RX ADMIN — INSULIN ASPART 4 UNITS: 100 INJECTION, SOLUTION INTRAVENOUS; SUBCUTANEOUS at 10:06

## 2017-06-22 RX ADMIN — SODIUM CHLORIDE: 0.9 INJECTION, SOLUTION INTRAVENOUS at 06:06

## 2017-06-22 RX ADMIN — Medication 12.5 MG: at 09:06

## 2017-06-22 RX ADMIN — FAMOTIDINE 20 MG: 20 TABLET, FILM COATED ORAL at 09:06

## 2017-06-22 RX ADMIN — CHLORHEXIDINE GLUCONATE 15 ML: 1.2 RINSE ORAL at 06:06

## 2017-06-22 RX ADMIN — POLYETHYLENE GLYCOL 3350 17 G: 17 POWDER, FOR SOLUTION ORAL at 08:06

## 2017-06-22 RX ADMIN — HEPARIN SODIUM 5000 UNITS: 5000 INJECTION, SOLUTION INTRAVENOUS; SUBCUTANEOUS at 01:06

## 2017-06-22 RX ADMIN — INSULIN ASPART 8 UNITS: 100 INJECTION, SOLUTION INTRAVENOUS; SUBCUTANEOUS at 05:06

## 2017-06-22 RX ADMIN — CHLORHEXIDINE GLUCONATE 15 ML: 1.2 RINSE ORAL at 05:06

## 2017-06-22 RX ADMIN — ACETYLCYSTEINE 600 MG: 200 INHALANT RESPIRATORY (INHALATION) at 11:06

## 2017-06-22 RX ADMIN — DEXTROSE: 50 INJECTION, SOLUTION INTRAVENOUS at 08:06

## 2017-06-22 RX ADMIN — ACETAMINOPHEN 650 MG: 325 TABLET ORAL at 11:06

## 2017-06-22 RX ADMIN — INSULIN ASPART 2 UNITS: 100 INJECTION, SOLUTION INTRAVENOUS; SUBCUTANEOUS at 02:06

## 2017-06-22 RX ADMIN — THIAMINE HCL TAB 100 MG 100 MG: 100 TAB at 08:06

## 2017-06-22 RX ADMIN — ASPIRIN 325 MG ORAL TABLET 325 MG: 325 PILL ORAL at 08:06

## 2017-06-22 RX ADMIN — INSULIN DETEMIR 12 UNITS: 100 INJECTION, SOLUTION SUBCUTANEOUS at 08:06

## 2017-06-22 RX ADMIN — ACETYLCYSTEINE 600 MG: 200 INHALANT RESPIRATORY (INHALATION) at 12:06

## 2017-06-22 RX ADMIN — Medication 12.5 MG: at 08:06

## 2017-06-22 NOTE — CONSULTS
Placed 18G X 8CM midline in left basilic vein of LUE using realtime ultrasound guidance. Lot#[id number]. Remove on or before 07/21/2017.

## 2017-06-22 NOTE — PROGRESS NOTES
[facility]  Neurocritical Care  Progress Note    Admit Date: 6/14/2017  Service Date: 06/22/2017  Length of Stay: 8    Subjective:     Chief Complaint: Guillain-Boyd syndrome    History of Present Illness: 72 year old male with PMHx CAD, skin cancer, hypothyroidism, and HLP who was transferred from OSH for further evaluation and management of rapidly progressive ascending paralysis.  No family present during exam, history obtained primarily from chart.  Patient was on a camping trip to [place] and [place] recently. When he returned he started to have paraesthesias of the feet and hand that quickly progressed to legs weakness. No clear hx of tick bites or skin lesions.  Additionally, a few days prior to admission to OSH he had prostate biopsy and was treated with cipro.  Pt presented to OSH and was diagnosed with GBS and was started on IVIG, he completed 5 days of treatment which ended on 6/12/17.  Pt also being treated for pneumonia.  After admission his weakness got worse to the point that he had to be intubated.  Pt has had poor neurologic exam since.   - LP was done in OSH and was reported as 0 rbc, 0 wbc, protein 34, glucose 97.  Lyme ab panel was sent and results are pending.  MRI Brain done today shows non enhancing lesion abutting the right optic nerve.  Neurosurgery consulted for evaluation.    MRI of the brain:  Bilateral supraclinoid internal carotid artery aneurysms.  On the right there is a 10 mm aneurysm in separate 2 mm aneurysm.  On the left the supraclinoid ICA aneurysm measures about 7 x 6 mm.3.6 cm nonenhancing mass centered over the right greater and lesser wings of the sphenoid abutting the ICA, A1 and M1 segments, displacing the right optic nerve.  Differential considerations include a thrombosed aneurysm associated with right ICA aneurysm aneurysm,  exophytic hamartoma, or or an exophytic nonenhancing primary brain tumor such as low-grade glioma or  oligodendroglioma.    Hospital Course: 5/20:Initial LP with protein of 34 and most recent LP.  Pending CTA of the head or EEG.. Got IVIG x 45 and plasma exchanges x 2,. No clinical improvement.  6/21:Pending CTA and continuous EEG. High BS last night. EMG/NCS on Friday.       Interval History:  >4 elements OR status of 3 inpatient conditions  Pending CTA and continuous EEG. High BS last night. EMG/NCS on Friday.  Review of Systems   Unable to perform ROS: Intubated     2 systems OR Unable to obtain a complete ROS due to level of consciousness.  Objective:     Vitals:  Temp: 98.6 °F (37 °C) (06/22/17 0005)  Pulse: 79 (06/22/17 0343)  Resp: 18 (06/22/17 0343)  BP: 131/66 (06/22/17 0205)  SpO2: 96 % (06/22/17 0343)    Temp:  [98.5 °F (36.9 °C)-99 °F (37.2 °C)] 98.6 °F (37 °C)  Pulse:  [] 79  Resp:  [18] 18  SpO2:  [96 %-100 %] 96 %  BP: (100-169)/(55-90) 131/66         Vent Mode: A/C  Oxygen Concentration (%):  [40] 40  Resp Rate Total:  [18 br/min] 18 br/min  Vt Set:  [8 mL-550 mL] 550 mL  PEEP/CPAP:  [8 cmH20] 8 cmH20  Mean Airway Pressure:  [12 cmH20] 12 cmH20    06/21 0701 - 06/22 0700  In: 7160 [I.V.:1425]  Out: 6627 [Urine:2495]    Physical Exam  Unable to test orientation, language, memory, judgment, insight, fund of knowledge, hearing, shoulder shrug, tongue protrusion, coordination, gait due to level of consciousness.  General   HEENT: ETT  Chest Heart RRR / Lungs Clear to auscultation  Adbomen: Soft nontender + BS  Extremities: OK distal pulses.  Skin: UK  Neurological Exam:  MS; Paralyzed..  CN: II-XII Pupils 2mm sluggish, trace corneal's. Facial paralysis and ophthalmoplegia bilaterally,  Motor: LUE   0/5 / RUE 0 /5               LLE   0/5 /  RLE 0 /5   Sensory: LT/PP/T/ Vibration Limited                 Complex sensory modalities: not tested  DTR:  normal throughout.  Coordination /Fine motor: Limited.  Gait: Not tested.  Meningeal signs: Absent  Medications:  Continuous  sodium chloride 0.9% Last  Rate: 75 mL/hr at 06/22/17 0200   Scheduled  acetylcysteine 200 mg/ml (20%) 600 mg BID   alprazolam 1 mg Q8H   aspirin 325 mg Daily   bisacodyl 10 mg Daily   chlorhexidine 15 mL QID   famotidine 20 mg BID   folic acid-vit B6-vit B12 2.5-25-2 mg 1 tablet Daily   heparin (porcine) 5,000 Units Q8H   insulin detemir 12 Units Daily   levothyroxine 75 mcg Daily   methylPREDNISolone (SOLU-Medrol) IVPB (doses > 250 mg)  Daily   metoprolol tartrate 12.5 mg BID   polyethylene glycol 17 g Daily   senna-docusate 8.6-50 mg 1 tablet Daily   sertraline 50 mg Daily   thiamine 100 mg Daily   PRN  sodium chloride  Q24H PRN   acetaminophen 650 mg Q6H PRN   dextrose 50% 12.5 g PRN   glucagon (human recombinant) 1 mg PRN   heparin, porcine (PF) 1,000 Units PRN   hydrALAZINE 10 mg Q6H PRN   insulin aspart 1-10 Units Q4H PRN   magnesium oxide 800 mg PRN   magnesium oxide 800 mg PRN   ondansetron 4 mg Q8H PRN   potassium chloride 10% 40 mEq PRN   potassium chloride 10% 40 mEq PRN   potassium chloride 10% 60 mEq PRN   potassium, sodium phosphates 2 packet PRN   potassium, sodium phosphates 2 packet PRN   potassium, sodium phosphates 2 packet PRN     Today I personally reviewed pertinent medications, lines/drains/airways, imaging, cardiology, lab results, microbiology results, notably:        Assessment/Plan:     No new Assessment & Plan notes have been filed under this hospital service since the last note was generated.  Service: Neuro Critical Care      Active Problem List:   1.GBS   2. CNS tumor  3. CNS aneurysms.     Assessment / Plan:     Neuro:  -PLEX today.  -Solumedrol 1gr qd x 5 days  -Alprazolam 1gr q 8hrs  -Sertraline 50 mg qd.  -EMG/NCS on Friday AM.  -24 hr EEG monitoring.  -24 hrs urine collection for heavy metal screen.   -Anti GM1, anti-GQ1B and  Campylobacter antibodies. (already getting PLEX). Done.  -PT/OT range of motion.  Resp: ABG /CXR OK. Pneumonia with Pseudomonas.  -Change to AC  -Albuterol /atrovent q 6hrs  -Chest  PT q 6hrs PRN  -VAP  CV: Keep SBP <=160 mmHg. TTE normal.  -Metoprolol 12.5 bid.   IVF/nutrition/Renal/GI: Prerenal/Postrenal.  -TF at goal   - cc/hr IVD.  -Mucomyst 600mmg q 12hrs x 48hrs  -Gudino catheter.  -BR Suppository.  Hem / ID: Pseudomonas. WBC 13K.  -Acyclovir D/C.  -HSV PCRs negative.  -Cefepime D/C.  Endo:  -SSI q4hrs  -Famotidine 20mg bid.  -Levothryroxine 0.075 mg qd.  -SC Detemir 12 U qd  Prophylaxis:  -SC Heparin 5000 U q 8hrs  -U/S 4 limbs.  Advance Directives and Disposition:    Full Code. Pending EMG/NCS on Fridayl              Uninterrupted Critical Care/Counseling Time (directly spent today by me not including procedures 30-74 min ([id number])): =  35  min    Activity Orders          Up with assistance starting at 06/14 0111        Full Code    Pepe Salgado MD  Neurocritical Care  [facility]

## 2017-06-22 NOTE — PROGRESS NOTES
[facility]  Adult Nutrition  Consult Note    SUMMARY     Recommendations    Recommendation/Intervention:   Recommend continuing Isosource 1.5 @ goal rate 50mL/hr. Add 4 packets Beneprotein daily to meet protein needs.   -Hold for residuals >500ml.     RD to monitor.      Goals: Pt to tolerate >90% EEN and EPN via TF  Nutrition Goal Status: progressing towards goal  Communication of RD Recs: reviewed with RN    Reason for Assessment    Reason for Assessment: RD follow-up  Diagnosis: other (see comments) (acute ascending paralysis, sphenoid mass)  Relevent Medical History: CHARLES, CAD, skin cancer, HLP         General Information Comments: Pt remains intubated. TF restarted yesterday after being held for CT scan. Tolerating appropriately. Poor neuro exam noted. PLEX at this time    Nutrition Discharge Planning: unable to determine at this time.     Nutrition Prescription Ordered    Current Diet Order: NPO  Nutrition Order Comments: TF running at goal  Current Nutrition Support Formula Ordered: Isosource 1.5  Current Nutrition Support Rate Ordered: 50 (ml)  Current Nutrition Support Frequency Ordered: mL/hr        Evaluation of Received Nutrients/Fluid Intake    Enteral Calories (kcal): 1800  Enteral Protein (gm): 82  Enteral (Free Water) Fluid (mL): 917      Energy Calories Required: meeting needs  % Kcal Needs: 93      Protein Required: not meeting needs  % Protein Needs: 79     IV Fluid (mL): 1800         Fluid Required: meeting needs  Comments: +I/O, +BM, good UOP  Tolerance: tolerating  % Intake of Estimated Energy Needs: 75 - 100 %  % Meal Intake: NPO     Nutrition Risk Screen     Nutrition Risk Screen: no indicators present    Nutrition/Diet History       Typical Food/Fluid Intake: ANASTASIIA           Factors Affecting Nutritional Intake: NPO, on mechanical ventilation                Labs/Tests/Procedures/Meds       Pertinent Labs Reviewed: reviewed     Pertinent Medications Reviewed:  "reviewed  Pertinent Medications Comments: albumin, prednisone, folic acid, thiamine, insulin, IVF    Physical Findings    Overall Physical Appearance: overweight, on ventilator support  Tubes: orogastric tube     Skin: intact    Anthropometrics    Temp: 98.5 °F (36.9 °C)     Height: 6' 1" (185.4 cm)  Weight Method: Bed Scale  Weight: 88.5 kg (195 lb)  Ideal Body Weight (IBW), Male: 184 lb     % Ideal Body Weight, Male (lb): 105.98 lb     BMI (Calculated): 25.8  BMI Grade: 25 - 29.9 - overweight                            Estimated/Assessed Needs    Weight Used For Calorie Calculations: 86.4 kg (190 lb 7.6 oz)         Energy Need Method: [place] (1929 kcals)     RMR ([place]-St. Jeor Equation): 1698.39        Weight Used For Protein Calculations: 86.4 kg (190 lb 7.6 oz)  Protein Requirements: 104-130g (1.2-1.5g/kg)     Fluid Need Method: RDA Method     Assessment and Plan    Nutr dx/problem: Inadequate energy intake  Related to/etiology: inability to consume sufficient energy  As evidenced by: NPO requiring alternative means of nutrition.  Status: continues    Monitor and Evaluation    Food and Nutrient Intake: enteral nutrition intake  Food and Nutrient Adminstration: enteral and parenteral nutrition administration        Anthropometric Measurements: weight, weight change, body mass index  Biochemical Data, Medical Tests and Procedures: electrolyte and renal panel, gastrointestinal profile, glucose/endocrine profile, inflammatory profile, lipid profile  Nutrition-Focused Physical Findings: overall appearance    Nutrition Risk    Level of Risk: other (see comments) (f/u 1x/week)    Nutrition Follow-Up    RD Follow-up?: Yes          "

## 2017-06-22 NOTE — PLAN OF CARE
06/22/17 0959   Discharge Reassessment   Assessment Type Discharge Planning Reassessment   Can the patient answer the patient profile reliably? No, cognitively impaired   How does the patient rate their overall health at the present time? (gregor)   Describe the patient's ability to walk at the present time. Major restrictions/daily assistance from another person   How often would a person be available to care for the patient? Whenever needed   Number of comorbid conditions (as recorded on the chart) Two   During the past month, has the patient often been bothered by feeling down, depressed or hopeless? (gregor)   During the past month, has the patient often been bothered by little interest or pleasure in doing things? (gregor)   Discharge plan remains the same: No   Provided patient/caregiver education on the expected discharge date and the discharge plan No   Discharge Plan A Long-term acute care facility (LTAC)   Discharge Plan B Skilled Nursing Facility   Change in patient condition or support system No   Patient choice form signed by patient/caregiver N/A   Explained to the the patient/caregiver why the discharge planned changed: No   Involved the patient/caregiver in establishing a new discharge plan: No     Discharge plan to be determined when medically stable.    Shauna Contreras CM  [id number]

## 2017-06-22 NOTE — SUBJECTIVE & OBJECTIVE
Interval History: NAEON    Medications:  Continuous Infusions:   sodium chloride 0.9% 75 mL/hr at 17 1000     Scheduled Meds:   acetylcysteine 200 mg/ml (20%)  600 mg Per NG tube BID    alprazolam  1 mg Per NG tube Q8H    aspirin  325 mg Per NG tube Daily    bisacodyl  10 mg Rectal Daily    chlorhexidine  15 mL Mouth/Throat QID    famotidine  20 mg Per OG tube BID    folic acid-vit B6-vit B12 2.5-25-2 mg  1 tablet Per NG tube Daily    heparin (porcine)  5,000 Units Subcutaneous Q8H    insulin detemir  12 Units Subcutaneous Daily    levothyroxine  75 mcg Per NG tube Daily    methylPREDNISolone (SOLU-Medrol) IVPB (doses > 250 mg)   Intravenous Daily    metoprolol tartrate  12.5 mg Per OG tube BID    polyethylene glycol  17 g Per NG tube Daily    senna-docusate 8.6-50 mg  1 tablet Per OG tube Daily    sertraline  50 mg Per NG tube Daily    thiamine  100 mg Per NG tube Daily     PRN Meds:sodium chloride, acetaminophen, dextrose 50%, glucagon (human recombinant), heparin, porcine (PF), hydrALAZINE, insulin aspart, magnesium oxide, magnesium oxide, ondansetron, potassium chloride 10%, potassium chloride 10%, potassium chloride 10%, potassium, sodium phosphates, potassium, sodium phosphates, potassium, sodium phosphates     Review of Systems  Objective:     Weight: 88.5 kg (195 lb)  Body mass index is 25.73 kg/m².  Vital Signs (Most Recent):  Temp: 98.5 °F (36.9 °C) (17 0700)  Pulse: 78 (17 1000)  Resp: 18 (17 1000)  BP: 138/72 (17 1000)  SpO2: 97 % (17 1000) Vital Signs (24h Range):  Temp:  [98.5 °F (36.9 °C)-98.8 °F (37.1 °C)] 98.5 °F (36.9 °C)  Pulse:  [] 78  Resp:  [18] 18  SpO2:  [96 %-100 %] 97 %  BP: (100-169)/(55-90) 138/72              Temp (24hrs), Av.6 °F (37 °C), Min:98.5 °F (36.9 °C), Max:98.8 °F (37.1 °C)    Vent Mode: A/C  Oxygen Concentration (%):  [40] 40  Resp Rate Total:  [18 br/min] 18 br/min  Vt Set:  [8 mL-550 mL] 550 mL  PEEP/CPAP:  [8  cmH20] 8 cmH20  Mean Airway Pressure:  [12 tlI09-88 cmH20] 12 cmH20      Date 06/22/17 0700 - 06/23/17 0659   Shift 0323-5418 1593-8188 2580-6912 24 Hour Total   I  N  T  A  K  E   I.V.  (mL/kg) 300  (3.4)   300  (3.4)    NG/   250    Shift Total  (mL/kg) 550  (6.2)   550  (6.2)   O  U  T  P  U  T   Urine  (mL/kg/hr) 285   285    Shift Total  (mL/kg) 285  (3.2)   285  (3.2)   Weight (kg) 88.5 88.5 88.5 88.5          NG/OG Tube 06/09/17 0130 (Active)   Placement Check placement verified by distal tube length measurement;placement verified by aspirate characteristics 6/22/2017  7:05 AM   Distal Tube Length (cm) 65 6/22/2017  7:05 AM   Tolerance no signs/symptoms of discomfort 6/22/2017  7:05 AM   Securement taped to commercial device 6/22/2017  7:05 AM   Clamp Status/Tolerance unclamped;no abdominal discomfort;no abdominal distention;no emesis;no nausea;no restlessness 6/22/2017  7:05 AM   Suction Setting/Drainage Method dependent drainage 6/17/2017  3:01 AM   Insertion Site Appearance no redness, warmth, tenderness, skin breakdown, drainage 6/22/2017  7:05 AM   Drainage None 6/22/2017  7:05 AM   Flush/Irrigation flushed w/;water;no resistance met 6/22/2017  7:05 AM   Feeding Method continuous 6/22/2017  7:05 AM   Current Rate (mL/hr) 50 mL/hr 6/22/2017  7:05 AM   Goal Rate (mL/hr) 50 mL/hr 6/22/2017  7:05 AM   Intake (mL) 100 mL 6/22/2017  9:00 AM   Rate Formula Tube Feeding (mL/hr) 50 mL/hr 6/22/2017  7:05 AM   Intake (mL) - Formula Tube Feeding 50 6/22/2017 10:00 AM   Residual Amount (ml) 100 ml 6/22/2017  7:05 AM            Urethral Catheter 06/20/17 2220 (Active)   Site Assessment Clean;Intact 6/22/2017  7:05 AM   Collection Container Urimeter 6/22/2017  7:05 AM   Securement Method secured to top of thigh w/ adhesive device 6/22/2017  7:05 AM   Catheter Care Performed yes 6/22/2017  7:05 AM   Reason for Continuing Urinary Catheterization Urinary retention 6/22/2017  7:05 AM   CAUTI Prevention Bundle  "StatLock in place w 1" slack;Drainage bag off the floor;Intact seal between catheter & drainage tubing;Green sheeting clip in use;No dependent loops or kinks;Drainage bag not overfilled (<2/3 full);Drainage bag below bladder 6/22/2017  7:05 AM   Output (mL) 60 mL 6/22/2017 10:00 AM            Trialysis (Dialysis) Catheter 06/17/17 1300 left subclavian (Active)   IV Device Securement catheter securement device 6/22/2017  7:05 AM   Additional Site Signs no erythema;no warmth;no edema;no pain;no palpable cord;no streak formation;no drainage 6/22/2017  7:05 AM   Patency/Care heparin locked 6/22/2017  7:05 AM   Site Assessment Clean;Dry;Intact;No redness;No swelling 6/22/2017  7:05 AM   Status Deaccessed 6/22/2017  7:05 AM   Flows Good 6/22/2017  3:05 AM   Dressing Intervention Dressing reinforced 6/22/2017  3:05 AM   Dressing Status Biopatch in place;Clean;Intact;Dry 6/22/2017  7:05 AM   Dressing Change Due 06/26/17 6/22/2017  7:05 AM   Verification by X-ray Yes 6/22/2017  3:05 AM   Site Condition No complications 6/22/2017  7:05 AM   Dressing Other (Comment) 6/22/2017  3:05 AM   Daily Line Review Performed 6/22/2017  7:05 AM       Neurosurgery Physical Exam   E1VTM1  PERRL, no corneals, cough/gag  No motor response to deep pain.    Significant Labs:    Recent Labs  Lab 06/22/17  0216   *      K 4.7      CO2 24   BUN 30*   CREATININE 0.7   CALCIUM 8.1*   MG 2.5       Recent Labs  Lab 06/21/17  0200 06/22/17  0216   WBC 13.51* 19.68*   HGB 9.2* 10.3*   HCT 27.3* 30.4*    260     No results for input(s): LABPT, INR, APTT in the last 48 hours.  Microbiology Results (last 7 days)     Procedure Component Value Units Date/Time    CSF culture [[id number]] Collected:  06/18/17 1212    Order Status:  Completed Specimen:  CSF (Spinal Fluid) from CSF Tap, Tube 3 Updated:  06/22/17 0709     CSF CULTURE No Growth to date     Gram Stain Result No WBC's      No organisms seen    Narrative:       On which " sequentially labeled tube should this analysis be  performed?->3    Laurita Ink (CSF) [[id number]] Collected:  06/18/17 1212    Order Status:  Completed Specimen:  CSF (Spinal Fluid) from CSF Tap, Tube 3 Updated:  06/19/17 1417     Laurita Ink No encapsulated yeast seen    Cryptococcal antigen, CSF [[id number]] Collected:  06/18/17 1212    Order Status:  Canceled Specimen:  CSF (Spinal Fluid) from CSF Tap, Tube 3 Updated:  06/19/17 1243    Blood culture [[id number]] Collected:  06/14/17 0336    Order Status:  Completed Specimen:  Blood from Peripheral, Wrist, Left Updated:  06/19/17 0812     Blood Culture, Routine No growth after 5 days.    Culture, Respiratory with Gram Stain [[id number]]  (Susceptibility) Collected:  06/14/17 0500    Order Status:  Completed Specimen:  Respiratory from Endotracheal Aspirate Updated:  06/16/17 1030     Respiratory Culture --     PSEUDOMONAS AERUGINOSA  Few       Gram Stain (Respiratory) <10 epithelial cells per low power field.     Gram Stain (Respiratory) Moderate WBC's     Gram Stain (Respiratory) Few Gram negative rods            Significant Diagnostics:  CTA: reviewed

## 2017-06-22 NOTE — ASSESSMENT & PLAN NOTE
72 male with acute ascending paralysis, newly found sphenoid mass, and B/L ICA aneursyms    Pt with poor neurologic status, w/o sedation  - Intracranial lesion unlikely cause of obtundation.  Bilateral ICA aneurysms incidental findings.  - Continue current management / work up per Ncc and neurology  - Fu paraneoplastic panel.  - PLEX, solumedrol per NCC/neurology  - Fu cultures of LP  - EMG friday

## 2017-06-22 NOTE — PROGRESS NOTES
[facility]  Neurosurgery  Progress Note    Subjective:     History of Present Illness: 72 year old male with PMHx CAD, skin cancer, hypothyroidism, and HLP who was transferred from OSH for further evaluation and management of rapidly progressive ascending paralysis.  No family present during exam, history obtained primarily from chart.  Patient was on a camping trip to [place] and [place] recently. When he returned he started to have paraesthesias of the feet and hand that quickly progressed to legs weakness. No clear hx of tick bites or skin lesions.  Additionally, a few days prior to admission to OSH he had prostate biopsy and was treated with cipro.  Pt presented to OSH and was diagnosed with GBS and was started on IVIG, he completed 5 days of treatment which ended on 6/12/17.  Pt also being treated for pneumonia.  After admission his weakness got worse to the point that he had to be intubated.  Pt has had poor neurologic exam since.   - LP was done in OSH and was reported as 0 rbc, 0 wbc, protein 34, glucose 97.  Lyme ab panel was sent and results are pending.  MRI Brain done today shows non enhancing lesion abutting the right optic nerve.  Neurosurgery consulted for evaluation.             Post-Op Info:  * No surgery found *         Interval History: NAEON    Medications:  Continuous Infusions:   sodium chloride 0.9% 75 mL/hr at 06/22/17 1000     Scheduled Meds:   acetylcysteine 200 mg/ml (20%)  600 mg Per NG tube BID    alprazolam  1 mg Per NG tube Q8H    aspirin  325 mg Per NG tube Daily    bisacodyl  10 mg Rectal Daily    chlorhexidine  15 mL Mouth/Throat QID    famotidine  20 mg Per OG tube BID    folic acid-vit B6-vit B12 2.5-25-2 mg  1 tablet Per NG tube Daily    heparin (porcine)  5,000 Units Subcutaneous Q8H    insulin detemir  12 Units Subcutaneous Daily    levothyroxine  75 mcg Per NG tube Daily    methylPREDNISolone (SOLU-Medrol) IVPB (doses > 250 mg)    Intravenous Daily    metoprolol tartrate  12.5 mg Per OG tube BID    polyethylene glycol  17 g Per NG tube Daily    senna-docusate 8.6-50 mg  1 tablet Per OG tube Daily    sertraline  50 mg Per NG tube Daily    thiamine  100 mg Per NG tube Daily     PRN Meds:sodium chloride, acetaminophen, dextrose 50%, glucagon (human recombinant), heparin, porcine (PF), hydrALAZINE, insulin aspart, magnesium oxide, magnesium oxide, ondansetron, potassium chloride 10%, potassium chloride 10%, potassium chloride 10%, potassium, sodium phosphates, potassium, sodium phosphates, potassium, sodium phosphates     Review of Systems  Objective:     Weight: 88.5 kg (195 lb)  Body mass index is 25.73 kg/m².  Vital Signs (Most Recent):  Temp: 98.5 °F (36.9 °C) (17 0700)  Pulse: 78 (17 1000)  Resp: 18 (17 1000)  BP: 138/72 (17 1000)  SpO2: 97 % (17 1000) Vital Signs (24h Range):  Temp:  [98.5 °F (36.9 °C)-98.8 °F (37.1 °C)] 98.5 °F (36.9 °C)  Pulse:  [] 78  Resp:  [18] 18  SpO2:  [96 %-100 %] 97 %  BP: (100-169)/(55-90) 138/72              Temp (24hrs), Av.6 °F (37 °C), Min:98.5 °F (36.9 °C), Max:98.8 °F (37.1 °C)    Vent Mode: A/C  Oxygen Concentration (%):  [40] 40  Resp Rate Total:  [18 br/min] 18 br/min  Vt Set:  [8 mL-550 mL] 550 mL  PEEP/CPAP:  [8 cmH20] 8 cmH20  Mean Airway Pressure:  [12 bhX26-78 cmH20] 12 cmH20      Date 17 0700 - 17 0659   Shift 4071-9482 2241-5844 4532-3325 24 Hour Total   I  N  T  A  K  E   I.V.  (mL/kg) 300  (3.4)   300  (3.4)    NG/   250    Shift Total  (mL/kg) 550  (6.2)   550  (6.2)   O  U  T  P  U  T   Urine  (mL/kg/hr) 285   285    Shift Total  (mL/kg) 285  (3.2)   285  (3.2)   Weight (kg) 88.5 88.5 88.5 88.5          NG/OG Tube 17 0130 (Active)   Placement Check placement verified by distal tube length measurement;placement verified by aspirate characteristics 2017  7:05 AM   Distal Tube Length (cm) 65 2017  7:05 AM   Tolerance  "no signs/symptoms of discomfort 6/22/2017  7:05 AM   Securement taped to commercial device 6/22/2017  7:05 AM   Clamp Status/Tolerance unclamped;no abdominal discomfort;no abdominal distention;no emesis;no nausea;no restlessness 6/22/2017  7:05 AM   Suction Setting/Drainage Method dependent drainage 6/17/2017  3:01 AM   Insertion Site Appearance no redness, warmth, tenderness, skin breakdown, drainage 6/22/2017  7:05 AM   Drainage None 6/22/2017  7:05 AM   Flush/Irrigation flushed w/;water;no resistance met 6/22/2017  7:05 AM   Feeding Method continuous 6/22/2017  7:05 AM   Current Rate (mL/hr) 50 mL/hr 6/22/2017  7:05 AM   Goal Rate (mL/hr) 50 mL/hr 6/22/2017  7:05 AM   Intake (mL) 100 mL 6/22/2017  9:00 AM   Rate Formula Tube Feeding (mL/hr) 50 mL/hr 6/22/2017  7:05 AM   Intake (mL) - Formula Tube Feeding 50 6/22/2017 10:00 AM   Residual Amount (ml) 100 ml 6/22/2017  7:05 AM            Urethral Catheter 06/20/17 2220 (Active)   Site Assessment Clean;Intact 6/22/2017  7:05 AM   Collection Container Urimeter 6/22/2017  7:05 AM   Securement Method secured to top of thigh w/ adhesive device 6/22/2017  7:05 AM   Catheter Care Performed yes 6/22/2017  7:05 AM   Reason for Continuing Urinary Catheterization Urinary retention 6/22/2017  7:05 AM   CAUTI Prevention Bundle StatLock in place w 1" slack;Drainage bag off the floor;Intact seal between catheter & drainage tubing;Green sheeting clip in use;No dependent loops or kinks;Drainage bag not overfilled (<2/3 full);Drainage bag below bladder 6/22/2017  7:05 AM   Output (mL) 60 mL 6/22/2017 10:00 AM            Trialysis (Dialysis) Catheter 06/17/17 1300 left subclavian (Active)   IV Device Securement catheter securement device 6/22/2017  7:05 AM   Additional Site Signs no erythema;no warmth;no edema;no pain;no palpable cord;no streak formation;no drainage 6/22/2017  7:05 AM   Patency/Care heparin locked 6/22/2017  7:05 AM   Site Assessment Clean;Dry;Intact;No redness;No " swelling 6/22/2017  7:05 AM   Status Deaccessed 6/22/2017  7:05 AM   Flows Good 6/22/2017  3:05 AM   Dressing Intervention Dressing reinforced 6/22/2017  3:05 AM   Dressing Status Biopatch in place;Clean;Intact;Dry 6/22/2017  7:05 AM   Dressing Change Due 06/26/17 6/22/2017  7:05 AM   Verification by X-ray Yes 6/22/2017  3:05 AM   Site Condition No complications 6/22/2017  7:05 AM   Dressing Other (Comment) 6/22/2017  3:05 AM   Daily Line Review Performed 6/22/2017  7:05 AM       Neurosurgery Physical Exam   E1VTM1  PERRL, no corneals, cough/gag  No motor response to deep pain.    Significant Labs:    Recent Labs  Lab 06/22/17 0216   *      K 4.7      CO2 24   BUN 30*   CREATININE 0.7   CALCIUM 8.1*   MG 2.5       Recent Labs  Lab 06/21/17  0200 06/22/17 0216   WBC 13.51* 19.68*   HGB 9.2* 10.3*   HCT 27.3* 30.4*    260     No results for input(s): LABPT, INR, APTT in the last 48 hours.  Microbiology Results (last 7 days)     Procedure Component Value Units Date/Time    CSF culture [[id number]] Collected:  06/18/17 1212    Order Status:  Completed Specimen:  CSF (Spinal Fluid) from CSF Tap, Tube 3 Updated:  06/22/17 0709     CSF CULTURE No Growth to date     Gram Stain Result No WBC's      No organisms seen    Narrative:       On which sequentially labeled tube should this analysis be  performed?->3    Laurita Ink (CSF) [[id number]] Collected:  06/18/17 1212    Order Status:  Completed Specimen:  CSF (Spinal Fluid) from CSF Tap, Tube 3 Updated:  06/19/17 1417     Laurita Ink No encapsulated yeast seen    Cryptococcal antigen, CSF [[id number]] Collected:  06/18/17 1212    Order Status:  Canceled Specimen:  CSF (Spinal Fluid) from CSF Tap, Tube 3 Updated:  06/19/17 1243    Blood culture [[id number]] Collected:  06/14/17 0336    Order Status:  Completed Specimen:  Blood from Peripheral, Wrist, Left Updated:  06/19/17 0812     Blood Culture, Routine No growth after 5 days.    Culture, Respiratory  with Gram Stain [[id number]]  (Susceptibility) Collected:  06/14/17 0500    Order Status:  Completed Specimen:  Respiratory from Endotracheal Aspirate Updated:  06/16/17 1030     Respiratory Culture --     PSEUDOMONAS AERUGINOSA  Few       Gram Stain (Respiratory) <10 epithelial cells per low power field.     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  Bilateral ICA aneurysms incidental findings.  - Continue current management / work up per Ncc and neurology  - Fu paraneoplastic panel.  - PLEX, solumedrol per NCC/neurology  - Fu cultures of LP  - EMG friday            Thai King DO  Neurosurgery  [facility]

## 2017-06-22 NOTE — PLAN OF CARE
Problem: Patient Care Overview  Goal: Plan of Care Review  Outcome: Ongoing (interventions implemented as appropriate)  POC reviewed with pt and family at 1400. Pt did not verbalize understanding due to ETT/communication impairments. Questions and concerns addressed w/ family. Family verbalized understanding. No acute events today. Versed infusion and BIS monitoring initiated. NS infusion increased to 100 mL/hr. Pt progressing toward goals. Will continue to monitor. See flowsheets for full assessment and VS info.

## 2017-06-22 NOTE — PLAN OF CARE
Problem: Patient Care Overview  Goal: Plan of Care Review  POC reviewed with pt and pt's spouse at 0600. Pt unable to verbalize understanding d/t clinical condition, wife acknowledged understanding. Questions and concerns addressed. No acute events overnight. Mucomyst via NGT was started overnight for renal protection. 1 PRN dose of hydralazine was needed overnight for a sustained SBP >160. Cath care performed. Oral care performed q 2hrs. Pt's WBCs did elevated with this AM draw. Team will address today during rounds. Pt progressing toward goals. Will continue to monitor. See flowsheets for full assessment and VS info

## 2017-06-22 NOTE — SUBJECTIVE & OBJECTIVE
Interval History:  >4 elements OR status of 3 inpatient conditions  Pending CTA and continuous EEG. High BS last night. EMG/NCS on Friday.  Review of Systems   Unable to perform ROS: Intubated     2 systems OR Unable to obtain a complete ROS due to level of consciousness.  Objective:     Vitals:  Temp: 98.6 °F (37 °C) (06/22/17 0005)  Pulse: 79 (06/22/17 0343)  Resp: 18 (06/22/17 0343)  BP: 131/66 (06/22/17 0205)  SpO2: 96 % (06/22/17 0343)    Temp:  [98.5 °F (36.9 °C)-99 °F (37.2 °C)] 98.6 °F (37 °C)  Pulse:  [] 79  Resp:  [18] 18  SpO2:  [96 %-100 %] 96 %  BP: (100-169)/(55-90) 131/66         Vent Mode: A/C  Oxygen Concentration (%):  [40] 40  Resp Rate Total:  [18 br/min] 18 br/min  Vt Set:  [8 mL-550 mL] 550 mL  PEEP/CPAP:  [8 cmH20] 8 cmH20  Mean Airway Pressure:  [12 cmH20] 12 cmH20    06/21 0701 - 06/22 0700  In: 7160 [I.V.:1425]  Out: 6627 [Urine:2495]    Physical Exam  Unable to test orientation, language, memory, judgment, insight, fund of knowledge, hearing, shoulder shrug, tongue protrusion, coordination, gait due to level of consciousness.  General   HEENT: ETT  Chest Heart RRR / Lungs Clear to auscultation  Adbomen: Soft nontender + BS  Extremities: OK distal pulses.  Skin: UK  Neurological Exam:  MS; Paralyzed..  CN: II-XII Pupils 2mm sluggish, trace corneal's. Facial paralysis and ophthalmoplegia bilaterally,  Motor: LUE   0/5 / RUE 0 /5               LLE   0/5 /  RLE 0 /5   Sensory: LT/PP/T/ Vibration Limited                 Complex sensory modalities: not tested  DTR:  normal throughout.  Coordination /Fine motor: Limited.  Gait: Not tested.  Meningeal signs: Absent  Medications:  Continuous  sodium chloride 0.9% Last Rate: 75 mL/hr at 06/22/17 0200   Scheduled  acetylcysteine 200 mg/ml (20%) 600 mg BID   alprazolam 1 mg Q8H   aspirin 325 mg Daily   bisacodyl 10 mg Daily   chlorhexidine 15 mL QID   famotidine 20 mg BID   folic acid-vit B6-vit B12 2.5-25-2 mg 1 tablet Daily   heparin (porcine)  5,000 Units Q8H   insulin detemir 12 Units Daily   levothyroxine 75 mcg Daily   methylPREDNISolone (SOLU-Medrol) IVPB (doses > 250 mg)  Daily   metoprolol tartrate 12.5 mg BID   polyethylene glycol 17 g Daily   senna-docusate 8.6-50 mg 1 tablet Daily   sertraline 50 mg Daily   thiamine 100 mg Daily   PRN  sodium chloride  Q24H PRN   acetaminophen 650 mg Q6H PRN   dextrose 50% 12.5 g PRN   glucagon (human recombinant) 1 mg PRN   heparin, porcine (PF) 1,000 Units PRN   hydrALAZINE 10 mg Q6H PRN   insulin aspart 1-10 Units Q4H PRN   magnesium oxide 800 mg PRN   magnesium oxide 800 mg PRN   ondansetron 4 mg Q8H PRN   potassium chloride 10% 40 mEq PRN   potassium chloride 10% 40 mEq PRN   potassium chloride 10% 60 mEq PRN   potassium, sodium phosphates 2 packet PRN   potassium, sodium phosphates 2 packet PRN   potassium, sodium phosphates 2 packet PRN     Today I personally reviewed pertinent medications, lines/drains/airways, imaging, cardiology, lab results, microbiology results, notably:

## 2017-06-23 ENCOUNTER — PROCEDURE VISIT (OUTPATIENT)
Dept: NEUROLOGY | Facility: CLINIC | Age: 72
DRG: 004 | End: 2017-06-23
Attending: PSYCHIATRY & NEUROLOGY
Payer: MEDICARE

## 2017-06-23 DIAGNOSIS — G61.0 GBS (GUILLAIN BARRE SYNDROME): ICD-10-CM

## 2017-06-23 PROBLEM — I82.B11 RIGHT SUBCLAVIAN VEIN THROMBOSIS: Status: ACTIVE | Noted: 2017-06-23

## 2017-06-23 PROBLEM — R73.9 HYPERGLYCEMIA: Status: ACTIVE | Noted: 2017-06-23

## 2017-06-23 LAB
ALBUMIN SERPL BCP-MCNC: 3.3 G/DL
ALLENS TEST: ABNORMAL
ALP SERPL-CCNC: 36 U/L
ALT SERPL W/O P-5'-P-CCNC: 28 U/L
ANION GAP SERPL CALC-SCNC: 5 MMOL/L
APTT BLDCRRT: 58.3 SEC
ARSENIC 24H UR-MRATE: NOT DETECTED MCG/SPEC (ref 0–35)
ARSENIC UR-MCNC: <15 MCG/L (ref 0–35)
AST SERPL-CCNC: 15 U/L
BACTERIA #/AREA URNS AUTO: NORMAL /HPF
BASOPHILS # BLD AUTO: 0 K/UL
BASOPHILS # BLD AUTO: 0 K/UL
BASOPHILS NFR BLD: 0 %
BASOPHILS NFR BLD: 0 %
BILIRUB SERPL-MCNC: 0.5 MG/DL
BILIRUB UR QL STRIP: NEGATIVE
BUN SERPL-MCNC: 41 MG/DL
CADMIUM 24H UR-MRATE: 21.2 MCG/SPEC (ref 0–1.3)
CADMIUM UR-MCNC: 5.4 MCG/L (ref 0–1.3)
CALCIUM SERPL-MCNC: 8.2 MG/DL
CHLORIDE SERPL-SCNC: 107 MMOL/L
CLARITY UR REFRACT.AUTO: CLEAR
CO2 SERPL-SCNC: 28 MMOL/L
COLLECT DURATION TIME UR: 24 H
COLOR UR AUTO: YELLOW
CREAT SERPL-MCNC: 0.8 MG/DL
DELSYS: ABNORMAL
DIFFERENTIAL METHOD: ABNORMAL
DIFFERENTIAL METHOD: ABNORMAL
EOSINOPHIL # BLD AUTO: 0 K/UL
EOSINOPHIL # BLD AUTO: 0 K/UL
EOSINOPHIL NFR BLD: 0 %
EOSINOPHIL NFR BLD: 0 %
ERYTHROCYTE [DISTWIDTH] IN BLOOD BY AUTOMATED COUNT: 13.8 %
ERYTHROCYTE [DISTWIDTH] IN BLOOD BY AUTOMATED COUNT: 13.8 %
ERYTHROCYTE [SEDIMENTATION RATE] IN BLOOD BY WESTERGREN METHOD: 18 MM/H
EST. GFR  (AFRICAN AMERICAN): >60 ML/MIN/1.73 M^2
EST. GFR  (NON AFRICAN AMERICAN): >60 ML/MIN/1.73 M^2
FACT X PPP CHRO-ACNC: 0.12 IU/ML
FIO2: 40
GLUCOSE SERPL-MCNC: 291 MG/DL
GLUCOSE UR QL STRIP: ABNORMAL
H CAPSUL AB CSF QL ID: NEGATIVE
H CAPSUL MYC AB TITR CSF CF: NEGATIVE {TITER}
H CAPSUL YST AB TITR CSF CF: NEGATIVE {TITER}
HCO3 UR-SCNC: 31.4 MMOL/L (ref 24–28)
HCT VFR BLD AUTO: 25.1 %
HCT VFR BLD AUTO: 28.2 %
HGB BLD-MCNC: 8.4 G/DL
HGB BLD-MCNC: 9 G/DL
HGB UR QL STRIP: ABNORMAL
KETONES UR QL STRIP: NEGATIVE
LEAD 24H UR-MRATE: NOT DETECTED MCG/SPEC (ref 0–4)
LEAD UR-MCNC: <1 MCG/L (ref 0–4)
LEUKOCYTE ESTERASE UR QL STRIP: NEGATIVE
LYMPHOCYTES # BLD AUTO: 0.3 K/UL
LYMPHOCYTES # BLD AUTO: 0.3 K/UL
LYMPHOCYTES NFR BLD: 2.3 %
LYMPHOCYTES NFR BLD: 3.2 %
MAGNESIUM SERPL-MCNC: 2.6 MG/DL
MCH RBC QN AUTO: 31.8 PG
MCH RBC QN AUTO: 33.3 PG
MCHC RBC AUTO-ENTMCNC: 31.9 %
MCHC RBC AUTO-ENTMCNC: 33.5 %
MCV RBC AUTO: 100 FL
MCV RBC AUTO: 100 FL
MERCURY 24H UR-MRATE: NOT DETECTED MCG/SPEC (ref 0–9)
MERCURY UR-MCNC: <1 MCG/L (ref 0–9)
MICROSCOPIC COMMENT: NORMAL
MIN VOL: 10.7
MODE: ABNORMAL
MONOCYTES # BLD AUTO: 0.2 K/UL
MONOCYTES # BLD AUTO: 0.3 K/UL
MONOCYTES NFR BLD: 1.8 %
MONOCYTES NFR BLD: 2.1 %
NEUTROPHILS # BLD AUTO: 14.1 K/UL
NEUTROPHILS # BLD AUTO: 9.5 K/UL
NEUTROPHILS NFR BLD: 94.7 %
NEUTROPHILS NFR BLD: 95.3 %
NITRITE UR QL STRIP: NEGATIVE
PCO2 BLDA: 47.3 MMHG (ref 35–45)
PEEP: 8
PH SMN: 7.43 [PH] (ref 7.35–7.45)
PH UR STRIP: 6 [PH] (ref 5–8)
PHOSPHATE SERPL-MCNC: 3 MG/DL
PIP: 20
PLATELET # BLD AUTO: 197 K/UL
PLATELET # BLD AUTO: 230 K/UL
PMV BLD AUTO: 10.8 FL
PMV BLD AUTO: 10.9 FL
PO2 BLDA: 92 MMHG (ref 80–100)
POC BE: 7 MMOL/L
POC SATURATED O2: 97 % (ref 95–100)
POC TCO2: 33 MMOL/L (ref 23–27)
POCT GLUCOSE: 275 MG/DL (ref 70–110)
POCT GLUCOSE: 278 MG/DL (ref 70–110)
POCT GLUCOSE: 284 MG/DL (ref 70–110)
POCT GLUCOSE: 291 MG/DL (ref 70–110)
POCT GLUCOSE: 294 MG/DL (ref 70–110)
POCT GLUCOSE: 298 MG/DL (ref 70–110)
POTASSIUM SERPL-SCNC: 5.2 MMOL/L
PROT SERPL-MCNC: 5 G/DL
PROT UR QL STRIP: NEGATIVE
RBC # BLD AUTO: 2.52 M/UL
RBC # BLD AUTO: 2.83 M/UL
RBC #/AREA URNS AUTO: 2 /HPF (ref 0–4)
SAMPLE: ABNORMAL
SITE: ABNORMAL
SODIUM SERPL-SCNC: 140 MMOL/L
SP GR UR STRIP: 1.01 (ref 1–1.03)
SP02: 96
SPECIMEN VOL UR: 3900 ML
SQUAMOUS #/AREA URNS AUTO: 0 /HPF
URN SPEC COLLECT METH UR: ABNORMAL
UROBILINOGEN UR STRIP-ACNC: NEGATIVE EU/DL
VT: 550
WBC # BLD AUTO: 10.03 K/UL
WBC # BLD AUTO: 14.77 K/UL
WBC #/AREA URNS AUTO: 2 /HPF (ref 0–5)
YEAST UR QL AUTO: NORMAL

## 2017-06-23 PROCEDURE — 83735 ASSAY OF MAGNESIUM: CPT

## 2017-06-23 PROCEDURE — 27000221 HC OXYGEN, UP TO 24 HOURS

## 2017-06-23 PROCEDURE — 36514 APHERESIS PLASMA: CPT | Mod: ,,, | Performed by: PATHOLOGY

## 2017-06-23 PROCEDURE — P9047 ALBUMIN (HUMAN), 25%, 50ML: HCPCS | Performed by: NURSE PRACTITIONER

## 2017-06-23 PROCEDURE — P9045 ALBUMIN (HUMAN), 5%, 250 ML: HCPCS | Performed by: PATHOLOGY

## 2017-06-23 PROCEDURE — 20000000 HC ICU ROOM

## 2017-06-23 PROCEDURE — 95912 NRV CNDJ TEST 11-12 STUDIES: CPT | Mod: S$GLB,,, | Performed by: PSYCHIATRY & NEUROLOGY

## 2017-06-23 PROCEDURE — 63600175 PHARM REV CODE 636 W HCPCS: Performed by: PSYCHIATRY & NEUROLOGY

## 2017-06-23 PROCEDURE — 25000003 PHARM REV CODE 250: Performed by: PHYSICIAN ASSISTANT

## 2017-06-23 PROCEDURE — 25000003 PHARM REV CODE 250: Performed by: NURSE PRACTITIONER

## 2017-06-23 PROCEDURE — 87186 SC STD MICRODIL/AGAR DIL: CPT | Mod: 59

## 2017-06-23 PROCEDURE — 99900026 HC AIRWAY MAINTENANCE (STAT)

## 2017-06-23 PROCEDURE — 94003 VENT MGMT INPAT SUBQ DAY: CPT

## 2017-06-23 PROCEDURE — 99900035 HC TECH TIME PER 15 MIN (STAT)

## 2017-06-23 PROCEDURE — 99232 SBSQ HOSP IP/OBS MODERATE 35: CPT | Mod: ,,, | Performed by: NEUROLOGICAL SURGERY

## 2017-06-23 PROCEDURE — 82803 BLOOD GASES ANY COMBINATION: CPT

## 2017-06-23 PROCEDURE — 94761 N-INVAS EAR/PLS OXIMETRY MLT: CPT

## 2017-06-23 PROCEDURE — 96366 THER/PROPH/DIAG IV INF ADDON: CPT

## 2017-06-23 PROCEDURE — 63600175 PHARM REV CODE 636 W HCPCS: Performed by: PHYSICIAN ASSISTANT

## 2017-06-23 PROCEDURE — 85025 COMPLETE CBC W/AUTO DIFF WBC: CPT | Mod: 91

## 2017-06-23 PROCEDURE — 63600175 PHARM REV CODE 636 W HCPCS: Performed by: PATHOLOGY

## 2017-06-23 PROCEDURE — 63600175 PHARM REV CODE 636 W HCPCS: Performed by: NURSE PRACTITIONER

## 2017-06-23 PROCEDURE — 85520 HEPARIN ASSAY: CPT

## 2017-06-23 PROCEDURE — 87040 BLOOD CULTURE FOR BACTERIA: CPT | Mod: 59

## 2017-06-23 PROCEDURE — 99291 CRITICAL CARE FIRST HOUR: CPT | Mod: ,,, | Performed by: NURSE PRACTITIONER

## 2017-06-23 PROCEDURE — 36514 APHERESIS PLASMA: CPT

## 2017-06-23 PROCEDURE — 36600 WITHDRAWAL OF ARTERIAL BLOOD: CPT

## 2017-06-23 PROCEDURE — 25000003 PHARM REV CODE 250: Performed by: PATHOLOGY

## 2017-06-23 PROCEDURE — 51798 US URINE CAPACITY MEASURE: CPT

## 2017-06-23 PROCEDURE — 80053 COMPREHEN METABOLIC PANEL: CPT

## 2017-06-23 PROCEDURE — 87077 CULTURE AEROBIC IDENTIFY: CPT | Mod: 59

## 2017-06-23 PROCEDURE — 95885 MUSC TST DONE W/NERV TST LIM: CPT | Mod: S$GLB,,, | Performed by: PSYCHIATRY & NEUROLOGY

## 2017-06-23 PROCEDURE — 96365 THER/PROPH/DIAG IV INF INIT: CPT

## 2017-06-23 PROCEDURE — 36415 COLL VENOUS BLD VENIPUNCTURE: CPT

## 2017-06-23 PROCEDURE — 85730 THROMBOPLASTIN TIME PARTIAL: CPT

## 2017-06-23 PROCEDURE — 87205 SMEAR GRAM STAIN: CPT

## 2017-06-23 PROCEDURE — 81001 URINALYSIS AUTO W/SCOPE: CPT

## 2017-06-23 PROCEDURE — 27200966 HC CLOSED SUCTION SYSTEM

## 2017-06-23 PROCEDURE — 84100 ASSAY OF PHOSPHORUS: CPT

## 2017-06-23 PROCEDURE — 25000242 PHARM REV CODE 250 ALT 637 W/ HCPCS: Performed by: PHYSICIAN ASSISTANT

## 2017-06-23 PROCEDURE — 25000003 PHARM REV CODE 250: Performed by: PSYCHIATRY & NEUROLOGY

## 2017-06-23 PROCEDURE — 87070 CULTURE OTHR SPECIMN AEROBIC: CPT

## 2017-06-23 RX ORDER — ALBUMIN HUMAN 50 G/1000ML
200 SOLUTION INTRAVENOUS ONCE
Status: COMPLETED | OUTPATIENT
Start: 2017-06-23 | End: 2017-06-23

## 2017-06-23 RX ORDER — ALBUMIN HUMAN 250 G/1000ML
25 SOLUTION INTRAVENOUS ONCE
Status: COMPLETED | OUTPATIENT
Start: 2017-06-23 | End: 2017-06-23

## 2017-06-23 RX ORDER — HEPARIN SODIUM 1000 [USP'U]/ML
2500 INJECTION, SOLUTION INTRAVENOUS; SUBCUTANEOUS ONCE
Status: COMPLETED | OUTPATIENT
Start: 2017-06-23 | End: 2017-06-23

## 2017-06-23 RX ORDER — HEPARIN SODIUM 10000 [USP'U]/100ML
1500 INJECTION, SOLUTION INTRAVENOUS CONTINUOUS
Status: DISCONTINUED | OUTPATIENT
Start: 2017-06-23 | End: 2017-06-25

## 2017-06-23 RX ORDER — FENTANYL CITRATE 50 UG/ML
25 INJECTION, SOLUTION INTRAMUSCULAR; INTRAVENOUS ONCE
Status: COMPLETED | OUTPATIENT
Start: 2017-06-23 | End: 2017-06-23

## 2017-06-23 RX ADMIN — HEPARIN SODIUM 5000 UNITS: 5000 INJECTION, SOLUTION INTRAVENOUS; SUBCUTANEOUS at 02:06

## 2017-06-23 RX ADMIN — CHLORHEXIDINE GLUCONATE 15 ML: 1.2 RINSE ORAL at 05:06

## 2017-06-23 RX ADMIN — HEPARIN SODIUM AND DEXTROSE 1500 UNITS/HR: 10000; 5 INJECTION INTRAVENOUS at 06:06

## 2017-06-23 RX ADMIN — INSULIN ASPART 6 UNITS: 100 INJECTION, SOLUTION INTRAVENOUS; SUBCUTANEOUS at 05:06

## 2017-06-23 RX ADMIN — DEXTROSE: 50 INJECTION, SOLUTION INTRAVENOUS at 10:06

## 2017-06-23 RX ADMIN — GABAPENTIN 300 MG: 300 CAPSULE ORAL at 02:06

## 2017-06-23 RX ADMIN — CALCIUM GLUCONATE 2000 MG: 94 INJECTION, SOLUTION INTRAVENOUS at 11:06

## 2017-06-23 RX ADMIN — ACETAMINOPHEN 650 MG: 325 TABLET ORAL at 05:06

## 2017-06-23 RX ADMIN — INSULIN ASPART 6 UNITS: 100 INJECTION, SOLUTION INTRAVENOUS; SUBCUTANEOUS at 08:06

## 2017-06-23 RX ADMIN — HYDRALAZINE HYDROCHLORIDE 10 MG: 20 INJECTION INTRAMUSCULAR; INTRAVENOUS at 02:06

## 2017-06-23 RX ADMIN — HEPARIN SODIUM 5000 UNITS: 5000 INJECTION, SOLUTION INTRAVENOUS; SUBCUTANEOUS at 05:06

## 2017-06-23 RX ADMIN — FAMOTIDINE 20 MG: 20 TABLET, FILM COATED ORAL at 09:06

## 2017-06-23 RX ADMIN — CHLORHEXIDINE GLUCONATE 15 ML: 1.2 RINSE ORAL at 11:06

## 2017-06-23 RX ADMIN — ALBUMIN (HUMAN) 25 G: 12.5 SOLUTION INTRAVENOUS at 03:06

## 2017-06-23 RX ADMIN — INSULIN ASPART 4 UNITS: 100 INJECTION, SOLUTION INTRAVENOUS; SUBCUTANEOUS at 11:06

## 2017-06-23 RX ADMIN — INSULIN ASPART 3 UNITS: 100 INJECTION, SOLUTION INTRAVENOUS; SUBCUTANEOUS at 08:06

## 2017-06-23 RX ADMIN — Medication 1 TABLET: at 09:06

## 2017-06-23 RX ADMIN — CHLORHEXIDINE GLUCONATE 15 ML: 1.2 RINSE ORAL at 12:06

## 2017-06-23 RX ADMIN — INSULIN ASPART 6 UNITS: 100 INJECTION, SOLUTION INTRAVENOUS; SUBCUTANEOUS at 01:06

## 2017-06-23 RX ADMIN — MIDAZOLAM 2 MG/HR: 5 INJECTION INTRAMUSCULAR; INTRAVENOUS at 09:06

## 2017-06-23 RX ADMIN — LEVOTHYROXINE SODIUM 75 MCG: 75 TABLET ORAL at 05:06

## 2017-06-23 RX ADMIN — STANDARDIZED SENNA CONCENTRATE AND DOCUSATE SODIUM 1 TABLET: 8.6; 5 TABLET, FILM COATED ORAL at 09:06

## 2017-06-23 RX ADMIN — HEPARIN SODIUM 2500 UNITS: 1000 INJECTION, SOLUTION INTRAVENOUS; SUBCUTANEOUS at 12:06

## 2017-06-23 RX ADMIN — SODIUM CHLORIDE: 0.9 INJECTION, SOLUTION INTRAVENOUS at 08:06

## 2017-06-23 RX ADMIN — ASPIRIN 325 MG ORAL TABLET 325 MG: 325 PILL ORAL at 09:06

## 2017-06-23 RX ADMIN — GABAPENTIN 300 MG: 300 CAPSULE ORAL at 05:06

## 2017-06-23 RX ADMIN — GABAPENTIN 300 MG: 300 CAPSULE ORAL at 09:06

## 2017-06-23 RX ADMIN — SERTRALINE HYDROCHLORIDE 50 MG: 50 TABLET ORAL at 09:06

## 2017-06-23 RX ADMIN — FENTANYL CITRATE 25 MCG: 50 INJECTION, SOLUTION INTRAMUSCULAR; INTRAVENOUS at 03:06

## 2017-06-23 RX ADMIN — Medication 12.5 MG: at 09:06

## 2017-06-23 RX ADMIN — ALBUMIN (HUMAN) 200 G: 12.5 SOLUTION INTRAVENOUS at 11:06

## 2017-06-23 RX ADMIN — ACETYLCYSTEINE 600 MG: 200 INHALANT RESPIRATORY (INHALATION) at 11:06

## 2017-06-23 RX ADMIN — THIAMINE HCL TAB 100 MG 100 MG: 100 TAB at 09:06

## 2017-06-23 NOTE — PROGRESS NOTES
[facility]  Neurocritical Care  Progress Note    Admit Date: 6/14/2017  Service Date: 06/22/2017  Length of Stay: 8    Subjective:     Chief Complaint: Guillain-Bonneau syndrome    History of Present Illness: 72 year old male with PMHx CAD, skin cancer, hypothyroidism, and HLP who was transferred from OSH for further evaluation and management of rapidly progressive ascending paralysis.  No family present during exam, history obtained primarily from chart.  Patient was on a camping trip to [place] and [place] recently. When he returned he started to have paraesthesias of the feet and hand that quickly progressed to legs weakness. No clear hx of tick bites or skin lesions.  Additionally, a few days prior to admission to OSH he had prostate biopsy and was treated with cipro.  Pt presented to OSH and was diagnosed with GBS and was started on IVIG, he completed 5 days of treatment which ended on 6/12/17.  Pt also being treated for pneumonia.  After admission his weakness got worse to the point that he had to be intubated.  Pt has had poor neurologic exam since.   - LP was done in OSH and was reported as 0 rbc, 0 wbc, protein 34, glucose 97.  Lyme ab panel was sent and results are pending.  MRI Brain done today shows non enhancing lesion abutting the right optic nerve.  Neurosurgery consulted for evaluation.    MRI of the brain:  Bilateral supraclinoid internal carotid artery aneurysms.  On the right there is a 10 mm aneurysm in separate 2 mm aneurysm.  On the left the supraclinoid ICA aneurysm measures about 7 x 6 mm.3.6 cm nonenhancing mass centered over the right greater and lesser wings of the sphenoid abutting the ICA, A1 and M1 segments, displacing the right optic nerve.  Differential considerations include a thrombosed aneurysm associated with right ICA aneurysm aneurysm,  exophytic hamartoma, or or an exophytic nonenhancing primary brain tumor such as low-grade glioma or  oligodendroglioma.    Hospital Course: 5/20:Initial LP with protein of 34 and most recent LP.  Pending CTA of the head or EEG.. Got IVIG x 45 and plasma exchanges x 2,. No clinical improvement.  6/21:Pending CTA and continuous EEG. High BS last night. EMG/NCS on Friday.  6/22: No clinical changes. CTA   left  superior hypophyseal aneurysm again measures approximately 7.0 x 6.0 mm. The right superior hypophyseal artery aneurysm again measures 10.0 x 7.0 mm.  There is a second superiorly projecting aneurysm of the right supraclinoid ICA measuring 2.0 mm. EEG: slowing. Sphenoid aneurysm. Pending EMG /NCS. PIC line is kinked.  CXR -R-PIC line kinked.           Interval History:  >4 elements OR status of 3 inpatient conditions  No clinical changes. CTA   left  superior hypophyseal aneurysm again measures approximately 7.0 x 6.0 mm. The right superior hypophyseal artery aneurysm again measures 10.0 x 7.0 mm.  There is a second superiorly projecting aneurysm of the right supraclinoid ICA measuring 2.0 mm. EEG: slowing. Sphenoid aneurysm. Pending EMG /NCS. PIC line is kinked.  CXR -R-PIC line kinked. Spoke with family about patient's condition, treatments  and pending tests. Botulinum toxin test was sent.     Review of Systems   Unable to perform ROS: Intubated     2 systems OR Unable to obtain a complete ROS due to level of consciousness.  Objective:     Vitals:  Temp: 98.8 °F (37.1 °C) (06/22/17 1500)  Pulse: 69 (06/22/17 1955)  Resp: 18 (06/22/17 1955)  BP: 122/64 (06/22/17 1905)  SpO2: 98 % (06/22/17 1955)    Temp:  [98 °F (36.7 °C)-98.8 °F (37.1 °C)] 98.8 °F (37.1 °C)  Pulse:  [] 69  Resp:  [18] 18  SpO2:  [96 %-100 %] 98 %  BP: (100-169)/(55-90) 122/64         Vent Mode: A/C  Oxygen Concentration (%):  [40] 40  Resp Rate Total:  [18 br/min] 18 br/min  Vt Set:  [550 mL] 550 mL  PEEP/CPAP:  [8 cmH20] 8 cmH20  Mean Airway Pressure:  [12 yzA54-00 cmH20] 12 cmH20    06/21 0701 - 06/22 0700  In: 7715 [I.V.:1800]  Out:  6992 [Urine:2860]    Physical Exam   Unable to test orientation, language, memory, judgment, insight, fund of knowledge, hearing, shoulder shrug, tongue protrusion, coordination, gait due to level of consciousness.  Exam:  General   HEENT: ETT  Chest Heart RRR / Lungs Clear to auscultation  Abdomen: Soft nontender + BS  Extremities: OK distal pulses.  Skin: UK  Neurological Exam:  MS; Paralyzed..first time following command swith his eyes.  CN: II-XII Pupils 2mm sluggish, trace corneal's. Facial paralysis and ophthalmoplegia bilaterally, Moved laterally with his eyes./  Motor: LUE   0/5 / RUE 0 /5               LLE   0/5 /  RLE 0 /5   Sensory: LT/PP/T/ Vibration Limited                 Complex sensory modalities: not tested  DTR:  normal throughout.  Coordination /Fine motor: Limited.  Gait: Not tested.  Meningeal signs: Absent  Medications:  Continuous  sodium chloride 0.9% Last Rate: 100 mL/hr at 06/22/17 2000   midazolam (VERSED) infusion (non-titrating) Last Rate: 2 mg/hr (06/22/17 2000)   Scheduled  acetylcysteine 200 mg/ml (20%) 600 mg BID   aspirin 325 mg Daily   bisacodyl 10 mg Daily   chlorhexidine 15 mL QID   famotidine 20 mg BID   folic acid-vit B6-vit B12 2.5-25-2 mg 1 tablet Daily   gabapentin 300 mg TID   heparin (porcine) 5,000 Units Q8H   insulin detemir 15 Units BID   levothyroxine 75 mcg Daily   methylPREDNISolone (SOLU-Medrol) IVPB (doses > 250 mg)  Daily   metoprolol tartrate 12.5 mg BID   polyethylene glycol 17 g Daily   senna-docusate 8.6-50 mg 1 tablet Daily   sertraline 50 mg Daily   thiamine 100 mg Daily   PRN  sodium chloride  Q24H PRN   acetaminophen 650 mg Q6H PRN   dextrose 50% 12.5 g PRN   glucagon (human recombinant) 1 mg PRN   heparin, porcine (PF) 1,000 Units PRN   hydrALAZINE 10 mg Q6H PRN   insulin aspart 1-10 Units Q4H PRN   magnesium oxide 800 mg PRN   magnesium oxide 800 mg PRN   ondansetron 4 mg Q8H PRN   potassium chloride 10% 40 mEq PRN   potassium chloride 10% 40 mEq PRN    potassium chloride 10% 60 mEq PRN   potassium, sodium phosphates 2 packet PRN   potassium, sodium phosphates 2 packet PRN   potassium, sodium phosphates 2 packet PRN     Today I personally reviewed pertinent medications, lines/drains/airways, imaging, cardiology, lab results, microbiology results, notably:        Assessment/Plan:     No new Assessment & Plan notes have been filed under this hospital service since the last note was generated.  Service: Neuro Critical Care    Active Problem List:   1.GBS   2. CNS tumor  3. CNS aneurysms.     Assessment / Plan:     Neuro:  -PLEX tomorrow and one more on Sunday.  -Solumedrol 1gr qd x 5 days. Last day tomorrow.  -Alprazolam 1gr q 8hrs  -Sertraline 50 mg qd.  -EMG/NCS tomorrow  -24 hrs urine collection for heavy metal screen.   -Anti GM1, anti-GQ1B and  Campylobacter antibodies. (already getting PLEX). Done.  -PT/OT range of motion.  -Bis Monitor.  -Versed IVD 1 PRN  -Neurontin 300mg tid for neurooptic pain.  -Bis monitor.Resp: ABG /CXR OK. Pneumonia with Pseudomonas.  -Vent Mode: A/C  Oxygen Concentration (%):  [40] 40  Resp Rate Total:  [18 br/min] 18 br/min  Vt Set:  [8 mL-550 mL] 550 mL  PEEP/CPAP:  [8 cmH20] 8 cmH20  Mean Airway Pressure:  [12 [id number] cmH20] 12 cmH20  -Albuterol /atrovent q 6hrs  -Chest PT q 6hrs PRN  -VAP  CV: Keep SBP <=160 mmHg. TTE normal.  -Metoprolol 12.5  q8hrs    IVF/nutrition/Renal/GI: Prerenal/Postrenal.  -TF at goal   - cc/hr IVD. For today  -Mucomyst 600mmg q 12hrs x 48hrs  -Gudino catheter.  -BR Suppository.  Hem / ID: Pseudomonas. WBC 19K., HSV PCRs negative.  -Check Pro calcitonin..  Endo:  -SSI q4hrs  -Famotidine 20mg bid.  -Levothryroxine 0.075 mg qd.  -SC Detemir 15 U  bid  Prophylaxis:  -SC Heparin 5000 U q 8hrs  -U/S 4 limbs.  Advance Directives and Disposition:    Full Code. Pending EMG/NCS tomorrow.              Uninterrupted Critical Care/Counseling Time (directly spent today by me not including procedures 30-74 min  (63614)): =  35  min      Activity Orders          Up with assistance starting at 06/14 0111        Full Code    Pepe Salgado MD  Neurocritical Care  [facility]

## 2017-06-23 NOTE — ASSESSMENT & PLAN NOTE
Neurology following  LP outside hospital Protein 34 Glucose 97, WBC, completed 5 days IvIg before arrival  Exam poor, No motor response to pain,Diffuse hypotonia, Absent DTR diffusely   Repeat LP 6/19, CSF studies pending, Lyme negative  6/15 MRI shows supraclinoid aneurysms and mass/thrombosed aneurysm (nsgy consult),   Currently in PLEX 4/5, final PLEX Sunday 6/25  EMG performed 6/23, pending results

## 2017-06-23 NOTE — PROGRESS NOTES
Notified resident MD with NCC regarding pt febrile, T max 100.2. Tylenol given at 0530. Ice packs applied and room temperature decreased at this time since no new order obtained. Will continue to monitor very closely.

## 2017-06-23 NOTE — SUBJECTIVE & OBJECTIVE
Interval History: no AE. Started on versed yesterday.     Medications:  Continuous Infusions:   sodium chloride 0.9% 100 mL/hr at 17    midazolam (VERSED) infusion (non-titrating) 2 mg/hr (17)     Scheduled Meds:   albumin human 5%  200 g Intravenous Once    aspirin  325 mg Per NG tube Daily    bisacodyl  10 mg Rectal Daily    calcium gluconate IVPB  2,000 mg Intravenous Once    chlorhexidine  15 mL Mouth/Throat QID    famotidine  20 mg Per OG tube BID    folic acid-vit B6-vit B12 2.5-25-2 mg  1 tablet Per NG tube Daily    gabapentin  300 mg Oral TID    heparin (porcine)  2,500 Units Intravenous Once    heparin (porcine)  5,000 Units Subcutaneous Q8H    insulin detemir  15 Units Subcutaneous BID    levothyroxine  75 mcg Per NG tube Daily    metoprolol tartrate  12.5 mg Per OG tube BID    polyethylene glycol  17 g Per NG tube Daily    senna-docusate 8.6-50 mg  1 tablet Per OG tube Daily    sertraline  50 mg Per NG tube Daily    thiamine  100 mg Per NG tube Daily     PRN Meds:sodium chloride, acetaminophen, dextrose 50%, glucagon (human recombinant), heparin, porcine (PF), hydrALAZINE, insulin aspart, magnesium oxide, magnesium oxide, ondansetron, potassium chloride 10%, potassium chloride 10%, potassium chloride 10%, potassium, sodium phosphates, potassium, sodium phosphates, potassium, sodium phosphates     Review of Systems  Objective:     Weight: 91.4 kg (201 lb 8 oz)  Body mass index is 26.58 kg/m².  Vital Signs (Most Recent):  Temp: 99.1 °F (37.3 °C) (17)  Pulse: 65 (17)  Resp: 18 (17)  BP: (!) 120/59 (17)  SpO2: 99 % (17) Vital Signs (24h Range):  Temp:  [98.8 °F (37.1 °C)-100.4 °F (38 °C)] 99.1 °F (37.3 °C)  Pulse:  [65-99] 65  Resp:  [18] 18  SpO2:  [96 %-100 %] 99 %  BP: (104-155)/(55-81) 120/59              Temp (24hrs), Av.7 °F (37.6 °C), Min:98.8 °F (37.1 °C), Max:100.4 °F (38 °C)    Vent Mode: A/C  Oxygen  Concentration (%):  [40] 40  Resp Rate Total:  [18 br/min] 18 br/min  Vt Set:  [550 mL] 550 mL  PEEP/CPAP:  [8 cmH20] 8 cmH20  Mean Airway Pressure:  [12 cmH20] 12 cmH20      Date 06/23/17 0700 - 06/24/17 0659   Shift 2472-9646 6598-7789 0176-1356 24 Hour Total   I  N  T  A  K  E   I.V.  (mL/kg) 411.4  (4.5)   411.4  (4.5)    NG/   400    Shift Total  (mL/kg) 811.4  (8.9)   811.4  (8.9)   O  U  T  P  U  T   Urine  (mL/kg/hr) 435   435    Shift Total  (mL/kg) 435  (4.8)   435  (4.8)   Weight (kg) 91.4 91.4 91.4 91.4          NG/OG Tube 06/09/17 0130 (Active)   Placement Check placement verified by x-ray;placement verified by distal tube length measurement;placement verified by aspirate characteristics 6/23/2017  7:02 AM   Advancement advanced manually 6/23/2017  7:02 AM   Distal Tube Length (cm) 65 6/23/2017  7:02 AM   Tolerance no signs/symptoms of discomfort 6/23/2017  7:02 AM   Securement taped to commercial device 6/23/2017  7:02 AM   Clamp Status/Tolerance unclamped;residual 6/23/2017  7:02 AM   Suction Setting/Drainage Method dependent drainage 6/23/2017  7:02 AM   Insertion Site Appearance no redness, warmth, tenderness, skin breakdown, drainage 6/23/2017  7:02 AM   Drainage None 6/23/2017  7:02 AM   Flush/Irrigation flushed w/;water;no resistance met 6/23/2017  7:02 AM   Feeding Method continuous 6/23/2017  7:02 AM   Current Rate (mL/hr) 50 mL/hr 6/23/2017  7:02 AM   Goal Rate (mL/hr) 50 mL/hr 6/23/2017  7:02 AM   Intake (mL) 200 mL 6/23/2017 10:02 AM   Rate Formula Tube Feeding (mL/hr) 50 mL/hr 6/23/2017  3:05 AM   Intake (mL) - Formula Tube Feeding 50 6/23/2017 10:02 AM   Residual Amount (ml) 130 ml 6/22/2017  9:00 PM            Urethral Catheter 06/20/17 2220 (Active)   Site Assessment Clean;Intact 6/23/2017  7:02 AM   Collection Container Urimeter 6/23/2017  7:02 AM   Securement Method secured to top of thigh w/ adhesive device 6/23/2017  7:02 AM   Catheter Care Performed yes 6/23/2017  7:02 AM  "  Reason for Continuing Urinary Catheterization Urinary retention;Critically ill in ICU requiring intensive monitoring 6/23/2017  7:02 AM   CAUTI Prevention Bundle StatLock in place w 1" slack;Intact seal between catheter & drainage tubing;Green sheeting clip in use;Drainage bag off the floor;No dependent loops or kinks;Drainage bag not overfilled (<2/3 full);Drainage bag below bladder 6/23/2017  7:02 AM   Output (mL) 115 mL 6/23/2017 10:02 AM            Trialysis (Dialysis) Catheter 06/17/17 1300 left subclavian (Active)   IV Device Securement sutures 6/23/2017  7:02 AM   Additional Site Signs no warmth;no erythema;no edema;no pain;no palpable cord;no streak formation;no drainage 6/23/2017  7:02 AM   Patency/Care heparin locked 6/23/2017  7:02 AM   Site Assessment Clean;Dry;Intact;No redness;No swelling 6/23/2017  7:02 AM   Status Deaccessed 6/23/2017  7:02 AM   Flows Good 6/23/2017  7:02 AM   Dressing Intervention Dressing reinforced 6/23/2017  7:02 AM   Dressing Status Biopatch in place;Clean;Dry;Intact 6/23/2017  7:02 AM   Dressing Change Due 06/26/17 6/23/2017  7:02 AM   Verification by X-ray Yes 6/23/2017  7:02 AM   Site Condition No complications 6/23/2017  7:02 AM   Dressing Occlusive 6/23/2017  7:02 AM   Drainage Description Other (Comment) 6/23/2017  7:02 AM   Daily Line Review Performed 6/23/2017  7:02 AM       Neurosurgery Physical Exam  Intubated, sedated- versed.   Does not open eyes   Does not respond to stim  GCS 3T    Significant Labs:    Recent Labs  Lab 06/23/17  0348   *      K 5.2*      CO2 28   BUN 41*   CREATININE 0.8   CALCIUM 8.2*   MG 2.6       Recent Labs  Lab 06/22/17  0216 06/23/17  0348   WBC 19.68* 14.77*   HGB 10.3* 9.0*   HCT 30.4* 28.2*    230     No results for input(s): LABPT, INR, APTT in the last 48 hours.  Microbiology Results (last 7 days)     Procedure Component Value Units Date/Time    CSF culture [[id number]] Collected:  06/18/17 1212    Order " Status:  Completed Specimen:  CSF (Spinal Fluid) from CSF Tap, Tube 3 Updated:  06/23/17 0719     CSF CULTURE No Growth to date     Gram Stain Result No WBC's      No organisms seen    Narrative:       On which sequentially labeled tube should this analysis be  performed?->3    Laurita Ink (CSF) [[id number]] Collected:  06/18/17 1212    Order Status:  Completed Specimen:  CSF (Spinal Fluid) from CSF Tap, Tube 3 Updated:  06/19/17 1417     Laurita Ink No encapsulated yeast seen    Cryptococcal antigen, CSF [[id number]] Collected:  06/18/17 1212    Order Status:  Canceled Specimen:  CSF (Spinal Fluid) from CSF Tap, Tube 3 Updated:  06/19/17 1243    Blood culture [[id number]] Collected:  06/14/17 0336    Order Status:  Completed Specimen:  Blood from Peripheral, Wrist, Left Updated:  06/19/17 0812     Blood Culture, Routine No growth after 5 days.        Recent Lab Results       06/23/17  0537 06/23/17  0406 06/23/17  0348 06/22/17  2152 06/22/17  1725      Procalcitonin          Albumin   3.3(L)       Alkaline Phosphatase   36(L)       Allens Test  Pass        ALT   28       Anion Gap   5(L)       AST   15       Baso #   0.00       Basophil%   0.0       Total Bilirubin   0.5  Comment:  For infants and newborns, interpretation of results should be based  on gestational age, weight and in agreement with clinical  observations.  Premature Infant recommended reference ranges:  Up to 24 hours.............<8.0 mg/dL  Up to 48 hours............<12.0 mg/dL  3-5 days..................<15.0 mg/dL  6-29 days.................<15.0 mg/dL         Site  LR        BUN, Bld   41(H)       Calcium   8.2(L)       Chloride   107       CO2   28       Creatinine   0.8       DelSys  Adult Vent        Differential Method   Automated       eGFR if    >60.0       eGFR if non    >60.0  Comment:  Calculation used to obtain the estimated glomerular filtration  rate (eGFR) is the CKD-EPI equation. Since race is unknown   in  our information system, the eGFR values for   -American and Non--American patients are given   for each creatinine result.         Eos #   0.0       Eosinophil%   0.0       FiO2  40        Glucose   291(H)       Gran #   14.1(H)       Gran%   95.3(H)       Hematocrit   28.2(L)       Hemoglobin   9.0(L)       Lymph #   0.3(L)       Lymph%   2.3(L)       Magnesium   2.6       MCH   31.8(H)       MCHC   31.9(L)       MCV   100(H)       Min Vol  10.7        Mode  AC/PRVC        Mono #   0.3       Mono%   2.1(L)       MPV   10.9       PEEP  8        Phosphorus   3.0       PiP  20        Platelets   230       POC BE  7        POC HCO3  31.4(H)        POC PCO2  47.3(H)        POC PH  7.430        POC PO2  92        POC SATURATED O2  97        POC TCO2  33(H)        POCT Glucose 294(H)   283(H) 309(H)     Potassium   5.2(H)       Total Protein   5.0(L)       Rate  18        RBC   2.83(L)       RDW   13.8       Sample  ARTERIAL        Sodium   140       Sp02  96        Vt  550        WBC   14.77(H)                   06/22/17  1332 06/22/17  1243      Procalcitonin  0.18  Comment:  A concentration < 0.25 ng/mL represents a low risk bacterial   infection.  Procalcitonin may not be accurate among patients with localized   infection, recent trauma or major surgery, immunosuppressed state,   invasive fungal infection, renal dysfunction. Decisions regarding   initiation or continuation of antibiotic therapy should not be based   solely on procalcitonin levels.       Albumin       Alkaline Phosphatase       Allens Test       ALT       Anion Gap       AST       Baso #       Basophil%       Total Bilirubin       Site       BUN, Bld       Calcium       Chloride       CO2       Creatinine       DelSys       Differential Method       eGFR if        eGFR if non        Eos #       Eosinophil%       FiO2       Glucose       Gran #       Gran%       Hematocrit       Hemoglobin       Lymph #        Lymph%       Magnesium       MCH       MCHC       MCV       Min Vol       Mode       Mono #       Mono%       MPV       PEEP       Phosphorus       PiP       Platelets       POC BE       POC HCO3       POC PCO2       POC PH       POC PO2       POC SATURATED O2       POC TCO2       POCT Glucose 252(H)      Potassium       Total Protein       Rate       RBC       RDW       Sample       Sodium       Sp02       Vt       WBC             Significant Diagnostics:  CT: No results found in the last 24 hours.

## 2017-06-23 NOTE — PROGRESS NOTES
[facility]  Neurocritical Care  Progress Note    Admit Date: 6/14/2017  Service Date: 06/23/2017  Length of Stay: 9    Subjective:     Chief Complaint: Guillain-Rector syndrome    History of Present Illness: 72 year old male with PMHx CAD, skin cancer, hypothyroidism, and HLP who was transferred from OSH for further evaluation and management of rapidly progressive ascending paralysis.  No family present during exam, history obtained primarily from chart.  Patient was on a camping trip to [place] and [place] recently. When he returned he started to have paraesthesias of the feet and hand that quickly progressed to legs weakness. No clear hx of tick bites or skin lesions.  Additionally, a few days prior to admission to OSH he had prostate biopsy and was treated with cipro.  Pt presented to OSH and was diagnosed with GBS and was started on IVIG, he completed 5 days of treatment which ended on 6/12/17.  Pt also being treated for pneumonia.  After admission his weakness got worse to the point that he had to be intubated.  Pt has had poor neurologic exam since.   - LP was done in OSH and was reported as 0 rbc, 0 wbc, protein 34, glucose 97.  Lyme ab panel was sent and results are pending.  MRI Brain done today shows non enhancing lesion abutting the right optic nerve.  Neurosurgery consulted for evaluation.    MRI of the brain:  Bilateral supraclinoid internal carotid artery aneurysms.  On the right there is a 10 mm aneurysm in separate 2 mm aneurysm.  On the left the supraclinoid ICA aneurysm measures about 7 x 6 mm.3.6 cm nonenhancing mass centered over the right greater and lesser wings of the sphenoid abutting the ICA, A1 and M1 segments, displacing the right optic nerve.  Differential considerations include a thrombosed aneurysm associated with right ICA aneurysm aneurysm,  exophytic hamartoma, or or an exophytic nonenhancing primary brain tumor such as low-grade glioma or  oligodendroglioma.    Hospital Course: 6/15 transferred [address] for GBS. :Initial LP with protein of 34., glucose 97, WBC ) .IVIG x 5(finished 6/12).  6/16 NSGY consulted for  /lesion noted on MRI, Awaitng EMG  6/18 Lyme negative, multiple CSF labs pending  6/21:Pending CTA and continuous EEG.   6/22: No clinical changes. CTA   left  superior hypophyseal aneurysm again measures approximately 7.0 x 6.0 mm. The right superior hypophyseal artery aneurysm again measures 10.0 x 7.0 mm.  There is a second superiorly projecting aneurysm of the right supraclinoid ICA measuring 2.0 mm. EEG: slowing. Sphenoid aneurysm. Pending EMG /NCS. PIC line is kinked.  CXR -R-PIC line kinked.   6/23 EMG performed, awaiting results.         Interval History:    -EMG today, awaiting results  -continue versed for sedation while intubated  -Pan Cx for tMax 100.2 overnight  -Detemir increased        Review of Systems   Unable to perform ROS: Intubated     2 systems OR Unable to obtain a complete ROS due to level of consciousness.  Objective:     Vitals:  Temp: 98.8 °F (37.1 °C) (06/23/17 1502)  Pulse: 106 (06/23/17 1502)  Resp: 18 (06/23/17 1502)  BP: (!) 158/79 (06/23/17 1502)  SpO2: 98 % (06/23/17 1502)    Temp:  [97.5 °F (36.4 °C)-100.4 °F (38 °C)] 98.8 °F (37.1 °C)  Pulse:  [] 106  Resp:  [18] 18  SpO2:  [96 %-100 %] 98 %  BP: (104-162)/(55-83) 158/79         Vent Mode: A/C  Oxygen Concentration (%):  [40] 40  Resp Rate Total:  [18 br/min] 18 br/min  Vt Set:  [550 mL] 550 mL  PEEP/CPAP:  [8 cmH20] 8 cmH20  Mean Airway Pressure:  [11 byO96-71 cmH20] 11 cmH20    06/22 0701 - 06/23 0700  In: 3563.8 [I.V.:2033.8]  Out: 2210 [Urine:2210]    Physical Exam   HENT:   Head: Normocephalic.   Cardiovascular: Normal rate, regular rhythm, normal heart sounds and intact distal pulses.    Pulmonary/Chest: Effort normal and breath sounds normal.   Abdominal: Soft. Bowel sounds are normal.   Neurological:   E1 V1T M0  Areflexic, diffuse  hypotonia  No response to noxious stimuli  No gag/cough/corneal  PERRLA, minimal lateral eye movment, noted left eye   Skin: Skin is warm and dry.   Vitals reviewed.        Medications:  Continuous  sodium chloride 0.9% Last Rate: 100 mL/hr at 06/23/17 1502   midazolam (VERSED) infusion (non-titrating) Last Rate: 2 mg/hr (06/23/17 1502)   Scheduled  aspirin 325 mg Daily   bisacodyl 10 mg Daily   chlorhexidine 15 mL QID   famotidine 20 mg BID   folic acid-vit B6-vit B12 2.5-25-2 mg 1 tablet Daily   gabapentin 300 mg TID   heparin (porcine) 5,000 Units Q8H   insulin detemir 25 Units BID   levothyroxine 75 mcg Daily   metoprolol tartrate 12.5 mg BID   polyethylene glycol 17 g Daily   senna-docusate 8.6-50 mg 1 tablet Daily   sertraline 50 mg Daily   thiamine 100 mg Daily   PRN  sodium chloride  Q24H PRN   acetaminophen 650 mg Q6H PRN   dextrose 50% 12.5 g PRN   glucagon (human recombinant) 1 mg PRN   heparin, porcine (PF) 1,000 Units PRN   hydrALAZINE 10 mg Q6H PRN   insulin aspart 1-10 Units Q4H PRN   magnesium oxide 800 mg PRN   magnesium oxide 800 mg PRN   ondansetron 4 mg Q8H PRN   potassium chloride 10% 40 mEq PRN   potassium chloride 10% 40 mEq PRN   potassium chloride 10% 60 mEq PRN   potassium, sodium phosphates 2 packet PRN   potassium, sodium phosphates 2 packet PRN   potassium, sodium phosphates 2 packet PRN     Today I personally reviewed pertinent medications, lines/drains/airways, imaging, cardiology, lab results, microbiology results,       Assessment/Plan:     Neuro   * Guillain-Barberton syndrome    Neurology following  LP outside hospital Protein 34 Glucose 97, WBC, completed 5 days IvIg before arrival  Exam poor, No motor response to pain,Diffuse hypotonia, Absent DTR diffusely   Repeat LP 6/19, CSF studies pending, Lyme negative  6/15 MRI shows supraclinoid aneurysms and mass/thrombosed aneurysm (nsgy consult),   Currently in PLEX 4/5, final PLEX Sunday 6/25  EMG performed 6/23, pending results           Brain aneurysm    NSGY following  CTA 6/21: Stable, bilateral saccular superior hypophyseal artery aneurysms measuring 7.0 mm on the left and 10.0 mm on the right with an additional 2.0 mm aneurysm involving the right supraclinoid ICA. Stable, 3.6 cm nonenhancing soft tissue attenuation mass along the right planum sphenoidale           Pulmonary   Acute respiratory failure with hypoxia and hypercapnia    Intuabted on arrival to [facility]  Daily CXR/ABG  Unable to wean d/t muscle weakness  Duonebs        Cardiac   Right subclavian vein thrombosis    6/23 US reveals right Subclavian/axilary thrombus,  -heparin gtt started        Endocrine   Acquired hypothyroidism    On synthroid        Fluids/Electrolytes/Nutrition/GI   Hyperglycemia    HgbA1c 6.2  Hyperglycemia worsened by steroids  detemir increased, Continue ISS            Prophylaxis:  Venous Thromboembolism: mechanical  Stress Ulcer: H2B  Ventilator Pneumonia: yes     Activity Orders          Up with assistance starting at 06/14 0111        Full Code   Critical Care: 36 min      James Blackmon NP  Neurocritical Care  [facility]      NEURO-ICU FACULTY ATTESTATION NOTE:    I assessed this  patient clinically, reviewed his/ her labs, imaging studies, consultation notes with the aid of my team composed of Neurocritical Care NPs /PAs/ and residents during my Neurocritical Care Rounds. I agree with the assessment and plan presented here by James Mc NP NICU.            24-hr Events:  No clinical changes.  EMG/NCS done and pending results. PLEX done. EEG will be placed back. Had low grade fever and WBC increased. Unlikely botulism given presentation.    RxS:  Limited.    Exam:  General   HEENT: ETT  Chest Heart RRR / Lungs Clear to auscultation  Abdomen: Soft nontender + BS  Extremities: OK distal pulses.  Skin: UK  Neurological Exam:  MS; Paralyzed..first time following command swith his eyes.  CN: II-XII Pupils 2mm sluggish, trace corneal's.  Facial paralysis and ophthalmoplegia bilaterally.  Motor: LUE   0/5 / RUE 0 /5               LLE   0/5 /  RLE 0 /5   Sensory: LT/PP/T/ Vibration Limited                 Complex sensory modalities: not tested  DTR:  Areflexic.  Coordination /Fine motor: Limited.  Gait: Not tested.  Meningeal signs: Absent    Active Problem List:   1.GBS   2. CNS tumor  3. CNS aneurysms.    Assessment / Plan:    Neuro:  -PLEX last on Sunday.  -Solumedrol D/C  -Sertraline 50 mg qd.  -EMG/NCS pending results.  -Heavy metal screen pending  -Anti GM1, anti-GQ1B and  Campylobacter antibodies. (already getting PLEX). Done.  -PT/OT range of motion.  -Bis Monitor.  -Versed IVD 1 PRN  Sedation vacation.  -Neurontin 300mg tid for neuropatic pain.  -Bis monitor.Resp: ABG /CXR OK. Pneumonia with Pseudomonas.  -Vent Mode: A/C  Oxygen Concentration (%):  [40] 40  Resp Rate Total:  [18 br/min] 18 br/min  Vt Set:  [8 mL-550 mL] 550 mL  PEEP/CPAP:  [8 cmH20] 8 cmH20  Mean Airway Pressure:  [12 lqI49-03 cmH20] 12 cmH20  -Albuterol /atrovent q 6hrs  -Chest PT q 6hrs PRN  -VAP  CV: Keep SBP <=160 mmHg. TTE normal.  -Metoprolol 25   q8hrs    IVF/nutrition/Renal/GI: Prerenal/Postrenal.  -TF at goal   - cc/hr IVD. For today  -Bladder scan  .-Albumin 25%100 cc over 1 hr x 1`  -Mucomyst 600mmg q 12hrs  Off after 48hrs  -Gudino catheter.   -BR Suppository.   Hem / ID: Pseudomonas. WBC 14K., HSV PCRs negative.  -Reculture, blood cultures.  -Indirect BAL.  -UA  Endo:  -SSI q4hrs  -Famotidine 20mg bid.  -Levothryroxine 0.075 mg qd.  -SC Detemir 30 U  Bid. Change  Prophylaxis:  -SC Heparin 5000 U q 8hrs  -U/S 4 limbs.  Advance Directives and Disposition:    Full Code. Pending EMG/NCS results.                 Pepe Salgado MD, MPH, FAHA,  Neurocritical Care Department Faculty

## 2017-06-23 NOTE — PROGRESS NOTES
[facility]  Neurosurgery  Progress Note    Subjective:     History of Present Illness: 72 year old male with PMHx CAD, skin cancer, hypothyroidism, and HLP who was transferred from OSH for further evaluation and management of rapidly progressive ascending paralysis.  No family present during exam, history obtained primarily from chart.  Patient was on a camping trip to [place] and [place] recently. When he returned he started to have paraesthesias of the feet and hand that quickly progressed to legs weakness. No clear hx of tick bites or skin lesions.  Additionally, a few days prior to admission to OSH he had prostate biopsy and was treated with cipro.  Pt presented to OSH and was diagnosed with GBS and was started on IVIG, he completed 5 days of treatment which ended on 6/12/17.  Pt also being treated for pneumonia.  After admission his weakness got worse to the point that he had to be intubated.  Pt has had poor neurologic exam since.   - LP was done in OSH and was reported as 0 rbc, 0 wbc, protein 34, glucose 97.  Lyme ab panel was sent and results are pending.  MRI Brain done today shows non enhancing lesion abutting the right optic nerve.  Neurosurgery consulted for evaluation.             Post-Op Info:  * No surgery found *         Interval History: no AE. Started on versed yesterday.     Medications:  Continuous Infusions:   sodium chloride 0.9% 100 mL/hr at 06/23/17 1002    midazolam (VERSED) infusion (non-titrating) 2 mg/hr (06/23/17 1002)     Scheduled Meds:   albumin human 5%  200 g Intravenous Once    aspirin  325 mg Per NG tube Daily    bisacodyl  10 mg Rectal Daily    calcium gluconate IVPB  2,000 mg Intravenous Once    chlorhexidine  15 mL Mouth/Throat QID    famotidine  20 mg Per OG tube BID    folic acid-vit B6-vit B12 2.5-25-2 mg  1 tablet Per NG tube Daily    gabapentin  300 mg Oral TID    heparin (porcine)  2,500 Units Intravenous Once    heparin  (porcine)  5,000 Units Subcutaneous Q8H    insulin detemir  15 Units Subcutaneous BID    levothyroxine  75 mcg Per NG tube Daily    metoprolol tartrate  12.5 mg Per OG tube BID    polyethylene glycol  17 g Per NG tube Daily    senna-docusate 8.6-50 mg  1 tablet Per OG tube Daily    sertraline  50 mg Per NG tube Daily    thiamine  100 mg Per NG tube Daily     PRN Meds:sodium chloride, acetaminophen, dextrose 50%, glucagon (human recombinant), heparin, porcine (PF), hydrALAZINE, insulin aspart, magnesium oxide, magnesium oxide, ondansetron, potassium chloride 10%, potassium chloride 10%, potassium chloride 10%, potassium, sodium phosphates, potassium, sodium phosphates, potassium, sodium phosphates     Review of Systems  Objective:     Weight: 91.4 kg (201 lb 8 oz)  Body mass index is 26.58 kg/m².  Vital Signs (Most Recent):  Temp: 99.1 °F (37.3 °C) (17 1002)  Pulse: 65 (17 1002)  Resp: 18 (17 1002)  BP: (!) 120/59 (17 1002)  SpO2: 99 % (17 1002) Vital Signs (24h Range):  Temp:  [98.8 °F (37.1 °C)-100.4 °F (38 °C)] 99.1 °F (37.3 °C)  Pulse:  [65-99] 65  Resp:  [18] 18  SpO2:  [96 %-100 %] 99 %  BP: (104-155)/(55-81) 120/59              Temp (24hrs), Av.7 °F (37.6 °C), Min:98.8 °F (37.1 °C), Max:100.4 °F (38 °C)    Vent Mode: A/C  Oxygen Concentration (%):  [40] 40  Resp Rate Total:  [18 br/min] 18 br/min  Vt Set:  [550 mL] 550 mL  PEEP/CPAP:  [8 cmH20] 8 cmH20  Mean Airway Pressure:  [12 cmH20] 12 cmH20      Date 17 0700 - 17 0659   Shift 1526-1550 5442-2767 4234-6574 24 Hour Total   I  N  T  A  K  E   I.V.  (mL/kg) 411.4  (4.5)   411.4  (4.5)    NG/   400    Shift Total  (mL/kg) 811.4  (8.9)   811.4  (8.9)   O  U  T  P  U  T   Urine  (mL/kg/hr) 435   435    Shift Total  (mL/kg) 435  (4.8)   435  (4.8)   Weight (kg) 91.4 91.4 91.4 91.4          NG/OG Tube 17 0130 (Active)   Placement Check placement verified by x-ray;placement verified by distal tube  "length measurement;placement verified by aspirate characteristics 6/23/2017  7:02 AM   Advancement advanced manually 6/23/2017  7:02 AM   Distal Tube Length (cm) 65 6/23/2017  7:02 AM   Tolerance no signs/symptoms of discomfort 6/23/2017  7:02 AM   Securement taped to commercial device 6/23/2017  7:02 AM   Clamp Status/Tolerance unclamped;residual 6/23/2017  7:02 AM   Suction Setting/Drainage Method dependent drainage 6/23/2017  7:02 AM   Insertion Site Appearance no redness, warmth, tenderness, skin breakdown, drainage 6/23/2017  7:02 AM   Drainage None 6/23/2017  7:02 AM   Flush/Irrigation flushed w/;water;no resistance met 6/23/2017  7:02 AM   Feeding Method continuous 6/23/2017  7:02 AM   Current Rate (mL/hr) 50 mL/hr 6/23/2017  7:02 AM   Goal Rate (mL/hr) 50 mL/hr 6/23/2017  7:02 AM   Intake (mL) 200 mL 6/23/2017 10:02 AM   Rate Formula Tube Feeding (mL/hr) 50 mL/hr 6/23/2017  3:05 AM   Intake (mL) - Formula Tube Feeding 50 6/23/2017 10:02 AM   Residual Amount (ml) 130 ml 6/22/2017  9:00 PM            Urethral Catheter 06/20/17 2220 (Active)   Site Assessment Clean;Intact 6/23/2017  7:02 AM   Collection Container Urimeter 6/23/2017  7:02 AM   Securement Method secured to top of thigh w/ adhesive device 6/23/2017  7:02 AM   Catheter Care Performed yes 6/23/2017  7:02 AM   Reason for Continuing Urinary Catheterization Urinary retention;Critically ill in ICU requiring intensive monitoring 6/23/2017  7:02 AM   CAUTI Prevention Bundle StatLock in place w 1" slack;Intact seal between catheter & drainage tubing;Green sheeting clip in use;Drainage bag off the floor;No dependent loops or kinks;Drainage bag not overfilled (<2/3 full);Drainage bag below bladder 6/23/2017  7:02 AM   Output (mL) 115 mL 6/23/2017 10:02 AM            Trialysis (Dialysis) Catheter 06/17/17 1300 left subclavian (Active)   IV Device Securement sutures 6/23/2017  7:02 AM   Additional Site Signs no warmth;no erythema;no edema;no pain;no palpable " cord;no streak formation;no drainage 6/23/2017  7:02 AM   Patency/Care heparin locked 6/23/2017  7:02 AM   Site Assessment Clean;Dry;Intact;No redness;No swelling 6/23/2017  7:02 AM   Status Deaccessed 6/23/2017  7:02 AM   Flows Good 6/23/2017  7:02 AM   Dressing Intervention Dressing reinforced 6/23/2017  7:02 AM   Dressing Status Biopatch in place;Clean;Dry;Intact 6/23/2017  7:02 AM   Dressing Change Due 06/26/17 6/23/2017  7:02 AM   Verification by X-ray Yes 6/23/2017  7:02 AM   Site Condition No complications 6/23/2017  7:02 AM   Dressing Occlusive 6/23/2017  7:02 AM   Drainage Description Other (Comment) 6/23/2017  7:02 AM   Daily Line Review Performed 6/23/2017  7:02 AM       Neurosurgery Physical Exam  Intubated, sedated- versed.   Does not open eyes   Does not respond to stim  GCS 3T    Significant Labs:    Recent Labs  Lab 06/23/17  0348   *      K 5.2*      CO2 28   BUN 41*   CREATININE 0.8   CALCIUM 8.2*   MG 2.6       Recent Labs  Lab 06/22/17  0216 06/23/17  0348   WBC 19.68* 14.77*   HGB 10.3* 9.0*   HCT 30.4* 28.2*    230     No results for input(s): LABPT, INR, APTT in the last 48 hours.  Microbiology Results (last 7 days)     Procedure Component Value Units Date/Time    CSF culture [[id number]] Collected:  06/18/17 1212    Order Status:  Completed Specimen:  CSF (Spinal Fluid) from CSF Tap, Tube 3 Updated:  06/23/17 0719     CSF CULTURE No Growth to date     Gram Stain Result No WBC's      No organisms seen    Narrative:       On which sequentially labeled tube should this analysis be  performed?->3    Laurita Ink (CSF) [[id number]] Collected:  06/18/17 1212    Order Status:  Completed Specimen:  CSF (Spinal Fluid) from CSF Tap, Tube 3 Updated:  06/19/17 1417     Laurita Ink No encapsulated yeast seen    Cryptococcal antigen, CSF [[id number]] Collected:  06/18/17 1212    Order Status:  Canceled Specimen:  CSF (Spinal Fluid) from CSF Tap, Tube 3 Updated:  06/19/17 1243    Blood  culture [[id number]] Collected:  06/14/17 0336    Order Status:  Completed Specimen:  Blood from Peripheral, Wrist, Left Updated:  06/19/17 0812     Blood Culture, Routine No growth after 5 days.        Recent Lab Results       06/23/17  0537 06/23/17  0406 06/23/17  0348 06/22/17  2152 06/22/17  1725      Procalcitonin          Albumin   3.3(L)       Alkaline Phosphatase   36(L)       Allens Test  Pass        ALT   28       Anion Gap   5(L)       AST   15       Baso #   0.00       Basophil%   0.0       Total Bilirubin   0.5  Comment:  For infants and newborns, interpretation of results should be based  on gestational age, weight and in agreement with clinical  observations.  Premature Infant recommended reference ranges:  Up to 24 hours.............<8.0 mg/dL  Up to 48 hours............<12.0 mg/dL  3-5 days..................<15.0 mg/dL  6-29 days.................<15.0 mg/dL         Site  LR        BUN, Bld   41(H)       Calcium   8.2(L)       Chloride   107       CO2   28       Creatinine   0.8       DelSys  Adult Vent        Differential Method   Automated       eGFR if    >60.0       eGFR if non    >60.0  Comment:  Calculation used to obtain the estimated glomerular filtration  rate (eGFR) is the CKD-EPI equation. Since race is unknown   in our information system, the eGFR values for   -American and Non--American patients are given   for each creatinine result.         Eos #   0.0       Eosinophil%   0.0       FiO2  40        Glucose   291(H)       Gran #   14.1(H)       Gran%   95.3(H)       Hematocrit   28.2(L)       Hemoglobin   9.0(L)       Lymph #   0.3(L)       Lymph%   2.3(L)       Magnesium   2.6       MCH   31.8(H)       MCHC   31.9(L)       MCV   100(H)       Min Vol  10.7        Mode  AC/PRVC        Mono #   0.3       Mono%   2.1(L)       MPV   10.9       PEEP  8        Phosphorus   3.0       PiP  20        Platelets   230       POC BE  7        POC HCO3   31.4(H)        POC PCO2  47.3(H)        POC PH  7.430        POC PO2  92        POC SATURATED O2  97        POC TCO2  33(H)        POCT Glucose 294(H)   283(H) 309(H)     Potassium   5.2(H)       Total Protein   5.0(L)       Rate  18        RBC   2.83(L)       RDW   13.8       Sample  ARTERIAL        Sodium   140       Sp02  96        Vt  550        WBC   14.77(H)                   06/22/17  1332 06/22/17  1243      Procalcitonin  0.18  Comment:  A concentration < 0.25 ng/mL represents a low risk bacterial   infection.  Procalcitonin may not be accurate among patients with localized   infection, recent trauma or major surgery, immunosuppressed state,   invasive fungal infection, renal dysfunction. Decisions regarding   initiation or continuation of antibiotic therapy should not be based   solely on procalcitonin levels.       Albumin       Alkaline Phosphatase       Allens Test       ALT       Anion Gap       AST       Baso #       Basophil%       Total Bilirubin       Site       BUN, Bld       Calcium       Chloride       CO2       Creatinine       DelSys       Differential Method       eGFR if        eGFR if non        Eos #       Eosinophil%       FiO2       Glucose       Gran #       Gran%       Hematocrit       Hemoglobin       Lymph #       Lymph%       Magnesium       MCH       MCHC       MCV       Min Vol       Mode       Mono #       Mono%       MPV       PEEP       Phosphorus       PiP       Platelets       POC BE       POC HCO3       POC PCO2       POC PH       POC PO2       POC SATURATED O2       POC TCO2       POCT Glucose 252(H)      Potassium       Total Protein       Rate       RBC       RDW       Sample       Sodium       Sp02       Vt       WBC             Significant Diagnostics:  CT: No results found in the last 24 hours.    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  Bilateral ICA aneurysms incidental findings.  - Continue current management / work up per Ncc and neurology  - Fu paraneoplastic panel.  - PLEX, solumedrol per NCC/neurology  - Fu cultures of LP  - EMG Friday  No surgical intervention. Call w questions.             Narciso Parks MD  Neurosurgery  [facility]

## 2017-06-23 NOTE — SUBJECTIVE & OBJECTIVE
Interval History:  >4 elements OR status of 3 inpatient conditions  No clinical changes. CTA   left  superior hypophyseal aneurysm again measures approximately 7.0 x 6.0 mm. The right superior hypophyseal artery aneurysm again measures 10.0 x 7.0 mm.  There is a second superiorly projecting aneurysm of the right supraclinoid ICA measuring 2.0 mm. EEG: slowing. Sphenoid aneurysm. Pending EMG /NCS. PIC line is kinked.  CXR -R-PIC line kinked. Spoke with family about patient's condition, treatments  and pending tests. Botulinum toxin test was sent.     Review of Systems   Unable to perform ROS: Intubated     2 systems OR Unable to obtain a complete ROS due to level of consciousness.  Objective:     Vitals:  Temp: 98.8 °F (37.1 °C) (06/22/17 1500)  Pulse: 69 (06/22/17 1955)  Resp: 18 (06/22/17 1955)  BP: 122/64 (06/22/17 1905)  SpO2: 98 % (06/22/17 1955)    Temp:  [98 °F (36.7 °C)-98.8 °F (37.1 °C)] 98.8 °F (37.1 °C)  Pulse:  [] 69  Resp:  [18] 18  SpO2:  [96 %-100 %] 98 %  BP: (100-169)/(55-90) 122/64         Vent Mode: A/C  Oxygen Concentration (%):  [40] 40  Resp Rate Total:  [18 br/min] 18 br/min  Vt Set:  [550 mL] 550 mL  PEEP/CPAP:  [8 cmH20] 8 cmH20  Mean Airway Pressure:  [12 xoV16-99 cmH20] 12 cmH20    06/21 0701 - 06/22 0700  In: 7715 [I.V.:1800]  Out: 6992 [Urine:2860]    Physical Exam   Unable to test orientation, language, memory, judgment, insight, fund of knowledge, hearing, shoulder shrug, tongue protrusion, coordination, gait due to level of consciousness.  Exam:  General   HEENT: ETT  Chest Heart RRR / Lungs Clear to auscultation  Abdomen: Soft nontender + BS  Extremities: OK distal pulses.  Skin: UK  Neurological Exam:  MS; Paralyzed..first time following command swith his eyes.  CN: II-XII Pupils 2mm sluggish, trace corneal's. Facial paralysis and ophthalmoplegia bilaterally, Moved laterally with his eyes./  Motor: LUE   0/5 / RUE 0 /5               LLE   0/5 /  RLE 0 /5   Sensory: LT/PP/T/  Vibration Limited                 Complex sensory modalities: not tested  DTR:  normal throughout.  Coordination /Fine motor: Limited.  Gait: Not tested.  Meningeal signs: Absent  Medications:  Continuous  sodium chloride 0.9% Last Rate: 100 mL/hr at 06/22/17 2000   midazolam (VERSED) infusion (non-titrating) Last Rate: 2 mg/hr (06/22/17 2000)   Scheduled  acetylcysteine 200 mg/ml (20%) 600 mg BID   aspirin 325 mg Daily   bisacodyl 10 mg Daily   chlorhexidine 15 mL QID   famotidine 20 mg BID   folic acid-vit B6-vit B12 2.5-25-2 mg 1 tablet Daily   gabapentin 300 mg TID   heparin (porcine) 5,000 Units Q8H   insulin detemir 15 Units BID   levothyroxine 75 mcg Daily   methylPREDNISolone (SOLU-Medrol) IVPB (doses > 250 mg)  Daily   metoprolol tartrate 12.5 mg BID   polyethylene glycol 17 g Daily   senna-docusate 8.6-50 mg 1 tablet Daily   sertraline 50 mg Daily   thiamine 100 mg Daily   PRN  sodium chloride  Q24H PRN   acetaminophen 650 mg Q6H PRN   dextrose 50% 12.5 g PRN   glucagon (human recombinant) 1 mg PRN   heparin, porcine (PF) 1,000 Units PRN   hydrALAZINE 10 mg Q6H PRN   insulin aspart 1-10 Units Q4H PRN   magnesium oxide 800 mg PRN   magnesium oxide 800 mg PRN   ondansetron 4 mg Q8H PRN   potassium chloride 10% 40 mEq PRN   potassium chloride 10% 40 mEq PRN   potassium chloride 10% 60 mEq PRN   potassium, sodium phosphates 2 packet PRN   potassium, sodium phosphates 2 packet PRN   potassium, sodium phosphates 2 packet PRN     Today I personally reviewed pertinent medications, lines/drains/airways, imaging, cardiology, lab results, microbiology results, notably:

## 2017-06-23 NOTE — ASSESSMENT & PLAN NOTE
NSGY following  CTA 6/21: Stable, bilateral saccular superior hypophyseal artery aneurysms measuring 7.0 mm on the left and 10.0 mm on the right with an additional 2.0 mm aneurysm involving the right supraclinoid ICA. Stable, 3.6 cm nonenhancing soft tissue attenuation mass along the right planum sphenoidale

## 2017-06-23 NOTE — PROGRESS NOTES
Plasmapheresis tx #4 started at 1120 without difficulty.  Pt sedated and intubated, unable to assess pain and orientation. 4 Liter exchange.  Replacement fluids 5% albumin via L SC CVC. 2 grams calcium gluconate infusing IVPB throughout treatment.  Tx ended at 1217.  Cath flushed and locked.  Pt tolerated tx well.  Next tx 06/25/17. Wife and family at bedside during treatment.

## 2017-06-23 NOTE — SUBJECTIVE & OBJECTIVE
Interval History:    -EMG today, awaiting results  -continue versed for sedation while intubated  -Pan Cx for tMax 100.2 overnight  -Detemir increased        Review of Systems   Unable to perform ROS: Intubated     2 systems OR Unable to obtain a complete ROS due to level of consciousness.  Objective:     Vitals:  Temp: 98.8 °F (37.1 °C) (06/23/17 1502)  Pulse: 106 (06/23/17 1502)  Resp: 18 (06/23/17 1502)  BP: (!) 158/79 (06/23/17 1502)  SpO2: 98 % (06/23/17 1502)    Temp:  [97.5 °F (36.4 °C)-100.4 °F (38 °C)] 98.8 °F (37.1 °C)  Pulse:  [] 106  Resp:  [18] 18  SpO2:  [96 %-100 %] 98 %  BP: (104-162)/(55-83) 158/79         Vent Mode: A/C  Oxygen Concentration (%):  [40] 40  Resp Rate Total:  [18 br/min] 18 br/min  Vt Set:  [550 mL] 550 mL  PEEP/CPAP:  [8 cmH20] 8 cmH20  Mean Airway Pressure:  [11 tbF71-31 cmH20] 11 cmH20    06/22 0701 - 06/23 0700  In: 3563.8 [I.V.:2033.8]  Out: 2210 [Urine:2210]    Physical Exam   HENT:   Head: Normocephalic.   Cardiovascular: Normal rate, regular rhythm, normal heart sounds and intact distal pulses.    Pulmonary/Chest: Effort normal and breath sounds normal.   Abdominal: Soft. Bowel sounds are normal.   Neurological:   E1 V1T M0  Areflexic, diffuse hypotonia  No response to noxious stimuli  No gag/cough/corneal  PERRLA, minimal lateral eye movment, noted left eye   Skin: Skin is warm and dry.   Vitals reviewed.        Medications:  Continuous  sodium chloride 0.9% Last Rate: 100 mL/hr at 06/23/17 1502   midazolam (VERSED) infusion (non-titrating) Last Rate: 2 mg/hr (06/23/17 1502)   Scheduled  aspirin 325 mg Daily   bisacodyl 10 mg Daily   chlorhexidine 15 mL QID   famotidine 20 mg BID   folic acid-vit B6-vit B12 2.5-25-2 mg 1 tablet Daily   gabapentin 300 mg TID   heparin (porcine) 5,000 Units Q8H   insulin detemir 25 Units BID   levothyroxine 75 mcg Daily   metoprolol tartrate 12.5 mg BID   polyethylene glycol 17 g Daily   senna-docusate 8.6-50 mg 1 tablet Daily    sertraline 50 mg Daily   thiamine 100 mg Daily   PRN  sodium chloride  Q24H PRN   acetaminophen 650 mg Q6H PRN   dextrose 50% 12.5 g PRN   glucagon (human recombinant) 1 mg PRN   heparin, porcine (PF) 1,000 Units PRN   hydrALAZINE 10 mg Q6H PRN   insulin aspart 1-10 Units Q4H PRN   magnesium oxide 800 mg PRN   magnesium oxide 800 mg PRN   ondansetron 4 mg Q8H PRN   potassium chloride 10% 40 mEq PRN   potassium chloride 10% 40 mEq PRN   potassium chloride 10% 60 mEq PRN   potassium, sodium phosphates 2 packet PRN   potassium, sodium phosphates 2 packet PRN   potassium, sodium phosphates 2 packet PRN     Today I personally reviewed pertinent medications, lines/drains/airways, imaging, cardiology, lab results, microbiology results,

## 2017-06-23 NOTE — PROCEDURES
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 Symptoms have progressed from peripheral paresthesias, to weakness, to a rapidly ascending paralysis now requiring intubation.    Guillain [place] status post IVIG carries a Category III Grade 2C indication for therapeutic plasma exchange via the 2016 Journal of Clinical Apheresis Guidelines (Mena PARDO et al. Journal of Clinical Apheresis 2016; 31:149-162.) Five TPE are planned, scheduled every other day per the aforementioned guidelines.    Interval History: Pt remains intubated and sedated. Overall, today's procedure (#4 of 5). The next scheduled procedure will be 6/25/17.    Pertinent Laboratory Data:   Complete Blood Count:   Lab Results   Component Value Date    HGB 9.0 (L) 06/23/2017    HCT 28.2 (L) 06/23/2017     06/23/2017    WBC 14.77 (H) 06/23/2017     Comprehensive Metabolic Panel:   Lab Results   Component Value Date     06/23/2017    K 5.2 (H) 06/23/2017     06/23/2017    CO2 28 06/23/2017     (H) 06/23/2017    BUN 41 (H) 06/23/2017    CREATININE 0.8 06/23/2017    CALCIUM 8.2 (L) 06/23/2017    PROT 5.0 (L) 06/23/2017    ALBUMIN 3.3 (L) 06/23/2017    BILITOT 0.5 06/23/2017    ALKPHOS 36 (L) 06/23/2017    AST 15 06/23/2017    ALT 28 06/23/2017    ANIONGAP 5 (L) 06/23/2017    EGFRNONAA >60.0 06/23/2017       Pertinent Medications: None contraindicated.     Review of patient's  allergies indicates:  No Known Allergies    Anesthesia: None     Technical Procedures Used: Plasma Exchange: Volume exchanged - 4.0 Liters; Replacement fluid - Albumin; Number of procedures 4; Date of next procedure 6/25/17.    Description of the Findings of the Procedure:     Please see Apheresis Nurse flowsheet for details.    The patient was evaluated and all clinical and laboratory data relevant to the treatment was reviewed, and a decision was made to proceed with the Apheresis procedure.    I was available to the clinical staff throughout the procedure.    Significant Surgical Tasks Conducted by the Assistant(s): Not applicable  Complications: None  Estimated Blood Loss (EBL): None  Implants: None   Specimens: None    ATTENDING MD ATTESTATION:  The apheresis procedure was performed under my supervision. I was available throughout the procedure. The note has been edited, where and when necessary, to reflect my agreement.  I reviewed all clinical and laboratory data and reviewed the note written by Dr. Baltazar and agree with the findings, assessment and plan.   The patient tolerated the procedure without complications. The next (and final) scheduled procedure will be Sunday.     Marcela Pradhan MD, PhD  Section of Transfusion Medicine & Histocompatibility  Department of Pathology and Laboratory Medicine  [facility]  [phone] (HLA & Blood Bank Offices)  06/23/2017

## 2017-06-23 NOTE — ASSESSMENT & PLAN NOTE
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  Bilateral ICA aneurysms incidental findings.  - Continue current management / work up per Ncc and neurology  - Fu paraneoplastic panel.  - PLEX, solumedrol per NCC/neurology  - Fu cultures of LP  - EMG Friday  No surgical intervention. Call w questions.

## 2017-06-23 NOTE — PLAN OF CARE
Problem: Patient Care Overview  Goal: Plan of Care Review  POC reviewed with pt and pt's spouse at 0545. Pt's spouse verbalized understanding. Questions and concerns addressed. No acute events overnight. Pt had 1BM overnight. POCT BG continues to be elevated. Pt progressing toward goals. Will continue to monitor. See flowsheets for full assessment and VS info

## 2017-06-24 LAB
ALBUMIN SERPL BCP-MCNC: 4 G/DL
ALLENS TEST: ABNORMAL
ALP SERPL-CCNC: 30 U/L
ALT SERPL W/O P-5'-P-CCNC: 24 U/L
ANION GAP SERPL CALC-SCNC: 3 MMOL/L
AST SERPL-CCNC: 17 U/L
BASOPHILS # BLD AUTO: 0 K/UL
BASOPHILS NFR BLD: 0 %
BILIRUB SERPL-MCNC: 0.7 MG/DL
BUN SERPL-MCNC: 43 MG/DL
CALCIUM SERPL-MCNC: 8.3 MG/DL
CHLORIDE SERPL-SCNC: 110 MMOL/L
CK SERPL-CCNC: 52 U/L
CMV SPEC QL SHELL VIAL CULT: NO GROWTH
CO2 SERPL-SCNC: 27 MMOL/L
CREAT SERPL-MCNC: 0.7 MG/DL
DELSYS: ABNORMAL
DIFFERENTIAL METHOD: ABNORMAL
EOSINOPHIL # BLD AUTO: 0 K/UL
EOSINOPHIL NFR BLD: 0 %
ERYTHROCYTE [DISTWIDTH] IN BLOOD BY AUTOMATED COUNT: 13.9 %
ERYTHROCYTE [SEDIMENTATION RATE] IN BLOOD BY WESTERGREN METHOD: 18 MM/H
EST. GFR  (AFRICAN AMERICAN): >60 ML/MIN/1.73 M^2
EST. GFR  (NON AFRICAN AMERICAN): >60 ML/MIN/1.73 M^2
FACT X PPP CHRO-ACNC: 0.55 IU/ML
FACT X PPP CHRO-ACNC: 0.57 IU/ML
FACT X PPP CHRO-ACNC: 0.68 IU/ML
FACT X PPP CHRO-ACNC: 0.75 IU/ML
FIO2: 40
GLUCOSE SERPL-MCNC: 309 MG/DL
GRAM STN SPEC: NORMAL
GRAM STN SPEC: NORMAL
HCO3 UR-SCNC: 26.3 MMOL/L (ref 24–28)
HCT VFR BLD AUTO: 26.2 %
HGB BLD-MCNC: 8.7 G/DL
LYMPHOCYTES # BLD AUTO: 0.6 K/UL
LYMPHOCYTES NFR BLD: 4.2 %
MAGNESIUM SERPL-MCNC: 2.5 MG/DL
MBP CSF-MCNC: <2 MCG/L
MCH RBC QN AUTO: 33.1 PG
MCHC RBC AUTO-ENTMCNC: 33.2 %
MCV RBC AUTO: 100 FL
MIN VOL: 10.6
MODE: ABNORMAL
MONOCYTES # BLD AUTO: 0.7 K/UL
MONOCYTES NFR BLD: 4.5 %
NEUTROPHILS # BLD AUTO: 13.8 K/UL
NEUTROPHILS NFR BLD: 90.8 %
PCO2 BLDA: 39.8 MMHG (ref 35–45)
PEEP: 8
PH SMN: 7.43 [PH] (ref 7.35–7.45)
PHOSPHATE SERPL-MCNC: 2.7 MG/DL
PHOSPHATE SERPL-MCNC: 3.2 MG/DL
PIP: 22
PLATELET # BLD AUTO: 249 K/UL
PMV BLD AUTO: 11.3 FL
PO2 BLDA: 103 MMHG (ref 80–100)
POC BE: 2 MMOL/L
POC SATURATED O2: 98 % (ref 95–100)
POC TCO2: 27 MMOL/L (ref 23–27)
POCT GLUCOSE: 220 MG/DL (ref 70–110)
POCT GLUCOSE: 304 MG/DL (ref 70–110)
POCT GLUCOSE: 309 MG/DL (ref 70–110)
POCT GLUCOSE: 315 MG/DL (ref 70–110)
POCT GLUCOSE: 349 MG/DL (ref 70–110)
POTASSIUM SERPL-SCNC: 4.5 MMOL/L
PROT SERPL-MCNC: 5.1 G/DL
RBC # BLD AUTO: 2.63 M/UL
RHEUMATOID FACT SERPL-ACNC: <10 IU/ML
SAMPLE: ABNORMAL
SITE: ABNORMAL
SODIUM SERPL-SCNC: 140 MMOL/L
SP02: 97
VT: 550
WBC # BLD AUTO: 15.13 K/UL

## 2017-06-24 PROCEDURE — 86431 RHEUMATOID FACTOR QUANT: CPT

## 2017-06-24 PROCEDURE — 85025 COMPLETE CBC W/AUTO DIFF WBC: CPT

## 2017-06-24 PROCEDURE — 94761 N-INVAS EAR/PLS OXIMETRY MLT: CPT

## 2017-06-24 PROCEDURE — 25000003 PHARM REV CODE 250: Performed by: NURSE PRACTITIONER

## 2017-06-24 PROCEDURE — 63600175 PHARM REV CODE 636 W HCPCS: Performed by: PSYCHIATRY & NEUROLOGY

## 2017-06-24 PROCEDURE — 99900035 HC TECH TIME PER 15 MIN (STAT)

## 2017-06-24 PROCEDURE — 80053 COMPREHEN METABOLIC PANEL: CPT

## 2017-06-24 PROCEDURE — 20000000 HC ICU ROOM

## 2017-06-24 PROCEDURE — 82550 ASSAY OF CK (CPK): CPT

## 2017-06-24 PROCEDURE — 99291 CRITICAL CARE FIRST HOUR: CPT | Mod: ,,, | Performed by: NURSE PRACTITIONER

## 2017-06-24 PROCEDURE — 84100 ASSAY OF PHOSPHORUS: CPT | Mod: 91

## 2017-06-24 PROCEDURE — 27200966 HC CLOSED SUCTION SYSTEM

## 2017-06-24 PROCEDURE — 94003 VENT MGMT INPAT SUBQ DAY: CPT

## 2017-06-24 PROCEDURE — 82803 BLOOD GASES ANY COMBINATION: CPT

## 2017-06-24 PROCEDURE — 37799 UNLISTED PX VASCULAR SURGERY: CPT

## 2017-06-24 PROCEDURE — 25000003 PHARM REV CODE 250: Performed by: PSYCHIATRY & NEUROLOGY

## 2017-06-24 PROCEDURE — 27000221 HC OXYGEN, UP TO 24 HOURS

## 2017-06-24 PROCEDURE — 85520 HEPARIN ASSAY: CPT | Mod: 91

## 2017-06-24 PROCEDURE — 84100 ASSAY OF PHOSPHORUS: CPT

## 2017-06-24 PROCEDURE — 25000003 PHARM REV CODE 250: Performed by: PHYSICIAN ASSISTANT

## 2017-06-24 PROCEDURE — 83735 ASSAY OF MAGNESIUM: CPT

## 2017-06-24 RX ADMIN — STANDARDIZED SENNA CONCENTRATE AND DOCUSATE SODIUM 1 TABLET: 8.6; 5 TABLET, FILM COATED ORAL at 08:06

## 2017-06-24 RX ADMIN — INSULIN ASPART 6 UNITS: 100 INJECTION, SOLUTION INTRAVENOUS; SUBCUTANEOUS at 05:06

## 2017-06-24 RX ADMIN — HYDRALAZINE HYDROCHLORIDE 10 MG: 20 INJECTION INTRAMUSCULAR; INTRAVENOUS at 02:06

## 2017-06-24 RX ADMIN — INSULIN ASPART 8 UNITS: 100 INJECTION, SOLUTION INTRAVENOUS; SUBCUTANEOUS at 05:06

## 2017-06-24 RX ADMIN — FAMOTIDINE 20 MG: 20 TABLET, FILM COATED ORAL at 08:06

## 2017-06-24 RX ADMIN — GABAPENTIN 300 MG: 300 CAPSULE ORAL at 05:06

## 2017-06-24 RX ADMIN — SERTRALINE HYDROCHLORIDE 50 MG: 50 TABLET ORAL at 08:06

## 2017-06-24 RX ADMIN — THIAMINE HCL TAB 100 MG 100 MG: 100 TAB at 08:06

## 2017-06-24 RX ADMIN — POTASSIUM & SODIUM PHOSPHATES POWDER PACK 280-160-250 MG 2 PACKET: 280-160-250 PACK at 10:06

## 2017-06-24 RX ADMIN — Medication 12.5 MG: at 08:06

## 2017-06-24 RX ADMIN — CHLORHEXIDINE GLUCONATE 15 ML: 1.2 RINSE ORAL at 11:06

## 2017-06-24 RX ADMIN — ASPIRIN 325 MG ORAL TABLET 325 MG: 325 PILL ORAL at 08:06

## 2017-06-24 RX ADMIN — LEVOTHYROXINE SODIUM 75 MCG: 75 TABLET ORAL at 05:06

## 2017-06-24 RX ADMIN — POLYETHYLENE GLYCOL 3350 17 G: 17 POWDER, FOR SOLUTION ORAL at 08:06

## 2017-06-24 RX ADMIN — INSULIN ASPART 10 UNITS: 100 INJECTION, SOLUTION INTRAVENOUS; SUBCUTANEOUS at 09:06

## 2017-06-24 RX ADMIN — GABAPENTIN 300 MG: 300 CAPSULE ORAL at 09:06

## 2017-06-24 RX ADMIN — CHLORHEXIDINE GLUCONATE 15 ML: 1.2 RINSE ORAL at 05:06

## 2017-06-24 RX ADMIN — CHLORHEXIDINE GLUCONATE 15 ML: 1.2 RINSE ORAL at 12:06

## 2017-06-24 RX ADMIN — HEPARIN SODIUM AND DEXTROSE 1400 UNITS/HR: 10000; 5 INJECTION INTRAVENOUS at 12:06

## 2017-06-24 RX ADMIN — ACETAMINOPHEN 650 MG: 325 TABLET ORAL at 11:06

## 2017-06-24 RX ADMIN — MINERAL OIL AND WHITE PETROLATUM: 150; 830 OINTMENT OPHTHALMIC at 06:06

## 2017-06-24 RX ADMIN — GABAPENTIN 300 MG: 300 CAPSULE ORAL at 02:06

## 2017-06-24 RX ADMIN — Medication 1 TABLET: at 08:06

## 2017-06-24 RX ADMIN — INSULIN ASPART 10 UNITS: 100 INJECTION, SOLUTION INTRAVENOUS; SUBCUTANEOUS at 02:06

## 2017-06-24 RX ADMIN — POTASSIUM & SODIUM PHOSPHATES POWDER PACK 280-160-250 MG 2 PACKET: 280-160-250 PACK at 05:06

## 2017-06-24 NOTE — CONSULTS
Consult received for new TF recommendations 2/2 hyperglycemia.  Patient was on steroids which could be the cause of the hyperglycemia. RN reports steroids were discontinued yesterday.    Current TF regimen of Isosource 1.5 at 50 mL/hr + 4 packets Beneprotein - meeting EEN and EPN.   If hyperglycemia continues despite discontinuing steroids, recommend modifying TF regimen to Glucerna 1.5 at a goal rate of 55 mL/hr - to provide 1980 kcal/day, 109 g protein/day, and 1002 mL free fluid/day.      Estimated/Assessed Needs   Weight Used For Calorie Calculations: 86.4 kg (190 lb 7.6 oz)   Energy Need Method: [place] (1929 kcal/day)   RMR ([place]-St. Jeor Equation): 1698.39   Weight Used For Protein Calculations: 86.4 kg (190 lb 7.6 oz)  Protein Requirements: 104-130g (1.2-1.5 g/kg)   Fluid Need Method: RDA Method (1 mL/kcal or per MD)      RD completed full assessment, see RD Progress Note 6/22/17. RD to monitor and follow up.

## 2017-06-24 NOTE — PROGRESS NOTES
Pt anti-Xa was 0.75. [facility] notified. Instructed by Dr. Raya to decrease drip to 1300 units/hr. Will continue to monitor and update as necessary.

## 2017-06-24 NOTE — PLAN OF CARE
Problem: Patient Care Overview  Goal: Plan of Care Review  Outcome: Revised  POC reviewed with Mr. Cam and family at 1500. Pt verbalized understanding. Questions and concerns addressed. IVIG today. Clots on right arm. EMG today, pending results. Heparin gtt started.  Pt progressing toward goals. Will continue to monitor. See flowsheets for full assessment and VS info.

## 2017-06-24 NOTE — SUBJECTIVE & OBJECTIVE
Interval History:    -EMG today, awaiting results  -continue versed for sedation while intubated  -Pan Cx for tMax 100.2 overnight  -SSI increased, nutrition consult for TF change.         Review of Systems   Unable to perform ROS: Intubated       Objective:     Vitals:  Temp: 98.9 °F (37.2 °C) (06/24/17 0300)  Pulse: 87 (06/24/17 0600)  Resp: 18 (06/24/17 0600)  BP: (!) 149/67 (06/24/17 0600)  SpO2: 98 % (06/24/17 0600)    Temp:  [97.5 °F (36.4 °C)-99.1 °F (37.3 °C)] 98.9 °F (37.2 °C)  Pulse:  [] 87  Resp:  [18] 18  SpO2:  [97 %-100 %] 98 %  BP: (116-162)/(56-83) 149/67         Vent Mode: A/C  Oxygen Concentration (%):  [40] 40  Resp Rate Total:  [18 br/min] 18 br/min  Vt Set:  [550 mL] 550 mL  PEEP/CPAP:  [8 cmH20] 8 cmH20  Mean Airway Pressure:  [11 [id number] cmH20] 12 cmH20    06/23 0701 - 06/24 0700  In: 8784.7 [I.V.:2722.7]  Out: 7411 [Urine:3305]    Physical Exam   HENT:   Head: Normocephalic.   Cardiovascular: Normal rate, regular rhythm, normal heart sounds and intact distal pulses.    Pulmonary/Chest: Effort normal and breath sounds normal.   Abdominal: Soft. Bowel sounds are normal.   Neurological:   E1 V1T M0  Areflexic, diffuse hypotonia  No response to noxious stimuli  No gag/cough/corneal  PERRLA, minimal lateral eye movment, noted left eye   Skin: Skin is warm and dry.   Vitals reviewed.        Medications:  Continuous    sodium chloride 0.9% Last Rate: 100 mL/hr at 06/24/17 0600   heparin (porcine) in 5 % dex Last Rate: 1,300 Units/hr (06/24/17 0712)   midazolam (VERSED) infusion (non-titrating) Last Rate: 2 mg/hr (06/24/17 0600)   Scheduled    aspirin 325 mg Daily   bisacodyl 10 mg Daily   chlorhexidine 15 mL QID   famotidine 20 mg BID   folic acid-vit B6-vit B12 2.5-25-2 mg 1 tablet Daily   gabapentin 300 mg TID   insulin detemir 25 Units BID   levothyroxine 75 mcg Daily   metoprolol tartrate 12.5 mg BID   polyethylene glycol 17 g Daily   senna-docusate 8.6-50 mg 1 tablet Daily   sertraline  50 mg Daily   thiamine 100 mg Daily   PRN    sodium chloride  Q24H PRN   acetaminophen 650 mg Q6H PRN   dextrose 50% 12.5 g PRN   glucagon (human recombinant) 1 mg PRN   heparin, porcine (PF) 1,000 Units PRN   hydrALAZINE 10 mg Q6H PRN   insulin aspart 1-10 Units Q4H PRN   magnesium oxide 800 mg PRN   magnesium oxide 800 mg PRN   ondansetron 4 mg Q8H PRN   potassium chloride 10% 40 mEq PRN   potassium chloride 10% 40 mEq PRN   potassium chloride 10% 60 mEq PRN   potassium, sodium phosphates 2 packet PRN   potassium, sodium phosphates 2 packet PRN   potassium, sodium phosphates 2 packet PRN     Today I personally reviewed pertinent medications, lines/drains/airways, imaging, cardiology, lab results, microbiology results,

## 2017-06-24 NOTE — PROGRESS NOTES
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  No family present during exam, history obtained primarily from chart.  Patient was on a camping trip to [place] and [place] recently. When he returned he started to have paraesthesias of the feet and hand that quickly progressed to legs weakness. No clear hx of tick bites or skin lesions.  Additionally, a few days prior to admission to OSH he had prostate biopsy and was treated with cipro.  Pt presented to OSH and was diagnosed with GBS and was started on IVIG, he completed 5 days of treatment which ended on 6/12/17.  Pt also being treated for pneumonia.  After admission his weakness got worse to the point that he had to be intubated.  Pt has had poor neurologic exam since.   - LP was done in OSH and was reported as 0 rbc, 0 wbc, protein 34, glucose 97.  Lyme ab panel was sent and results are pending.  MRI Brain done today shows non enhancing lesion abutting the right optic nerve.  Neurosurgery consulted for evaluation.    MRI of the brain:  Bilateral supraclinoid internal carotid artery aneurysms.  On the right there is a 10 mm aneurysm in separate 2 mm aneurysm.  On the left the supraclinoid ICA aneurysm measures about 7 x 6 mm.3.6 cm nonenhancing mass centered over the right greater and lesser wings of the sphenoid abutting the ICA, A1 and M1 segments, displacing the right optic nerve.  Differential considerations include a thrombosed aneurysm associated with right ICA aneurysm aneurysm,  exophytic hamartoma, or or an exophytic nonenhancing primary brain tumor such as low-grade glioma or  oligodendroglioma.    Hospital Course: [place] [facility] [place] :Initial LP with protein of 34., glucose 97, WBC ) .IVIG x 5(finished 6/12).  6/16 NSGY consulted for  /lesion noted on MRI, Awaitng EMG  6/18 Lyme negative, multiple CSF labs pending  6/21:Pending CTA and continuous EEG.   6/22: No clinical changes. CTA   left  superior hypophyseal aneurysm again measures approximately 7.0 x 6.0 mm. The right superior hypophyseal artery aneurysm again measures 10.0 x 7.0 mm.  There is a second superiorly projecting aneurysm of the right supraclinoid ICA measuring 2.0 mm. EEG: slowing. Sphenoid aneurysm. Pending EMG /NCS. PIC line is kinked.  CXR -R-PIC line kinked.   6/23 EMG performed, awaiting results.  6/24 Awaiting EMG report from 6/23. Insulin and TF adjusted for hyperglycemia.       Interval History:    -EMG today, awaiting results  -continue versed for sedation while intubated  -Pan Cx for tMax 100.2 overnight  -SSI increased, nutrition consult for TF change.         Review of Systems   Unable to perform ROS: Intubated       Objective:     Vitals:  Temp: 98.9 °F (37.2 °C) (06/24/17 0300)  Pulse: 87 (06/24/17 0600)  Resp: 18 (06/24/17 0600)  BP: (!) 149/67 (06/24/17 0600)  SpO2: 98 % (06/24/17 0600)    Temp:  [97.5 °F (36.4 °C)-99.1 °F (37.3 °C)] 98.9 °F (37.2 °C)  Pulse:  [] 87  Resp:  [18] 18  SpO2:  [97 %-100 %] 98 %  BP: (116-162)/(56-83) 149/67         Vent Mode: A/C  Oxygen Concentration (%):  [40] 40  Resp Rate Total:  [18 br/min] 18 br/min  Vt Set:  [550 mL] 550 mL  PEEP/CPAP:  [8 cmH20] 8 cmH20  Mean Airway Pressure:  [11 [id number] cmH20] 12 cmH20    06/23 0701 - 06/24 0700  In: 8784.7 [I.V.:2722.7]  Out: 7411 [Urine:3305]    Physical Exam   HENT:   Head: Normocephalic.   Cardiovascular: Normal rate, regular rhythm, normal heart sounds and intact distal pulses.    Pulmonary/Chest: Effort normal and breath sounds normal.   Abdominal: Soft. Bowel sounds are normal.   Neurological:   E1 V1T  M0  Areflexic, diffuse hypotonia  No response to noxious stimuli  No gag/cough/corneal  PERRLA, minimal lateral eye movment, noted left eye   Skin: Skin is warm and dry.   Vitals reviewed.        Medications:  Continuous    sodium chloride 0.9% Last Rate: 100 mL/hr at 06/24/17 0600   heparin (porcine) in 5 % dex Last Rate: 1,300 Units/hr (06/24/17 0712)   midazolam (VERSED) infusion (non-titrating) Last Rate: 2 mg/hr (06/24/17 0600)   Scheduled    aspirin 325 mg Daily   bisacodyl 10 mg Daily   chlorhexidine 15 mL QID   famotidine 20 mg BID   folic acid-vit B6-vit B12 2.5-25-2 mg 1 tablet Daily   gabapentin 300 mg TID   insulin detemir 25 Units BID   levothyroxine 75 mcg Daily   metoprolol tartrate 12.5 mg BID   polyethylene glycol 17 g Daily   senna-docusate 8.6-50 mg 1 tablet Daily   sertraline 50 mg Daily   thiamine 100 mg Daily   PRN    sodium chloride  Q24H PRN   acetaminophen 650 mg Q6H PRN   dextrose 50% 12.5 g PRN   glucagon (human recombinant) 1 mg PRN   heparin, porcine (PF) 1,000 Units PRN   hydrALAZINE 10 mg Q6H PRN   insulin aspart 1-10 Units Q4H PRN   magnesium oxide 800 mg PRN   magnesium oxide 800 mg PRN   ondansetron 4 mg Q8H PRN   potassium chloride 10% 40 mEq PRN   potassium chloride 10% 40 mEq PRN   potassium chloride 10% 60 mEq PRN   potassium, sodium phosphates 2 packet PRN   potassium, sodium phosphates 2 packet PRN   potassium, sodium phosphates 2 packet PRN     Today I personally reviewed pertinent medications, lines/drains/airways, imaging, cardiology, lab results, microbiology results,       Assessment/Plan:     Neuro   * Guillain-[place] syndrome    Neurology following  LP outside hospital Protein 34 Glucose 97, WBC, completed 5 days IvIg before arrival  Exam poor, No motor response to pain,Diffuse hypotonia, Absent DTR diffusely   Repeat LP 6/19, CSF studies pending, Lyme negative  6/15 MRI shows supraclinoid aneurysms and mass/thrombosed aneurysm (nsgy consult),   Currently in PLEX 4/5,  final PLEX Sunday 6/25  EMG performed 6/23, pending results  Discuss POC/EMG results with Family.   CK/RH drawn to eval for polymitosis          Brain aneurysm    NSGY following  CTA 6/21: Stable, bilateral saccular superior hypophyseal artery aneurysms measuring 7.0 mm on the left and 10.0 mm on the right with an additional 2.0 mm aneurysm involving the right supraclinoid ICA. Stable, 3.6 cm nonenhancing soft tissue attenuation mass along the right planum sphenoidale           Pulmonary   Acute respiratory failure with hypoxia and hypercapnia    Intuabted on arrival to [facility]  Daily CXR/ABG  Unable to wean d/t muscle weakness  Duonebs        Cardiac   Right subclavian vein thrombosis    6/23 US reveals right Subclavian/axilary thrombus,  -heparin gtt started        Endocrine   Acquired hypothyroidism    On synthroid            Prophylaxis:  Venous Thromboembolism: mechanical chemical  Stress Ulcer: H2B  Ventilator Pneumonia: yes     Activity Orders          Up with assistance starting at 06/14 0111        Full Code   Critical Care: 36 min    James Blackmon NP  Neurocritical Care  [facility]

## 2017-06-24 NOTE — ASSESSMENT & PLAN NOTE
Neurology following  LP outside hospital Protein 34 Glucose 97, WBC, completed 5 days IvIg before arrival  Exam poor, No motor response to pain,Diffuse hypotonia, Absent DTR diffusely   Repeat LP 6/19, CSF studies pending, Lyme negative  6/15 MRI shows supraclinoid aneurysms and mass/thrombosed aneurysm (nsgy consult),   Currently in PLEX 4/5, final PLEX Sunday 6/25  EMG performed 6/23, pending results  Discuss POC/EMG results with Family.   CK/RH drawn to eval for polymitosis

## 2017-06-24 NOTE — PLAN OF CARE
Problem: Patient Care Overview  Goal: Plan of Care Review  Outcome: Ongoing (interventions implemented as appropriate)  Plan of care reviewed with pt and pt's family.  Pt's family verbalized understanding of the plan of care.  Versed paused for sedation vacation per MD, no spontaneous eye opening noted.  BIS monitoring maintained.  TF changed to Glucerna as ordered.  Heparin gtt adjusted per order.  Pt remained free of any falls or injuries, skin integrity was maintained.  Will continue to monitor.

## 2017-06-24 NOTE — PROGRESS NOTES
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  No family present during exam, history obtained primarily from chart.  Patient was on a camping trip to [place] and [place] recently. When he returned he started to have paraesthesias of the feet and hand that quickly progressed to legs weakness. No clear hx of tick bites or skin lesions.  Additionally, a few days prior to admission to OSH he had prostate biopsy and was treated with cipro.  Pt presented to OSH and was diagnosed with GBS and was started on IVIG, he completed 5 days of treatment which ended on 6/12/17.  Pt also being treated for pneumonia.  After admission his weakness got worse to the point that he had to be intubated.  Pt has had poor neurologic exam since.   - LP was done in OSH and was reported as 0 rbc, 0 wbc, protein 34, glucose 97.  Lyme ab panel was sent and results are pending.  MRI Brain done today shows non enhancing lesion abutting the right optic nerve.  Neurosurgery consulted for evaluation.    MRI of the brain:  Bilateral supraclinoid internal carotid artery aneurysms.  On the right there is a 10 mm aneurysm in separate 2 mm aneurysm.  On the left the supraclinoid ICA aneurysm measures about 7 x 6 mm.3.6 cm nonenhancing mass centered over the right greater and lesser wings of the sphenoid abutting the ICA, A1 and M1 segments, displacing the right optic nerve.  Differential considerations include a thrombosed aneurysm associated with right ICA aneurysm aneurysm,  exophytic hamartoma, or or an exophytic nonenhancing primary brain tumor such as low-grade glioma or  oligodendroglioma.     Hospital Course: 6/15 transferred [address] for GBS. :Initial LP with protein of 34., glucose 97, WBC ) .IVIG x 5(finished 6/12).  6/16 NSGY consulted for  /lesion noted on MRI, Awaitng EMG  6/18 Lyme negative, multiple CSF labs pending  6/21:Pending CTA and continuous EEG.   6/22: No clinical changes. CTA   left  superior hypophyseal aneurysm again measures approximately 7.0 x 6.0 mm. The right superior hypophyseal artery aneurysm again measures 10.0 x 7.0 mm.  There is a second superiorly projecting aneurysm of the right supraclinoid ICA measuring 2.0 mm. EEG: slowing. Sphenoid aneurysm. Pending EMG /NCS. PIC line is kinked.  CXR -R-PIC line kinked.   6/23 EMG performed, awaiting results.           Interval History:    -EMG today, awaiting results  -continue versed for sedation while intubated  -Pan Cx for tMax 100.2 overnight  -Detemir increased           Review of Systems   Unable to perform ROS: Intubated      2 systems OR Unable to obtain a complete ROS due to level of consciousness.  Objective:      Vitals:  Temp: 98.8 °F (37.1 °C) (06/23/17 1502)  Pulse: 106 (06/23/17 1502)  Resp: 18 (06/23/17 1502)  BP: (!) 158/79 (06/23/17 1502)  SpO2: 98 % (06/23/17 1502)     Temp:  [97.5 °F (36.4 °C)-100.4 °F (38 °C)] 98.8 °F (37.1 °C)  Pulse:  [] 106  Resp:  [18] 18  SpO2:  [96 %-100 %] 98 %  BP: (104-162)/(55-83) 158/79           Vent Mode: A/C  Oxygen Concentration (%):  [40] 40  Resp Rate Total:  [18 br/min] 18 br/min  Vt Set:  [550 mL] 550 mL  PEEP/CPAP:  [8 cmH20] 8 cmH20  Mean Airway Pressure:  [11 wbS95-75 cmH20] 11 cmH20     06/22 0701 - 06/23 0700  In: 3563.8 [I.V.:2033.8]  Out: 2210 [Urine:2210]     Physical Exam   HENT:   Head: Normocephalic.   Cardiovascular: Normal rate, regular rhythm, normal heart sounds and intact distal pulses.    Pulmonary/Chest: Effort normal and breath sounds normal.   Abdominal: Soft. Bowel sounds are normal.   Neurological:   E1 V1T M0  Areflexic, diffuse  hypotonia  No response to noxious stimuli  No gag/cough/corneal  PERRLA, minimal lateral eye movment, noted left eye   Skin: Skin is warm and dry.   Vitals reviewed.           Medications:  Continuous  sodium chloride 0.9% Last Rate: 100 mL/hr at 06/23/17 1502   midazolam (VERSED) infusion (non-titrating) Last Rate: 2 mg/hr (06/23/17 1502)   Scheduled  aspirin 325 mg Daily   bisacodyl 10 mg Daily   chlorhexidine 15 mL QID   famotidine 20 mg BID   folic acid-vit B6-vit B12 2.5-25-2 mg 1 tablet Daily   gabapentin 300 mg TID   heparin (porcine) 5,000 Units Q8H   insulin detemir 25 Units BID   levothyroxine 75 mcg Daily   metoprolol tartrate 12.5 mg BID   polyethylene glycol 17 g Daily   senna-docusate 8.6-50 mg 1 tablet Daily   sertraline 50 mg Daily   thiamine 100 mg Daily   PRN  sodium chloride   Q24H PRN   acetaminophen 650 mg Q6H PRN   dextrose 50% 12.5 g PRN   glucagon (human recombinant) 1 mg PRN   heparin, porcine (PF) 1,000 Units PRN   hydrALAZINE 10 mg Q6H PRN   insulin aspart 1-10 Units Q4H PRN   magnesium oxide 800 mg PRN   magnesium oxide 800 mg PRN   ondansetron 4 mg Q8H PRN   potassium chloride 10% 40 mEq PRN   potassium chloride 10% 40 mEq PRN   potassium chloride 10% 60 mEq PRN   potassium, sodium phosphates 2 packet PRN   potassium, sodium phosphates 2 packet PRN   potassium, sodium phosphates 2 packet PRN      Today I personally reviewed pertinent medications, lines/drains/airways, imaging, cardiology, lab results, microbiology results,         Assessment/Plan:          Neuro   * Guillain-[place] syndrome     Neurology following  LP outside hospital Protein 34 Glucose 97, WBC, completed 5 days IvIg before arrival  Exam poor, No motor response to pain,Diffuse hypotonia, Absent DTR diffusely   Repeat LP 6/19, CSF studies pending, Lyme negative  6/15 MRI shows supraclinoid aneurysms and mass/thrombosed aneurysm (nsgy consult),   Currently in PLEX 4/5, final PLEX Sunday 6/25  EMG performed 6/23, pending  results          Brain aneurysm     NSGY following  CTA 6/21: Stable, bilateral saccular superior hypophyseal artery aneurysms measuring 7.0 mm on the left and 10.0 mm on the right with an additional 2.0 mm aneurysm involving the right supraclinoid ICA. Stable, 3.6 cm nonenhancing soft tissue attenuation mass along the right planum sphenoidale           Pulmonary   Acute respiratory failure with hypoxia and hypercapnia     Intuabted on arrival to [facility]  Daily CXR/ABG  Unable to wean d/t muscle weakness  Duonebs       Cardiac   Right subclavian vein thrombosis     6/23 US reveals right Subclavian/axilary thrombus,  -heparin gtt started       Endocrine   Acquired hypothyroidism     On synthroid       Fluids/Electrolytes/Nutrition/GI   Hyperglycemia     HgbA1c 6.2  Hyperglycemia worsened by steroids  detemir increased, Continue ISS           Prophylaxis:  Venous Thromboembolism: mechanical  Stress Ulcer: H2B  Ventilator Pneumonia: yes          Activity Orders            Up with assistance starting at 06/14 0111          Full Code   Critical Care: 36 min        James Blackmon NP  Neurocritical Care  [facility]

## 2017-06-25 PROBLEM — R50.9 FEVER: Status: ACTIVE | Noted: 2017-06-25

## 2017-06-25 PROBLEM — I95.9 HYPOTENSION: Status: ACTIVE | Noted: 2017-06-25

## 2017-06-25 LAB
ALBUMIN SERPL BCP-MCNC: 3.3 G/DL
ALLENS TEST: ABNORMAL
ALLENS TEST: ABNORMAL
ALP SERPL-CCNC: 38 U/L
ALT SERPL W/O P-5'-P-CCNC: 42 U/L
ANION GAP SERPL CALC-SCNC: 4 MMOL/L
AST SERPL-CCNC: 27 U/L
BASOPHILS # BLD AUTO: 0 K/UL
BASOPHILS # BLD AUTO: 0 K/UL
BASOPHILS NFR BLD: 0 %
BASOPHILS NFR BLD: 0 %
BILIRUB SERPL-MCNC: 0.4 MG/DL
BLD PROD TYP BPU: NORMAL
BLOOD UNIT EXPIRATION DATE: NORMAL
BLOOD UNIT TYPE CODE: 9500
BLOOD UNIT TYPE: NORMAL
BUN SERPL-MCNC: 43 MG/DL
CALCIUM SERPL-MCNC: 8 MG/DL
CHLORIDE SERPL-SCNC: 111 MMOL/L
CO2 SERPL-SCNC: 30 MMOL/L
CODING SYSTEM: NORMAL
CREAT SERPL-MCNC: 0.7 MG/DL
DELSYS: ABNORMAL
DELSYS: ABNORMAL
DIFFERENTIAL METHOD: ABNORMAL
DIFFERENTIAL METHOD: ABNORMAL
DISPENSE STATUS: NORMAL
EOSINOPHIL # BLD AUTO: 0 K/UL
EOSINOPHIL # BLD AUTO: 0 K/UL
EOSINOPHIL NFR BLD: 0 %
EOSINOPHIL NFR BLD: 0 %
ERYTHROCYTE [DISTWIDTH] IN BLOOD BY AUTOMATED COUNT: 14.5 %
ERYTHROCYTE [DISTWIDTH] IN BLOOD BY AUTOMATED COUNT: 14.8 %
ERYTHROCYTE [SEDIMENTATION RATE] IN BLOOD BY WESTERGREN METHOD: 18 MM/H
ERYTHROCYTE [SEDIMENTATION RATE] IN BLOOD BY WESTERGREN METHOD: 18 MM/H
EST. GFR  (AFRICAN AMERICAN): >60 ML/MIN/1.73 M^2
EST. GFR  (NON AFRICAN AMERICAN): >60 ML/MIN/1.73 M^2
FACT X PPP CHRO-ACNC: 0.48 IU/ML
FACT X PPP CHRO-ACNC: 0.51 IU/ML
FACT X PPP CHRO-ACNC: 0.51 IU/ML
FIO2: 40
FIO2: 40
GLUCOSE SERPL-MCNC: 228 MG/DL
HCO3 UR-SCNC: 30.6 MMOL/L (ref 24–28)
HCO3 UR-SCNC: 33.5 MMOL/L (ref 24–28)
HCT VFR BLD AUTO: 21.6 %
HCT VFR BLD AUTO: 24.1 %
HGB BLD-MCNC: 7 G/DL
HGB BLD-MCNC: 7.6 G/DL
LYMPHOCYTES # BLD AUTO: 0.5 K/UL
LYMPHOCYTES # BLD AUTO: 0.5 K/UL
LYMPHOCYTES NFR BLD: 3.6 %
LYMPHOCYTES NFR BLD: 4.3 %
MAGNESIUM SERPL-MCNC: 2.5 MG/DL
MCH RBC QN AUTO: 32.3 PG
MCH RBC QN AUTO: 33.5 PG
MCHC RBC AUTO-ENTMCNC: 31.5 %
MCHC RBC AUTO-ENTMCNC: 32.4 %
MCV RBC AUTO: 103 FL
MCV RBC AUTO: 103 FL
MIN VOL: 10
MIN VOL: 9.6
MODE: ABNORMAL
MODE: ABNORMAL
MONOCYTES # BLD AUTO: 0.7 K/UL
MONOCYTES # BLD AUTO: 0.9 K/UL
MONOCYTES NFR BLD: 6 %
MONOCYTES NFR BLD: 6.7 %
NEUTROPHILS # BLD AUTO: 10.4 K/UL
NEUTROPHILS # BLD AUTO: 11.8 K/UL
NEUTROPHILS NFR BLD: 89.3 %
NEUTROPHILS NFR BLD: 89.4 %
OB PNL GAST: POSITIVE
PCO2 BLDA: 47.1 MMHG (ref 35–45)
PCO2 BLDA: 55 MMHG (ref 35–45)
PEEP: 8
PEEP: 8
PH SMN: 7.39 [PH] (ref 7.35–7.45)
PH SMN: 7.42 [PH] (ref 7.35–7.45)
PHOSPHATE SERPL-MCNC: 3.1 MG/DL
PIP: 22
PIP: 23
PLATELET # BLD AUTO: 203 K/UL
PLATELET # BLD AUTO: 221 K/UL
PMV BLD AUTO: 10.9 FL
PMV BLD AUTO: 11.4 FL
PO2 BLDA: 106 MMHG (ref 80–100)
PO2 BLDA: 123 MMHG (ref 80–100)
POC BE: 6 MMOL/L
POC BE: 9 MMOL/L
POC SATURATED O2: 98 % (ref 95–100)
POC SATURATED O2: 99 % (ref 95–100)
POC TCO2: 32 MMOL/L (ref 23–27)
POC TCO2: 35 MMOL/L (ref 23–27)
POCT GLUCOSE: 186 MG/DL (ref 70–110)
POCT GLUCOSE: 190 MG/DL (ref 70–110)
POCT GLUCOSE: 193 MG/DL (ref 70–110)
POCT GLUCOSE: 221 MG/DL (ref 70–110)
POCT GLUCOSE: 258 MG/DL (ref 70–110)
POTASSIUM SERPL-SCNC: 5.2 MMOL/L
PROT SERPL-MCNC: 4.7 G/DL
RBC # BLD AUTO: 2.09 M/UL
RBC # BLD AUTO: 2.35 M/UL
SAMPLE: ABNORMAL
SAMPLE: ABNORMAL
SITE: ABNORMAL
SITE: ABNORMAL
SODIUM SERPL-SCNC: 145 MMOL/L
SP02: 100
SP02: 98
TRANS ERYTHROCYTES VOL PATIENT: NORMAL ML
VT: 550
VT: 550
WBC # BLD AUTO: 11.63 K/UL
WBC # BLD AUTO: 13.26 K/UL

## 2017-06-25 PROCEDURE — 85520 HEPARIN ASSAY: CPT

## 2017-06-25 PROCEDURE — 25000003 PHARM REV CODE 250: Performed by: PSYCHIATRY & NEUROLOGY

## 2017-06-25 PROCEDURE — 63600175 PHARM REV CODE 636 W HCPCS: Performed by: NURSE PRACTITIONER

## 2017-06-25 PROCEDURE — 36620 INSERTION CATHETER ARTERY: CPT | Mod: ,,, | Performed by: NURSE PRACTITIONER

## 2017-06-25 PROCEDURE — 99291 CRITICAL CARE FIRST HOUR: CPT | Mod: 25,,, | Performed by: NURSE PRACTITIONER

## 2017-06-25 PROCEDURE — 80053 COMPREHEN METABOLIC PANEL: CPT

## 2017-06-25 PROCEDURE — 25000003 PHARM REV CODE 250: Performed by: NURSE PRACTITIONER

## 2017-06-25 PROCEDURE — 85520 HEPARIN ASSAY: CPT | Mod: 91

## 2017-06-25 PROCEDURE — 20000000 HC ICU ROOM

## 2017-06-25 PROCEDURE — 63600175 PHARM REV CODE 636 W HCPCS: Performed by: PATHOLOGY

## 2017-06-25 PROCEDURE — P9021 RED BLOOD CELLS UNIT: HCPCS

## 2017-06-25 PROCEDURE — 27000221 HC OXYGEN, UP TO 24 HOURS

## 2017-06-25 PROCEDURE — 63600175 PHARM REV CODE 636 W HCPCS: Performed by: PSYCHIATRY & NEUROLOGY

## 2017-06-25 PROCEDURE — 25000003 PHARM REV CODE 250: Performed by: PHYSICIAN ASSISTANT

## 2017-06-25 PROCEDURE — 82803 BLOOD GASES ANY COMBINATION: CPT

## 2017-06-25 PROCEDURE — 36620 INSERTION CATHETER ARTERY: CPT

## 2017-06-25 PROCEDURE — 36514 APHERESIS PLASMA: CPT | Mod: ,,, | Performed by: PATHOLOGY

## 2017-06-25 PROCEDURE — P9045 ALBUMIN (HUMAN), 5%, 250 ML: HCPCS | Performed by: PATHOLOGY

## 2017-06-25 PROCEDURE — 94003 VENT MGMT INPAT SUBQ DAY: CPT

## 2017-06-25 PROCEDURE — C9113 INJ PANTOPRAZOLE SODIUM, VIA: HCPCS | Performed by: PSYCHIATRY & NEUROLOGY

## 2017-06-25 PROCEDURE — 25000003 PHARM REV CODE 250: Performed by: PATHOLOGY

## 2017-06-25 PROCEDURE — 96365 THER/PROPH/DIAG IV INF INIT: CPT

## 2017-06-25 PROCEDURE — 96366 THER/PROPH/DIAG IV INF ADDON: CPT

## 2017-06-25 PROCEDURE — 36430 TRANSFUSION BLD/BLD COMPNT: CPT

## 2017-06-25 PROCEDURE — 37799 UNLISTED PX VASCULAR SURGERY: CPT

## 2017-06-25 PROCEDURE — 83735 ASSAY OF MAGNESIUM: CPT

## 2017-06-25 PROCEDURE — 82271 OCCULT BLOOD OTHER SOURCES: CPT

## 2017-06-25 PROCEDURE — 84100 ASSAY OF PHOSPHORUS: CPT

## 2017-06-25 PROCEDURE — 99900026 HC AIRWAY MAINTENANCE (STAT)

## 2017-06-25 PROCEDURE — 36514 APHERESIS PLASMA: CPT

## 2017-06-25 PROCEDURE — 85025 COMPLETE CBC W/AUTO DIFF WBC: CPT

## 2017-06-25 PROCEDURE — 99900035 HC TECH TIME PER 15 MIN (STAT)

## 2017-06-25 RX ORDER — HYDROCODONE BITARTRATE AND ACETAMINOPHEN 500; 5 MG/1; MG/1
TABLET ORAL
Status: DISCONTINUED | OUTPATIENT
Start: 2017-06-25 | End: 2017-07-14 | Stop reason: HOSPADM

## 2017-06-25 RX ORDER — PANTOPRAZOLE SODIUM 40 MG/10ML
40 INJECTION, POWDER, LYOPHILIZED, FOR SOLUTION INTRAVENOUS EVERY 12 HOURS
Status: DISCONTINUED | OUTPATIENT
Start: 2017-06-25 | End: 2017-06-26

## 2017-06-25 RX ORDER — HEPARIN SODIUM 1000 [USP'U]/ML
2500 INJECTION, SOLUTION INTRAVENOUS; SUBCUTANEOUS ONCE
Status: COMPLETED | OUTPATIENT
Start: 2017-06-25 | End: 2017-06-25

## 2017-06-25 RX ORDER — ALBUMIN HUMAN 50 G/1000ML
200 SOLUTION INTRAVENOUS ONCE
Status: COMPLETED | OUTPATIENT
Start: 2017-06-25 | End: 2017-06-25

## 2017-06-25 RX ADMIN — CALCIUM GLUCONATE 2000 MG: 94 INJECTION, SOLUTION INTRAVENOUS at 12:06

## 2017-06-25 RX ADMIN — SERTRALINE HYDROCHLORIDE 50 MG: 50 TABLET ORAL at 08:06

## 2017-06-25 RX ADMIN — HEPARIN SODIUM 250 UNITS: 1000 INJECTION, SOLUTION INTRAVENOUS; SUBCUTANEOUS at 01:06

## 2017-06-25 RX ADMIN — FAMOTIDINE 20 MG: 20 TABLET, FILM COATED ORAL at 08:06

## 2017-06-25 RX ADMIN — STANDARDIZED SENNA CONCENTRATE AND DOCUSATE SODIUM 1 TABLET: 8.6; 5 TABLET, FILM COATED ORAL at 08:06

## 2017-06-25 RX ADMIN — ACETAMINOPHEN 650 MG: 325 TABLET ORAL at 04:06

## 2017-06-25 RX ADMIN — SODIUM CHLORIDE 250 ML: 0.9 INJECTION, SOLUTION INTRAVENOUS at 12:06

## 2017-06-25 RX ADMIN — Medication 1 TABLET: at 08:06

## 2017-06-25 RX ADMIN — GABAPENTIN 300 MG: 300 CAPSULE ORAL at 05:06

## 2017-06-25 RX ADMIN — ASPIRIN 325 MG ORAL TABLET 325 MG: 325 PILL ORAL at 08:06

## 2017-06-25 RX ADMIN — ALBUMIN (HUMAN) 200 G: 12.5 SOLUTION INTRAVENOUS at 12:06

## 2017-06-25 RX ADMIN — CHLORHEXIDINE GLUCONATE 15 ML: 1.2 RINSE ORAL at 06:06

## 2017-06-25 RX ADMIN — MINERAL OIL AND WHITE PETROLATUM: 150; 830 OINTMENT OPHTHALMIC at 09:06

## 2017-06-25 RX ADMIN — INSULIN ASPART 8 UNITS: 100 INJECTION, SOLUTION INTRAVENOUS; SUBCUTANEOUS at 10:06

## 2017-06-25 RX ADMIN — CHLORHEXIDINE GLUCONATE 15 ML: 1.2 RINSE ORAL at 12:06

## 2017-06-25 RX ADMIN — CHLORHEXIDINE GLUCONATE 15 ML: 1.2 RINSE ORAL at 05:06

## 2017-06-25 RX ADMIN — THIAMINE HCL TAB 100 MG 100 MG: 100 TAB at 08:06

## 2017-06-25 RX ADMIN — Medication 12.5 MG: at 08:06

## 2017-06-25 RX ADMIN — SODIUM CHLORIDE 500 ML: 0.9 INJECTION, SOLUTION INTRAVENOUS at 10:06

## 2017-06-25 RX ADMIN — PANTOPRAZOLE SODIUM 40 MG: 40 INJECTION, POWDER, FOR SOLUTION INTRAVENOUS at 03:06

## 2017-06-25 RX ADMIN — Medication 12.5 MG: at 09:06

## 2017-06-25 RX ADMIN — LEVOTHYROXINE SODIUM 75 MCG: 75 TABLET ORAL at 05:06

## 2017-06-25 RX ADMIN — HYDRALAZINE HYDROCHLORIDE 10 MG: 20 INJECTION INTRAMUSCULAR; INTRAVENOUS at 07:06

## 2017-06-25 RX ADMIN — INSULIN ASPART 4 UNITS: 100 INJECTION, SOLUTION INTRAVENOUS; SUBCUTANEOUS at 06:06

## 2017-06-25 RX ADMIN — INSULIN ASPART 4 UNITS: 100 INJECTION, SOLUTION INTRAVENOUS; SUBCUTANEOUS at 02:06

## 2017-06-25 RX ADMIN — POLYETHYLENE GLYCOL 3350 17 G: 17 POWDER, FOR SOLUTION ORAL at 08:06

## 2017-06-25 RX ADMIN — MIDAZOLAM 2 MG/HR: 5 INJECTION INTRAMUSCULAR; INTRAVENOUS at 08:06

## 2017-06-25 NOTE — PLAN OF CARE
Problem: Patient Care Overview  Goal: Plan of Care Review  Outcome: Ongoing (interventions implemented as appropriate)  Plan of care reviewed with pt and pt's wife, Fallon.  Pt's wife verbalized understanding of the plan of care.  Pt had episode of hypotension, see progress notes.  Last session of PLEX completed this afternoon, pt tolerated well.  CT abdomen this evening, results pending.  Heparin gtt stopped as ordered.  Arterial line placed this afternoon.  1 unit PRBC's given.  Will continue to monitor.

## 2017-06-25 NOTE — ASSESSMENT & PLAN NOTE
Neurology following  LP outside hospital Protein 34 Glucose 97, WBC, completed 5 days IvIg before arrival  Exam poor, No motor response to pain,Diffuse hypotonia, Absent DTR diffusely   Repeat LP 6/19, CSF studies pending, Lyme negative  6/15 MRI shows supraclinoid aneurysms and mass/thrombosed aneurysm (nsgy consult),   Currently in PLEX 4/5, final PLEX Sunday 6/25  EMG performed 6/23, pending results  Discuss POC/EMG results with Family.   6/25 Cadmium level returned elevated. Though patients symptoms do not fit.

## 2017-06-25 NOTE — ASSESSMENT & PLAN NOTE
Afebrile overnight  No leukocytosis  May be colonized with pseudomonas in sputum.   No ABX started, will follow

## 2017-06-25 NOTE — ASSESSMENT & PLAN NOTE
-HR 70'sm yet MAP dropped to 50's, x 2  -fluid bolus given, Blanchard placed  -H/H did decline/on heparin. , will monitor with A-line in place, may consider CT ABD r/o spontaneous bleed   -No obvious s/s bleeding noted

## 2017-06-25 NOTE — PROCEDURE NOTE ADDENDUM
This is the final (5/5) PLEX planned for this patient for Guillain-[place] syndrome which worsened dramatically in spite of a recent series of IvIg administrations.     The procedure was begun after review of the patient situation. His vital signs and laboratory parameters were stable, and it was elected to proceed. A 4 liter exchange was completed with 5% albumin replacement. No problems were encountered during the exchange and I was available to the staff throughout for consultation.    PAM Verma MD  Transfusion Medicine

## 2017-06-25 NOTE — ASSESSMENT & PLAN NOTE
6/23 US reveals right Subclavian/axilary thrombus,  -heparin gtt started, consider PO anticoagulation after Trach/Peg

## 2017-06-25 NOTE — SUBJECTIVE & OBJECTIVE
Interval History:    -EMG today, awaiting results  -continue versed for sedation while intubated  -Pan Cx for tMax 100.2 overnight  -SSI increased, nutrition consult for TF change.         Review of Systems   Unable to perform ROS: Intubated       Objective:     Vitals:  Temp: 99.1 °F (37.3 °C) (06/25/17 0705)  Pulse: 78 (06/25/17 1125)  Resp: 18 (06/25/17 1125)  BP: 130/71 (06/25/17 1000)  SpO2: 100 % (06/25/17 1125)    Temp:  [98.6 °F (37 °C)-99.7 °F (37.6 °C)] 99.1 °F (37.3 °C)  Pulse:  [70-96] 78  Resp:  [18-19] 18  SpO2:  [97 %-100 %] 100 %  BP: (104-151)/(51-76) 130/71         Vent Mode: A/C  Oxygen Concentration (%):  [40] 40  Resp Rate Total:  [18 br/min] 18 br/min  Vt Set:  [550 mL] 550 mL  PEEP/CPAP:  [8 cmH20] 8 cmH20  Pressure Support:  [0 cmH20] 0 cmH20  Mean Airway Pressure:  [12 diR06-56 cmH20] 13 cmH20    06/24 0701 - 06/25 0700  In: 3144 [I.V.:1674]  Out: 2915 [Urine:2915]    Physical Exam   HENT:   Head: Normocephalic.   Cardiovascular: Normal rate, regular rhythm, normal heart sounds and intact distal pulses.    Pulmonary/Chest: Effort normal and breath sounds normal.   Abdominal: Soft. Bowel sounds are normal.   Neurological:   E1 V1T M0  Areflexic, diffuse hypotonia  No response to noxious stimuli  No gag/cough/corneal  PERRLA, bilateral horizontal eye moments noted (sedation vacation)   Skin: Skin is warm and dry.   Vitals reviewed.        Medications:  Continuous    sodium chloride 0.9% Last Rate: 50 mL/hr at 06/25/17 1000   heparin (porcine) in 5 % dex Last Rate: 1,100 Units/hr (06/25/17 1000)   midazolam (VERSED) infusion (non-titrating) Last Rate: Stopped (06/25/17 0930)   Scheduled    albumin human 5% 200 g Once   aspirin 325 mg Daily   bisacodyl 10 mg Daily   calcium gluconate IVPB 2,000 mg Once   chlorhexidine 15 mL QID   famotidine 20 mg BID   folic acid-vit B6-vit B12 2.5-25-2 mg 1 tablet Daily   heparin (porcine) 2,500 Units Once   insulin detemir 28 Units BID   levothyroxine 75 mcg  Daily   metoprolol tartrate 12.5 mg BID   polyethylene glycol 17 g Daily   senna-docusate 8.6-50 mg 1 tablet Daily   sertraline 50 mg Daily   thiamine 100 mg Daily   white petrolatum-mineral oil  QHS   PRN    sodium chloride  Q24H PRN   acetaminophen 650 mg Q6H PRN   dextrose 50% 12.5 g PRN   glucagon (human recombinant) 1 mg PRN   heparin, porcine (PF) 1,000 Units PRN   hydrALAZINE 10 mg Q6H PRN   insulin aspart 1-10 Units Q4H PRN   magnesium oxide 800 mg PRN   magnesium oxide 800 mg PRN   ondansetron 4 mg Q8H PRN   potassium chloride 10% 40 mEq PRN   potassium chloride 10% 40 mEq PRN   potassium chloride 10% 60 mEq PRN   potassium, sodium phosphates 2 packet PRN   potassium, sodium phosphates 2 packet PRN   potassium, sodium phosphates 2 packet PRN     Today I personally reviewed pertinent medications, lines/drains/airways, imaging, cardiology, lab results, microbiology results,

## 2017-06-25 NOTE — PROCEDURES
"Rosalio Cam is a 72 y.o. male patient.    Temp: 99.1 °F (37.3 °C) (17 0705)  Pulse: 78 (17 1125)  Resp: 18 (17 1125)  BP: 130/71 (17 1000)  SpO2: 100 % (17 1125)  Weight: 88.8 kg (195 lb 12.3 oz) (17 0300)  Height: 6' 1" (185.4 cm) (17 1120)       Arterial Line  Date/Time: 2017 12:11 PM  Performed by: SASKIA BLACKMON.  Authorized by: SASKIA BLACKMON   Consent Done: Yes  Consent: Verbal consent obtained. Written consent obtained.  Consent given by: spouse  Patient understanding: patient states understanding of the procedure being performed  Patient consent: the patient's understanding of the procedure matches consent given  Procedure consent: procedure consent matches procedure scheduled  Relevant documents: relevant documents present and verified  Test results: test results available and properly labeled  Patient identity confirmed: , MRN and name  Time out: Immediately prior to procedure a "time out" was called to verify the correct patient, procedure, equipment, support staff and site/side marked as required.  Preparation: Patient was prepped and draped in the usual sterile fashion.  Indications: respiratory failure and hemodynamic monitoring  Location: left radial  Patient sedated: no  Herve's test normal: yes  Needle gauge: 20  Seldinger technique: Seldinger technique used  Number of attempts: 1  Complications: No  Specimens: No  Implants: No  Post-procedure: dressing applied  Post-procedure CMS: normal and unchanged  Patient tolerance: Patient tolerated the procedure well with no immediate complications          Saskia Blackmon  2017    "

## 2017-06-25 NOTE — PROGRESS NOTES
Spoke to NCC regarding ani-Xa results. Orders initiated to hold heparin gtt for one hour and reduce rate by 100u/hr. Then resume and Recheck labs 6hr after. VSS no acute distress. Will continue to monitor.

## 2017-06-25 NOTE — PROGRESS NOTES
[facility]  Neurocritical Care  Progress Note    Admit Date: 6/14/2017  Service Date: 06/25/2017  Length of Stay: 11    Subjective:     Chief Complaint: Guillain-[place] syndrome    History of Present Illness: 72 year old male with PMHx CAD, skin cancer, hypothyroidism, and HLP who was transferred from OSH for further evaluation and management of rapidly progressive ascending paralysis.  No family present during exam, history obtained primarily from chart.  Patient was on a camping trip to [place] and [place] recently. When he returned he started to have paraesthesias of the feet and hand that quickly progressed to legs weakness. No clear hx of tick bites or skin lesions.  Additionally, a few days prior to admission to OSH he had prostate biopsy and was treated with cipro.  Pt presented to OSH and was diagnosed with GBS and was started on IVIG, he completed 5 days of treatment which ended on 6/12/17.  Pt also being treated for pneumonia.  After admission his weakness got worse to the point that he had to be intubated.  Pt has had poor neurologic exam since.   - LP was done in OSH and was reported as 0 rbc, 0 wbc, protein 34, glucose 97.  Lyme ab panel was sent and results are pending.  MRI Brain done today shows non enhancing lesion abutting the right optic nerve.  Neurosurgery consulted for evaluation.    MRI of the brain:  Bilateral supraclinoid internal carotid artery aneurysms.  On the right there is a 10 mm aneurysm in separate 2 mm aneurysm.  On the left the supraclinoid ICA aneurysm measures about 7 x 6 mm.3.6 cm nonenhancing mass centered over the right greater and lesser wings of the sphenoid abutting the ICA, A1 and M1 segments, displacing the right optic nerve.  Differential considerations include a thrombosed aneurysm associated with right ICA aneurysm aneurysm,  exophytic hamartoma, or or an exophytic nonenhancing primary brain tumor such as low-grade glioma or  oligodendroglioma.    Hospital Course: 6[place]15 [place] [facility] [place] :Initial LP with protein of 34., glucose 97, WBC ) .IVIG x 5(finished 6/12).  6/16 NSGY consulted for  /lesion noted on MRI, Awaitng EMG  6/18 Lyme negative, multiple CSF labs pending  6/21:Pending CTA and continuous EEG.   6/22: No clinical changes. CTA   left  superior hypophyseal aneurysm again measures approximately 7.0 x 6.0 mm. The right superior hypophyseal artery aneurysm again measures 10.0 x 7.0 mm.  There is a second superiorly projecting aneurysm of the right supraclinoid ICA measuring 2.0 mm. EEG: slowing. Sphenoid aneurysm. Pending EMG /NCS. PIC line is kinked.  CXR -R-PIC line kinked.   6/23 EMG performed, awaiting results.  6/24 Awaiting EMG report from 6/23. Insulin and TF adjusted for hyperglycemia.  6/25 ET tube repositioned. episode of hypotension, A-line placed. Lateral eye movements noted when on sedation vacation. Need to finalize discussion on early Trach/Peg Monday.    Interval History:    -a-line for hypotension, MAP 50s, though HR 70's  -ET advanced, patient has long airway. Discussed with RT and nursing staff. Towel used to help alleviate the ET tube slipping down  -horizontal eye movents noted off versed, make sure sedation vacations are happening with Nursing staff  -Follow WBC/Temps, as sputum may be colonized with pseudomonas      Review of Systems   Unable to perform ROS: Intubated       Objective:     Vitals:  Temp: 99.1 °F (37.3 °C) (06/25/17 0705)  Pulse: 78 (06/25/17 1125)  Resp: 18 (06/25/17 1125)  BP: 130/71 (06/25/17 1000)  SpO2: 100 % (06/25/17 1125)    Temp:  [98.6 °F (37 °C)-99.7 °F (37.6 °C)] 99.1 °F (37.3 °C)  Pulse:  [70-96] 78  Resp:  [18-19] 18  SpO2:  [97 %-100 %] 100 %  BP: (104-151)/(51-76) 130/71         Vent Mode: A/C  Oxygen Concentration (%):  [40] 40  Resp Rate Total:  [18 br/min] 18 br/min  Vt Set:  [550 mL] 550 mL  PEEP/CPAP:  [8 cmH20] 8 cmH20  Pressure Support:  [0 cmH20] 0 cmH20  Mean  Airway Pressure:  [12 xoJ49-47 cmH20] 13 cmH20    06/24 0701 - 06/25 0700  In: 3144 [I.V.:1674]  Out: 2915 [Urine:2915]    Physical Exam   HENT:   Head: Normocephalic.   Cardiovascular: Normal rate, regular rhythm, normal heart sounds and intact distal pulses.    Pulmonary/Chest: Effort normal and breath sounds normal.   Abdominal: Soft. Bowel sounds are normal.   Neurological:   E1 V1T M0  Areflexic, diffuse hypotonia  No response to noxious stimuli  No gag/cough/corneal  PERRLA, bilateral horizontal eye moments noted (sedation vacation)   Skin: Skin is warm and dry.   Vitals reviewed.        Medications:  Continuous    sodium chloride 0.9% Last Rate: 50 mL/hr at 06/25/17 1000   heparin (porcine) in 5 % dex Last Rate: 1,100 Units/hr (06/25/17 1000)   midazolam (VERSED) infusion (non-titrating) Last Rate: Stopped (06/25/17 0930)   Scheduled    albumin human 5% 200 g Once   aspirin 325 mg Daily   bisacodyl 10 mg Daily   calcium gluconate IVPB 2,000 mg Once   chlorhexidine 15 mL QID   famotidine 20 mg BID   folic acid-vit B6-vit B12 2.5-25-2 mg 1 tablet Daily   heparin (porcine) 2,500 Units Once   insulin detemir 28 Units BID   levothyroxine 75 mcg Daily   metoprolol tartrate 12.5 mg BID   polyethylene glycol 17 g Daily   senna-docusate 8.6-50 mg 1 tablet Daily   sertraline 50 mg Daily   thiamine 100 mg Daily   white petrolatum-mineral oil  QHS   PRN    sodium chloride  Q24H PRN   acetaminophen 650 mg Q6H PRN   dextrose 50% 12.5 g PRN   glucagon (human recombinant) 1 mg PRN   heparin, porcine (PF) 1,000 Units PRN   hydrALAZINE 10 mg Q6H PRN   insulin aspart 1-10 Units Q4H PRN   magnesium oxide 800 mg PRN   magnesium oxide 800 mg PRN   ondansetron 4 mg Q8H PRN   potassium chloride 10% 40 mEq PRN   potassium chloride 10% 40 mEq PRN   potassium chloride 10% 60 mEq PRN   potassium, sodium phosphates 2 packet PRN   potassium, sodium phosphates 2 packet PRN   potassium, sodium phosphates 2 packet PRN     Today I personally  reviewed pertinent medications, lines/drains/airways, imaging, cardiology, lab results, microbiology results,       Assessment/Plan:     Neuro   * Guillain-[place] syndrome    Neurology following  LP outside hospital Protein 34 Glucose 97, WBC, completed 5 days IvIg before arrival  Exam poor, No motor response to pain,Diffuse hypotonia, Absent DTR diffusely   Repeat LP 6/19, CSF studies pending, Lyme negative  6/15 MRI shows supraclinoid aneurysms and mass/thrombosed aneurysm (nsgy consult),   Currently in PLEX 4/5, final PLEX Sunday 6/25  EMG performed 6/23, pending results  Discuss POC/EMG results with Family.   6/25 Cadmium level returned elevated. Though patients symptoms do not fit.             Brain aneurysm    NSGY following  CTA 6/21: Stable, bilateral saccular superior hypophyseal artery aneurysms measuring 7.0 mm on the left and 10.0 mm on the right with an additional 2.0 mm aneurysm involving the right supraclinoid ICA. Stable, 3.6 cm nonenhancing soft tissue attenuation mass along the right planum sphenoidale           Pulmonary   Acute respiratory failure with hypoxia and hypercapnia    Intuabted on arrival to [facility]  Daily CXR/ABG  Unable to wean d/t muscle weakness  Duonebs  Plan for Early Trach/Peg. Needs final discussion/clarification with family on Monday.         Cardiac   Hypotension    -HR 70'sm yet MAP dropped to 50's, x 2  -fluid bolus given, [place] placed  -H/H did decline/on heparin. , will monitor with A-line in place, may consider CT ABD r/o spontaneous bleed   -No obvious s/s bleeding noted        Right subclavian vein thrombosis    6/23 US reveals right Subclavian/axilary thrombus,  -heparin gtt started, consider PO anticoagulation after Trach/Peg        Endocrine   Acquired hypothyroidism    On synthroid        Fluids/Electrolytes/Nutrition/GI   Hyperglycemia    HgbA1c 6.2  Hyperglycemia worsened by steroids  detemir increased (28units bid), Continue modified ISS  TF changed to GLucerna,  hyperglycemia becoming more controlled            Prophylaxis:  Venous Thromboembolism: mechanical chemical  Stress Ulcer: H2B  Ventilator Pneumonia: yes     Activity Orders          Up with assistance starting at 06/14 0111        Full Code   Critical Care: 36 min    James Blackmon NP  Neurocritical Care  [facility]

## 2017-06-25 NOTE — PLAN OF CARE
Problem: Patient Care Overview  Goal: Plan of Care Review  Outcome: Ongoing (interventions implemented as appropriate)  POC reviewed with pt family at 2000. Pt family verbalized understanding. Questions and concerns addressed. No acute events overnight. Pt progressing toward goals. Will continue to monitor. See flowsheets for full assessment and VS info

## 2017-06-25 NOTE — ASSESSMENT & PLAN NOTE
HgbA1c 6.2  Hyperglycemia worsened by steroids  detemir increased (28units bid), Continue modified ISS  TF changed to GLucerna, hyperglycemia becoming more controlled

## 2017-06-25 NOTE — PROGRESS NOTES
Pt had episode of hypotension BP 71/41 MAP 52, Dr. Rodriguez and James Blackmon NP at bedside.  Smooth bump administered as ordered as well as 500 cc NS bolus.  Pt NSR throughout episode.  BP now stable with MAPs in 70's.  STAT CBC sent.  Will continue to monitor.    1430--James Blackmon NP notified of CBC results.  Orders given to stop heparin gtt now, occult blood samples, 1 unit PRBC's, and STAT CT abdomen.

## 2017-06-25 NOTE — ASSESSMENT & PLAN NOTE
Intuabted on arrival to [facility]  Daily CXR/ABG  Unable to wean d/t muscle weakness  Shanice  Plan for Early Trach/Peg. Needs final discussion/clarification with family on Monday.

## 2017-06-25 NOTE — PROGRESS NOTES
AntiXa therapeutic at 0.48.  Per order, will continue heparin gtt at current rate and recheck antixa in 6 hours at 1400.

## 2017-06-26 PROBLEM — K59.01 SLOW TRANSIT CONSTIPATION: Status: ACTIVE | Noted: 2017-06-26

## 2017-06-26 LAB
ALBUMIN SERPL BCP-MCNC: 3.7 G/DL
ALLENS TEST: ABNORMAL
ALP SERPL-CCNC: 32 U/L
ALT SERPL W/O P-5'-P-CCNC: 42 U/L
ANION GAP SERPL CALC-SCNC: 8 MMOL/L
APTT BLDCRRT: 28.7 SEC
AST SERPL-CCNC: 55 U/L
BACTERIA SPEC AEROBE CULT: NORMAL
BASOPHILS # BLD AUTO: 0 K/UL
BASOPHILS NFR BLD: 0 %
BILIRUB SERPL-MCNC: 0.9 MG/DL
BUN SERPL-MCNC: 35 MG/DL
CALCIUM SERPL-MCNC: 8.4 MG/DL
CHLORIDE SERPL-SCNC: 112 MMOL/L
CO2 SERPL-SCNC: 28 MMOL/L
CREAT SERPL-MCNC: 0.6 MG/DL
DELSYS: ABNORMAL
DIFFERENTIAL METHOD: ABNORMAL
EOSINOPHIL # BLD AUTO: 0 K/UL
EOSINOPHIL NFR BLD: 0 %
ERYTHROCYTE [DISTWIDTH] IN BLOOD BY AUTOMATED COUNT: 16.4 %
ERYTHROCYTE [DISTWIDTH] IN BLOOD BY AUTOMATED COUNT: 16.7 %
ERYTHROCYTE [DISTWIDTH] IN BLOOD BY AUTOMATED COUNT: 16.8 %
ERYTHROCYTE [SEDIMENTATION RATE] IN BLOOD BY WESTERGREN METHOD: 18 MM/H
EST. GFR  (AFRICAN AMERICAN): >60 ML/MIN/1.73 M^2
EST. GFR  (NON AFRICAN AMERICAN): >60 ML/MIN/1.73 M^2
FACT X PPP CHRO-ACNC: 0.31 IU/ML
FACT X PPP CHRO-ACNC: 0.31 IU/ML
FACT X PPP CHRO-ACNC: <0.1 IU/ML
FIO2: 40
FIO2: 50
FIO2: 50
GLUCOSE SERPL-MCNC: 139 MG/DL
GRAM STN SPEC: NORMAL
HCO3 UR-SCNC: 27.7 MMOL/L (ref 24–28)
HCO3 UR-SCNC: 30.1 MMOL/L (ref 24–28)
HCO3 UR-SCNC: 31.5 MMOL/L (ref 24–28)
HCT VFR BLD AUTO: 24 %
HCT VFR BLD AUTO: 24.9 %
HCT VFR BLD AUTO: 25.6 %
HGB BLD-MCNC: 8 G/DL
HGB BLD-MCNC: 8.3 G/DL
HGB BLD-MCNC: 8.4 G/DL
LYMPHOCYTES # BLD AUTO: 0.7 K/UL
LYMPHOCYTES # BLD AUTO: 0.7 K/UL
LYMPHOCYTES # BLD AUTO: 0.9 K/UL
LYMPHOCYTES NFR BLD: 4.4 %
LYMPHOCYTES NFR BLD: 4.5 %
LYMPHOCYTES NFR BLD: 6.1 %
MAGNESIUM SERPL-MCNC: 2.4 MG/DL
MCH RBC QN AUTO: 32.7 PG
MCH RBC QN AUTO: 32.9 PG
MCH RBC QN AUTO: 33.1 PG
MCHC RBC AUTO-ENTMCNC: 32.8 %
MCHC RBC AUTO-ENTMCNC: 33.3 %
MCHC RBC AUTO-ENTMCNC: 33.3 %
MCV RBC AUTO: 100 FL
MCV RBC AUTO: 99 FL
MCV RBC AUTO: 99 FL
MIN VOL: 10
MIN VOL: 10.4
MIN VOL: 10.5
MODE: ABNORMAL
MONOCYTES # BLD AUTO: 0.6 K/UL
MONOCYTES # BLD AUTO: 0.6 K/UL
MONOCYTES # BLD AUTO: 0.8 K/UL
MONOCYTES NFR BLD: 3.6 %
MONOCYTES NFR BLD: 3.8 %
MONOCYTES NFR BLD: 5.3 %
NEUTROPHILS # BLD AUTO: 12.4 K/UL
NEUTROPHILS # BLD AUTO: 14.1 K/UL
NEUTROPHILS # BLD AUTO: 14.7 K/UL
NEUTROPHILS NFR BLD: 88.1 %
NEUTROPHILS NFR BLD: 91.3 %
NEUTROPHILS NFR BLD: 91.6 %
NMO/AQP4 FACS,CSF: NEGATIVE
PCO2 BLDA: 36.5 MMHG (ref 35–45)
PCO2 BLDA: 37.8 MMHG (ref 35–45)
PCO2 BLDA: 42.1 MMHG (ref 35–45)
PEEP: 8
PH SMN: 7.46 [PH] (ref 7.35–7.45)
PH SMN: 7.49 [PH] (ref 7.35–7.45)
PH SMN: 7.53 [PH] (ref 7.35–7.45)
PHOSPHATE SERPL-MCNC: 2.8 MG/DL
PIP: 22
PIP: 23
PIP: 25
PLATELET # BLD AUTO: 189 K/UL
PLATELET # BLD AUTO: 195 K/UL
PLATELET # BLD AUTO: 196 K/UL
PMV BLD AUTO: 11.1 FL
PMV BLD AUTO: 11.4 FL
PMV BLD AUTO: 11.6 FL
PO2 BLDA: 161 MMHG (ref 80–100)
PO2 BLDA: 58 MMHG (ref 80–100)
PO2 BLDA: 67 MMHG (ref 80–100)
POC BE: 4 MMOL/L
POC BE: 6 MMOL/L
POC BE: 9 MMOL/L
POC SATURATED O2: 100 % (ref 95–100)
POC SATURATED O2: 93 % (ref 95–100)
POC SATURATED O2: 95 % (ref 95–100)
POC TCO2: 29 MMOL/L (ref 23–27)
POC TCO2: 31 MMOL/L (ref 23–27)
POC TCO2: 33 MMOL/L (ref 23–27)
POCT GLUCOSE: 158 MG/DL (ref 70–110)
POCT GLUCOSE: 172 MG/DL (ref 70–110)
POCT GLUCOSE: 174 MG/DL (ref 70–110)
POCT GLUCOSE: 174 MG/DL (ref 70–110)
POCT GLUCOSE: 184 MG/DL (ref 70–110)
POTASSIUM SERPL-SCNC: 4.1 MMOL/L
PROT SERPL-MCNC: 5 G/DL
RBC # BLD AUTO: 2.42 M/UL
RBC # BLD AUTO: 2.52 M/UL
RBC # BLD AUTO: 2.57 M/UL
SAMPLE: ABNORMAL
SITE: ABNORMAL
SODIUM SERPL-SCNC: 148 MMOL/L
SP02: 100
SP02: 100
SP02: 98
VT: 550
WBC # BLD AUTO: 14.04 K/UL
WBC # BLD AUTO: 15.36 K/UL
WBC # BLD AUTO: 16.11 K/UL

## 2017-06-26 PROCEDURE — 27200966 HC CLOSED SUCTION SYSTEM

## 2017-06-26 PROCEDURE — 63600175 PHARM REV CODE 636 W HCPCS: Performed by: PSYCHIATRY & NEUROLOGY

## 2017-06-26 PROCEDURE — 80053 COMPREHEN METABOLIC PANEL: CPT

## 2017-06-26 PROCEDURE — 94003 VENT MGMT INPAT SUBQ DAY: CPT

## 2017-06-26 PROCEDURE — 99291 CRITICAL CARE FIRST HOUR: CPT | Mod: ,,, | Performed by: PSYCHIATRY & NEUROLOGY

## 2017-06-26 PROCEDURE — 84100 ASSAY OF PHOSPHORUS: CPT

## 2017-06-26 PROCEDURE — 25000003 PHARM REV CODE 250: Performed by: NURSE PRACTITIONER

## 2017-06-26 PROCEDURE — 82803 BLOOD GASES ANY COMBINATION: CPT

## 2017-06-26 PROCEDURE — C9113 INJ PANTOPRAZOLE SODIUM, VIA: HCPCS | Performed by: PSYCHIATRY & NEUROLOGY

## 2017-06-26 PROCEDURE — 85730 THROMBOPLASTIN TIME PARTIAL: CPT

## 2017-06-26 PROCEDURE — 27000221 HC OXYGEN, UP TO 24 HOURS

## 2017-06-26 PROCEDURE — 83735 ASSAY OF MAGNESIUM: CPT

## 2017-06-26 PROCEDURE — 25000003 PHARM REV CODE 250: Performed by: PHYSICIAN ASSISTANT

## 2017-06-26 PROCEDURE — 25000003 PHARM REV CODE 250: Performed by: PSYCHIATRY & NEUROLOGY

## 2017-06-26 PROCEDURE — 20000000 HC ICU ROOM

## 2017-06-26 PROCEDURE — 97530 THERAPEUTIC ACTIVITIES: CPT

## 2017-06-26 PROCEDURE — 85520 HEPARIN ASSAY: CPT | Mod: 91

## 2017-06-26 PROCEDURE — 37799 UNLISTED PX VASCULAR SURGERY: CPT

## 2017-06-26 PROCEDURE — 99900035 HC TECH TIME PER 15 MIN (STAT)

## 2017-06-26 PROCEDURE — 85025 COMPLETE CBC W/AUTO DIFF WBC: CPT

## 2017-06-26 PROCEDURE — 99900026 HC AIRWAY MAINTENANCE (STAT)

## 2017-06-26 RX ORDER — HEPARIN SODIUM 10000 [USP'U]/100ML
17 INJECTION, SOLUTION INTRAVENOUS CONTINUOUS
Status: DISCONTINUED | OUTPATIENT
Start: 2017-06-26 | End: 2017-06-28

## 2017-06-26 RX ORDER — AMOXICILLIN 250 MG
1 CAPSULE ORAL 2 TIMES DAILY
Status: DISCONTINUED | OUTPATIENT
Start: 2017-06-26 | End: 2017-06-27

## 2017-06-26 RX ORDER — PANTOPRAZOLE SODIUM 40 MG/1
40 FOR SUSPENSION ORAL DAILY
Status: DISCONTINUED | OUTPATIENT
Start: 2017-06-27 | End: 2017-07-05

## 2017-06-26 RX ORDER — SYRING-NEEDL,DISP,INSUL,0.3 ML 29 G X1/2"
296 SYRINGE, EMPTY DISPOSABLE MISCELLANEOUS ONCE
Status: COMPLETED | OUTPATIENT
Start: 2017-06-26 | End: 2017-06-26

## 2017-06-26 RX ADMIN — Medication 12.5 MG: at 09:06

## 2017-06-26 RX ADMIN — CHLORHEXIDINE GLUCONATE 15 ML: 1.2 RINSE ORAL at 06:06

## 2017-06-26 RX ADMIN — SERTRALINE HYDROCHLORIDE 50 MG: 50 TABLET ORAL at 08:06

## 2017-06-26 RX ADMIN — ASPIRIN 325 MG ORAL TABLET 325 MG: 325 PILL ORAL at 08:06

## 2017-06-26 RX ADMIN — INSULIN ASPART 2 UNITS: 100 INJECTION, SOLUTION INTRAVENOUS; SUBCUTANEOUS at 09:06

## 2017-06-26 RX ADMIN — INSULIN ASPART 4 UNITS: 100 INJECTION, SOLUTION INTRAVENOUS; SUBCUTANEOUS at 02:06

## 2017-06-26 RX ADMIN — Medication 12.5 MG: at 08:06

## 2017-06-26 RX ADMIN — THIAMINE HCL TAB 100 MG 100 MG: 100 TAB at 08:06

## 2017-06-26 RX ADMIN — CHLORHEXIDINE GLUCONATE 15 ML: 1.2 RINSE ORAL at 12:06

## 2017-06-26 RX ADMIN — HEPARIN SODIUM AND DEXTROSE 17 UNITS/KG/HR: 10000; 5 INJECTION INTRAVENOUS at 11:06

## 2017-06-26 RX ADMIN — LEVOTHYROXINE SODIUM 75 MCG: 75 TABLET ORAL at 06:06

## 2017-06-26 RX ADMIN — PANTOPRAZOLE SODIUM 40 MG: 40 INJECTION, POWDER, FOR SOLUTION INTRAVENOUS at 08:06

## 2017-06-26 RX ADMIN — STANDARDIZED SENNA CONCENTRATE AND DOCUSATE SODIUM 1 TABLET: 8.6; 5 TABLET, FILM COATED ORAL at 08:06

## 2017-06-26 RX ADMIN — MINERAL OIL AND WHITE PETROLATUM: 150; 830 OINTMENT OPHTHALMIC at 09:06

## 2017-06-26 RX ADMIN — INSULIN ASPART 4 UNITS: 100 INJECTION, SOLUTION INTRAVENOUS; SUBCUTANEOUS at 06:06

## 2017-06-26 RX ADMIN — INSULIN ASPART 4 UNITS: 100 INJECTION, SOLUTION INTRAVENOUS; SUBCUTANEOUS at 10:06

## 2017-06-26 RX ADMIN — HYDRALAZINE HYDROCHLORIDE 10 MG: 20 INJECTION INTRAMUSCULAR; INTRAVENOUS at 04:06

## 2017-06-26 RX ADMIN — MAGESIUM CITRATE 296 ML: 1.75 LIQUID ORAL at 11:06

## 2017-06-26 RX ADMIN — Medication 1 TABLET: at 08:06

## 2017-06-26 RX ADMIN — POLYETHYLENE GLYCOL 3350 17 G: 17 POWDER, FOR SOLUTION ORAL at 08:06

## 2017-06-26 RX ADMIN — CHLORHEXIDINE GLUCONATE 15 ML: 1.2 RINSE ORAL at 05:06

## 2017-06-26 RX ADMIN — HYDRALAZINE HYDROCHLORIDE 10 MG: 20 INJECTION INTRAMUSCULAR; INTRAVENOUS at 01:06

## 2017-06-26 RX ADMIN — BISACODYL 10 MG: 10 SUPPOSITORY RECTAL at 11:06

## 2017-06-26 RX ADMIN — STANDARDIZED SENNA CONCENTRATE AND DOCUSATE SODIUM 1 TABLET: 8.6; 5 TABLET, FILM COATED ORAL at 09:06

## 2017-06-26 NOTE — PROGRESS NOTES
SS enema completed, Pt tolerated well, stool noted during and after completed. Bed prepped with incontinence pads will continue to monitor.

## 2017-06-26 NOTE — PLAN OF CARE
06/26/17 1224   Discharge Reassessment   Assessment Type Discharge Planning Reassessment   Can the patient answer the patient profile reliably? No, cognitively impaired  (intubated)   How does the patient rate their overall health at the present time? (gregor)   Describe the patient's ability to walk at the present time. Major restrictions/daily assistance from another person   How often would a person be available to care for the patient? Whenever needed   Number of comorbid conditions (as recorded on the chart) Two   During the past month, has the patient often been bothered by feeling down, depressed or hopeless? (gregor)   During the past month, has the patient often been bothered by little interest or pleasure in doing things? (gregor)   Discharge plan remains the same: No   Provided patient/caregiver education on the expected discharge date and the discharge plan No   Discharge Plan A Other;Long-term acute care facility (LTAC)  (TBD)   Discharge Plan B Skilled Nursing Facility   Change in patient condition or support system No   Patient choice form signed by patient/caregiver N/A   Explained to the the patient/caregiver why the discharge planned changed: No   Involved the patient/caregiver in establishing a new discharge plan: No       Discharge plan: TBD    Shauna Contreras   [phone]

## 2017-06-26 NOTE — PROGRESS NOTES
4 liter plasma exchange #5 complete by CTA staff nurse SHAN Freitas per blood bank protocol.  5% albumin used as replacement fluid.  See flowsheet on chart details.  Tolerated well.  Last tx this series.

## 2017-06-26 NOTE — PT/OT/SLP PROGRESS
Physical Therapy  Treatment    Rosalio Cam   MRN: [id number]   Admitting Diagnosis: Guillain-[place] syndrome    PT Received On: 06/26/17  PT Start Time: 1257     PT Stop Time: 1316    PT Total Time (min): 19 min       Billable Minutes:  Therapeutic Activity 19    Treatment Type: Treatment  PT/PTA: PT             General Precautions: Standard, fall, NPO  Orthopedic Precautions: N/A   Braces: N/A    Do you have any cultural, spiritual, Adventism conflicts, given your current situation?: none reported    Subjective:  Communicated with RN prior to session.   Pt intubated and not following commands. RN reports pt was on sedation vacation since 6 am.   Wife present at bedside throughout session.     Family agreeable to PT evaluation/treatment. No indicated or c/o pain/discomfort stated throughout session.        Pain/Comfort  Pain Rating 1: 0/10  Pain Rating Post-Intervention 1: 0/10    Objective:   Patient found with: blood pressure cuff, arterial line, mendez catheter, peripheral IV, ventilator, telemetry, SCD, pressure relief boots, NG tube, pulse ox (continuous)    Functional Mobility:  Not performed due to pt unable to follow commands and remains on ventilator.     Therapeutic Exercises/Activities:  Performed PROM exercises and mobility to all extremities within non-restrictive motion. Shoulder elevation limited to <60° due to limited scapula mobility & avoidance of impingement. Swelling noted in bilaterally UE's; PT provided appropriate UE elevation & positioning to aide with decrease swelling and reduce risk of contractures.     Education provided to pt's wife regarding safety and techniques to perform PROM and light massage to aide with swelling reduction. Wife verbalized understanding and reports son has been performing PROM when he comes, but only about 1x/day. Wife encouraged to perform mobility throughout the day as appropriate with RN and MD treatment.        AM-PAC 6 CLICK MOBILITY  How much help from another  person does this patient currently need?   1 = Unable, Total/Dependent Assistance  2 = A lot, Maximum/Moderate Assistance  3 = A little, Minimum/Contact Guard/Supervision  4 = None, Modified [place]/Independent    Turning over in bed (including adjusting bedclothes, sheets and blankets)?: 1  Sitting down on and standing up from a chair with arms (e.g., wheelchair, bedside commode, etc.): 1  Moving from lying on back to sitting on the side of the bed?: 1  Moving to and from a bed to a chair (including a wheelchair)?: 1  Need to walk in hospital room?: 1  Climbing 3-5 steps with a railing?: 1  Total Score: 6    AM-PAC Raw Score CMS G-Code Modifier Level of Impairment Assistance   6 % Total / Unable   7 - 9 CM 80 - 100% Maximal Assist   10 - 14 CL 60 - 80% Moderate Assist   15 - 19 CK 40 - 60% Moderate Assist   20 - 22 CJ 20 - 40% Minimal Assist   23 CI 1-20% SBA / CGA   24 CH 0% Independent/ Mod I     Patient left supine with all lines intact and call button in reach.    Assessment:  Rosalio Cam is a 72 y.o. male with a medical diagnosis of Guillain-[place] syndrome and presents with decrease cognition and mobility increasing risk for pressure ulcers, swelling, and contractures along with pneumonia. Pt tolerated session well with VSS.  Wife encouraged and educated on positioning and PROM exercises to decrease risk of adverse effects due to immobility. Progress towards goals limited/impacted by medical and respiratory needs.  Pt would benefit from continued skilled physical therapy to address listed impairments, improve functional independence, and maximize safety with mobility.  PT recommends dc disposition to LTAC pending medical/respiratory needs; should pt improve to be able to participate in more EOB mobility, PT will re-evaluate d/c disposition as appropriate.  .    Rehab identified problem list/impairments: Rehab identified problem list/impairments: weakness, impaired endurance, gait instability,  impaired balance, decreased upper extremity function, decreased lower extremity function, impaired fine motor, visual deficits, impaired cognition, impaired self care skills, impaired functional mobilty, impaired sensation, decreased safety awareness, decreased coordination, impaired skin, edema    Rehab potential is fair.    Activity tolerance: Fair    Discharge recommendations: Discharge Facility/Level Of Care Needs: LTACH (long term acute care hospital) (pending respiratory and medical progress)     Barriers to discharge: Barriers to Discharge: Inaccessible home environment, Decreased caregiver support    Equipment recommendations: Equipment Needed After Discharge:  (TBD)     GOALS:    Physical Therapy Goals        Problem: Physical Therapy Goal    Goal Priority Disciplines Outcome Goal Variances Interventions   Physical Therapy Goal     PT/OT, PT      Description:  Goals to be met by: 17    Patient will increase functional independence with mobility by performin. Patient's family will be Independent in performing PROM exercises to all extremities to maintain ROM and decrease swelling. NOT MET  2. Patient's family will demonstrate knowledge/understanding of proper positioning of patient to decrease adverse risks. NOT MET  3. Patient's family will be Independent in performing Passive stretching to maintain ROM and decrease muscle tightness and/or tone. NOT MET     **PT will reassess and establish goals as appropriate when pt is able to participate in EOB/OOB activity**                        PLAN:    Patient to be seen 2 x/week  to address the above listed problems via therapeutic exercises, therapeutic activities, neuromuscular re-education  Plan of Care expires: 07/15/17  Plan of Care reviewed with: patient, spouse         Abigail COLLINS Lizeth, PT  2017

## 2017-06-26 NOTE — PROGRESS NOTES
[facility]  Neurocritical Care  Progress Note    Admit Date: 6/14/2017  Service Date: 06/26/2017  Length of Stay: 12    Subjective:     Chief Complaint: Guillain-[place] syndrome    History of Present Illness: 72 year old male with PMHx CAD, skin cancer, hypothyroidism, and HLP who was transferred from OSH for further evaluation and management of rapidly progressive ascending paralysis.  No family present during exam, history obtained primarily from chart.  Patient was on a camping trip to [place] and [place] recently. When he returned he started to have paraesthesias of the feet and hand that quickly progressed to legs weakness. No clear hx of tick bites or skin lesions.  Additionally, a few days prior to admission to OSH he had prostate biopsy and was treated with cipro.  Pt presented to OSH and was diagnosed with GBS and was started on IVIG, he completed 5 days of treatment which ended on 6/12/17.  Pt also being treated for pneumonia.  After admission his weakness got worse to the point that he had to be intubated.  Pt has had poor neurologic exam since.   - LP was done in OSH and was reported as 0 rbc, 0 wbc, protein 34, glucose 97.  Lyme ab panel was sent and results are pending.  MRI Brain done today shows non enhancing lesion abutting the right optic nerve.  Neurosurgery consulted for evaluation.    MRI of the brain:  Bilateral supraclinoid internal carotid artery aneurysms.  On the right there is a 10 mm aneurysm in separate 2 mm aneurysm.  On the left the supraclinoid ICA aneurysm measures about 7 x 6 mm.3.6 cm nonenhancing mass centered over the right greater and lesser wings of the sphenoid abutting the ICA, A1 and M1 segments, displacing the right optic nerve.  Differential considerations include a thrombosed aneurysm associated with right ICA aneurysm aneurysm,  exophytic hamartoma, or or an exophytic nonenhancing primary brain tumor such as low-grade glioma or  oligodendroglioma.    Hospital Course: 6/15 transferred [address] for GBS. :Initial LP with protein of 34., glucose 97, WBC ) .IVIG x 5(finished 6/12).  6/16 NSGY consulted for  /lesion noted on MRI, Awaitng EMG  6/18 Lyme negative, multiple CSF labs pending  6/21:Pending CTA and continuous EEG.   6/22: No clinical changes. CTA   left  superior hypophyseal aneurysm again measures approximately 7.0 x 6.0 mm. The right superior hypophyseal artery aneurysm again measures 10.0 x 7.0 mm.  There is a second superiorly projecting aneurysm of the right supraclinoid ICA measuring 2.0 mm. EEG: slowing. Sphenoid aneurysm. Pending EMG /NCS. PIC line is kinked.  CXR -R-PIC line kinked.   6/23 EMG performed, awaiting results.  6/24 Awaiting EMG report from 6/23. Insulin and TF adjusted for hyperglycemia.  6/25  A-line placed. Lateral eye movements noted when on sedation vacation.  6/26: gen surg consulted for peg and trach    Review of Symptoms:   unable to assess, neuro exam    Physical Exam:  GA:comfortable, no acute distress.   HEENT: No scleral icterus or JVD.   Pulmonary: Clear to auscultation A/L. No wheezing, crackles, or rhonchi.  Cardiac: RRR S1 & S2 w/o rubs/murmurs/gallops.   Abdominal: Bowel sounds present x 4. No appreciable hepatosplenomegaly.  Skin: No jaundice, rashes, or visible lesions.  Pulses: 2+ DP bilat    Neuro:  --sedation: versed (sedation held for exam)  --GCS: [id number]  --Mental Status:  coma  --CN II-XII grossly intact.   --Pupils 3-->2mm, PERRL. Intact horizontal eye movements  --brainstem: no cough or gag  --Motor: flaccid quadroplegia  --sensory: unable to fully assess  --Reflexes: areflexic throughout  --Gait: deferred      Recent Labs  Lab 06/26/17  1129   WBC 16.11*   RBC 2.52*   HGB 8.3*   HCT 24.9*      MCV 99*   MCH 32.9*   MCHC 33.3       Recent Labs  Lab 06/26/17  0214   CALCIUM 8.4*   PROT 5.0*   *   K 4.1   CO2 28   *   BUN 35*   CREATININE 0.6   ALKPHOS 32*   ALT 42   AST  55*   BILITOT 0.9       Recent Labs  Lab 06/26/17  1129   APTT 28.7     Vent Mode: A/C  Oxygen Concentration (%):  [40-50] 40  Resp Rate Total:  [18 br/min] 18 br/min  Vt Set:  [550 mL] 550 mL  PEEP/CPAP:  [8 cmH20] 8 cmH20  Pressure Support:  [0 cmH20] 0 cmH20  Mean Airway Pressure:  [12 evC73-30 cmH20] 13 cmH20      I have personally reviewed all labs, imaging, and studies today      Assessment/Plan:     Neuro   * Guillain-[place] syndrome    Post IVIG x 5 days  Post steroids x 5 days  Post PLEX x 5 days  Minimal improvement in horizontal eye movements  Continue versed for patient comfort but daily sedation holiday at 6 am until team rounds and restarts  gen surg consulted for trach and peg              Brain aneurysm    Incidental  followup with vascular neurology/NSGY in one month after discharge          Flaccid quadriplegia    See GBS        Pulmonary   Acute respiratory failure with hypoxia and hypercapnia    Neuromuscular respiratory failure  Pending trach and peg        Cardiac   Right subclavian vein thrombosis    Started on heparin gtt  Resume hep gtt as no signs of bleeding on CT abdo        Hem/Onc   Brain mass    Incidental  Follow with neurosurgery post discharge        Endocrine   Acquired hypothyroidism    On synthroid        Fluids/Electrolytes/Nutrition/GI   Slow transit constipation    Mag citrate  mirlax  Senna docusate  Bisacodyl suppository  Soap garrison enema        Hyperglycemia    iss  detemir  aspart        Other   [place]ony coma scale total score 3-8    Severe GBS            Prophylaxis:  Venous Thromboembolism: chemical  Stress Ulcer: H2B  Ventilator Pneumonia: yes     Activity Orders          Up with assistance starting at 06/14 0111        Full Code    Yahir Rodriguez MD  Neurocritical Care  [facility]    Cc time 32 minutes

## 2017-06-26 NOTE — PLAN OF CARE
Problem: Patient Care Overview  Goal: Plan of Care Review  Outcome: Ongoing (interventions implemented as appropriate)  POC reviewed with pt family at 0500. Pt family verbalized understanding. Questions and concerns addressed. No acute events overnight. Pt progressing toward goals. Will continue to monitor. See flowsheets for full assessment and VS info

## 2017-06-26 NOTE — ASSESSMENT & PLAN NOTE
Post IVIG x 5 days  Post steroids x 5 days  Post PLEX x 5 days  Minimal improvement in horizontal eye movements  Continue versed for patient comfort but daily sedation holiday at 6 am until team rounds and restarts  gen surg consulted for trach and peg

## 2017-06-26 NOTE — PLAN OF CARE
Problem: Physical Therapy Goal  Goal: Physical Therapy Goal  Goals to be met by: 17    Patient will increase functional independence with mobility by performin. Patient's family will be Independent in performing PROM exercises to all extremities to maintain ROM and decrease swelling. NOT MET  2. Patient's family will demonstrate knowledge/understanding of proper positioning of patient to decrease adverse risks. NOT MET  3. Patient's family will be Independent in performing Passive stretching to maintain ROM and decrease muscle tightness and/or tone. NOT MET     **PT will reassess and establish goals as appropriate when pt is able to participate in EOB/OOB activity**           Pt progressing towards goals. All goals remain appropriate at this time. Family education performed. Will follow 1 more time this week, then look to d/c Acute PT until pt is able to progress more with functional mobility and/or follow commands.     Abigail Stanley, PT, DPT  2017

## 2017-06-26 NOTE — PROGRESS NOTES
TRACIE Presley notified AM chest xray showed ETT in R main stem bronchus.  Orders received to retract ETT to 24 at lip and repeat CXR and ABG in 1 hr.  Will continue to monitor.

## 2017-06-26 NOTE — PROGRESS NOTES
Withdrew patient's ETT from 28cm to 24cm at the lip per order. Patient is getting correct tidal volumes and SPO2 is 100% on the monitor.  Will do a repeat ABG in one hour.

## 2017-06-27 LAB
ALBUMIN SERPL BCP-MCNC: 3 G/DL
ALLENS TEST: ABNORMAL
ALP SERPL-CCNC: 41 U/L
ALT SERPL W/O P-5'-P-CCNC: 55 U/L
ANION GAP SERPL CALC-SCNC: 4 MMOL/L
AST SERPL-CCNC: 64 U/L
BACTERIA BLD CULT: NORMAL
BASOPHILS # BLD AUTO: 0 K/UL
BASOPHILS # BLD AUTO: 0 K/UL
BASOPHILS # BLD AUTO: 0.01 K/UL
BASOPHILS # BLD AUTO: 0.01 K/UL
BASOPHILS NFR BLD: 0 %
BASOPHILS NFR BLD: 0 %
BASOPHILS NFR BLD: 0.1 %
BASOPHILS NFR BLD: 0.1 %
BILIRUB SERPL-MCNC: 0.5 MG/DL
BUN SERPL-MCNC: 37 MG/DL
CALCIUM SERPL-MCNC: 8.4 MG/DL
CHLORIDE SERPL-SCNC: 110 MMOL/L
CO2 SERPL-SCNC: 32 MMOL/L
CREAT SERPL-MCNC: 0.5 MG/DL
DELSYS: ABNORMAL
DIFFERENTIAL METHOD: ABNORMAL
EOSINOPHIL # BLD AUTO: 0 K/UL
EOSINOPHIL NFR BLD: 0.1 %
ERYTHROCYTE [DISTWIDTH] IN BLOOD BY AUTOMATED COUNT: 16.4 %
ERYTHROCYTE [DISTWIDTH] IN BLOOD BY AUTOMATED COUNT: 16.5 %
ERYTHROCYTE [DISTWIDTH] IN BLOOD BY AUTOMATED COUNT: 16.6 %
ERYTHROCYTE [DISTWIDTH] IN BLOOD BY AUTOMATED COUNT: 16.9 %
ERYTHROCYTE [SEDIMENTATION RATE] IN BLOOD BY WESTERGREN METHOD: 18 MM/H
EST. GFR  (AFRICAN AMERICAN): >60 ML/MIN/1.73 M^2
EST. GFR  (NON AFRICAN AMERICAN): >60 ML/MIN/1.73 M^2
FACT X PPP CHRO-ACNC: 0.38 IU/ML
FACT X PPP CHRO-ACNC: 0.42 IU/ML
FACT X PPP CHRO-ACNC: 0.45 IU/ML
FIO2: 40
GLUCOSE SERPL-MCNC: 148 MG/DL
HCO3 UR-SCNC: 33.6 MMOL/L (ref 24–28)
HCT VFR BLD AUTO: 22.3 %
HCT VFR BLD AUTO: 22.7 %
HCT VFR BLD AUTO: 23.5 %
HCT VFR BLD AUTO: 23.5 %
HGB BLD-MCNC: 7.3 G/DL
HGB BLD-MCNC: 7.3 G/DL
HGB BLD-MCNC: 7.7 G/DL
HGB BLD-MCNC: 7.9 G/DL
LYMPHOCYTES # BLD AUTO: 0.6 K/UL
LYMPHOCYTES # BLD AUTO: 0.7 K/UL
LYMPHOCYTES # BLD AUTO: 0.7 K/UL
LYMPHOCYTES # BLD AUTO: 0.8 K/UL
LYMPHOCYTES NFR BLD: 4.2 %
LYMPHOCYTES NFR BLD: 5.1 %
LYMPHOCYTES NFR BLD: 5.1 %
LYMPHOCYTES NFR BLD: 5.5 %
MAGNESIUM SERPL-MCNC: 2.7 MG/DL
MCH RBC QN AUTO: 32.9 PG
MCH RBC QN AUTO: 32.9 PG
MCH RBC QN AUTO: 33 PG
MCH RBC QN AUTO: 33.3 PG
MCHC RBC AUTO-ENTMCNC: 32.2 %
MCHC RBC AUTO-ENTMCNC: 32.7 %
MCHC RBC AUTO-ENTMCNC: 32.8 %
MCHC RBC AUTO-ENTMCNC: 33.6 %
MCV RBC AUTO: 101 FL
MCV RBC AUTO: 101 FL
MCV RBC AUTO: 102 FL
MCV RBC AUTO: 99 FL
MODE: ABNORMAL
MONOCYTES # BLD AUTO: 0.4 K/UL
MONOCYTES # BLD AUTO: 0.5 K/UL
MONOCYTES # BLD AUTO: 0.5 K/UL
MONOCYTES # BLD AUTO: 0.7 K/UL
MONOCYTES NFR BLD: 3.1 %
MONOCYTES NFR BLD: 3.2 %
MONOCYTES NFR BLD: 3.4 %
MONOCYTES NFR BLD: 4.7 %
NEUTROPHILS # BLD AUTO: 11.8 K/UL
NEUTROPHILS # BLD AUTO: 12.5 K/UL
NEUTROPHILS # BLD AUTO: 13.6 K/UL
NEUTROPHILS # BLD AUTO: 14 K/UL
NEUTROPHILS NFR BLD: 89.4 %
NEUTROPHILS NFR BLD: 90.7 %
NEUTROPHILS NFR BLD: 91 %
NEUTROPHILS NFR BLD: 92 %
PCO2 BLDA: 44.1 MMHG (ref 35–45)
PEEP: 8
PH SMN: 7.49 [PH] (ref 7.35–7.45)
PHOSPHATE SERPL-MCNC: 3.4 MG/DL
PLATELET # BLD AUTO: 184 K/UL
PLATELET # BLD AUTO: 186 K/UL
PLATELET # BLD AUTO: 187 K/UL
PLATELET # BLD AUTO: 226 K/UL
PMV BLD AUTO: 11.4 FL
PMV BLD AUTO: 11.6 FL
PMV BLD AUTO: 11.8 FL
PMV BLD AUTO: 11.9 FL
PO2 BLDA: 109 MMHG (ref 80–100)
POC BE: 10 MMOL/L
POC SATURATED O2: 99 % (ref 95–100)
POC TCO2: 35 MMOL/L (ref 23–27)
POCT GLUCOSE: 141 MG/DL (ref 70–110)
POCT GLUCOSE: 142 MG/DL (ref 70–110)
POCT GLUCOSE: 143 MG/DL (ref 70–110)
POCT GLUCOSE: 156 MG/DL (ref 70–110)
POCT GLUCOSE: 161 MG/DL (ref 70–110)
POTASSIUM SERPL-SCNC: 4.5 MMOL/L
PROT SERPL-MCNC: 4.7 G/DL
RBC # BLD AUTO: 2.22 M/UL
RBC # BLD AUTO: 2.22 M/UL
RBC # BLD AUTO: 2.33 M/UL
RBC # BLD AUTO: 2.37 M/UL
SAMPLE: ABNORMAL
SITE: ABNORMAL
SODIUM SERPL-SCNC: 146 MMOL/L
SP02: 93
VT: 550
WBC # BLD AUTO: 12.97 K/UL
WBC # BLD AUTO: 13.94 K/UL
WBC # BLD AUTO: 14.96 K/UL
WBC # BLD AUTO: 15.17 K/UL

## 2017-06-27 PROCEDURE — 25000003 PHARM REV CODE 250: Performed by: NURSE PRACTITIONER

## 2017-06-27 PROCEDURE — 85025 COMPLETE CBC W/AUTO DIFF WBC: CPT | Mod: 91

## 2017-06-27 PROCEDURE — 83735 ASSAY OF MAGNESIUM: CPT

## 2017-06-27 PROCEDURE — 99900026 HC AIRWAY MAINTENANCE (STAT)

## 2017-06-27 PROCEDURE — 85520 HEPARIN ASSAY: CPT

## 2017-06-27 PROCEDURE — 84100 ASSAY OF PHOSPHORUS: CPT

## 2017-06-27 PROCEDURE — 94003 VENT MGMT INPAT SUBQ DAY: CPT

## 2017-06-27 PROCEDURE — 25000003 PHARM REV CODE 250: Performed by: PHYSICIAN ASSISTANT

## 2017-06-27 PROCEDURE — 82803 BLOOD GASES ANY COMBINATION: CPT

## 2017-06-27 PROCEDURE — 99291 CRITICAL CARE FIRST HOUR: CPT | Mod: ,,, | Performed by: PSYCHIATRY & NEUROLOGY

## 2017-06-27 PROCEDURE — 85520 HEPARIN ASSAY: CPT | Mod: 91

## 2017-06-27 PROCEDURE — 80053 COMPREHEN METABOLIC PANEL: CPT

## 2017-06-27 PROCEDURE — 37799 UNLISTED PX VASCULAR SURGERY: CPT

## 2017-06-27 PROCEDURE — 27000221 HC OXYGEN, UP TO 24 HOURS

## 2017-06-27 PROCEDURE — 94761 N-INVAS EAR/PLS OXIMETRY MLT: CPT

## 2017-06-27 PROCEDURE — 20000000 HC ICU ROOM

## 2017-06-27 PROCEDURE — 25000003 PHARM REV CODE 250: Performed by: PSYCHIATRY & NEUROLOGY

## 2017-06-27 RX ORDER — SYRING-NEEDL,DISP,INSUL,0.3 ML 29 G X1/2"
296 SYRINGE, EMPTY DISPOSABLE MISCELLANEOUS ONCE
Status: COMPLETED | OUTPATIENT
Start: 2017-06-27 | End: 2017-06-27

## 2017-06-27 RX ORDER — AMOXICILLIN 250 MG
2 CAPSULE ORAL 2 TIMES DAILY
Status: DISCONTINUED | OUTPATIENT
Start: 2017-06-27 | End: 2017-07-07

## 2017-06-27 RX ADMIN — PANTOPRAZOLE SODIUM 40 MG: 40 GRANULE, DELAYED RELEASE ORAL at 09:06

## 2017-06-27 RX ADMIN — CHLORHEXIDINE GLUCONATE 15 ML: 1.2 RINSE ORAL at 05:06

## 2017-06-27 RX ADMIN — LEVOTHYROXINE SODIUM 75 MCG: 75 TABLET ORAL at 05:06

## 2017-06-27 RX ADMIN — HEPARIN SODIUM AND DEXTROSE 17 UNITS/KG/HR: 10000; 5 INJECTION INTRAVENOUS at 07:06

## 2017-06-27 RX ADMIN — STANDARDIZED SENNA CONCENTRATE AND DOCUSATE SODIUM 1 TABLET: 8.6; 5 TABLET, FILM COATED ORAL at 09:06

## 2017-06-27 RX ADMIN — CHLORHEXIDINE GLUCONATE 15 ML: 1.2 RINSE ORAL at 12:06

## 2017-06-27 RX ADMIN — MINERAL OIL AND WHITE PETROLATUM: 150; 830 OINTMENT OPHTHALMIC at 09:06

## 2017-06-27 RX ADMIN — CHLORHEXIDINE GLUCONATE 15 ML: 1.2 RINSE ORAL at 06:06

## 2017-06-27 RX ADMIN — SERTRALINE HYDROCHLORIDE 50 MG: 50 TABLET ORAL at 09:06

## 2017-06-27 RX ADMIN — ASPIRIN 325 MG ORAL TABLET 325 MG: 325 PILL ORAL at 10:06

## 2017-06-27 RX ADMIN — Medication 1 TABLET: at 09:06

## 2017-06-27 RX ADMIN — INSULIN ASPART 4 UNITS: 100 INJECTION, SOLUTION INTRAVENOUS; SUBCUTANEOUS at 12:06

## 2017-06-27 RX ADMIN — POLYETHYLENE GLYCOL 3350 17 G: 17 POWDER, FOR SOLUTION ORAL at 09:06

## 2017-06-27 RX ADMIN — THIAMINE HCL TAB 100 MG 100 MG: 100 TAB at 09:06

## 2017-06-27 RX ADMIN — BISACODYL 10 MG: 10 SUPPOSITORY RECTAL at 09:06

## 2017-06-27 RX ADMIN — Medication 12.5 MG: at 09:06

## 2017-06-27 RX ADMIN — MAGESIUM CITRATE 296 ML: 1.75 LIQUID ORAL at 12:06

## 2017-06-27 RX ADMIN — Medication 12.5 MG: at 10:06

## 2017-06-27 RX ADMIN — STANDARDIZED SENNA CONCENTRATE AND DOCUSATE SODIUM 2 TABLET: 8.6; 5 TABLET, FILM COATED ORAL at 09:06

## 2017-06-27 NOTE — PLAN OF CARE
Problem: Patient Care Overview  Goal: Plan of Care Review  Outcome: Ongoing (interventions implemented as appropriate)  POC reviewed with pt at 0330. Wife verbalized understanding. Questions and concerns addressed. No acute events overnight. Pt progressing toward goals. POC- consulted for TREG, LTAC placement eventually. Will continue to monitor. See flowsheets for full assessment and VS info

## 2017-06-27 NOTE — HPI
72 y.o. male admitted with acute ascending paralysis presumed to be GBS 6/14/2017. Remains ventilator dependent with minimal neurologic function (apparently some improvement past 48 hours with spontaneous eye / jaw movements). Tolerating TFs at goal with some bowel function; bowel reg + enemas for fecal loading of distal colon / rectum; + bowel function.  General surgery consulted for possible gastrostomy / tracheostomy tubes.    He is hemodynamically stable on no vasopressors. Vent settings 40% FiO2 / 8 peep.    Of note he is on heparin GTT for subclavian DVT. He is also on ASA 325mg QD for history of CAD s/p cardiac stents several years ago.    No prior abdominal surgeries.

## 2017-06-27 NOTE — ASSESSMENT & PLAN NOTE
Post IVIG x 5 days  Post steroids x 5 days  Post PLEX x 5 days  Minimal improvement in horizontal eye movements, moving jaw to command minimally today  Continue versed for patient comfort but daily sedation holiday at 6 am until team rounds and restarts  gen surg consulted for trach and peg

## 2017-06-27 NOTE — SUBJECTIVE & OBJECTIVE
No current facility-administered medications on file prior to encounter.      No current outpatient prescriptions on file prior to encounter.       Review of patient's allergies indicates:  No Known Allergies    History reviewed. No pertinent past medical history.  No past surgical history on file.  Family History     None        Social History Main Topics    Smoking status: Unknown If Ever Smoked    Smokeless tobacco: Not on file    Alcohol use Not on file    Drug use: Unknown    Sexual activity: Not on file     Review of Systems   Unable to perform ROS: Patient unresponsive     Objective:     Vital Signs (Most Recent):  Temp: 97.3 °F (36.3 °C) (06/26/17 1900)  Pulse: 72 (06/26/17 2000)  Resp: 18 (06/26/17 2000)  BP: (!) 151/67 (06/26/17 1200)  SpO2: 100 % (06/26/17 2000) Vital Signs (24h Range):  Temp:  [97.3 °F (36.3 °C)-99.1 °F (37.3 °C)] 97.3 °F (36.3 °C)  Pulse:  [] 72  Resp:  [18-19] 18  SpO2:  [93 %-100 %] 100 %  BP: (145-163)/(65-73) 151/67  Arterial Line BP: (127-176)/(44-70) 136/57     Weight: 88.4 kg (194 lb 14.2 oz)  Body mass index is 25.71 kg/m².    Physical Exam   Constitutional: He appears well-developed.   Intubated, sedated, unresponsive.   HENT:   Head: Normocephalic and atraumatic.   OG tube in place with TFs running at goal.     Neck: Normal range of motion. No tracheal deviation present.   Cardiovascular: Normal rate, regular rhythm and intact distal pulses.    Pulmonary/Chest: No respiratory distress. He has no wheezes.   Minimal vent settings.  Diminished breath sounds left lower zone.   Abdominal: Soft. He exhibits no distension. There is no guarding.       Significant Labs:  CBC:   Recent Labs  Lab 06/26/17  1129   WBC 16.11*   RBC 2.52*   HGB 8.3*   HCT 24.9*      MCV 99*   MCH 32.9*   MCHC 33.3     BMP:   Recent Labs  Lab 06/26/17  0214   *   *   K 4.1   *   CO2 28   BUN 35*   CREATININE 0.6   CALCIUM 8.4*   MG 2.4     LFTs:   Recent Labs  Lab  06/26/17  0214   ALT 42   AST 55*   ALKPHOS 32*   BILITOT 0.9   PROT 5.0*   ALBUMIN 3.7     Coagulation:   Recent Labs  Lab 06/26/17  1129   APTT 28.7       Significant Diagnostics:  CT: I have reviewed all pertinent results/findings within the past 24 hours and my personal findings are:  rectal stool burden. LLL consolidation.   Transverse colon lying anterior to stomach. NG tube in place.

## 2017-06-27 NOTE — PLAN OF CARE
SW met with Pt wife and family at bedside. Discussed LTAC and gave list. She wants to start with [facility] in [place] and will tour other facilities for more options.    Deisi Jeffery LMSW  Neurocritical Care   [facility]  [phone]

## 2017-06-27 NOTE — PROGRESS NOTES
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  No family present during exam, history obtained primarily from chart.  Patient was on a camping trip to [place] and [place] recently. When he returned he started to have paraesthesias of the feet and hand that quickly progressed to legs weakness. No clear hx of tick bites or skin lesions.  Additionally, a few days prior to admission to OSH he had prostate biopsy and was treated with cipro.  Pt presented to OSH and was diagnosed with GBS and was started on IVIG, he completed 5 days of treatment which ended on 6/12/17.  Pt also being treated for pneumonia.  After admission his weakness got worse to the point that he had to be intubated.  Pt has had poor neurologic exam since.   - LP was done in OSH and was reported as 0 rbc, 0 wbc, protein 34, glucose 97.  Lyme ab panel was sent and results are pending.  MRI Brain done today shows non enhancing lesion abutting the right optic nerve.  Neurosurgery consulted for evaluation.    MRI of the brain:  Bilateral supraclinoid internal carotid artery aneurysms.  On the right there is a 10 mm aneurysm in separate 2 mm aneurysm.  On the left the supraclinoid ICA aneurysm measures about 7 x 6 mm.3.6 cm nonenhancing mass centered over the right greater and lesser wings of the sphenoid abutting the ICA, A1 and M1 segments, displacing the right optic nerve.  Differential considerations include a thrombosed aneurysm associated with right ICA aneurysm aneurysm,  exophytic hamartoma, or or an exophytic nonenhancing primary brain tumor such as low-grade glioma or  oligodendroglioma.    Hospital Course: 6/15 transferred [address] for GBS. :Initial LP with protein of 34., glucose 97, WBC ) .IVIG x 5(finished 6/12).  6/16 NSGY consulted for  /lesion noted on MRI, Awaitng EMG  6/18 Lyme negative, multiple CSF labs pending  6/21:Pending CTA and continuous EEG.   6/22: No clinical changes. CTA   left  superior hypophyseal aneurysm again measures approximately 7.0 x 6.0 mm. The right superior hypophyseal artery aneurysm again measures 10.0 x 7.0 mm.  There is a second superiorly projecting aneurysm of the right supraclinoid ICA measuring 2.0 mm. EEG: slowing. Sphenoid aneurysm. Pending EMG /NCS. PIC line is kinked.  CXR -R-PIC line kinked.   6/23 EMG performed, awaiting results.  6/24 Awaiting EMG report from 6/23. Insulin and TF adjusted for hyperglycemia.  6/25  A-line placed. Lateral eye movements noted when on sedation vacation.  6/26: gen surg consulted for peg and trach    Review of Symptoms:   Unable to obtain due to neurological condition    Physical Exam:  GA: , comfortable, no acute distress.   HEENT: No scleral icterus or JVD.   Pulmonary: Clear to auscultation A/L. No wheezing, crackles, or rhonchi.  Cardiac: RRR S1 & S2 w/o rubs/murmurs/gallops.   Abdominal: Bowel sounds present x 4. No appreciable hepatosplenomegaly.  Skin: No jaundice, rashes, or visible lesions.  Pulses: 2+ DP bilat    Neuro:  --sedation: none during examination  --GCS: [id number]  --Mental Status: unable to obtain, moves eyes and chin intermittently to commands   --facial diplegia  --Pupils 3-->2mm, PERRL. Intact horizontal gaze   --brainstem: weak cough and gag  --Motor: flaccid paralysis throuughout  --sensory: unable to assess  --Reflexes: areflexic throughout  --Gait: deferred        Recent Labs  Lab 06/27/17  0837   WBC 12.97*   RBC 2.22*   HGB 7.3*   HCT 22.3*      *   MCH 32.9*   MCHC 32.7       Recent Labs  Lab 06/27/17  0332   CALCIUM 8.4*   PROT 4.7*   *   K 4.5   CO2 32*       BUN 37*   CREATININE 0.5   ALKPHOS 41*   ALT 55*   AST 64*   BILITOT 0.5     No results for input(s): INR, APTT in the last 24 hours.    Invalid input(s): PT  Vent Mode: A/C  Oxygen Concentration (%):  [40] 40  Resp Rate Total:  [18 br/min] 18 br/min  Vt Set:  [550 mL] 550 mL  PEEP/CPAP:  [8 cmH20] 8 cmH20  Pressure Support:  [0 cmH20] 0 cmH20  Mean Airway Pressure:  [12 lfS28-67 cmH20] 13 cmH20      I have personally reviewed all labs, imaging, and studies today        Assessment/Plan:     Neuro   * Guillain-[place] syndrome    Post IVIG x 5 days  Post steroids x 5 days  Post PLEX x 5 days  Minimal improvement in horizontal eye movements, moving jaw to command minimally today  Continue versed for patient comfort but daily sedation holiday at 6 am until team rounds and restarts  gen surg consulted for trach and peg              Brain aneurysm    Incidental  followup with vascular neurology/NSGY in one month after discharge          Flaccid quadriplegia    See GBS        Pulmonary   Acute respiratory failure with hypoxia and hypercapnia    Neuromuscular respiratory failure  Pending trach and peg        Cardiac   Right subclavian vein thrombosis    Heparin gtt  Monitor Xa  Will start anticoagulation after peg and trach        Hem/Onc   Brain mass    Incidental  Follow with neurosurgery post discharge        Endocrine   Acquired hypothyroidism    On synthroid        Fluids/Electrolytes/Nutrition/GI   Slow transit constipation    Mag citrate  mirlax  Senna docusate  Bisacodyl suppository  Soap garrison enema        Hyperglycemia    iss  detemir  aspart        Other   Juan coma scale total score 3-8    Severe GBS            Prophylaxis:  Venous Thromboembolism: chemical  Stress Ulcer: H2B  Ventilator Pneumonia: yes    D/C PLEX line     Activity Orders          Remove central line starting at 06/27 1111    Up with assistance starting at 06/14 0111        Full Code    Yahir Rodriguez MD  Neurocritical  Care [facility]    Cc time 32 minutes

## 2017-06-27 NOTE — ASSESSMENT & PLAN NOTE
Discussed indications for gastrostomy (peg vs open) and tracheostomy with patient's wife and family does desire to proceed.   Given colon overlying stomach, open G tube likely to be safe option than PEG.  Concomitant tracheostomy can be performed; minimal vent settings.  Will d/w staff and schedule within next 48 hours.    Will need heparin held 6hr prior to procedure.

## 2017-06-28 LAB
ABO + RH BLD: NORMAL
ACHR BIND AB SER-SCNC: 0 NMOL/L
ALBUMIN SERPL BCP-MCNC: 2.6 G/DL
ALLENS TEST: ABNORMAL
ALP SERPL-CCNC: 59 U/L
ALT SERPL W/O P-5'-P-CCNC: 78 U/L
AMORPH CRY UR QL COMP ASSIST: ABNORMAL
AMPHIPHYSIN AB TITR SER: NEGATIVE TITER
ANION GAP SERPL CALC-SCNC: 7 MMOL/L
AORTIC VALVE STENOSIS: NORMAL
AST SERPL-CCNC: 71 U/L
BACTERIA #/AREA URNS AUTO: ABNORMAL /HPF
BACTERIA #/AREA URNS AUTO: ABNORMAL /HPF
BACTERIA BLD CULT: NORMAL
BASOPHILS # BLD AUTO: 0 K/UL
BASOPHILS # BLD AUTO: 0.01 K/UL
BASOPHILS NFR BLD: 0 %
BASOPHILS NFR BLD: 0.1 %
BILIRUB SERPL-MCNC: 0.4 MG/DL
BILIRUB UR QL STRIP: NEGATIVE
BILIRUB UR QL STRIP: NEGATIVE
BLD GP AB SCN CELLS X3 SERPL QL: NORMAL
BLD PROD TYP BPU: NORMAL
BLD PROD TYP BPU: NORMAL
BLOOD UNIT EXPIRATION DATE: NORMAL
BLOOD UNIT EXPIRATION DATE: NORMAL
BLOOD UNIT TYPE CODE: 9500
BLOOD UNIT TYPE CODE: 9500
BLOOD UNIT TYPE: NORMAL
BLOOD UNIT TYPE: NORMAL
BUN SERPL-MCNC: 34 MG/DL
CALCIUM SERPL-MCNC: 8 MG/DL
CHLORIDE SERPL-SCNC: 110 MMOL/L
CK MB SERPL-MCNC: 10.9 NG/ML
CK MB SERPL-MCNC: 21.4 NG/ML
CK MB SERPL-RTO: 1.7 %
CK MB SERPL-RTO: 2.1 %
CK SERPL-CCNC: 1028 U/L
CK SERPL-CCNC: 658 U/L
CLARITY UR REFRACT.AUTO: ABNORMAL
CLARITY UR REFRACT.AUTO: ABNORMAL
CO2 SERPL-SCNC: 32 MMOL/L
CODING SYSTEM: NORMAL
CODING SYSTEM: NORMAL
COLOR UR AUTO: YELLOW
COLOR UR AUTO: YELLOW
CREAT SERPL-MCNC: 0.6 MG/DL
CV2 IGG TITR SER: NEGATIVE TITER
DELSYS: ABNORMAL
DIASTOLIC DYSFUNCTION: NO
DIFFERENTIAL METHOD: ABNORMAL
DISPENSE STATUS: NORMAL
DISPENSE STATUS: NORMAL
EOSINOPHIL # BLD AUTO: 0 K/UL
EOSINOPHIL NFR BLD: 0.1 %
EOSINOPHIL NFR BLD: 0.1 %
EOSINOPHIL NFR BLD: 0.2 %
EOSINOPHIL NFR BLD: 0.2 %
ERYTHROCYTE [DISTWIDTH] IN BLOOD BY AUTOMATED COUNT: 16.8 %
ERYTHROCYTE [DISTWIDTH] IN BLOOD BY AUTOMATED COUNT: 16.8 %
ERYTHROCYTE [DISTWIDTH] IN BLOOD BY AUTOMATED COUNT: 17.3 %
ERYTHROCYTE [DISTWIDTH] IN BLOOD BY AUTOMATED COUNT: 19 %
ERYTHROCYTE [SEDIMENTATION RATE] IN BLOOD BY WESTERGREN METHOD: 18 MM/H
EST. GFR  (AFRICAN AMERICAN): >60 ML/MIN/1.73 M^2
EST. GFR  (NON AFRICAN AMERICAN): >60 ML/MIN/1.73 M^2
FACT X PPP CHRO-ACNC: 0.11 IU/ML
FACT X PPP CHRO-ACNC: 0.47 IU/ML
FACT X PPP CHRO-ACNC: <0.1 IU/ML
FERRITIN SERPL-MCNC: 2153 NG/ML
FIO2: 40
FOLATE SERPL-MCNC: 29 NG/ML
GLIAL NUC TYPE 1 AB TITR SER: NEGATIVE TITER
GLUCOSE SERPL-MCNC: 133 MG/DL
GLUCOSE UR QL STRIP: NEGATIVE
GLUCOSE UR QL STRIP: NEGATIVE
HCO3 UR-SCNC: 37.8 MMOL/L (ref 24–28)
HCT VFR BLD AUTO: 21.6 %
HCT VFR BLD AUTO: 21.9 %
HCT VFR BLD AUTO: 21.9 %
HCT VFR BLD AUTO: 28.4 %
HCT VFR BLD AUTO: 29.4 %
HGB BLD-MCNC: 6.9 G/DL
HGB BLD-MCNC: 6.9 G/DL
HGB BLD-MCNC: 7.1 G/DL
HGB BLD-MCNC: 9.2 G/DL
HGB BLD-MCNC: 9.4 G/DL
HGB UR QL STRIP: ABNORMAL
HGB UR QL STRIP: ABNORMAL
HU1 AB TITR SER: NEGATIVE TITER
HU2 AB TITR SER IF: NEGATIVE TITER
HU3 AB TITR SER: NEGATIVE TITER
HYALINE CASTS UR QL AUTO: 0 /LPF
HYALINE CASTS UR QL AUTO: 14 /LPF
IMMUNOLOGIST REVIEW: NORMAL
IRON SERPL-MCNC: 96 UG/DL
KETONES UR QL STRIP: NEGATIVE
KETONES UR QL STRIP: NEGATIVE
LEUKOCYTE ESTERASE UR QL STRIP: ABNORMAL
LEUKOCYTE ESTERASE UR QL STRIP: ABNORMAL
LYMPHOCYTES # BLD AUTO: 0.9 K/UL
LYMPHOCYTES # BLD AUTO: 1.2 K/UL
LYMPHOCYTES NFR BLD: 5.7 %
LYMPHOCYTES NFR BLD: 6.5 %
LYMPHOCYTES NFR BLD: 7.9 %
LYMPHOCYTES NFR BLD: 7.9 %
Lab: NORMAL
MAGNESIUM SERPL-MCNC: 2.9 MG/DL
MCH RBC QN AUTO: 32.1 PG
MCH RBC QN AUTO: 32.9 PG
MCH RBC QN AUTO: 32.9 PG
MCH RBC QN AUTO: 33.2 PG
MCHC RBC AUTO-ENTMCNC: 31.5 %
MCHC RBC AUTO-ENTMCNC: 31.5 %
MCHC RBC AUTO-ENTMCNC: 32.4 %
MCHC RBC AUTO-ENTMCNC: 32.9 %
MCV RBC AUTO: 101 FL
MCV RBC AUTO: 104 FL
MCV RBC AUTO: 104 FL
MCV RBC AUTO: 99 FL
MICROSCOPIC COMMENT: ABNORMAL
MICROSCOPIC COMMENT: ABNORMAL
MIN VOL: 10
MODE: ABNORMAL
MONOCYTES # BLD AUTO: 0.4 K/UL
MONOCYTES # BLD AUTO: 0.4 K/UL
MONOCYTES # BLD AUTO: 0.7 K/UL
MONOCYTES # BLD AUTO: 0.8 K/UL
MONOCYTES NFR BLD: 3.7 %
MONOCYTES NFR BLD: 4 %
MONOCYTES NFR BLD: 4 %
MONOCYTES NFR BLD: 5.3 %
NACHR AB SER-SCNC: 0 NMOL/L
NEUTROPHILS # BLD AUTO: 13.2 K/UL
NEUTROPHILS # BLD AUTO: 16.3 K/UL
NEUTROPHILS # BLD AUTO: 9.5 K/UL
NEUTROPHILS # BLD AUTO: 9.5 K/UL
NEUTROPHILS NFR BLD: 87.2 %
NEUTROPHILS NFR BLD: 87.2 %
NEUTROPHILS NFR BLD: 88.4 %
NEUTROPHILS NFR BLD: 88.4 %
NITRITE UR QL STRIP: NEGATIVE
NITRITE UR QL STRIP: NEGATIVE
PAVAL REFLEX TEST ADDED: NORMAL
PCA-1 AB TITR SER: NEGATIVE TITER
PCA-2 AB TITR SER: NEGATIVE TITER
PCA-TR AB TITR SER: NEGATIVE TITER
PCO2 BLDA: 42.6 MMHG (ref 35–45)
PEEP: 8
PH SMN: 7.56 [PH] (ref 7.35–7.45)
PH UR STRIP: 7 [PH] (ref 5–8)
PH UR STRIP: 8 [PH] (ref 5–8)
PHOSPHATE SERPL-MCNC: 3.5 MG/DL
PIP: 22
PLATELET # BLD AUTO: 206 K/UL
PLATELET # BLD AUTO: 206 K/UL
PLATELET # BLD AUTO: 210 K/UL
PLATELET # BLD AUTO: 213 K/UL
PMV BLD AUTO: 11 FL
PMV BLD AUTO: 12 FL
PMV BLD AUTO: 12 FL
PMV BLD AUTO: 12.2 FL
PO2 BLDA: 125 MMHG (ref 80–100)
POC BE: 15 MMOL/L
POC SATURATED O2: 99 % (ref 95–100)
POC TCO2: 39 MMOL/L (ref 23–27)
POCT GLUCOSE: 126 MG/DL (ref 70–110)
POCT GLUCOSE: 80 MG/DL (ref 70–110)
POCT GLUCOSE: 82 MG/DL (ref 70–110)
POCT GLUCOSE: 93 MG/DL (ref 70–110)
POCT GLUCOSE: 96 MG/DL (ref 70–110)
POTASSIUM SERPL-SCNC: 4.4 MMOL/L
PROT SERPL-MCNC: 4.6 G/DL
PROT UR QL STRIP: ABNORMAL
PROT UR QL STRIP: ABNORMAL
RBC # BLD AUTO: 2.1 M/UL
RBC # BLD AUTO: 2.1 M/UL
RBC # BLD AUTO: 2.14 M/UL
RBC # BLD AUTO: 2.87 M/UL
RBC #/AREA URNS AUTO: 19 /HPF (ref 0–4)
RBC #/AREA URNS AUTO: >100 /HPF (ref 0–4)
RETIRED EF AND QEF - SEE NOTES: 65 (ref 55–65)
SAMPLE: ABNORMAL
SATURATED IRON: 76 %
SITE: ABNORMAL
SODIUM SERPL-SCNC: 149 MMOL/L
SP GR UR STRIP: 1.02 (ref 1–1.03)
SP GR UR STRIP: 1.02 (ref 1–1.03)
SP02: 100
SQUAMOUS #/AREA URNS AUTO: 1 /HPF
STRIA MUS AB TITR SER: NEGATIVE TITER
TOTAL IRON BINDING CAPACITY: 126 UG/DL
TRANS ERYTHROCYTES VOL PATIENT: NORMAL ML
TRANS ERYTHROCYTES VOL PATIENT: NORMAL ML
TRANSFERRIN SERPL-MCNC: 85 MG/DL
TROPONIN I SERPL DL<=0.01 NG/ML-MCNC: 0.06 NG/ML
TROPONIN I SERPL DL<=0.01 NG/ML-MCNC: 0.07 NG/ML
URN SPEC COLLECT METH UR: ABNORMAL
URN SPEC COLLECT METH UR: ABNORMAL
UROBILINOGEN UR STRIP-ACNC: NEGATIVE EU/DL
UROBILINOGEN UR STRIP-ACNC: NEGATIVE EU/DL
VGCC-N BIND AB SER-SCNC: 0 NMOL/L
VGCC-P/Q BIND AB SER-SCNC: 0 NMOL/L
VGKC AB SER-SCNC: 0 NMOL/L
VT: 550
WBC # BLD AUTO: 10.91 K/UL
WBC # BLD AUTO: 10.91 K/UL
WBC # BLD AUTO: 14.93 K/UL
WBC # BLD AUTO: 18.44 K/UL
WBC #/AREA URNS AUTO: >100 /HPF (ref 0–5)
WBC #/AREA URNS AUTO: >100 /HPF (ref 0–5)
WBC CLUMPS UR QL AUTO: ABNORMAL
WBC CLUMPS UR QL AUTO: ABNORMAL
YEAST UR QL AUTO: ABNORMAL

## 2017-06-28 PROCEDURE — 63600175 PHARM REV CODE 636 W HCPCS: Performed by: PSYCHIATRY & NEUROLOGY

## 2017-06-28 PROCEDURE — P9021 RED BLOOD CELLS UNIT: HCPCS

## 2017-06-28 PROCEDURE — 25000003 PHARM REV CODE 250: Performed by: PHYSICIAN ASSISTANT

## 2017-06-28 PROCEDURE — 84484 ASSAY OF TROPONIN QUANT: CPT

## 2017-06-28 PROCEDURE — 94003 VENT MGMT INPAT SUBQ DAY: CPT

## 2017-06-28 PROCEDURE — 94761 N-INVAS EAR/PLS OXIMETRY MLT: CPT

## 2017-06-28 PROCEDURE — 25000003 PHARM REV CODE 250: Performed by: NURSE PRACTITIONER

## 2017-06-28 PROCEDURE — 93306 TTE W/DOPPLER COMPLETE: CPT | Mod: 26,,, | Performed by: INTERNAL MEDICINE

## 2017-06-28 PROCEDURE — 80053 COMPREHEN METABOLIC PANEL: CPT

## 2017-06-28 PROCEDURE — 27000221 HC OXYGEN, UP TO 24 HOURS

## 2017-06-28 PROCEDURE — 86901 BLOOD TYPING SEROLOGIC RH(D): CPT

## 2017-06-28 PROCEDURE — 83735 ASSAY OF MAGNESIUM: CPT

## 2017-06-28 PROCEDURE — 99900026 HC AIRWAY MAINTENANCE (STAT)

## 2017-06-28 PROCEDURE — 95951 HC EEG MONITORING/VIDEO RECORD: CPT

## 2017-06-28 PROCEDURE — 85014 HEMATOCRIT: CPT

## 2017-06-28 PROCEDURE — 81001 URINALYSIS AUTO W/SCOPE: CPT | Mod: 91

## 2017-06-28 PROCEDURE — 83540 ASSAY OF IRON: CPT

## 2017-06-28 PROCEDURE — 93005 ELECTROCARDIOGRAM TRACING: CPT

## 2017-06-28 PROCEDURE — 86920 COMPATIBILITY TEST SPIN: CPT

## 2017-06-28 PROCEDURE — 85018 HEMOGLOBIN: CPT

## 2017-06-28 PROCEDURE — 20000000 HC ICU ROOM

## 2017-06-28 PROCEDURE — 85520 HEPARIN ASSAY: CPT

## 2017-06-28 PROCEDURE — 99233 SBSQ HOSP IP/OBS HIGH 50: CPT | Mod: ,,, | Performed by: PHYSICIAN ASSISTANT

## 2017-06-28 PROCEDURE — 99900035 HC TECH TIME PER 15 MIN (STAT)

## 2017-06-28 PROCEDURE — 93306 TTE W/DOPPLER COMPLETE: CPT

## 2017-06-28 PROCEDURE — 86900 BLOOD TYPING SEROLOGIC ABO: CPT

## 2017-06-28 PROCEDURE — 82728 ASSAY OF FERRITIN: CPT

## 2017-06-28 PROCEDURE — 85520 HEPARIN ASSAY: CPT | Mod: 91

## 2017-06-28 PROCEDURE — 84100 ASSAY OF PHOSPHORUS: CPT

## 2017-06-28 PROCEDURE — 95957 EEG DIGITAL ANALYSIS: CPT

## 2017-06-28 PROCEDURE — 82553 CREATINE MB FRACTION: CPT

## 2017-06-28 PROCEDURE — 85025 COMPLETE CBC W/AUTO DIFF WBC: CPT | Mod: 91

## 2017-06-28 PROCEDURE — 95951 PR EEG MONITORING/VIDEORECORD: CPT | Mod: 26,,, | Performed by: PSYCHIATRY & NEUROLOGY

## 2017-06-28 PROCEDURE — 27200966 HC CLOSED SUCTION SYSTEM

## 2017-06-28 PROCEDURE — 93010 ELECTROCARDIOGRAM REPORT: CPT | Mod: S$GLB,,, | Performed by: INTERNAL MEDICINE

## 2017-06-28 PROCEDURE — 84425 ASSAY OF VITAMIN B-1: CPT

## 2017-06-28 PROCEDURE — 25000003 PHARM REV CODE 250: Performed by: PSYCHIATRY & NEUROLOGY

## 2017-06-28 PROCEDURE — 82746 ASSAY OF FOLIC ACID SERUM: CPT

## 2017-06-28 RX ORDER — FENTANYL CITRATE 50 UG/ML
200 INJECTION, SOLUTION INTRAMUSCULAR; INTRAVENOUS
Status: DISCONTINUED | OUTPATIENT
Start: 2017-06-28 | End: 2017-06-29

## 2017-06-28 RX ORDER — FENTANYL CITRATE/PF 250MCG/5ML
25 SYRINGE (ML) INTRAVENOUS ONCE
Status: COMPLETED | OUTPATIENT
Start: 2017-06-28 | End: 2017-06-28

## 2017-06-28 RX ORDER — METOPROLOL TARTRATE 25 MG/1
25 TABLET, FILM COATED ORAL 2 TIMES DAILY
Status: DISCONTINUED | OUTPATIENT
Start: 2017-06-28 | End: 2017-07-06

## 2017-06-28 RX ORDER — FENTANYL CITRATE 50 UG/ML
INJECTION, SOLUTION INTRAMUSCULAR; INTRAVENOUS
Status: DISPENSED
Start: 2017-06-28 | End: 2017-06-29

## 2017-06-28 RX ORDER — FENTANYL CITRATE 50 UG/ML
INJECTION, SOLUTION INTRAMUSCULAR; INTRAVENOUS
Status: DISPENSED
Start: 2017-06-28 | End: 2017-06-28

## 2017-06-28 RX ORDER — MIDAZOLAM HYDROCHLORIDE 1 MG/ML
6 INJECTION INTRAMUSCULAR; INTRAVENOUS
Status: DISCONTINUED | OUTPATIENT
Start: 2017-06-28 | End: 2017-06-29

## 2017-06-28 RX ORDER — FENTANYL CITRATE 50 UG/ML
25 INJECTION, SOLUTION INTRAMUSCULAR; INTRAVENOUS
Status: DISCONTINUED | OUTPATIENT
Start: 2017-06-28 | End: 2017-07-14 | Stop reason: HOSPADM

## 2017-06-28 RX ORDER — FENTANYL CITRATE/PF 250MCG/5ML
25 SYRINGE (ML) INTRAVENOUS
Status: DISCONTINUED | OUTPATIENT
Start: 2017-06-28 | End: 2017-06-28 | Stop reason: ALTCHOICE

## 2017-06-28 RX ORDER — HYDROCODONE BITARTRATE AND ACETAMINOPHEN 500; 5 MG/1; MG/1
TABLET ORAL
Status: DISCONTINUED | OUTPATIENT
Start: 2017-06-28 | End: 2017-07-14 | Stop reason: HOSPADM

## 2017-06-28 RX ORDER — ROCURONIUM BROMIDE 10 MG/ML
50 INJECTION, SOLUTION INTRAVENOUS
Status: DISCONTINUED | OUTPATIENT
Start: 2017-06-28 | End: 2017-06-29

## 2017-06-28 RX ADMIN — Medication 25 MCG: at 11:06

## 2017-06-28 RX ADMIN — BISACODYL 10 MG: 10 SUPPOSITORY RECTAL at 09:06

## 2017-06-28 RX ADMIN — HYDRALAZINE HYDROCHLORIDE 10 MG: 20 INJECTION INTRAMUSCULAR; INTRAVENOUS at 07:06

## 2017-06-28 RX ADMIN — THIAMINE HCL TAB 100 MG 100 MG: 100 TAB at 09:06

## 2017-06-28 RX ADMIN — LEVOTHYROXINE SODIUM 75 MCG: 75 TABLET ORAL at 05:06

## 2017-06-28 RX ADMIN — Medication 1 TABLET: at 09:06

## 2017-06-28 RX ADMIN — MINERAL OIL AND WHITE PETROLATUM: 150; 830 OINTMENT OPHTHALMIC at 09:06

## 2017-06-28 RX ADMIN — CHLORHEXIDINE GLUCONATE 15 ML: 1.2 RINSE ORAL at 05:06

## 2017-06-28 RX ADMIN — Medication 25 MCG: at 01:06

## 2017-06-28 RX ADMIN — Medication 12.5 MG: at 09:06

## 2017-06-28 RX ADMIN — POLYETHYLENE GLYCOL 3350 17 G: 17 POWDER, FOR SOLUTION ORAL at 09:06

## 2017-06-28 RX ADMIN — PANTOPRAZOLE SODIUM 40 MG: 40 GRANULE, DELAYED RELEASE ORAL at 09:06

## 2017-06-28 RX ADMIN — STANDARDIZED SENNA CONCENTRATE AND DOCUSATE SODIUM 2 TABLET: 8.6; 5 TABLET, FILM COATED ORAL at 09:06

## 2017-06-28 RX ADMIN — CHLORHEXIDINE GLUCONATE 15 ML: 1.2 RINSE ORAL at 12:06

## 2017-06-28 RX ADMIN — CHLORHEXIDINE GLUCONATE 15 ML: 1.2 RINSE ORAL at 02:06

## 2017-06-28 RX ADMIN — SODIUM CHLORIDE 1000 ML: 0.9 INJECTION, SOLUTION INTRAVENOUS at 11:06

## 2017-06-28 RX ADMIN — HYDRALAZINE HYDROCHLORIDE 10 MG: 20 INJECTION INTRAMUSCULAR; INTRAVENOUS at 09:06

## 2017-06-28 RX ADMIN — FENTANYL CITRATE 25 MCG: 50 INJECTION INTRAMUSCULAR; INTRAVENOUS at 02:06

## 2017-06-28 RX ADMIN — HYDRALAZINE HYDROCHLORIDE 10 MG: 20 INJECTION INTRAMUSCULAR; INTRAVENOUS at 03:06

## 2017-06-28 RX ADMIN — SERTRALINE HYDROCHLORIDE 50 MG: 50 TABLET ORAL at 09:06

## 2017-06-28 RX ADMIN — METOPROLOL TARTRATE 25 MG: 25 TABLET ORAL at 09:06

## 2017-06-28 NOTE — PROGRESS NOTES
Pt. transported to MRI from [id number] on transport vent. [email or web address]; returned to [facility] without incident; will continue to monitor.

## 2017-06-28 NOTE — PLAN OF CARE
Problem: Patient Care Overview  Goal: Plan of Care Review  Outcome: Ongoing (interventions implemented as appropriate)  POC reviewed with pt and family at 1500. Pt unable to verbalize understanding. Questions and concerns addressed with family. Trach/PEG postponed due to cardiac instability and STAT MRI.  Pt received 2 units of PRBC's today.  Trending cardiac enzymes.   Pt progressing toward goals. Will continue to monitor. See flowsheets for full assessment and VS info.

## 2017-06-28 NOTE — PLAN OF CARE
Problem: Patient Care Overview  Goal: Plan of Care Review  Outcome: Ongoing (interventions implemented as appropriate)  POC reviewed with pt and family at 1400. Pt unable to verbalize understanding. Questions and concerns addressed with family. No acute events today. Pt progressing toward goals. Will continue to monitor. See flowsheets for full assessment and VS info.

## 2017-06-28 NOTE — PLAN OF CARE
Problem: Patient Care Overview  Goal: Plan of Care Review  Outcome: Ongoing (interventions implemented as appropriate)  POC reviewed with pt and wife at 0430. Wife verbalized understanding. Questions and concerns addressed. Pt to receive 2 units RBCs d/t low H&H. TF off since midnight. Heparin gtt will be turned off at 0600 for TREG placement today. Will continue to monitor. See flowsheets for full assessment and VS info

## 2017-06-28 NOTE — PLAN OF CARE
SW attempted to meet with Pt family at bedside to review list for more choices and check in. Pt wife was not at bedside.    TONE sent email to [facility] for [facility] in [place].    Deisi Jeffery LMSW  Neurocritical Care   [facility]  [id number]

## 2017-06-28 NOTE — PROGRESS NOTES
[facility]  General Surgery  Progress Note    Subjective:     Interval History:   Patient seen and examined, no acute events overnight, has been NPO p MN, heparin held at 6a in anticipation of PEG/trach placement today at bedside    Post-Op Info:  * No surgery found *          Medications:  Continuous Infusions:   sodium chloride 0.9% 50 mL/hr at 06/28/17 0701    heparin (porcine) in 5 % dex Stopped (06/28/17 0600)    midazolam (VERSED) infusion (non-titrating) Stopped (06/27/17 0800)     Scheduled Meds:   aspirin  325 mg Per NG tube Daily    bisacodyl  10 mg Rectal Daily    chlorhexidine  15 mL Mouth/Throat QID    folic acid-vit B6-vit B12 2.5-25-2 mg  1 tablet Per NG tube Daily    insulin detemir  20 Units Subcutaneous BID    levothyroxine  75 mcg Per NG tube Daily    metoprolol tartrate  12.5 mg Per OG tube BID    pantoprazole  40 mg Per NG tube Daily    polyethylene glycol  17 g Per NG tube Daily    senna-docusate 8.6-50 mg  2 tablet Per OG tube BID    sertraline  50 mg Per NG tube Daily    thiamine  100 mg Per NG tube Daily    white petrolatum-mineral oil   Both Eyes QHS     PRN Meds:sodium chloride, sodium chloride, sodium chloride, acetaminophen, dextrose 50%, glucagon (human recombinant), heparin, porcine (PF), hydrALAZINE, insulin aspart, magnesium oxide, magnesium oxide, ondansetron, potassium chloride 10%, potassium chloride 10%, potassium chloride 10%, potassium, sodium phosphates, potassium, sodium phosphates, potassium, sodium phosphates     Objective:     Vital Signs (Most Recent):  Temp: 99.7 °F (37.6 °C) (06/28/17 0715)  Pulse: 73 (06/28/17 0701)  Resp: 18 (06/28/17 0701)  BP: 139/60 (06/28/17 0715)  SpO2: 100 % (06/28/17 0701) Vital Signs (24h Range):  Temp:  [98.8 °F (37.1 °C)-100 °F (37.8 °C)] 99.7 °F (37.6 °C)  Pulse:  [65-91] 73  Resp:  [18] 18  SpO2:  [94 %-100 %] 100 %  BP: (139-158)/(60-64) 139/60  Arterial Line BP: (117-164)/(49-66) 132/57        Intake/Output Summary (Last 24 hours) at 06/28/17 0759  Last data filed at 06/28/17 0701   Gross per 24 hour   Intake          2527.34 ml   Output             2170 ml   Net           357.34 ml       Physical Exam   Constitutional: He appears well-developed and well-nourished. No distress.   HENT:   Head: Normocephalic and atraumatic.   Cardiovascular: Normal rate and regular rhythm.    Pulmonary/Chest:   Coarse BS bilat, intubated   Abdominal:   Soft, NTND, no previous abdominal surgical scars noted   Musculoskeletal: He exhibits no edema or tenderness.   Skin: Skin is warm and dry.       Significant Labs:  ABGs:   Recent Labs  Lab 06/28/17  0531   PH 7.556*   PCO2 42.6   PO2 125*   HCO3 37.8*   POCSATURATED 99   BE 15   BMP:   Recent Labs  Lab 06/28/17  0303   *   *   K 4.4      CO2 32*   BUN 34*   CREATININE 0.6   CALCIUM 8.0*   MG 2.9*       CBC:   Recent Labs  Lab 06/28/17  0303   WBC 10.91  10.91   RBC 2.10*  2.10*   HGB 6.9*  6.9*   HCT 21.9*  21.9*     206   *  104*   MCH 32.9*  32.9*   MCHC 31.5*  31.5*     CMP:   Recent Labs  Lab 06/28/17  0303   *   CALCIUM 8.0*   ALBUMIN 2.6*   PROT 4.6*   *   K 4.4   CO2 32*      BUN 34*   CREATININE 0.6   ALKPHOS 59   ALT 78*   AST 71*   BILITOT 0.4     Coagulation:   Recent Labs  Lab 06/26/17  1129   APTT 28.7     LFTs:   Recent Labs  Lab 06/28/17  0303   ALT 78*   AST 71*   ALKPHOS 59   BILITOT 0.4   PROT 4.6*   ALBUMIN 2.6*     All pertinent labs from the last 24 hours have been reviewed.    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] 06/10/2017 Yes    Acute encephalopathy [G93.40]  Unknown    Slow transit constipation [K59.01] 06/26/2017 Clinically Undetermined    Hypotension [I95.9] 06/25/2017 Unknown    Fever [R50.9] 06/25/2017 Unknown    Hyperglycemia [R73.9] 06/23/2017 Unknown    Right subclavian vein thrombosis  [I82.B11] 06/23/2017 Unknown    Encephalopathy [G93.40]  Unknown    Brain mass [G93.9] 06/16/2017 Yes    Acquired hypothyroidism [E03.9] 06/16/2017 Yes    Acute respiratory failure with hypoxia and hypercapnia [J96.01, J96.02] 06/15/2017 Yes    Gering coma scale total score 3-8 [R40.2430] 06/15/2017 Yes    Hypovolemia [E86.1] 06/15/2017 Yes    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

## 2017-06-29 PROBLEM — R13.12 DYSPHAGIA, OROPHARYNGEAL: Status: ACTIVE | Noted: 2017-06-29

## 2017-06-29 PROBLEM — D63.8 ANEMIA OF CHRONIC DISEASE: Status: ACTIVE | Noted: 2017-06-29

## 2017-06-29 LAB
ALBUMIN SERPL BCP-MCNC: 2.7 G/DL
ALLENS TEST: ABNORMAL
ALP SERPL-CCNC: 76 U/L
ALT SERPL W/O P-5'-P-CCNC: 90 U/L
ANION GAP SERPL CALC-SCNC: 9 MMOL/L
APTT BLDCRRT: 23 SEC
AST SERPL-CCNC: 79 U/L
BASOPHILS # BLD AUTO: 0 K/UL
BASOPHILS # BLD AUTO: 0.01 K/UL
BASOPHILS # BLD AUTO: 0.01 K/UL
BASOPHILS NFR BLD: 0 %
BASOPHILS NFR BLD: 0.1 %
BASOPHILS NFR BLD: 0.1 %
BILIRUB SERPL-MCNC: 1.1 MG/DL
BUN SERPL-MCNC: 32 MG/DL
C JEJUNI AB SER IA-ACNC: <0.9
CALCIUM SERPL-MCNC: 8.2 MG/DL
CHLORIDE SERPL-SCNC: 112 MMOL/L
CK MB SERPL-MCNC: 24.9 NG/ML
CK MB SERPL-RTO: 2.8 %
CK SERPL-CCNC: 882 U/L
CO2 SERPL-SCNC: 26 MMOL/L
CREAT SERPL-MCNC: 0.5 MG/DL
DELSYS: ABNORMAL
DIFFERENTIAL METHOD: ABNORMAL
EOSINOPHIL # BLD AUTO: 0 K/UL
EOSINOPHIL NFR BLD: 0 %
ERYTHROCYTE [DISTWIDTH] IN BLOOD BY AUTOMATED COUNT: 18.3 %
ERYTHROCYTE [DISTWIDTH] IN BLOOD BY AUTOMATED COUNT: 18.4 %
ERYTHROCYTE [DISTWIDTH] IN BLOOD BY AUTOMATED COUNT: 18.8 %
ERYTHROCYTE [DISTWIDTH] IN BLOOD BY AUTOMATED COUNT: 19 %
ERYTHROCYTE [DISTWIDTH] IN BLOOD BY AUTOMATED COUNT: 19 %
ERYTHROCYTE [SEDIMENTATION RATE] IN BLOOD BY WESTERGREN METHOD: 18 MM/H
EST. GFR  (AFRICAN AMERICAN): >60 ML/MIN/1.73 M^2
EST. GFR  (NON AFRICAN AMERICAN): >60 ML/MIN/1.73 M^2
FACT X PPP CHRO-ACNC: <0.1 IU/ML
FIO2: 40
GLUCOSE SERPL-MCNC: 90 MG/DL
HCO3 UR-SCNC: 31.6 MMOL/L (ref 24–28)
HCT VFR BLD AUTO: 28.1 %
HCT VFR BLD AUTO: 28.3 %
HCT VFR BLD AUTO: 28.8 %
HCT VFR BLD AUTO: 29.1 %
HCT VFR BLD AUTO: 29.1 %
HGB BLD-MCNC: 9 G/DL
HGB BLD-MCNC: 9.1 G/DL
HGB BLD-MCNC: 9.2 G/DL
HGB BLD-MCNC: 9.4 G/DL
HGB BLD-MCNC: 9.4 G/DL
LYMPHOCYTES # BLD AUTO: 0.5 K/UL
LYMPHOCYTES # BLD AUTO: 0.6 K/UL
LYMPHOCYTES # BLD AUTO: 0.7 K/UL
LYMPHOCYTES # BLD AUTO: 0.7 K/UL
LYMPHOCYTES # BLD AUTO: 0.9 K/UL
LYMPHOCYTES NFR BLD: 4.4 %
LYMPHOCYTES NFR BLD: 4.6 %
LYMPHOCYTES NFR BLD: 4.9 %
LYMPHOCYTES NFR BLD: 5.5 %
LYMPHOCYTES NFR BLD: 8.5 %
MAGNESIUM SERPL-MCNC: 2.6 MG/DL
MAYO MISCELLANEOUS RESULT (REF): NORMAL
MCH RBC QN AUTO: 31.8 PG
MCH RBC QN AUTO: 31.8 PG
MCH RBC QN AUTO: 31.9 PG
MCH RBC QN AUTO: 32 PG
MCH RBC QN AUTO: 32.1 PG
MCHC RBC AUTO-ENTMCNC: 31.8 %
MCHC RBC AUTO-ENTMCNC: 31.9 %
MCHC RBC AUTO-ENTMCNC: 32.3 %
MCHC RBC AUTO-ENTMCNC: 32.3 %
MCHC RBC AUTO-ENTMCNC: 32.4 %
MCV RBC AUTO: 100 FL
MCV RBC AUTO: 100 FL
MCV RBC AUTO: 98 FL
MCV RBC AUTO: 99 FL
MCV RBC AUTO: 99 FL
MIN VOL: 7
MODE: ABNORMAL
MONOCYTES # BLD AUTO: 0.3 K/UL
MONOCYTES # BLD AUTO: 0.3 K/UL
MONOCYTES # BLD AUTO: 0.5 K/UL
MONOCYTES NFR BLD: 2.7 %
MONOCYTES NFR BLD: 2.7 %
MONOCYTES NFR BLD: 3.5 %
MONOCYTES NFR BLD: 3.7 %
MONOCYTES NFR BLD: 4.3 %
NEUTROPHILS # BLD AUTO: 11.1 K/UL
NEUTROPHILS # BLD AUTO: 11.5 K/UL
NEUTROPHILS # BLD AUTO: 12.2 K/UL
NEUTROPHILS # BLD AUTO: 9.4 K/UL
NEUTROPHILS # BLD AUTO: 9.8 K/UL
NEUTROPHILS NFR BLD: 86.2 %
NEUTROPHILS NFR BLD: 89.8 %
NEUTROPHILS NFR BLD: 91.1 %
NEUTROPHILS NFR BLD: 92.4 %
NEUTROPHILS NFR BLD: 92.6 %
PCO2 BLDA: 45.6 MMHG (ref 35–45)
PEEP: 8
PH SMN: 7.45 [PH] (ref 7.35–7.45)
PHOSPHATE SERPL-MCNC: 4 MG/DL
PIP: 21
PLATELET # BLD AUTO: 208 K/UL
PLATELET # BLD AUTO: 214 K/UL
PLATELET # BLD AUTO: 214 K/UL
PLATELET # BLD AUTO: 221 K/UL
PLATELET # BLD AUTO: 227 K/UL
PMV BLD AUTO: 11.2 FL
PMV BLD AUTO: 11.3 FL
PMV BLD AUTO: 11.5 FL
PO2 BLDA: 110 MMHG (ref 80–100)
POC BE: 8 MMOL/L
POC SATURATED O2: 98 % (ref 95–100)
POC TCO2: 33 MMOL/L (ref 23–27)
POCT GLUCOSE: 100 MG/DL (ref 70–110)
POCT GLUCOSE: 82 MG/DL (ref 70–110)
POCT GLUCOSE: 86 MG/DL (ref 70–110)
POCT GLUCOSE: 92 MG/DL (ref 70–110)
POCT GLUCOSE: 93 MG/DL (ref 70–110)
POTASSIUM SERPL-SCNC: 4 MMOL/L
PROT SERPL-MCNC: 5.2 G/DL
RBC # BLD AUTO: 2.83 M/UL
RBC # BLD AUTO: 2.86 M/UL
RBC # BLD AUTO: 2.88 M/UL
RBC # BLD AUTO: 2.93 M/UL
RBC # BLD AUTO: 2.94 M/UL
SAMPLE: ABNORMAL
SITE: ABNORMAL
SODIUM SERPL-SCNC: 147 MMOL/L
SP02: 99
TROPONIN I SERPL DL<=0.01 NG/ML-MCNC: 0.05 NG/ML
VT: 550
WBC # BLD AUTO: 10.62 K/UL
WBC # BLD AUTO: 10.93 K/UL
WBC # BLD AUTO: 12.36 K/UL
WBC # BLD AUTO: 12.39 K/UL
WBC # BLD AUTO: 13.39 K/UL

## 2017-06-29 PROCEDURE — 84100 ASSAY OF PHOSPHORUS: CPT

## 2017-06-29 PROCEDURE — 95951 HC EEG MONITORING/VIDEO RECORD: CPT

## 2017-06-29 PROCEDURE — 63600175 PHARM REV CODE 636 W HCPCS: Performed by: PHYSICIAN ASSISTANT

## 2017-06-29 PROCEDURE — 63600175 PHARM REV CODE 636 W HCPCS: Performed by: PSYCHIATRY & NEUROLOGY

## 2017-06-29 PROCEDURE — 95813 EEG EXTND MNTR 61-119 MIN: CPT | Mod: 26,,, | Performed by: PSYCHIATRY & NEUROLOGY

## 2017-06-29 PROCEDURE — 94003 VENT MGMT INPAT SUBQ DAY: CPT

## 2017-06-29 PROCEDURE — 0B113F4 BYPASS TRACHEA TO CUTANEOUS WITH TRACHEOSTOMY DEVICE, PERCUTANEOUS APPROACH: ICD-10-PCS | Performed by: SURGERY

## 2017-06-29 PROCEDURE — 84484 ASSAY OF TROPONIN QUANT: CPT

## 2017-06-29 PROCEDURE — 20000000 HC ICU ROOM

## 2017-06-29 PROCEDURE — 25000003 PHARM REV CODE 250: Performed by: PHYSICIAN ASSISTANT

## 2017-06-29 PROCEDURE — 83735 ASSAY OF MAGNESIUM: CPT

## 2017-06-29 PROCEDURE — 85520 HEPARIN ASSAY: CPT

## 2017-06-29 PROCEDURE — 43246 EGD PLACE GASTROSTOMY TUBE: CPT | Mod: 51,,, | Performed by: SURGERY

## 2017-06-29 PROCEDURE — 99233 SBSQ HOSP IP/OBS HIGH 50: CPT | Mod: ,,, | Performed by: PHYSICIAN ASSISTANT

## 2017-06-29 PROCEDURE — 27000221 HC OXYGEN, UP TO 24 HOURS

## 2017-06-29 PROCEDURE — 31600 PLANNED TRACHEOSTOMY: CPT | Mod: ,,, | Performed by: SURGERY

## 2017-06-29 PROCEDURE — 25000003 PHARM REV CODE 250: Performed by: NURSE PRACTITIONER

## 2017-06-29 PROCEDURE — 99900026 HC AIRWAY MAINTENANCE (STAT)

## 2017-06-29 PROCEDURE — 85730 THROMBOPLASTIN TIME PARTIAL: CPT

## 2017-06-29 PROCEDURE — 97803 MED NUTRITION INDIV SUBSEQ: CPT

## 2017-06-29 PROCEDURE — 95957 EEG DIGITAL ANALYSIS: CPT

## 2017-06-29 PROCEDURE — 80053 COMPREHEN METABOLIC PANEL: CPT

## 2017-06-29 PROCEDURE — 82553 CREATINE MB FRACTION: CPT

## 2017-06-29 PROCEDURE — 0DH63UZ INSERTION OF FEEDING DEVICE INTO STOMACH, PERCUTANEOUS APPROACH: ICD-10-PCS | Performed by: SURGERY

## 2017-06-29 PROCEDURE — 85025 COMPLETE CBC W/AUTO DIFF WBC: CPT | Mod: 91

## 2017-06-29 RX ORDER — MIDAZOLAM HYDROCHLORIDE 1 MG/ML
6 INJECTION INTRAMUSCULAR; INTRAVENOUS
Status: DISCONTINUED | OUTPATIENT
Start: 2017-06-29 | End: 2017-07-14 | Stop reason: HOSPADM

## 2017-06-29 RX ORDER — HEPARIN SODIUM 10000 [USP'U]/100ML
800 INJECTION, SOLUTION INTRAVENOUS CONTINUOUS
Status: DISCONTINUED | OUTPATIENT
Start: 2017-06-29 | End: 2017-06-29

## 2017-06-29 RX ORDER — ROCURONIUM BROMIDE 10 MG/ML
50 INJECTION, SOLUTION INTRAVENOUS
Status: DISCONTINUED | OUTPATIENT
Start: 2017-06-29 | End: 2017-07-14 | Stop reason: HOSPADM

## 2017-06-29 RX ORDER — FENTANYL CITRATE 50 UG/ML
200 INJECTION, SOLUTION INTRAMUSCULAR; INTRAVENOUS
Status: DISCONTINUED | OUTPATIENT
Start: 2017-06-29 | End: 2017-07-14 | Stop reason: HOSPADM

## 2017-06-29 RX ORDER — HEPARIN SODIUM,PORCINE/D5W 25000/250
17 INTRAVENOUS SOLUTION INTRAVENOUS CONTINUOUS
Status: DISCONTINUED | OUTPATIENT
Start: 2017-06-29 | End: 2017-07-01

## 2017-06-29 RX ADMIN — STANDARDIZED SENNA CONCENTRATE AND DOCUSATE SODIUM 2 TABLET: 8.6; 5 TABLET, FILM COATED ORAL at 09:06

## 2017-06-29 RX ADMIN — POLYETHYLENE GLYCOL 3350 17 G: 17 POWDER, FOR SOLUTION ORAL at 09:06

## 2017-06-29 RX ADMIN — MINERAL OIL AND WHITE PETROLATUM: 150; 830 OINTMENT OPHTHALMIC at 09:06

## 2017-06-29 RX ADMIN — METOPROLOL TARTRATE 25 MG: 25 TABLET ORAL at 09:06

## 2017-06-29 RX ADMIN — FENTANYL CITRATE 25 MCG: 50 INJECTION INTRAMUSCULAR; INTRAVENOUS at 09:06

## 2017-06-29 RX ADMIN — PANTOPRAZOLE SODIUM 40 MG: 40 GRANULE, DELAYED RELEASE ORAL at 09:06

## 2017-06-29 RX ADMIN — SERTRALINE HYDROCHLORIDE 50 MG: 50 TABLET ORAL at 09:06

## 2017-06-29 RX ADMIN — CHLORHEXIDINE GLUCONATE 15 ML: 1.2 RINSE ORAL at 12:06

## 2017-06-29 RX ADMIN — CHLORHEXIDINE GLUCONATE 15 ML: 1.2 RINSE ORAL at 06:06

## 2017-06-29 RX ADMIN — THIAMINE HCL TAB 100 MG 100 MG: 100 TAB at 09:06

## 2017-06-29 RX ADMIN — HYDRALAZINE HYDROCHLORIDE 10 MG: 20 INJECTION INTRAMUSCULAR; INTRAVENOUS at 06:06

## 2017-06-29 RX ADMIN — HEPARIN SODIUM AND DEXTROSE 17 UNITS/KG/HR: 10000; 5 INJECTION INTRAVENOUS at 08:06

## 2017-06-29 RX ADMIN — LEVOTHYROXINE SODIUM 75 MCG: 75 TABLET ORAL at 06:06

## 2017-06-29 RX ADMIN — MIDAZOLAM HYDROCHLORIDE 4 MG: 1 INJECTION, SOLUTION INTRAMUSCULAR; INTRAVENOUS at 01:06

## 2017-06-29 RX ADMIN — CHLORHEXIDINE GLUCONATE 15 ML: 1.2 RINSE ORAL at 11:06

## 2017-06-29 RX ADMIN — ROCURONIUM BROMIDE 50 MG: 10 INJECTION, SOLUTION INTRAVENOUS at 01:06

## 2017-06-29 RX ADMIN — BISACODYL 10 MG: 10 SUPPOSITORY RECTAL at 09:06

## 2017-06-29 RX ADMIN — Medication 1 TABLET: at 09:06

## 2017-06-29 RX ADMIN — ASPIRIN 325 MG ORAL TABLET 325 MG: 325 PILL ORAL at 09:06

## 2017-06-29 RX ADMIN — FENTANYL CITRATE 150 MCG: 50 INJECTION INTRAMUSCULAR; INTRAVENOUS at 01:06

## 2017-06-29 NOTE — PROGRESS NOTES
[facility]  Neurocritical Care  Progress Note    Admit Date: 6/14/2017  Service Date: 06/28/2017  Length of Stay: 14    Subjective:     Chief Complaint: Guillain-[place] syndrome    History of Present Illness: 72 year old male with PMHx CAD, skin cancer, hypothyroidism, and HLP who was transferred from OSH for further evaluation and management of rapidly progressive ascending paralysis.  No family present during exam, history obtained primarily from chart.  Patient was on a camping trip to [place] and [place] recently. When he returned he started to have paraesthesias of the feet and hand that quickly progressed to legs weakness. No clear hx of tick bites or skin lesions.  Additionally, a few days prior to admission to OSH he had prostate biopsy and was treated with cipro.  Pt presented to OSH and was diagnosed with GBS and was started on IVIG, he completed 5 days of treatment which ended on 6/12/17.  Pt also being treated for pneumonia.  After admission his weakness got worse to the point that he had to be intubated.  Pt has had poor neurologic exam since.   - LP was done in OSH and was reported as 0 rbc, 0 wbc, protein 34, glucose 97.  Lyme ab panel was sent and results are pending.  MRI Brain done today shows non enhancing lesion abutting the right optic nerve.  Neurosurgery consulted for evaluation.    MRI of the brain:  Bilateral supraclinoid internal carotid artery aneurysms.  On the right there is a 10 mm aneurysm in separate 2 mm aneurysm.  On the left the supraclinoid ICA aneurysm measures about 7 x 6 mm.3.6 cm nonenhancing mass centered over the right greater and lesser wings of the sphenoid abutting the ICA, A1 and M1 segments, displacing the right optic nerve.  Differential considerations include a thrombosed aneurysm associated with right ICA aneurysm aneurysm,  exophytic hamartoma, or or an exophytic nonenhancing primary brain tumor such as low-grade glioma or  oligodendroglioma.    Hospital Course: 6[place]15 [place] [facility] [place]. :Initial LP with protein of 34., glucose 97, WBC ) .IVIG x 5(finished 6/12).  6/16 NSGY consulted for  /lesion noted on MRI, Awaitng EMG  6/18 Lyme negative, multiple CSF labs pending  6/21:Pending CTA and continuous EEG.   6/22: No clinical changes. CTA   left  superior hypophyseal aneurysm again measures approximately 7.0 x 6.0 mm. The right superior hypophyseal artery aneurysm again measures 10.0 x 7.0 mm.  There is a second superiorly projecting aneurysm of the right supraclinoid ICA measuring 2.0 mm. EEG: slowing. Sphenoid aneurysm. Pending EMG /NCS. PIC line is kinked.  CXR -R-PIC line kinked.   6/23 EMG performed, awaiting results.  6/24 Awaiting EMG report from 6/23. Insulin and TF adjusted for hyperglycemia.  6/25  A-line placed. Lateral eye movements noted when on sedation vacation.  6/26: gen surg consulted for peg and trach  6/28 Pt went to runs of Vtach ; PEG and Trach delayed until pt stable/ STAT MRI ordered    Interval History:6/28 STAT MRI ordered/ VTach runs delayed Trach/PEG   Review of Systems   Unable to obtain a complete ROS due to level of consciousness.  Objective:     Vitals:  Temp: 99.4 °F (37.4 °C) (06/28/17 1501)  Pulse: 72 (06/28/17 2100)  Resp: 18 (06/28/17 2100)  BP: (!) 186/91 (06/28/17 1320)  SpO2: 99 % (06/28/17 2100)    Temp:  [98.8 °F (37.1 °C)-99.7 °F (37.6 °C)] 99.4 °F (37.4 °C)  Pulse:  [55-88] 72  Resp:  [18-26] 18  SpO2:  [94 %-100 %] 99 %  BP: (125-186)/(52-91) 186/91  Arterial Line BP: (117-183)/(49-80) 145/63         Vent Mode: A/C  Oxygen Concentration (%):  [40] 40  Resp Rate Total:  [18 br/min] 18 br/min  Vt Set:  [550 mL] 550 mL  PEEP/CPAP:  [8 cmH20] 8 cmH20  Pressure Support:  [0 cmH20] 0 cmH20  Mean Airway Pressure:  [12 fjJ07-41 cmH20] 12 cmH20    06/27 0701 - 06/28 0700  In: 2597.8 [I.V.:1542.8]  Out: 2095 [Urine:2095]    Physical Exam:  Unable to test gait due to level of  consciousness.      Physical Exam:  GA: Intubated, Does not follow commands, Does not withdraw to painful stimmuli comfortable, no acute distress.   HEENT: No scleral icterus or JVD.   Pulmonary: Clear to auscultation Anterior. No wheezing, crackles, or rhonchi.  Cardiac: RRR S1 & S2 w/o rubs/murmurs/gallops.   Abdominal: Bowel sounds present x 4.    Neuro:  --GCS: E4VT M3  --Mental Status: Intubated, Does not follow commands, Does not withdraw to painful stimmuli  --CN II-XII unable to fully assess   --Pupils 3mm, PERRL.   -- No Corneal reflex, no gag, no cough intact.  -- Minimal to no withdrawal in all 4 extremities    Medications:  Continuous  sodium chloride 0.9% Last Rate: 75 mL/hr at 06/28/17 2100   Scheduled  aspirin 325 mg Daily   bisacodyl 10 mg Daily   chlorhexidine 15 mL QID   fentaNYL     fentaNYL     folic acid-vit B6-vit B12 2.5-25-2 mg 1 tablet Daily   insulin detemir 20 Units BID   levothyroxine 75 mcg Daily   metoprolol tartrate 25 mg BID   pantoprazole 40 mg Daily   polyethylene glycol 17 g Daily   senna-docusate 8.6-50 mg 2 tablet BID   sertraline 50 mg Daily   thiamine 100 mg Daily   white petrolatum-mineral oil  QHS   PRN  sodium chloride  Q24H PRN   sodium chloride  Q24H PRN   sodium chloride  Q24H PRN   acetaminophen 650 mg Q6H PRN   dextrose 50% 12.5 g PRN   fentaNYL 200 mcg On Call Procedure   fentaNYL 25 mcg Q2H PRN   glucagon (human recombinant) 1 mg PRN   heparin, porcine (PF) 1,000 Units PRN   hydrALAZINE 10 mg Q6H PRN   insulin aspart 1-10 Units Q4H PRN   magnesium oxide 800 mg PRN   magnesium oxide 800 mg PRN   midazolam (PF) 6 mg On Call Procedure   ondansetron 4 mg Q8H PRN   potassium chloride 10% 40 mEq PRN   potassium chloride 10% 40 mEq PRN   potassium chloride 10% 60 mEq PRN   potassium, sodium phosphates 2 packet PRN   potassium, sodium phosphates 2 packet PRN   potassium, sodium phosphates 2 packet PRN   rocuronium 50 mg On Call Procedure     Today I personally reviewed  pertinent medications, lines/drains/airways, imaging, notably:        Assessment/Plan:     Neuro   * Guillain-[place] syndrome    Post IVIG x 5 days  Post steroids x 5 days  Post PLEX x 5 days  Minimal improvement in horizontal eye movements, moving jaw to command minimally today  Continue versed for patient comfort but daily sedation holiday at 6 am until team rounds and restarts  gen surg consulted for trach and peg  Trach and peg delayed today 6/28 due to runs of vtach  Will obtain MRI Brain today               Brain aneurysm    Incidental  followup with vascular neurology/NSGY in one month after discharge          Pulmonary   Acute respiratory failure with hypoxia and hypercapnia    Neuromuscular respiratory failure  Pending trach and peg  Trach and Peg postponed today due to runs of vtac        Cardiac   Right subclavian vein thrombosis    Heparin gtt on hold due trach and peg  Monitor Xa  Will start anticoagulation after peg and trach        Hem/Onc   Brain mass    Incidental  Follow with neurosurgery post discharge        Fluids/Electrolytes/Nutrition/GI   Hyperglycemia    iss  Hold detemir due to possible trach and peg  aspart            Prophylaxis:  Venous Thromboembolism: mechanical  Stress Ulcer: PPI  Ventilator Pneumonia: yes     Activity Orders          Up with assistance starting at 06/14 0111        Full Code    Serenity Banks PA-C  Neurocritical Care  [facility]

## 2017-06-29 NOTE — NURSING
Trach placed at bedside per Dr. Goldberg. Time out performed at 1300. 100 mcg fentanyl, 3 mg Versed, and 50 mg Ren administered. Pt became hypotensive after. 50 mcg lotus administered at 1308. Pt became hypertensive. 50 mcg fentanyl and 1 versed administered at 1317. Trach placement completed at 1318. Pt became hypotensive after. 10 mcg Lotus administered. BP maintained. Will continue to monitor.

## 2017-06-29 NOTE — ASSESSMENT & PLAN NOTE
Post IVIG x 5 days  Post steroids x 5 days  Post PLEX x 5 days  Minimal improvement in horizontal eye movements, moving jaw to command minimally today  Continue versed for patient comfort but daily sedation holiday at 6 am until team rounds and restarts  gen surg consulted for trach and peg  Trach and peg delayed today 6/28 due to runs of vtach  Will obtain MRI Brain 6/28 stable

## 2017-06-29 NOTE — SUBJECTIVE & OBJECTIVE
Interval History:    6/29 Trach and PEG today; Restarted Heparin gtt 6 hrs post procedure ; MRI stable       Review of Systems   OR Unable to obtain a complete ROS due to level of consciousness.  Objective:     Vitals:  Temp: 98.7 °F (37.1 °C) (06/29/17 1500)  Pulse: 73 (06/29/17 1711)  Resp: (!) 1 (06/29/17 1711)  BP: (!) 148/74 (06/29/17 1600)  SpO2: 100 % (06/29/17 1711)    Temp:  [97.7 °F (36.5 °C)-98.9 °F (37.2 °C)] 98.7 °F (37.1 °C)  Pulse:  [57-97] 73  Resp:  [1-20] 1  SpO2:  [96 %-100 %] 100 %  BP: (130-148)/(62-78) 148/74  Arterial Line BP: (129-174)/(54-75) 143/59         Vent Mode: A/C  Oxygen Concentration (%):  [] 100  Resp Rate Total:  [18 br/min-20 br/min] 18 br/min  Vt Set:  [550 mL] 550 mL  PEEP/CPAP:  [8 cmH20] 8 cmH20  Pressure Support:  [0 cmH20] 0 cmH20  Mean Airway Pressure:  [12 cmH20] 12 cmH20    06/28 0701 - 06/29 0700  In: 3412.9 [I.V.:1694.2]  Out: 2145 [Urine:2145]    Physical Exam   GA: Intubated,Attempts to track with eyes, Does not withdraw to painful stimmuli comfortable, no acute distress.   HEENT: No scleral icterus or JVD.   Pulmonary: Clear to auscultation Anterior. No wheezing, crackles, or rhonchi.  Cardiac: RRR S1 & S2 w/o rubs/murmurs/gallops.   Abdominal: Bowel sounds present x 4.     Neuro:  --GCS: E4VT M3  --Mental Status: Intubated, Attempts to track with eyes and follow commands with eyes only, Does not withdraw to painful stimmuli  --CN II-XII unable to fully assess   --Pupils 3mm, PERRL.   -- No Corneal reflex, no gag, no cough intact.  -- Minimal to no withdrawal in all 4 extremities  Unable to test gait due to level of consciousness.    Medications:  Continuous  sodium chloride 0.9% Last Rate: 75 mL/hr at 06/29/17 1700   heparin (porcine) in D5W    Scheduled  aspirin 325 mg Daily   bisacodyl 10 mg Daily   chlorhexidine 15 mL QID   folic acid-vit B6-vit B12 2.5-25-2 mg 1 tablet Daily   insulin detemir 20 Units BID   levothyroxine 75 mcg Daily   metoprolol  tartrate 25 mg BID   pantoprazole 40 mg Daily   polyethylene glycol 17 g Daily   senna-docusate 8.6-50 mg 2 tablet BID   sertraline 50 mg Daily   thiamine 100 mg Daily   white petrolatum-mineral oil  QHS   PRN  sodium chloride  Q24H PRN   sodium chloride  Q24H PRN   sodium chloride  Q24H PRN   acetaminophen 650 mg Q6H PRN   dextrose 50% 12.5 g PRN   fentaNYL 200 mcg On Call Procedure   fentaNYL 25 mcg Q2H PRN   glucagon (human recombinant) 1 mg PRN   heparin, porcine (PF) 1,000 Units PRN   hydrALAZINE 10 mg Q6H PRN   insulin aspart 1-10 Units Q4H PRN   magnesium oxide 800 mg PRN   magnesium oxide 800 mg PRN   midazolam (PF) 6 mg On Call Procedure   ondansetron 4 mg Q8H PRN   potassium chloride 10% 40 mEq PRN   potassium chloride 10% 40 mEq PRN   potassium chloride 10% 60 mEq PRN   potassium, sodium phosphates 2 packet PRN   potassium, sodium phosphates 2 packet PRN   potassium, sodium phosphates 2 packet PRN   rocuronium 50 mg On Call Procedure     Today I personally reviewed pertinent medications, lines/drains/airways, lab results, notably:

## 2017-06-29 NOTE — OP NOTE
DATE OF PROCEDURE: 06/29/2017    PREOPERATIVE DIAGNOSES:   1. Guillain [place]  2. Respiratory failure.   3. Dysphagia.    POSTOPERATIVE DIAGNOSES:   1. Guillain [place]  2. Respiratory failure.   3. Dysphagia.     PROCEDURES PERFORMED:   1. Percutaneous tracheostomy.   2. Esophagogastroduodenoscopy with percutaneous endoscopic gastrostomy.    ATTENDING SURGEON: Joshua Goldberg, M.D.     HOUSESTAFF SURGEON: Yusuf Dailey MD    : Evon Ramirez MD    ANESTHESIA: General endotracheal.     ESTIMATED BLOOD LOSS:  5 mL     FINDINGS: A #8 Shiley tracheostomy and 20-Belgian percutaneous gastrostomy   placed without apparent complication.     SPECIMEN: None.     DRAINS: None.     COMPLICATIONS: None.     INDICATIONS: Rosalio Cam is a 72 y.o.male admitted to [facility] with Guillain [place]. The patient has failed attempts to wean from the ventilator. We have recommended to the family that the patient would benefit from placement of a percutaneous tracheostomy tube for the anticipated prolonged need for   mechanical ventilation. We also recommend a percutaneous gastrostomy tube for the patient's dysphagia. We did obtain informed consent from the patient's family who expressed understanding of the risks and benefits and gave consent to proceed.     OPERATIVE PROCEDURE: The procedure was performed in the bedside at the Intensive Care Unit. The patient was identified and monitored throughout. Appropriate position with neck in extension; anterior neck was prepped and draped. We identified our tracheal landmarks, made a 2 cm midline cervical incision. Subcutaneous tissue was bluntly dissected, and appropriate position for the tracheostomy tube was noted. Using the Glidescope, we withdrew the endotracheal tube to above the level of tracheostomy. The trachea was then cannulated with a hollow bore needle. Aspiration of air confirmed cannulation of the trachea. The guidewire was then placed. Bronchoscopy  through the endotracheal tube visualized the wire within the trachea. Tracheostomy was then created by serial dilation; first with the punch dilator and then the Blue Rhino dilator. A #8 Shiley tracheostomy was loaded onto the appropriate size loading dilator and placed into the trachea by Seldinger technique. Bronchoscopy through the tracheostomy confirmed appropriate position with visualization of the john. The endotracheal tube was removed and the flange of the tracheostomy tube was secured in place using Velcro tracheostomy tape and four 2-0 Prolene sutures. The cuff of the tracheostomy tube was inflated.    We then directed our attention to the left upper quadrant for gastrostomy tube placement. The patient's abdomen was prepped and draped. An upper endoscope was introduced into the oropharynx and guided down into the esophagus and stomach. The stomach was insufflated with air. We intubated the duodenum and examined the first and second portions; no abnormalities were noted. We withdrew the endoscope into the stomach and identified an appropriate position for gastrostomy tube placement 2 finger-breadths below the left subcostal margin. Palpation of the anterior abdominal wall at this point was visualized endoscopically and transillumination from the endoscope was visualized through the anterior abdominal wall. We made a 1.5 cm transverse skin incision. At this point, the stomach was cannulated with a catheter loaded on a needle. This was grasped by a snare which had been passed through the endoscope. The needle was removed, and a guidewire was placed, and the snare was used to grasp the guidewire. The endoscope, snare, and guidewire were all withdrawn from the patient's mouth. A 20-Polish gastrostomy tube was loaded onto the guidewire and pulled through the anterior abdominal wall via Seldinger technique. Repeat endoscopy was performed with the gastrostomy tube at the 3 cm isaias at the skin. There was no  blanching of the gastric mucosa, and when the tube was twisted, the button did not grab the mucosa. The insufflation in the stomach was evacuated, and the endoscope was removed. The gastrostomy tube was secured in place using the supplied devices and connected to a bag for gravity drainage.    The patient remained in critical but stable condition in the ICU at the end of the case. I was present and either scrubbed for or directed the entire procedure.

## 2017-06-29 NOTE — ASSESSMENT & PLAN NOTE
Neuromuscular respiratory failure  Pending trach and peg  Trach and Peg postponed today due to runs of vtac

## 2017-06-29 NOTE — PROGRESS NOTES
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  No family present during exam, history obtained primarily from chart.  Patient was on a camping trip to [place] and [place] recently. When he returned he started to have paraesthesias of the feet and hand that quickly progressed to legs weakness. No clear hx of tick bites or skin lesions.  Additionally, a few days prior to admission to OSH he had prostate biopsy and was treated with cipro.  Pt presented to OSH and was diagnosed with GBS and was started on IVIG, he completed 5 days of treatment which ended on 6/12/17.  Pt also being treated for pneumonia.  After admission his weakness got worse to the point that he had to be intubated.  Pt has had poor neurologic exam since.   - LP was done in OSH and was reported as 0 rbc, 0 wbc, protein 34, glucose 97.  Lyme ab panel was sent and results are pending.  MRI Brain done today shows non enhancing lesion abutting the right optic nerve.  Neurosurgery consulted for evaluation.    MRI of the brain:  Bilateral supraclinoid internal carotid artery aneurysms.  On the right there is a 10 mm aneurysm in separate 2 mm aneurysm.  On the left the supraclinoid ICA aneurysm measures about 7 x 6 mm.3.6 cm nonenhancing mass centered over the right greater and lesser wings of the sphenoid abutting the ICA, A1 and M1 segments, displacing the right optic nerve.  Differential considerations include a thrombosed aneurysm associated with right ICA aneurysm aneurysm,  exophytic hamartoma, or or an exophytic nonenhancing primary brain tumor such as low-grade glioma or  oligodendroglioma.    Hospital Course: 6/15 transferred [place] [facility] [place]BS. :Initial LP with protein of 34., glucose 97, WBC ) .IVIG x 5(finished 6/12).  6/16 NSGY consulted for  /lesion noted on MRI, Awaitng EMG  6/18 Lyme negative, multiple CSF labs pending  6/21:Pending CTA and continuous EEG.   6/22: No clinical changes. CTA   left  superior hypophyseal aneurysm again measures approximately 7.0 x 6.0 mm. The right superior hypophyseal artery aneurysm again measures 10.0 x 7.0 mm.  There is a second superiorly projecting aneurysm of the right supraclinoid ICA measuring 2.0 mm. EEG: slowing. Sphenoid aneurysm. Pending EMG /NCS. PIC line is kinked.  CXR -R-PIC line kinked.   6/23 EMG performed, awaiting results.  6/24 Awaiting EMG report from 6/23. Insulin and TF adjusted for hyperglycemia.  6/25  A-line placed. Lateral eye movements noted when on sedation vacation.  6/26: gen surg consulted for peg and trach  6/28 Pt went to runs of Vta ; PEG and Trach delayed until pt stable/ STAT MRI ordered  6/29 Trach and PEG today; Restarted Heparin gtt 6 hrs post procedure ; MRI stable     Interval History:    6/29 Trach and PEG today; Restarted Heparin gtt 6 hrs post procedure ; MRI stable       Review of Systems   OR Unable to obtain a complete ROS due to level of consciousness.  Objective:     Vitals:  Temp: 98.7 °F (37.1 °C) (06/29/17 1500)  Pulse: 73 (06/29/17 1711)  Resp: (!) 1 (06/29/17 1711)  BP: (!) 148/74 (06/29/17 1600)  SpO2: 100 % (06/29/17 1711)    Temp:  [97.7 °F (36.5 °C)-98.9 °F (37.2 °C)] 98.7 °F (37.1 °C)  Pulse:  [57-97] 73  Resp:  [1-20] 1  SpO2:  [96 %-100 %] 100 %  BP: (130-148)/(62-78) 148/74  Arterial Line BP: (129-174)/(54-75) 143/59         Vent Mode: A/C  Oxygen Concentration (%):  [] 100  Resp Rate Total:  [18 br/min-20 br/min] 18 br/min  Vt Set:  [550 mL] 550 mL  PEEP/CPAP:  [8 cmH20] 8 cmH20  Pressure Support:  [0 cmH20] 0 cmH20  Mean Airway Pressure:  [12 cmH20] 12 cmH20    06/28 0701 -  06/29 0700  In: 3412.9 [I.V.:1694.2]  Out: 2145 [Urine:2145]    Physical Exam   GA: Intubated,Attempts to track with eyes, Does not withdraw to painful stimmuli comfortable, no acute distress.   HEENT: No scleral icterus or JVD.   Pulmonary: Clear to auscultation Anterior. No wheezing, crackles, or rhonchi.  Cardiac: RRR S1 & S2 w/o rubs/murmurs/gallops.   Abdominal: Bowel sounds present x 4.     Neuro:  --GCS: E4VT M3  --Mental Status: Intubated, Attempts to track with eyes and follow commands with eyes only, Does not withdraw to painful stimmuli  --CN II-XII unable to fully assess   --Pupils 3mm, PERRL.   -- No Corneal reflex, no gag, no cough intact.  -- Minimal to no withdrawal in all 4 extremities  Unable to test gait due to level of consciousness.    Medications:  Continuous  sodium chloride 0.9% Last Rate: 75 mL/hr at 06/29/17 1700   heparin (porcine) in D5W    Scheduled  aspirin 325 mg Daily   bisacodyl 10 mg Daily   chlorhexidine 15 mL QID   folic acid-vit B6-vit B12 2.5-25-2 mg 1 tablet Daily   insulin detemir 20 Units BID   levothyroxine 75 mcg Daily   metoprolol tartrate 25 mg BID   pantoprazole 40 mg Daily   polyethylene glycol 17 g Daily   senna-docusate 8.6-50 mg 2 tablet BID   sertraline 50 mg Daily   thiamine 100 mg Daily   white petrolatum-mineral oil  QHS   PRN  sodium chloride  Q24H PRN   sodium chloride  Q24H PRN   sodium chloride  Q24H PRN   acetaminophen 650 mg Q6H PRN   dextrose 50% 12.5 g PRN   fentaNYL 200 mcg On Call Procedure   fentaNYL 25 mcg Q2H PRN   glucagon (human recombinant) 1 mg PRN   heparin, porcine (PF) 1,000 Units PRN   hydrALAZINE 10 mg Q6H PRN   insulin aspart 1-10 Units Q4H PRN   magnesium oxide 800 mg PRN   magnesium oxide 800 mg PRN   midazolam (PF) 6 mg On Call Procedure   ondansetron 4 mg Q8H PRN   potassium chloride 10% 40 mEq PRN   potassium chloride 10% 40 mEq PRN   potassium chloride 10% 60 mEq PRN   potassium, sodium phosphates 2 packet PRN   potassium, sodium  phosphates 2 packet PRN   potassium, sodium phosphates 2 packet PRN   rocuronium 50 mg On Call Procedure     Today I personally reviewed pertinent medications, lines/drains/airways, lab results, notably:        Assessment/Plan:     Neuro   * Guillain-[place] syndrome    Post IVIG x 5 days  Post steroids x 5 days  Post PLEX x 5 days  Minimal improvement in horizontal eye movements, moving jaw to command minimally today  Continue versed for patient comfort but daily sedation holiday at 6 am until team rounds and restarts  gen surg consulted for trach and peg  Trach and peg delayed today 6/28 due to runs of vtach  Will obtain MRI Brain 6/28 stable                Brain aneurysm    Incidental  followup with vascular neurology/NSGY in one month after discharge          Flaccid quadriplegia    See GBS        Pulmonary   Acute respiratory failure with hypoxia and hypercapnia    Neuromuscular respiratory failure  Pending trach and peg  Trach and Peg postponed today due to runs of vtac  Trach/PEG today 6/29        Cardiac   Right subclavian vein thrombosis    Restarted Heparin gtt 6 hr post trach/PEG  Monitor Xa        Hem/Onc   Brain mass    Incidental  Follow with neurosurgery post discharge        Endocrine   Acquired hypothyroidism    On synthroid        Fluids/Electrolytes/Nutrition/GI   Hyperglycemia    iss  Hold detemir due to trach and peg  aspart            Prophylaxis:  Venous Thromboembolism: chemical  Stress Ulcer: PPI  Ventilator Pneumonia: yes     Activity Orders          Up with assistance starting at 06/14 0111        Full Code    Serenity Banks PA-C  Neurocritical Care  [facility]

## 2017-06-29 NOTE — NURSING
PEG placed at bedside per Dr. Goldberg. Pt became hypotensive after. 10 mcg Smooth administered at 1332. BP maintained. Drainage bag connected to PEG and hanging to gravity. Will continue to monitor.

## 2017-06-29 NOTE — ASSESSMENT & PLAN NOTE
Post IVIG x 5 days  Post steroids x 5 days  Post PLEX x 5 days  Minimal improvement in horizontal eye movements, moving jaw to command minimally today  Continue versed for patient comfort but daily sedation holiday at 6 am until team rounds and restarts  gen surg consulted for trach and peg  Trach and peg delayed today 6/28 due to runs of vtach  Will obtain MRI Brain today

## 2017-06-29 NOTE — PROGRESS NOTES
Spoke with primary - will plan for PEG/trach at the bedside today. Please call for questions.    Tomasa Riggs PA-C  [id number]

## 2017-06-29 NOTE — SUBJECTIVE & OBJECTIVE
Interval History:6/28 STAT MRI ordered/ VTach runs delayed Trach/PEG   Review of Systems   Unable to obtain a complete ROS due to level of consciousness.  Objective:     Vitals:  Temp: 99.4 °F (37.4 °C) (06/28/17 1501)  Pulse: 72 (06/28/17 2100)  Resp: 18 (06/28/17 2100)  BP: (!) 186/91 (06/28/17 1320)  SpO2: 99 % (06/28/17 2100)    Temp:  [98.8 °F (37.1 °C)-99.7 °F (37.6 °C)] 99.4 °F (37.4 °C)  Pulse:  [55-88] 72  Resp:  [18-26] 18  SpO2:  [94 %-100 %] 99 %  BP: (125-186)/(52-91) 186/91  Arterial Line BP: (117-183)/(49-80) 145/63         Vent Mode: A/C  Oxygen Concentration (%):  [40] 40  Resp Rate Total:  [18 br/min] 18 br/min  Vt Set:  [550 mL] 550 mL  PEEP/CPAP:  [8 cmH20] 8 cmH20  Pressure Support:  [0 cmH20] 0 cmH20  Mean Airway Pressure:  [12 brO65-28 cmH20] 12 cmH20    06/27 0701 - 06/28 0700  In: 2597.8 [I.V.:1542.8]  Out: 2095 [Urine:2095]    Physical Exam:  Unable to test gait due to level of consciousness.      Physical Exam:  GA: Intubated, Does not follow commands, Does not withdraw to painful stimmuli comfortable, no acute distress.   HEENT: No scleral icterus or JVD.   Pulmonary: Clear to auscultation Anterior. No wheezing, crackles, or rhonchi.  Cardiac: RRR S1 & S2 w/o rubs/murmurs/gallops.   Abdominal: Bowel sounds present x 4.    Neuro:  --GCS: E4VT M3  --Mental Status: Intubated, Does not follow commands, Does not withdraw to painful stimmuli  --CN II-XII unable to fully assess   --Pupils 3mm, PERRL.   -- No Corneal reflex, no gag, no cough intact.  -- Minimal to no withdrawal in all 4 extremities    Medications:  Continuous  sodium chloride 0.9% Last Rate: 75 mL/hr at 06/28/17 2100   Scheduled  aspirin 325 mg Daily   bisacodyl 10 mg Daily   chlorhexidine 15 mL QID   fentaNYL     fentaNYL     folic acid-vit B6-vit B12 2.5-25-2 mg 1 tablet Daily   insulin detemir 20 Units BID   levothyroxine 75 mcg Daily   metoprolol tartrate 25 mg BID   pantoprazole 40 mg Daily   polyethylene glycol 17 g Daily    senna-docusate 8.6-50 mg 2 tablet BID   sertraline 50 mg Daily   thiamine 100 mg Daily   white petrolatum-mineral oil  QHS   PRN  sodium chloride  Q24H PRN   sodium chloride  Q24H PRN   sodium chloride  Q24H PRN   acetaminophen 650 mg Q6H PRN   dextrose 50% 12.5 g PRN   fentaNYL 200 mcg On Call Procedure   fentaNYL 25 mcg Q2H PRN   glucagon (human recombinant) 1 mg PRN   heparin, porcine (PF) 1,000 Units PRN   hydrALAZINE 10 mg Q6H PRN   insulin aspart 1-10 Units Q4H PRN   magnesium oxide 800 mg PRN   magnesium oxide 800 mg PRN   midazolam (PF) 6 mg On Call Procedure   ondansetron 4 mg Q8H PRN   potassium chloride 10% 40 mEq PRN   potassium chloride 10% 40 mEq PRN   potassium chloride 10% 60 mEq PRN   potassium, sodium phosphates 2 packet PRN   potassium, sodium phosphates 2 packet PRN   potassium, sodium phosphates 2 packet PRN   rocuronium 50 mg On Call Procedure     Today I personally reviewed pertinent medications, lines/drains/airways, imaging, notably:

## 2017-06-29 NOTE — PROGRESS NOTES
[facility]  Adult Nutrition  Progress Note    SUMMARY     Recommendations    Recommendation/Intervention:   1. s/p PEG, recommend Glucerna 1.5 @ 55mL/hr.   -Hold for residuals >500ml.     2. If BG remains WNL, recommend Isosource 1.5 @ goal rate 55mL/hr.   -Hold for residuals >500ml.     RD to monitor.      Goals: Pt to tolerate >90% EEN and EPN via TF  Nutrition Goal Status: progressing towards goal  Communication of RD Recs: reviewed with RN    Reason for Assessment    Reason for Assessment: RD follow-up  Diagnosis: other (see comments) (acute ascending paralysis, sphenoid mass)  Relevent Medical History: CHARLES, CAD, skin cancer, HLP         General Information Comments: Pt pending PEG/trach today. TF held for procedure.     Nutrition Discharge Planning: unable to determine at this time.     Nutrition Prescription Ordered    Current Diet Order: NPO  Nutrition Order Comments: TF running at goal  Current Nutrition Support Formula Ordered: Glucerna 1.5  Current Nutrition Support Rate Ordered: 55 (ml)  Current Nutrition Support Frequency Ordered: mL/hr        Evaluation of Received Nutrients/Fluid Intake    Enteral Calories (kcal): 1980  Enteral Protein (gm): 109  Enteral (Free Water) Fluid (mL): 1002     Energy Calories Required: meeting needs  % Kcal Needs: 97     Protein Required: meeting needs  % Protein Needs: 100     IV Fluid (mL): 1800         Fluid Required: meeting needs  Comments: + I/O, good UOP, + BM  Tolerance: tolerating  % Intake of Estimated Energy Needs: 0 - 25 %  % Meal Intake: NPO     Nutrition Risk Screen     Nutrition Risk Screen: no indicators present    Nutrition/Diet History       Typical Food/Fluid Intake: ANASTASIIA           Factors Affecting Nutritional Intake: NPO, on mechanical ventilation                Labs/Tests/Procedures/Meds       Pertinent Labs Reviewed: reviewed  Pertinent Labs Comments: Na 147, BUN 32, POCT Glu 86-96  Pertinent Medications Reviewed: reviewed  Pertinent  "Medications Comments: folic acid, insulin, thiamine, IVF    Physical Findings    Overall Physical Appearance: overweight, on ventilator support  Tubes: orogastric tube     Skin: intact    Anthropometrics    Temp: 97.7 °F (36.5 °C)     Height: 6' 1" (185.4 cm)  Weight Method: Bed Scale  Weight: 89.3 kg (196 lb 13.9 oz)  Ideal Body Weight (IBW), Male: 184 lb     % Ideal Body Weight, Male (lb): 105.98 lb     BMI (Calculated): 25.8  BMI Grade: 25 - 29.9 - overweight                            Estimated/Assessed Needs    Weight Used For Calorie Calculations: 86.4 kg (190 lb 7.6 oz)      Energy Calorie Requirements (kcal): 1915  Energy Need Method: [facility]     RMR (Holloway-St. Jeor Equation): 1698.39        Weight Used For Protein Calculations: 86.4 kg (190 lb 7.6 oz)  Protein Requirements: 104-130g (1.2-1.5g/kg)       Fluid Need Method: RDA Method  RDA Method (mL): 1915               Assessment and Plan       Nutr dx/problem: Inadequate energy intake  Related to/etiology: inability to consume sufficient energy  As evidenced by: NPO requiring alternative means of nutrition.  Status: continues      Monitor and Evaluation    Food and Nutrient Intake: enteral nutrition intake  Food and Nutrient Adminstration: enteral and parenteral nutrition administration        Anthropometric Measurements: weight, weight change, body mass index  Biochemical Data, Medical Tests and Procedures: electrolyte and renal panel, gastrointestinal profile, glucose/endocrine profile, inflammatory profile, lipid profile  Nutrition-Focused Physical Findings: overall appearance    Nutrition Risk    Level of Risk: other (see comments) (f/u 1x/week)    Nutrition Follow-Up    RD Follow-up?: Yes    "

## 2017-06-29 NOTE — PLAN OF CARE
SW spoke with Pt family at bedside. Discussed being available for any additional questions and reported [facility] referral was sent. They reported [facility] has contacted them and will be coming here tomorrow. They will be touring [place] [facility] this weekend and will let this SW know if their plans change.    Deisi Jeffery, LENORA  Neurocritical Care   [facility]  [id number]

## 2017-06-29 NOTE — ASSESSMENT & PLAN NOTE
Neuromuscular respiratory failure  Pending trach and peg  Trach and Peg postponed today due to runs of [facility]  Trach/PEG today 6/29

## 2017-06-29 NOTE — PLAN OF CARE
06/29/17 0733   Discharge Reassessment   Assessment Type Discharge Planning Reassessment   Can the patient answer the patient profile reliably? No, cognitively impaired   How does the patient rate their overall health at the present time? (gregor)   Describe the patient's ability to walk at the present time. Does not walk or unable to take any steps at all   How often would a person be available to care for the patient? Whenever needed   Number of comorbid conditions (as recorded on the chart) Two   During the past month, has the patient often been bothered by feeling down, depressed or hopeless? (gregor)   During the past month, has the patient often been bothered by little interest or pleasure in doing things? (gregor)   Discharge plan remains the same: Yes   Provided patient/caregiver education on the expected discharge date and the discharge plan No   Discharge Plan A Long-term acute care facility (LTAC)   Discharge Plan B Skilled Nursing Facility   Change in patient condition or support system No   Patient choice form signed by patient/caregiver N/A   Explained to the the patient/caregiver why the discharge planned changed: No   Involved the patient/caregiver in establishing a new discharge plan: No     Patient to have trache/peg when medically stable.  Discharge plan: [facility] Contreras   [id number]

## 2017-06-29 NOTE — PLAN OF CARE
Problem: Patient Care Overview  Goal: Plan of Care Review  Outcome: Ongoing (interventions implemented as appropriate)  POC reviewed with patient and wife. Wife verbalized understanding. Questions and concerns addressed. Family is curious of all test results from 6/28/2017. No acute events overnight. Pt progressing toward goals. Will continue to monitor. See flow sheets for full assessment and VS info

## 2017-06-30 LAB
ALBUMIN SERPL BCP-MCNC: 2.4 G/DL
ALLENS TEST: ABNORMAL
ALP SERPL-CCNC: 73 U/L
ALT SERPL W/O P-5'-P-CCNC: 74 U/L
ANION GAP SERPL CALC-SCNC: 5 MMOL/L
AST SERPL-CCNC: 55 U/L
BASOPHILS # BLD AUTO: 0 K/UL
BASOPHILS NFR BLD: 0 %
BILIRUB SERPL-MCNC: 0.6 MG/DL
BUN SERPL-MCNC: 31 MG/DL
CALCIUM SERPL-MCNC: 8.3 MG/DL
CHLORIDE SERPL-SCNC: 113 MMOL/L
CO2 SERPL-SCNC: 30 MMOL/L
CREAT SERPL-MCNC: 0.5 MG/DL
DELSYS: ABNORMAL
DIFFERENTIAL METHOD: ABNORMAL
EOSINOPHIL # BLD AUTO: 0 K/UL
EOSINOPHIL NFR BLD: 0 %
ERYTHROCYTE [DISTWIDTH] IN BLOOD BY AUTOMATED COUNT: 17.7 %
ERYTHROCYTE [DISTWIDTH] IN BLOOD BY AUTOMATED COUNT: 18 %
ERYTHROCYTE [DISTWIDTH] IN BLOOD BY AUTOMATED COUNT: 18.2 %
ERYTHROCYTE [DISTWIDTH] IN BLOOD BY AUTOMATED COUNT: 18.3 %
ERYTHROCYTE [SEDIMENTATION RATE] IN BLOOD BY WESTERGREN METHOD: 18 MM/H
EST. GFR  (AFRICAN AMERICAN): >60 ML/MIN/1.73 M^2
EST. GFR  (NON AFRICAN AMERICAN): >60 ML/MIN/1.73 M^2
FIO2: 100
GLUCOSE SERPL-MCNC: 93 MG/DL
GQ1B GANGL AB SER QL: NORMAL
HCO3 UR-SCNC: 33.2 MMOL/L (ref 24–28)
HCT VFR BLD AUTO: 25.8 %
HCT VFR BLD AUTO: 26.4 %
HCT VFR BLD AUTO: 26.6 %
HCT VFR BLD AUTO: 26.9 %
HGB BLD-MCNC: 8.1 G/DL
HGB BLD-MCNC: 8.3 G/DL
HGB BLD-MCNC: 8.3 G/DL
HGB BLD-MCNC: 8.5 G/DL
LYMPHOCYTES # BLD AUTO: 0.4 K/UL
LYMPHOCYTES # BLD AUTO: 0.5 K/UL
LYMPHOCYTES # BLD AUTO: 0.6 K/UL
LYMPHOCYTES # BLD AUTO: 0.6 K/UL
LYMPHOCYTES NFR BLD: 3.4 %
LYMPHOCYTES NFR BLD: 4.1 %
LYMPHOCYTES NFR BLD: 5.1 %
LYMPHOCYTES NFR BLD: 5.1 %
MAGNESIUM SERPL-MCNC: 2.5 MG/DL
MCH RBC QN AUTO: 31.7 PG
MCH RBC QN AUTO: 31.8 PG
MCH RBC QN AUTO: 32 PG
MCH RBC QN AUTO: 32.1 PG
MCHC RBC AUTO-ENTMCNC: 31.2 %
MCHC RBC AUTO-ENTMCNC: 31.4 %
MCHC RBC AUTO-ENTMCNC: 31.4 %
MCHC RBC AUTO-ENTMCNC: 31.6 %
MCV RBC AUTO: 101 FL
MCV RBC AUTO: 102 FL
MIN VOL: 7.6
MODE: ABNORMAL
MONOCYTES # BLD AUTO: 0.3 K/UL
MONOCYTES # BLD AUTO: 0.3 K/UL
MONOCYTES # BLD AUTO: 0.4 K/UL
MONOCYTES # BLD AUTO: 0.5 K/UL
MONOCYTES NFR BLD: 2.5 %
MONOCYTES NFR BLD: 2.5 %
MONOCYTES NFR BLD: 3.4 %
MONOCYTES NFR BLD: 3.8 %
NEUTROPHILS # BLD AUTO: 10.7 K/UL
NEUTROPHILS # BLD AUTO: 10.9 K/UL
NEUTROPHILS # BLD AUTO: 11.3 K/UL
NEUTROPHILS # BLD AUTO: 11.9 K/UL
NEUTROPHILS NFR BLD: 90.7 %
NEUTROPHILS NFR BLD: 92.1 %
NEUTROPHILS NFR BLD: 92.2 %
NEUTROPHILS NFR BLD: 93.9 %
PCO2 BLDA: 58 MMHG (ref 35–45)
PEEP: 8
PH SMN: 7.37 [PH] (ref 7.35–7.45)
PHOSPHATE SERPL-MCNC: 3.8 MG/DL
PIP: 20
PLATELET # BLD AUTO: 193 K/UL
PLATELET # BLD AUTO: 198 K/UL
PLATELET # BLD AUTO: 201 K/UL
PLATELET # BLD AUTO: 202 K/UL
PMV BLD AUTO: 11 FL
PMV BLD AUTO: 11.1 FL
PMV BLD AUTO: 11.2 FL
PMV BLD AUTO: 11.6 FL
PO2 BLDA: 405 MMHG (ref 80–100)
POC BE: 8 MMOL/L
POC SATURATED O2: 100 % (ref 95–100)
POC TCO2: 35 MMOL/L (ref 23–27)
POCT GLUCOSE: 103 MG/DL (ref 70–110)
POCT GLUCOSE: 103 MG/DL (ref 70–110)
POCT GLUCOSE: 115 MG/DL (ref 70–110)
POCT GLUCOSE: 86 MG/DL (ref 70–110)
POCT GLUCOSE: 86 MG/DL (ref 70–110)
POCT GLUCOSE: 90 MG/DL (ref 70–110)
POCT GLUCOSE: 93 MG/DL (ref 70–110)
POTASSIUM SERPL-SCNC: 4 MMOL/L
PROT SERPL-MCNC: 5 G/DL
RBC # BLD AUTO: 2.55 M/UL
RBC # BLD AUTO: 2.59 M/UL
RBC # BLD AUTO: 2.62 M/UL
RBC # BLD AUTO: 2.65 M/UL
SAMPLE: ABNORMAL
SITE: ABNORMAL
SODIUM SERPL-SCNC: 148 MMOL/L
SP02: 100
VT: 550
WBC # BLD AUTO: 11.61 K/UL
WBC # BLD AUTO: 11.8 K/UL
WBC # BLD AUTO: 12.46 K/UL
WBC # BLD AUTO: 12.62 K/UL

## 2017-06-30 PROCEDURE — 84100 ASSAY OF PHOSPHORUS: CPT

## 2017-06-30 PROCEDURE — 80053 COMPREHEN METABOLIC PANEL: CPT

## 2017-06-30 PROCEDURE — 99233 SBSQ HOSP IP/OBS HIGH 50: CPT | Mod: ,,, | Performed by: PSYCHIATRY & NEUROLOGY

## 2017-06-30 PROCEDURE — 99900026 HC AIRWAY MAINTENANCE (STAT)

## 2017-06-30 PROCEDURE — 94761 N-INVAS EAR/PLS OXIMETRY MLT: CPT

## 2017-06-30 PROCEDURE — 37799 UNLISTED PX VASCULAR SURGERY: CPT

## 2017-06-30 PROCEDURE — 83735 ASSAY OF MAGNESIUM: CPT

## 2017-06-30 PROCEDURE — 0HBHXZX EXCISION OF RIGHT UPPER LEG SKIN, EXTERNAL APPROACH, DIAGNOSTIC: ICD-10-PCS | Performed by: DERMATOLOGY

## 2017-06-30 PROCEDURE — 94003 VENT MGMT INPAT SUBQ DAY: CPT

## 2017-06-30 PROCEDURE — 63600175 PHARM REV CODE 636 W HCPCS: Performed by: PSYCHIATRY & NEUROLOGY

## 2017-06-30 PROCEDURE — 97530 THERAPEUTIC ACTIVITIES: CPT

## 2017-06-30 PROCEDURE — 87070 CULTURE OTHR SPECIMN AEROBIC: CPT

## 2017-06-30 PROCEDURE — 25000003 PHARM REV CODE 250: Performed by: NURSE PRACTITIONER

## 2017-06-30 PROCEDURE — 87186 SC STD MICRODIL/AGAR DIL: CPT

## 2017-06-30 PROCEDURE — 99900035 HC TECH TIME PER 15 MIN (STAT)

## 2017-06-30 PROCEDURE — 27000221 HC OXYGEN, UP TO 24 HOURS

## 2017-06-30 PROCEDURE — 27200966 HC CLOSED SUCTION SYSTEM

## 2017-06-30 PROCEDURE — 82300 ASSAY OF CADMIUM: CPT

## 2017-06-30 PROCEDURE — 87077 CULTURE AEROBIC IDENTIFY: CPT

## 2017-06-30 PROCEDURE — 20000000 HC ICU ROOM

## 2017-06-30 PROCEDURE — 25000003 PHARM REV CODE 250: Performed by: PHYSICIAN ASSISTANT

## 2017-06-30 PROCEDURE — 85025 COMPLETE CBC W/AUTO DIFF WBC: CPT | Mod: 91

## 2017-06-30 PROCEDURE — 85347 COAGULATION TIME ACTIVATED: CPT

## 2017-06-30 PROCEDURE — 88305 TISSUE EXAM BY PATHOLOGIST: CPT | Performed by: PATHOLOGY

## 2017-06-30 RX ADMIN — CHLORHEXIDINE GLUCONATE 15 ML: 1.2 RINSE ORAL at 12:06

## 2017-06-30 RX ADMIN — METOPROLOL TARTRATE 25 MG: 25 TABLET ORAL at 09:06

## 2017-06-30 RX ADMIN — PANTOPRAZOLE SODIUM 40 MG: 40 GRANULE, DELAYED RELEASE ORAL at 08:06

## 2017-06-30 RX ADMIN — FENTANYL CITRATE 25 MCG: 50 INJECTION INTRAMUSCULAR; INTRAVENOUS at 07:06

## 2017-06-30 RX ADMIN — METOPROLOL TARTRATE 25 MG: 25 TABLET ORAL at 08:06

## 2017-06-30 RX ADMIN — Medication 1 TABLET: at 08:06

## 2017-06-30 RX ADMIN — SERTRALINE HYDROCHLORIDE 50 MG: 50 TABLET ORAL at 08:06

## 2017-06-30 RX ADMIN — FENTANYL CITRATE 25 MCG: 50 INJECTION INTRAMUSCULAR; INTRAVENOUS at 11:06

## 2017-06-30 RX ADMIN — STANDARDIZED SENNA CONCENTRATE AND DOCUSATE SODIUM 2 TABLET: 8.6; 5 TABLET, FILM COATED ORAL at 09:06

## 2017-06-30 RX ADMIN — MINERAL OIL AND WHITE PETROLATUM: 150; 830 OINTMENT OPHTHALMIC at 07:06

## 2017-06-30 RX ADMIN — THIAMINE HCL TAB 100 MG 100 MG: 100 TAB at 08:06

## 2017-06-30 RX ADMIN — CHLORHEXIDINE GLUCONATE 15 ML: 1.2 RINSE ORAL at 05:06

## 2017-06-30 RX ADMIN — LEVOTHYROXINE SODIUM 75 MCG: 75 TABLET ORAL at 05:06

## 2017-06-30 RX ADMIN — HYDRALAZINE HYDROCHLORIDE 10 MG: 20 INJECTION INTRAMUSCULAR; INTRAVENOUS at 10:06

## 2017-06-30 RX ADMIN — CHLORHEXIDINE GLUCONATE 15 ML: 1.2 RINSE ORAL at 06:06

## 2017-06-30 RX ADMIN — ASPIRIN 325 MG ORAL TABLET 325 MG: 325 PILL ORAL at 08:06

## 2017-06-30 NOTE — PLAN OF CARE
Problem: Patient Care Overview  Goal: Plan of Care Review  Outcome: Ongoing (interventions implemented as appropriate)  POC reviewed with pt and daughter. Daughter verbalized understanding. Questions and concerns addressed. No acute events overnight. Pt progressing toward goals. Pt appears to be following commands with simple yes/no questions with jaw movements. Heparin remained on hold during the night. Bleeding from trach eased up. Will continue to monitor. See flowsheets for full assessment and VS info

## 2017-06-30 NOTE — PT/OT/SLP PROGRESS
Physical Therapy  Treatment    Rosalio Cam   MRN: [id number]   Admitting Diagnosis: Guillain-[place] syndrome    PT Received On: 06/30/17  PT Start Time: 1030     PT Stop Time: 1058    PT Total Time (min): 28 min       Billable Minutes:  Therapeutic Activity 28    Treatment Type: Treatment  PT/PTA: PT             General Precautions: Standard, fall  Orthopedic Precautions: N/A   Braces: N/A    Do you have any cultural, spiritual, Caodaism conflicts, given your current situation?: none reported    Subjective:  Communicated with nsg prior to session.      Pain/Comfort  Pain Rating 1: 0/10    Pt dgt present in room and agreeable to ROM.    Objective:   Patient found with: blood pressure cuff, arterial line, central line, mendez catheter, tracheostomy, ventilator, telemetry, SCD, pulse ox (continuous), peripheral IV, pressure relief boots; found supine in bed    Functional Mobility:  Bed Mobility:   *not appropriate for EOB mobility on today      Therapeutic Activities and Exercises:  PT performed PROM x 20 reps to (B) UE and (B) LE  - educated dgt throughout and answered all questions pertaining technique, frequency of PROM, need to continue to perform, and overall prognosis of GBM with mobility.    * Pt able to initiate jaw movement at dgts command  * PT educated pt on bed to chair functioning as well as removal of pillow to incr cervical musculature strength.    White board updated with pertinent information    AM-PAC 6 CLICK MOBILITY  How much help from another person does this patient currently need?   1 = Unable, Total/Dependent Assistance  2 = A lot, Maximum/Moderate Assistance  3 = A little, Minimum/Contact Guard/Supervision  4 = None, Modified [place]/Independent    Turning over in bed (including adjusting bedclothes, sheets and blankets)?: 1  Sitting down on and standing up from a chair with arms (e.g., wheelchair, bedside commode, etc.): 1  Moving from lying on back to sitting on the side of the bed?:  1  Moving to and from a bed to a chair (including a wheelchair)?: 1  Need to walk in hospital room?: 1  Climbing 3-5 steps with a railing?: 1  Total Score: 6    AM-PAC Raw Score CMS G-Code Modifier Level of Impairment Assistance   6 % Total / Unable   7 - 9 CM 80 - 100% Maximal Assist   10 - 14 CL 60 - 80% Moderate Assist   15 - 19 CK 40 - 60% Moderate Assist   20 - 22 CJ 20 - 40% Minimal Assist   23 CI 1-20% SBA / CGA   24 CH 0% Independent/ Mod I     Patient left supine with all lines intact, call button in reach and dgt present.    Assessment:  Rosalio Cam is a 72 y.o. male with a medical diagnosis of Guillain-[place] syndrome and presents with continued impaired alert status and musculature weakness. No contractures evident on today with family reporting continued daily PROM performance. Pt will be discharged from acute PT services until he can participate more actively with treatment session; family well educated over multiple sessions regarding PROM.    Rehab identified problem list/impairments: Rehab identified problem list/impairments: weakness, impaired endurance, impaired sensation, impaired functional mobilty, impaired balance, decreased coordination, decreased lower extremity function, decreased upper extremity function, decreased safety awareness, pain, decreased ROM, impaired coordination, impaired joint extensibility    Rehab potential is fair.    Activity tolerance: Poor    Discharge recommendations: Discharge Facility/Level Of Care Needs: LTACH (long term acute care hospital)     Barriers to discharge: Barriers to Discharge: Inaccessible home environment, Decreased caregiver support    Equipment recommendations: Equipment Needed After Discharge:  (TBD pending progress)     GOALS:    Physical Therapy Goals     Not on file          Multidisciplinary Problems (Resolved)        Problem: Physical Therapy Goal    Goal Priority Disciplines Outcome Goal Variances Interventions   Physical Therapy  Goal   (Resolved)     PT/OT, PT Outcome(s) achieved     Description:  Goals to be met by: 17    Patient will increase functional independence with mobility by performin. Patient's family will be Independent in performing PROM exercises to all extremities to maintain ROM and decrease swelling. MET  2. Patient's family will demonstrate knowledge/understanding of proper positioning of patient to decrease adverse risks. MET  3. Patient's family will be Independent in performing Passive stretching to maintain ROM and decrease muscle tightness and/or tone. MET     **PT will reassess and establish goals as appropriate when pt is able to participate in EOB/OOB activity**                         PLAN:    Patient to be seen 2 x/week  to address the above listed problems via therapeutic exercises, therapeutic activities, neuromuscular re-education  Plan of Care expires: 07/15/17  Plan of Care reviewed with: patient, daughter         Mercy Weston, PT, DPT  2017

## 2017-06-30 NOTE — PLAN OF CARE
Problem: Patient Care Overview  Goal: Plan of Care Review  Outcome: Ongoing (interventions implemented as appropriate)  No acute events occurred throughout shift. Pt received trach and PEG at the bedside and tolerated well.  Plan of care reviewed with pts wife at 1500. Addressed all questions and concerns.  Pt progressing toward goals as noted.  See flowsheets for full list of VS and assessments. Will continue to monitor.

## 2017-06-30 NOTE — PROGRESS NOTES
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    Code Status: Full Code     SUBJECTIVE:     Interval History/Significant Events: no acute events overnight    Review of Symptoms: trach, cannot participate    Medications:  Continuous Infusions:   sodium chloride 0.9% 75 mL/hr at 06/30/17 0800    heparin (porcine) in D5W Stopped (06/29/17 2247)     Scheduled Meds:   aspirin  325 mg Per NG tube Daily    bisacodyl  10 mg Rectal Daily    chlorhexidine  15 mL Mouth/Throat QID    folic acid-vit B6-vit B12 2.5-25-2 mg  1 tablet Per NG tube Daily    insulin detemir  20 Units Subcutaneous BID    levothyroxine  75 mcg Per NG tube Daily    metoprolol tartrate  25 mg Per OG tube BID    pantoprazole  40 mg Per NG tube Daily    polyethylene glycol  17 g Per NG tube Daily    senna-docusate 8.6-50 mg  2 tablet Per OG tube BID    sertraline  50 mg Per NG tube Daily    thiamine  100 mg Per NG tube Daily    white petrolatum-mineral oil   Both Eyes QHS     PRN Meds:.sodium chloride, sodium chloride, sodium chloride, acetaminophen, dextrose 50%, fentaNYL, fentaNYL, glucagon (human recombinant), heparin, porcine (PF), hydrALAZINE, insulin aspart, magnesium oxide, magnesium oxide, midazolam (PF), ondansetron, potassium chloride 10%, potassium chloride 10%, potassium chloride 10%, potassium, sodium phosphates, potassium, sodium phosphates, potassium, sodium phosphates, rocuronium    OBJECTIVE:   Vital Signs (Most Recent):   Temp: 98.1 °F (36.7 °C) (06/30/17 1500)  Pulse: (!) 59 (06/30/17 1709)  Resp: 20 (06/30/17 1709)  BP: (!) 192/89 (06/30/17 1000)  SpO2: 100 % (06/30/17 1709)    Vital Signs (24h Range):   Temp:  [97 °F (36.1 °C)-99 °F (37.2 °C)] 98.1 °F (36.7 °C)  Pulse:  [57-97] 59  Resp:  [12-20] 20  SpO2:  [99 %-100 %] 100 %  BP: (112-192)/(54-89) 192/89  Arterial Line BP: (134-193)/(51-93) 165/70    ICP/CPP (Last 24h):        I & O (Last 24h):   Intake/Output  Summary (Last 24 hours) at 06/30/17 1744  Last data filed at 06/30/17 1700   Gross per 24 hour   Intake          1874.77 ml   Output             1555 ml   Net           319.77 ml     Physical Exam:  GA: Intubated,Attempts to track with eyes, Does not withdraw to painful stimmuli comfortable, no acute distress.   HEENT: No scleral icterus or JVD.   Pulmonary: Clear to auscultation Anterior. No wheezing, crackles, or rhonchi.  Cardiac: RRR S1 & S2 w/o rubs/murmurs/gallops.   Abdominal: Bowel sounds present x 4.     Neuro:  --GCS: E4VT M3  --Mental Status: Intubated, Attempts to track with eyes and follow commands with eyes only on passive opening of eyelid. Able to move lower jaw to command. Does not withdraw to painful stimmuli.   --CN II-XII unable to fully assess   --Pupils 3mm, PERRL.   -- No Corneal reflex, no gag, no cough intact.  -- Minimal to no withdrawal in all 4 extremities  Areflexia bilateral ue and le    Vent Data:   Vent Mode: A/C  Oxygen Concentration (%):  [] 40  Resp Rate Total:  [18 br/min-20 br/min] 20 br/min  Vt Set:  [550 mL] 550 mL  PEEP/CPAP:  [8 cmH20] 8 cmH20  Pressure Support:  [0 cmH20] 0 cmH20  Mean Airway Pressure:  [12 cmH20] 12 cmH20    Lines/Drains/Airway:        Surgical Airway 06/29/17 1318 Shiley Cuffed (Active)   Status Secured 6/30/2017  3:28 PM   Site Assessment Red;Bleeding 6/30/2017  3:28 PM   Site Care Cleansed;Dried;Dressing applied 6/30/2017 11:02 AM   Inner Cannula Care Changed/new 6/30/2017 11:02 AM   Ties Assessment Changed;Clean;Intact;Secure 6/30/2017  3:28 PM             Arterial Line 06/25/17 1117 Left Radial (Active)   Site Assessment Clean;Dry;Intact;No redness;No swelling 6/30/2017  3:00 PM   Line Status Pulsatile blood flow 6/30/2017  3:00 PM   Art Line Waveform Appropriate 6/30/2017  3:00 PM   Arterial Line Interventions Zeroed and calibrated;Leveled;Flushed per protocol 6/30/2017  3:00 PM   Color/Movement/Sensation Capillary refill less than 3 sec  "6/30/2017  3:00 PM   Dressing Type Transparent 6/30/2017  3:00 PM   Dressing Status Biopatch in place;Clean;Dry;Intact 6/30/2017  3:00 PM   Dressing Intervention Dressing reinforced 6/30/2017  3:00 PM   Dressing Change Due 07/02/17 6/30/2017  3:00 PM           Gastrostomy/Enterostomy 06/29/17 1338 Percutaneous endoscopic gastrostomy (PEG) LUQ (Active)   Securement other (see comments) 6/30/2017  3:00 PM   Interventions Prior to Feeding patency checked 6/30/2017  3:00 PM   Suction Setting/Drainage Method dependent drainage 6/30/2017  3:00 PM   Drainage green 6/30/2017  3:00 PM   Clamp Status/Tolerance unclamped 6/30/2017  3:00 PM   Feeding Action feeding held 6/30/2017  3:00 PM   Dressing dry and intact 6/30/2017  3:00 PM   Insertion Site dry;no warmth;no drainage;no tenderness;no swelling 6/30/2017  3:00 PM   Site Care device rotatated;sterile 4 x 4 gauze dressing applied 6/30/2017  3:00 AM   Intake (mL) 15 mL 6/30/2017  5:00 PM   Tube Output(mL)(Include Discarded Residual) 10 mL 6/30/2017  1:00 PM            Urethral Catheter 06/28/17 1830 (Active)   Site Assessment Clean;Intact;Dry 6/30/2017  3:00 PM   Collection Container Urimeter 6/30/2017  3:00 PM   Securement Method secured to top of thigh w/ adhesive device 6/30/2017  3:00 PM   Catheter Care Performed yes 6/30/2017  3:00 PM   Reason for Continuing Urinary Catheterization Urinary retention 6/30/2017  3:00 PM   CAUTI Prevention Bundle StatLock in place w 1" slack;Intact seal between catheter & drainage tubing;Drainage bag off the floor;Green sheeting clip in use;No dependent loops or kinks;Drainage bag not overfilled (<2/3 full);Drainage bag below bladder 6/30/2017  3:00 PM   Output (mL) 175 mL 6/30/2017  5:00 PM     Nutrition/Tube Feeds (if NPO state why): tube feeds     Labs:  ABG:   Recent Labs  Lab 06/30/17  0339   PH 7.365   PO2 405*   PCO2 58.0*   HCO3 33.2*   POCSATURATED 100   BE 8     BMP:  Recent Labs  Lab 06/30/17  0334   *   K 4.0   * "   CO2 30*   BUN 31*   CREATININE 0.5   GLU 93   MG 2.5   PHOS 3.8     LFT: Lab Results   Component Value Date    AST 55 (H) 06/30/2017    ALT 74 (H) 06/30/2017    ALKPHOS 73 06/30/2017    BILITOT 0.6 06/30/2017    ALBUMIN 2.4 (L) 06/30/2017    PROT 5.0 (L) 06/30/2017     CBC:   Lab Results   Component Value Date    WBC 11.61 06/30/2017    HGB 8.1 (L) 06/30/2017    HCT 25.8 (L) 06/30/2017     (H) 06/30/2017     06/30/2017     Microbiology x 7d:   Microbiology Results (last 7 days)     Procedure Component Value Units Date/Time    Aerobic culture [[id number]] Collected:  06/30/17 1726    Order Status:  Sent Specimen:  Skin from Abdomen Updated:  06/30/17 1727    Blood culture [[id number]] Collected:  06/23/17 1640    Order Status:  Completed Specimen:  Blood from Peripheral, Foot, Left Updated:  06/28/17 2022     Blood Culture, Routine No growth after 5 days.    Narrative:       Blood cultures from 2 different sites. 4 bottles total.  Please draw before starting antibiotics.    Blood culture [[id number]]  (Susceptibility) Collected:  06/23/17 1635    Order Status:  Completed Specimen:  Blood from Peripheral, Foot, Right Updated:  06/27/17 0802     Blood Culture, Routine Gram stain ciara bottle: Gram positive cocci in clusters resembling Staph      Blood Culture, Routine Results called to and read back by:Marquis Angel RN 06/24/2017  23:36     Blood Culture, Routine Gram stain aer bottle: Gram positive cocci in clusters resembling Staph      Blood Culture, Routine 06/25/2017  03:21       Blood Culture, Routine Positive results previously called     Blood Culture, Routine STAPHYLOCOCCUS EPIDERMIDIS    Narrative:       Blood cultures x 2 different sites. 4 bottles total. Please  draw cultures before administering antibiotics.    Culture, Respiratory with Gram Stain [[id number]]  (Susceptibility) Collected:  06/23/17 1800    Order Status:  Completed Specimen:  Respiratory from Endotracheal Aspirate Updated:  06/26/17  1010     Respiratory Culture --     PSEUDOMONAS AERUGINOSA  Many       Gram Stain (Respiratory) <10 epithelial cells per low power field.     Gram Stain (Respiratory) No WBC's     Gram Stain (Respiratory) Rare Gram negative rods    CSF culture [[id number]] Collected:  06/18/17 1212    Order Status:  Completed Specimen:  CSF (Spinal Fluid) from CSF Tap, Tube 3 Updated:  06/24/17 0732     CSF CULTURE No Growth     Gram Stain Result No WBC's      No organisms seen    Narrative:       On which sequentially labeled tube should this analysis be  performed?->3        Imaging:    I personally reviewed the above image.    ASSESSMENT/PLAN:     Active Hospital Problems    Diagnosis    *Guillain-[place] syndrome    Anemia of chronic disease    Dysphagia, oropharyngeal    Acute encephalopathy    Slow transit constipation    Hypotension    Fever    Hyperglycemia    Right subclavian vein thrombosis    Encephalopathy    Brain mass    Acquired hypothyroidism    Acute respiratory failure with hypoxia and hypercapnia    [place] coma scale total score 3-8    Hypovolemia    Flaccid quadriplegia    Brain aneurysm      Neuro:   Sever sensory motor neuropathy  S/p PLEX and IVIG for possibile GBS variant.   Cadmium level in urine elevated. Contacted poison control  Will send blood levels.  Unclear possible exposure.    Pulmonary:   Neuromuscular failure    Cardiac:   Hemodynamically stable    Renal:    Making urine    ID:   Afebrile, normal wbc    Hem/Onc:   Stable hh    Endocrine:    BS stable on insulin  levothyroxine    Fluids/Electrolytes/Nutrition/GI:   tf  Lines:  Art NA  CVC NA  ETT +  Gudino NA  NG NA  PEG +    Proph:  DVT:scd  Constipation:  Last output:bowel regimen  PUP:protonix  VAP:peridex    Uninterrupted Critical Care/Counseling Time (not including procedures):: III    Elio Shah MD  Neurocritical care attending

## 2017-06-30 NOTE — NURSING
Neuro critical care notified of pt's increased bleeding around trach site. Ordered to stop heparin drip until morning and continue monitoring pt.

## 2017-06-30 NOTE — PROGRESS NOTES
Pt seen and examined  Some oozing from trach site, apparently worsened yesterday when restarted on heparin  Would hold heparin if deemed safe from neurocritical care team given presence of upper extremity DVT  However if patient requires heparin per team, can manage bleeding at trach site with dressing changes, however would prefer holding heparin for 24-48 hours while tract heals.     Peg tube site without issues. Can start tube feeds this afternoon.    Will signoff at this time  Thank you, call with any questions      Yusuf Dailey MD PGY II  [phone]

## 2017-06-30 NOTE — PROGRESS NOTES
Severe sensory motor polyneuropathy  Cadmium screen level elevated. Will contact poison control  D/c eeg  Continue to hold heparin

## 2017-06-30 NOTE — PLAN OF CARE
TONE received call from Alejandrina with [facility] ([phone]) regarding wanting updates on Trach/Peg post op.  Discussed Pt progression and insurance auth. She reported she would wait until Monday to submit for auth as Pt will be on the vent almost 21 days and will hopefully be more likely to be approved for Wednesday (Tuesday is a Holiday and they cannot take anyone that day). They will have a bed and will accept if Pt family agrees this is still first choice.    TONE spoke with Alejandrina to answer questions about IVIG and other needed treatments. TONE reported that Pt does not need any more of these. Advised labs are ordered and updated on status. Agreed to call her co-worker Christine on Monday ([phone]) regarding the updates and to let her know if they can go ahead and submit for auth on Wednesday. Sent requested docs via [employer].    Deisi Jeffery LMSW  Neurocritical Care   [facility]  [phone]

## 2017-06-30 NOTE — PLAN OF CARE
Problem: Physical Therapy Goal  Goal: Physical Therapy Goal  Goals to be met by: 17    Patient will increase functional independence with mobility by performin. Patient's family will be Independent in performing PROM exercises to all extremities to maintain ROM and decrease swelling. MET  2. Patient's family will demonstrate knowledge/understanding of proper positioning of patient to decrease adverse risks. MET  3. Patient's family will be Independent in performing Passive stretching to maintain ROM and decrease muscle tightness and/or tone. MET     **PT will reassess and establish goals as appropriate when pt is able to participate in EOB/OOB activity**       Outcome: Outcome(s) achieved Date Met: 17  All goals met on today. Pt discharged from acute PT services at this time; pt family and nsg encouraged to perform daily ROM to prevent contractures and decr swelling.      Mercy Weston, PT, DPT  2017

## 2017-07-01 LAB
ALBUMIN SERPL BCP-MCNC: 2.2 G/DL
ALLENS TEST: ABNORMAL
ALP SERPL-CCNC: 76 U/L
ALT SERPL W/O P-5'-P-CCNC: 72 U/L
ANION GAP SERPL CALC-SCNC: 6 MMOL/L
ARSENIC BLD-MCNC: <1 NG/ML (ref 0–12)
AST SERPL-CCNC: 46 U/L
BASOPHILS # BLD AUTO: 0 K/UL
BASOPHILS # BLD AUTO: 0 K/UL
BASOPHILS # BLD AUTO: 0.01 K/UL
BASOPHILS # BLD AUTO: 0.01 K/UL
BASOPHILS NFR BLD: 0 %
BASOPHILS NFR BLD: 0 %
BASOPHILS NFR BLD: 0.1 %
BASOPHILS NFR BLD: 0.1 %
BILIRUB SERPL-MCNC: 0.5 MG/DL
BUN SERPL-MCNC: 28 MG/DL
CADMIUM BLD-MCNC: 0.2 NG/ML (ref 0–4.9)
CALCIUM SERPL-MCNC: 8.1 MG/DL
CHLORIDE SERPL-SCNC: 113 MMOL/L
CITY: NORMAL
CO2 SERPL-SCNC: 29 MMOL/L
COUNTY: NORMAL
CREAT SERPL-MCNC: 0.5 MG/DL
DELSYS: ABNORMAL
DIFFERENTIAL METHOD: ABNORMAL
EOSINOPHIL # BLD AUTO: 0 K/UL
EOSINOPHIL NFR BLD: 0 %
ERYTHROCYTE [DISTWIDTH] IN BLOOD BY AUTOMATED COUNT: 17.4 %
ERYTHROCYTE [DISTWIDTH] IN BLOOD BY AUTOMATED COUNT: 17.5 %
ERYTHROCYTE [DISTWIDTH] IN BLOOD BY AUTOMATED COUNT: 17.5 %
ERYTHROCYTE [DISTWIDTH] IN BLOOD BY AUTOMATED COUNT: 17.6 %
ERYTHROCYTE [SEDIMENTATION RATE] IN BLOOD BY WESTERGREN METHOD: 20 MM/H
EST. GFR  (AFRICAN AMERICAN): >60 ML/MIN/1.73 M^2
EST. GFR  (NON AFRICAN AMERICAN): >60 ML/MIN/1.73 M^2
FIO2: 40
GLUCOSE SERPL-MCNC: 150 MG/DL
GUARDIAN FIRST NAME: NORMAL
GUARDIAN LAST NAME: NORMAL
HCO3 UR-SCNC: 32 MMOL/L (ref 24–28)
HCT VFR BLD AUTO: 26 %
HCT VFR BLD AUTO: 26.5 %
HCT VFR BLD AUTO: 26.9 %
HCT VFR BLD AUTO: 27.7 %
HGB BLD-MCNC: 8.2 G/DL
HGB BLD-MCNC: 8.2 G/DL
HGB BLD-MCNC: 8.6 G/DL
HGB BLD-MCNC: 8.6 G/DL
HOME PHONE: NORMAL
LEAD BLD-MCNC: <1 MCG/DL (ref 0–4.9)
LYMPHOCYTES # BLD AUTO: 0.4 K/UL
LYMPHOCYTES # BLD AUTO: 0.6 K/UL
LYMPHOCYTES # BLD AUTO: 0.7 K/UL
LYMPHOCYTES # BLD AUTO: 0.8 K/UL
LYMPHOCYTES NFR BLD: 2.9 %
LYMPHOCYTES NFR BLD: 4.5 %
LYMPHOCYTES NFR BLD: 5 %
LYMPHOCYTES NFR BLD: 5.9 %
MAGNESIUM SERPL-MCNC: 2.2 MG/DL
MCH RBC QN AUTO: 31.2 PG
MCH RBC QN AUTO: 31.7 PG
MCH RBC QN AUTO: 31.7 PG
MCH RBC QN AUTO: 32.5 PG
MCHC RBC AUTO-ENTMCNC: 30.9 %
MCHC RBC AUTO-ENTMCNC: 31 %
MCHC RBC AUTO-ENTMCNC: 31.5 %
MCHC RBC AUTO-ENTMCNC: 32 %
MCV RBC AUTO: 100 FL
MCV RBC AUTO: 101 FL
MCV RBC AUTO: 102 FL
MCV RBC AUTO: 102 FL
MERCURY BLD-MCNC: 3 NG/ML (ref 0–9)
MODE: ABNORMAL
MONOCYTES # BLD AUTO: 0.2 K/UL
MONOCYTES # BLD AUTO: 0.5 K/UL
MONOCYTES # BLD AUTO: 0.5 K/UL
MONOCYTES # BLD AUTO: 0.6 K/UL
MONOCYTES NFR BLD: 1.4 %
MONOCYTES NFR BLD: 3.6 %
MONOCYTES NFR BLD: 3.9 %
MONOCYTES NFR BLD: 4.1 %
NEUTROPHILS # BLD AUTO: 11.8 K/UL
NEUTROPHILS # BLD AUTO: 11.9 K/UL
NEUTROPHILS # BLD AUTO: 12.3 K/UL
NEUTROPHILS # BLD AUTO: 13 K/UL
NEUTROPHILS NFR BLD: 89.8 %
NEUTROPHILS NFR BLD: 90.4 %
NEUTROPHILS NFR BLD: 91.5 %
NEUTROPHILS NFR BLD: 95.3 %
PCO2 BLDA: 48.2 MMHG (ref 35–45)
PEEP: 8
PH SMN: 7.43 [PH] (ref 7.35–7.45)
PHOSPHATE SERPL-MCNC: 2.8 MG/DL
PLATELET # BLD AUTO: 195 K/UL
PLATELET # BLD AUTO: 214 K/UL
PLATELET # BLD AUTO: 216 K/UL
PLATELET # BLD AUTO: 221 K/UL
PMV BLD AUTO: 10.8 FL
PMV BLD AUTO: 10.9 FL
PMV BLD AUTO: 11.2 FL
PMV BLD AUTO: 11.9 FL
PO2 BLDA: 163 MMHG (ref 80–100)
POC BE: 8 MMOL/L
POC SATURATED O2: 99 % (ref 95–100)
POC TCO2: 33 MMOL/L (ref 23–27)
POCT GLUCOSE: 139 MG/DL (ref 70–110)
POCT GLUCOSE: 154 MG/DL (ref 70–110)
POCT GLUCOSE: 162 MG/DL (ref 70–110)
POCT GLUCOSE: 187 MG/DL (ref 70–110)
POTASSIUM SERPL-SCNC: 3.5 MMOL/L
PROT SERPL-MCNC: 5 G/DL
RACE: NORMAL
RBC # BLD AUTO: 2.59 M/UL
RBC # BLD AUTO: 2.63 M/UL
RBC # BLD AUTO: 2.65 M/UL
RBC # BLD AUTO: 2.71 M/UL
SAMPLE: ABNORMAL
SITE: ABNORMAL
SODIUM SERPL-SCNC: 148 MMOL/L
SP02: 99
STATE: NORMAL
STREET ADDRESS: NORMAL
VENOUS/CAPILLARY: NORMAL
VT: 550
WBC # BLD AUTO: 13.02 K/UL
WBC # BLD AUTO: 13.12 K/UL
WBC # BLD AUTO: 13.64 K/UL
WBC # BLD AUTO: 13.64 K/UL
ZIP: NORMAL

## 2017-07-01 PROCEDURE — 85025 COMPLETE CBC W/AUTO DIFF WBC: CPT

## 2017-07-01 PROCEDURE — 99900026 HC AIRWAY MAINTENANCE (STAT)

## 2017-07-01 PROCEDURE — 82803 BLOOD GASES ANY COMBINATION: CPT

## 2017-07-01 PROCEDURE — 80053 COMPREHEN METABOLIC PANEL: CPT

## 2017-07-01 PROCEDURE — 25000003 PHARM REV CODE 250: Performed by: NURSE PRACTITIONER

## 2017-07-01 PROCEDURE — 25000003 PHARM REV CODE 250: Performed by: PHYSICIAN ASSISTANT

## 2017-07-01 PROCEDURE — 83735 ASSAY OF MAGNESIUM: CPT

## 2017-07-01 PROCEDURE — 27000221 HC OXYGEN, UP TO 24 HOURS

## 2017-07-01 PROCEDURE — 27200966 HC CLOSED SUCTION SYSTEM

## 2017-07-01 PROCEDURE — 99233 SBSQ HOSP IP/OBS HIGH 50: CPT | Mod: ,,, | Performed by: PSYCHIATRY & NEUROLOGY

## 2017-07-01 PROCEDURE — 25000003 PHARM REV CODE 250: Performed by: PSYCHIATRY & NEUROLOGY

## 2017-07-01 PROCEDURE — 94003 VENT MGMT INPAT SUBQ DAY: CPT

## 2017-07-01 PROCEDURE — 37799 UNLISTED PX VASCULAR SURGERY: CPT

## 2017-07-01 PROCEDURE — 94761 N-INVAS EAR/PLS OXIMETRY MLT: CPT

## 2017-07-01 PROCEDURE — 63600175 PHARM REV CODE 636 W HCPCS: Performed by: PSYCHIATRY & NEUROLOGY

## 2017-07-01 PROCEDURE — 99900035 HC TECH TIME PER 15 MIN (STAT)

## 2017-07-01 PROCEDURE — 20000000 HC ICU ROOM

## 2017-07-01 PROCEDURE — 84100 ASSAY OF PHOSPHORUS: CPT

## 2017-07-01 RX ORDER — PREDNISONE 20 MG/1
60 TABLET ORAL DAILY
Status: DISCONTINUED | OUTPATIENT
Start: 2017-07-01 | End: 2017-07-06

## 2017-07-01 RX ORDER — HEPARIN SODIUM 5000 [USP'U]/ML
5000 INJECTION, SOLUTION INTRAVENOUS; SUBCUTANEOUS EVERY 8 HOURS
Status: DISCONTINUED | OUTPATIENT
Start: 2017-07-01 | End: 2017-07-03

## 2017-07-01 RX ADMIN — CHLORHEXIDINE GLUCONATE 15 ML: 1.2 RINSE ORAL at 12:07

## 2017-07-01 RX ADMIN — STANDARDIZED SENNA CONCENTRATE AND DOCUSATE SODIUM 2 TABLET: 8.6; 5 TABLET, FILM COATED ORAL at 09:07

## 2017-07-01 RX ADMIN — MINERAL OIL AND WHITE PETROLATUM: 150; 830 OINTMENT OPHTHALMIC at 10:07

## 2017-07-01 RX ADMIN — MINERAL OIL AND WHITE PETROLATUM: 150; 830 OINTMENT OPHTHALMIC at 09:07

## 2017-07-01 RX ADMIN — POTASSIUM CHLORIDE 40 MEQ: 20 SOLUTION ORAL at 06:07

## 2017-07-01 RX ADMIN — MINERAL OIL AND WHITE PETROLATUM: 150; 830 OINTMENT OPHTHALMIC at 01:07

## 2017-07-01 RX ADMIN — LEVOTHYROXINE SODIUM 75 MCG: 75 TABLET ORAL at 06:07

## 2017-07-01 RX ADMIN — FENTANYL CITRATE 25 MCG: 50 INJECTION INTRAMUSCULAR; INTRAVENOUS at 05:07

## 2017-07-01 RX ADMIN — Medication 1 TABLET: at 09:07

## 2017-07-01 RX ADMIN — CHLORHEXIDINE GLUCONATE 15 ML: 1.2 RINSE ORAL at 05:07

## 2017-07-01 RX ADMIN — INSULIN ASPART 4 UNITS: 100 INJECTION, SOLUTION INTRAVENOUS; SUBCUTANEOUS at 01:07

## 2017-07-01 RX ADMIN — MINERAL OIL AND WHITE PETROLATUM: 150; 830 OINTMENT OPHTHALMIC at 06:07

## 2017-07-01 RX ADMIN — POLYETHYLENE GLYCOL 3350 17 G: 17 POWDER, FOR SOLUTION ORAL at 09:07

## 2017-07-01 RX ADMIN — PANTOPRAZOLE SODIUM 40 MG: 40 GRANULE, DELAYED RELEASE ORAL at 09:07

## 2017-07-01 RX ADMIN — FENTANYL CITRATE 25 MCG: 50 INJECTION INTRAMUSCULAR; INTRAVENOUS at 01:07

## 2017-07-01 RX ADMIN — HEPARIN SODIUM 5000 UNITS: 5000 INJECTION, SOLUTION INTRAVENOUS; SUBCUTANEOUS at 10:07

## 2017-07-01 RX ADMIN — METOPROLOL TARTRATE 25 MG: 25 TABLET ORAL at 10:07

## 2017-07-01 RX ADMIN — INSULIN DETEMIR 5 UNITS: 100 INJECTION, SOLUTION SUBCUTANEOUS at 10:07

## 2017-07-01 RX ADMIN — HYDRALAZINE HYDROCHLORIDE 10 MG: 20 INJECTION INTRAMUSCULAR; INTRAVENOUS at 12:07

## 2017-07-01 RX ADMIN — CHLORHEXIDINE GLUCONATE 15 ML: 1.2 RINSE ORAL at 01:07

## 2017-07-01 RX ADMIN — HEPARIN SODIUM 5000 UNITS: 5000 INJECTION, SOLUTION INTRAVENOUS; SUBCUTANEOUS at 05:07

## 2017-07-01 RX ADMIN — INSULIN ASPART 4 UNITS: 100 INJECTION, SOLUTION INTRAVENOUS; SUBCUTANEOUS at 05:07

## 2017-07-01 RX ADMIN — FENTANYL CITRATE 25 MCG: 50 INJECTION INTRAMUSCULAR; INTRAVENOUS at 06:07

## 2017-07-01 RX ADMIN — INSULIN DETEMIR 25 UNITS: 100 INJECTION, SOLUTION SUBCUTANEOUS at 10:07

## 2017-07-01 RX ADMIN — INSULIN ASPART 6 UNITS: 100 INJECTION, SOLUTION INTRAVENOUS; SUBCUTANEOUS at 09:07

## 2017-07-01 RX ADMIN — ASPIRIN 325 MG ORAL TABLET 325 MG: 325 PILL ORAL at 09:07

## 2017-07-01 RX ADMIN — METOPROLOL TARTRATE 25 MG: 25 TABLET ORAL at 09:07

## 2017-07-01 RX ADMIN — CHLORHEXIDINE GLUCONATE 15 ML: 1.2 RINSE ORAL at 06:07

## 2017-07-01 RX ADMIN — FENTANYL CITRATE 25 MCG: 50 INJECTION INTRAMUSCULAR; INTRAVENOUS at 08:07

## 2017-07-01 RX ADMIN — THIAMINE HCL TAB 100 MG 100 MG: 100 TAB at 09:07

## 2017-07-01 RX ADMIN — PREDNISONE 60 MG: 20 TABLET ORAL at 10:07

## 2017-07-01 RX ADMIN — SERTRALINE HYDROCHLORIDE 50 MG: 50 TABLET ORAL at 09:07

## 2017-07-01 RX ADMIN — HYDRALAZINE HYDROCHLORIDE 10 MG: 20 INJECTION INTRAMUSCULAR; INTRAVENOUS at 06:07

## 2017-07-01 NOTE — PLAN OF CARE
Problem: Patient Care Overview  Goal: Plan of Care Review  Outcome: Ongoing (interventions implemented as appropriate)  POC reviewed with patient and spouse. Questions and concerns addressed.   Hydralazine and fentanyl given twice during the night for pain and increased BP.  No acute events overnight. Pt progressing toward goals.   Will continue to monitor. See flow sheets for full assessment and VS info

## 2017-07-01 NOTE — PLAN OF CARE
Problem: Patient Care Overview  Goal: Plan of Care Review  Outcome: Revised  POC reviewed with pt's wife Fallon at 1500. Pt's wife verbalized understanding. Questions and concerns addressed. No acute events today. Pt remains unresponsive, not following commands today. Fentanyl prn given for pain and discomfort when hypertensive/tachycardic. Hyperglycemic throughout shift, prn insulin given. Pt progressing toward goals. Will continue to monitor. See flowsheets for full assessment and VS info.

## 2017-07-01 NOTE — PLAN OF CARE
Problem: Patient Care Overview  Goal: Plan of Care Review  Pt not alert, not opening eyes, not MORSE, at times pt appears to follow commands by moving jaw to indicate pain. Pt given prn fentanyl. Pt given hydralazine x 1 to maintain SBP < 160. Trach bleeding minimized. Heparin gtt remains on hold. TF restarted. Pt had 2 BMs per day shift. Pt given bath. Pt seen by dermatology/ labs sent for skin rash. NS infusing at 75 ml/hr. Pt and family updated on plan of care. Will continue to monitor.

## 2017-07-01 NOTE — PROGRESS NOTES
Progress Note  Neurological ICU    Admit Date: 6/14/2017   LOS: 17 days     SUBJECTIVE:     Follow-up For:  Gbs, likely axonal    Continuous Infusions:   Scheduled Meds:   aspirin  325 mg Per NG tube Daily    bisacodyl  10 mg Rectal Daily    chlorhexidine  15 mL Mouth/Throat QID    folic acid-vit B6-vit B12 2.5-25-2 mg  1 tablet Per NG tube Daily    heparin (porcine)  5,000 Units Subcutaneous Q8H    insulin detemir  25 Units Subcutaneous BID    levothyroxine  75 mcg Per NG tube Daily    metoprolol tartrate  25 mg Per OG tube BID    pantoprazole  40 mg Per NG tube Daily    polyethylene glycol  17 g Per NG tube Daily    predniSONE  60 mg Oral Daily    senna-docusate 8.6-50 mg  2 tablet Per OG tube BID    sertraline  50 mg Per NG tube Daily    white petrolatum-mineral oil   Both Eyes QHS    white petrolatum-mineral oil   Both Eyes Q8H     PRN Meds:sodium chloride, sodium chloride, sodium chloride, acetaminophen, dextrose 50%, fentaNYL, fentaNYL, glucagon (human recombinant), heparin, porcine (PF), hydrALAZINE, insulin aspart, magnesium oxide, magnesium oxide, midazolam (PF), ondansetron, potassium chloride 10%, potassium chloride 10%, potassium chloride 10%, potassium, sodium phosphates, potassium, sodium phosphates, potassium, sodium phosphates, rocuronium    Review of patient's allergies indicates:  No Known Allergies    OBJECTIVE:     Vital Signs (Most Recent)  Temp: 98.1 °F (36.7 °C) (07/01/17 1502)  Pulse: 65 (07/01/17 1659)  Resp: 20 (07/01/17 1659)  BP: (!) 147/61 (07/01/17 0921)  SpO2: 100 % (07/01/17 1659)    Vital Signs Range (Last 24H):  Temp:  [97 °F (36.1 °C)-98.2 °F (36.8 °C)]   Pulse:  [62-98]   Resp:  [20-22]   BP: (147)/(61)   SpO2:  [99 %-100 %]   Arterial Line BP: (126-172)/(54-75)     I & O (Last 24H):  Intake/Output Summary (Last 24 hours) at 07/01/17 1710  Last data filed at 07/01/17 1502   Gross per 24 hour   Intake          2543.75 ml   Output              905 ml   Net           1638.75 ml     Ventilator Data (Last 24H):     Vent Mode: A/C  Oxygen Concentration (%):  [40] 40  Resp Rate Total:  [20 br/min-23 br/min] 20 br/min  Vt Set:  [550 mL] 550 mL  PEEP/CPAP:  [8 cmH20] 8 cmH20  Pressure Support:  [0 cmH20] 0 cmH20  Mean Airway Pressure:  [12 tvK57-08 cmH20] 12 cmH20    Hemodynamic Parameters (Last 24H):       Physical Exam:  General: well developed  Lungs:  clear to auscultation bilaterally  Cardiovascular: Heart: regular rate and rhythm. Chest Wall: not examined. Extremities: edema 2+ edema. Pulses: 2+ and symmetric.  Abdomen/Rectal: Abdomen: soft, non-tender non-distented; bowel sounds normal; no masses,  no organomegaly. Rectal: not examined  Neurologic: Mental status: alertness: alert  Cranial nerves: III,IV,VI: extraocular muscles tracks horizontally  Motor:no movement    Lines/Drains:       Peripheral IV - Single Lumen 06/27/17 1557 Left Forearm (Active)   Site Assessment Clean;Dry;Intact;No redness;No swelling 7/1/2017 11:02 AM   Line Status Infusing 7/1/2017 11:02 AM   Dressing Status Clean;Dry;Intact 7/1/2017 11:02 AM   Dressing Intervention Dressing reinforced 7/1/2017 11:02 AM   Dressing Change Due 07/01/17 7/1/2017 11:02 AM   Site Change Due 07/01/17 7/1/2017  7:02 AM   Reason Not Rotated Poor venous access 7/1/2017 11:02 AM   Number of days: 4            Midline Catheter Insertion/Assessment  - Single Lumen 06/22/17 1219 Left basilic vein (medial side of arm) 18g x 10cm (Active)   Site Assessment Clean;Dry;Intact;No redness;No swelling 7/1/2017 11:02 AM   IV Device Securement catheter securement device 7/1/2017 11:02 AM   Line Status Infusing 7/1/2017 11:02 AM   Dressing Status Biopatch in place;Clean;Dry;Intact 7/1/2017 11:02 AM   Dressing Intervention Dressing reinforced 7/1/2017 11:02 AM   Dressing Change Due 07/06/17 7/1/2017 11:02 AM   Site Change Due 07/06/17 7/1/2017  7:02 AM   Reason Not Rotated Not due 7/1/2017 11:02 AM   Number of days: 9            Arterial Line  06/25/17 1117 Left Radial (Active)   Site Assessment Clean;Dry;Intact;No redness;No swelling 7/1/2017 11:02 AM   Line Status Pulsatile blood flow 7/1/2017 11:02 AM   Art Line Waveform Appropriate;Square wave test performed 7/1/2017 11:02 AM   Arterial Line Interventions Zeroed and calibrated;Leveled;Connections checked and tightened;Flushed per protocol 7/1/2017 11:02 AM   Color/Movement/Sensation Capillary refill less than 3 sec 7/1/2017 11:02 AM   Dressing Type Transparent;[place] 7/1/2017 11:02 AM   Dressing Status Clean;Dry;Intact;Biopatch in place 7/1/2017 11:02 AM   Dressing Intervention Dressing reinforced 7/1/2017 11:02 AM   Dressing Change Due 07/02/17 7/1/2017 11:02 AM   Number of days: 6            Gastrostomy/Enterostomy 06/29/17 1338 Percutaneous endoscopic gastrostomy (PEG) LUQ (Active)   Securement anchored to abdomen w/ adhesive device 7/1/2017 11:02 AM   Interventions Prior to Feeding patency checked;residual checked 7/1/2017 11:02 AM   Suction Setting/Drainage Method dependent drainage 7/1/2017 11:02 AM   Drainage green 7/1/2017  3:00 AM   Feeding Type continuous 7/1/2017 11:02 AM   Clamp Status/Tolerance unclamped;no residual 7/1/2017 11:02 AM   Feeding Action feeding continued 7/1/2017 11:02 AM   Dressing dry and intact 7/1/2017 11:02 AM   Insertion Site dry;serous drainage 7/1/2017 11:02 AM   Site Care device rotatated 7/1/2017 11:02 AM   Flush/Irrigation flushed w/;water;no resistance met 7/1/2017 11:02 AM   Dressing Change Due 07/02/17 7/1/2017 11:02 AM   Current Rate (mL/hr) 55 mL/hr 7/1/2017 11:02 AM   Goal Rate (mL/hr) 55 mL/hr 7/1/2017 11:02 AM   Intake (mL) 200 mL 7/1/2017 10:15 AM   Tube Output(mL)(Include Discarded Residual) 10 mL 6/30/2017  1:00 PM   Tube Feeding Intake (mL) 55 7/1/2017  3:02 PM   Number of days: 2            Urethral Catheter 06/28/17 1830 (Active)   Site Assessment Clean;Intact 7/1/2017 11:02 AM   Collection Container Urimeter 7/1/2017 11:02 AM   Securement  "Method secured to top of thigh w/ adhesive device 7/1/2017 11:02 AM   Catheter Care Performed yes 7/1/2017  7:02 AM   Reason for Continuing Urinary Catheterization Urinary retention 7/1/2017 11:02 AM   CAUTI Prevention Bundle StatLock in place w 1" slack;Intact seal between catheter & drainage tubing;Drainage bag off the floor;Green sheeting clip in use;No dependent loops or kinks;Drainage bag not overfilled (<2/3 full);Drainage bag below bladder 7/1/2017 11:02 AM   Output (mL) 50 mL 7/1/2017 10:02 AM   Number of days: 2       Laboratory (Last 24H):  CBC:    Recent Labs  Lab 07/01/17  1018   WBC 13.02*   HGB 8.2*   HCT 26.5*        CMP:    Recent Labs  Lab 07/01/17  0341   CALCIUM 8.1*   ALBUMIN 2.2*   PROT 5.0*   *   K 3.5   CO2 29   *   BUN 28*   CREATININE 0.5   ALKPHOS 76   ALT 72*   AST 46*   BILITOT 0.5     BMP:   Recent Labs  Lab 07/01/17  0341   *   *   K 3.5   *   CO2 29   BUN 28*   CREATININE 0.5   CALCIUM 8.1*   MG 2.2     Coagulation:   Recent Labs  Lab 06/29/17  1831   APTT 23.0     Cardiac Markers: No results for input(s): CKMB, TROPONINT, MYOGLOBIN in the last 168 hours.  ABGs:   Recent Labs  Lab 07/01/17  0339   PH 7.431   PCO2 48.2*   HCO3 32.0*   POCSATURATED 99   BE 8       Chest X-Ray: I personally reviewed the films and findings are:, atelectasis bilaterally    Diagnostic Results:  No Further    ASSESSMENT/PLAN:     Preventive Measures: Nutrition: Goal: advance tfs, Stress Ulcer: continue prophyllaxis, DVT: continue prophyllaxis        Plan:  Neuro: i believe that some if not majority of improvement may have come when steroids where introduced, not unusual for protracted gbs-trial of prednisone for at least 7 days  Pulmonary: still requiring a/c rate  Cardiac: no dysautonomia  Hematology/Oncology: baseline anemia  Fluids/Electrolytes/Nutrition/GI: advance tfs, enteral water when tolerated    Counseling/Consultation:    Critical Care Time greater than: f/u " 3

## 2017-07-02 LAB
ALBUMIN SERPL BCP-MCNC: 2.2 G/DL
ALLENS TEST: ABNORMAL
ALP SERPL-CCNC: 79 U/L
ALT SERPL W/O P-5'-P-CCNC: 70 U/L
ANION GAP SERPL CALC-SCNC: 5 MMOL/L
AST SERPL-CCNC: 36 U/L
BASOPHILS # BLD AUTO: 0 K/UL
BASOPHILS NFR BLD: 0 %
BILIRUB SERPL-MCNC: 0.3 MG/DL
BUN SERPL-MCNC: 28 MG/DL
CALCIUM SERPL-MCNC: 8.4 MG/DL
CHLORIDE SERPL-SCNC: 110 MMOL/L
CO2 SERPL-SCNC: 32 MMOL/L
CREAT SERPL-MCNC: 0.5 MG/DL
DELSYS: ABNORMAL
DIFFERENTIAL METHOD: ABNORMAL
EOSINOPHIL # BLD AUTO: 0 K/UL
EOSINOPHIL NFR BLD: 0 %
EOSINOPHIL NFR BLD: 0 %
EOSINOPHIL NFR BLD: 0.1 %
ERYTHROCYTE [DISTWIDTH] IN BLOOD BY AUTOMATED COUNT: 17 %
ERYTHROCYTE [DISTWIDTH] IN BLOOD BY AUTOMATED COUNT: 17.2 %
ERYTHROCYTE [DISTWIDTH] IN BLOOD BY AUTOMATED COUNT: 17.2 %
ERYTHROCYTE [SEDIMENTATION RATE] IN BLOOD BY WESTERGREN METHOD: 20 MM/H
EST. GFR  (AFRICAN AMERICAN): >60 ML/MIN/1.73 M^2
EST. GFR  (NON AFRICAN AMERICAN): >60 ML/MIN/1.73 M^2
FIO2: 40
GLUCOSE SERPL-MCNC: 187 MG/DL
HCO3 UR-SCNC: 36.9 MMOL/L (ref 24–28)
HCT VFR BLD AUTO: 25.3 %
HCT VFR BLD AUTO: 25.4 %
HCT VFR BLD AUTO: 25.4 %
HGB BLD-MCNC: 7.9 G/DL
HGB BLD-MCNC: 7.9 G/DL
HGB BLD-MCNC: 8 G/DL
LYMPHOCYTES # BLD AUTO: 0.6 K/UL
LYMPHOCYTES # BLD AUTO: 0.6 K/UL
LYMPHOCYTES # BLD AUTO: 0.9 K/UL
LYMPHOCYTES NFR BLD: 4.5 %
LYMPHOCYTES NFR BLD: 4.5 %
LYMPHOCYTES NFR BLD: 7.4 %
MAGNESIUM SERPL-MCNC: 2.2 MG/DL
MCH RBC QN AUTO: 31.7 PG
MCH RBC QN AUTO: 31.7 PG
MCH RBC QN AUTO: 32.1 PG
MCHC RBC AUTO-ENTMCNC: 31.1 %
MCHC RBC AUTO-ENTMCNC: 31.1 %
MCHC RBC AUTO-ENTMCNC: 31.6 %
MCV RBC AUTO: 102 FL
MIN VOL: 1
MODE: ABNORMAL
MONOCYTES # BLD AUTO: 0.4 K/UL
MONOCYTES # BLD AUTO: 0.4 K/UL
MONOCYTES # BLD AUTO: 0.6 K/UL
MONOCYTES NFR BLD: 3.5 %
MONOCYTES NFR BLD: 3.5 %
MONOCYTES NFR BLD: 5.2 %
NEUTROPHILS # BLD AUTO: 10.2 K/UL
NEUTROPHILS # BLD AUTO: 11.1 K/UL
NEUTROPHILS # BLD AUTO: 11.1 K/UL
NEUTROPHILS NFR BLD: 87 %
NEUTROPHILS NFR BLD: 91.7 %
NEUTROPHILS NFR BLD: 91.7 %
PCO2 BLDA: 45.1 MMHG (ref 35–45)
PEEP: 8
PH SMN: 7.52 [PH] (ref 7.35–7.45)
PHOSPHATE SERPL-MCNC: 2.3 MG/DL
PIP: 20
PLATELET # BLD AUTO: 196 K/UL
PLATELET # BLD AUTO: 210 K/UL
PLATELET # BLD AUTO: 210 K/UL
PMV BLD AUTO: 11 FL
PO2 BLDA: 111 MMHG (ref 80–100)
POC BE: 14 MMOL/L
POC SATURATED O2: 99 % (ref 95–100)
POC TCO2: 38 MMOL/L (ref 23–27)
POCT GLUCOSE: 137 MG/DL (ref 70–110)
POCT GLUCOSE: 159 MG/DL (ref 70–110)
POCT GLUCOSE: 172 MG/DL (ref 70–110)
POCT GLUCOSE: 182 MG/DL (ref 70–110)
POCT GLUCOSE: 200 MG/DL (ref 70–110)
POTASSIUM SERPL-SCNC: 4.2 MMOL/L
PROT SERPL-MCNC: 5 G/DL
RBC # BLD AUTO: 2.49 M/UL
SAMPLE: ABNORMAL
SITE: ABNORMAL
SODIUM SERPL-SCNC: 147 MMOL/L
SP02: 100
VT: 550
WBC # BLD AUTO: 11.73 K/UL
WBC # BLD AUTO: 12.12 K/UL
WBC # BLD AUTO: 12.12 K/UL

## 2017-07-02 PROCEDURE — 25000003 PHARM REV CODE 250: Performed by: NURSE PRACTITIONER

## 2017-07-02 PROCEDURE — 80053 COMPREHEN METABOLIC PANEL: CPT

## 2017-07-02 PROCEDURE — 84100 ASSAY OF PHOSPHORUS: CPT

## 2017-07-02 PROCEDURE — 25000003 PHARM REV CODE 250: Performed by: PHYSICIAN ASSISTANT

## 2017-07-02 PROCEDURE — 63600175 PHARM REV CODE 636 W HCPCS: Performed by: PSYCHIATRY & NEUROLOGY

## 2017-07-02 PROCEDURE — 83735 ASSAY OF MAGNESIUM: CPT

## 2017-07-02 PROCEDURE — 99900026 HC AIRWAY MAINTENANCE (STAT)

## 2017-07-02 PROCEDURE — 85025 COMPLETE CBC W/AUTO DIFF WBC: CPT

## 2017-07-02 PROCEDURE — 27000221 HC OXYGEN, UP TO 24 HOURS

## 2017-07-02 PROCEDURE — 25000003 PHARM REV CODE 250: Performed by: PSYCHIATRY & NEUROLOGY

## 2017-07-02 PROCEDURE — 99233 SBSQ HOSP IP/OBS HIGH 50: CPT | Mod: ,,, | Performed by: PSYCHIATRY & NEUROLOGY

## 2017-07-02 PROCEDURE — 99900022

## 2017-07-02 PROCEDURE — 94003 VENT MGMT INPAT SUBQ DAY: CPT

## 2017-07-02 PROCEDURE — 20000000 HC ICU ROOM

## 2017-07-02 RX ADMIN — ASPIRIN 325 MG ORAL TABLET 325 MG: 325 PILL ORAL at 09:07

## 2017-07-02 RX ADMIN — PREDNISONE 60 MG: 20 TABLET ORAL at 09:07

## 2017-07-02 RX ADMIN — METOPROLOL TARTRATE 25 MG: 25 TABLET ORAL at 09:07

## 2017-07-02 RX ADMIN — HEPARIN SODIUM 5000 UNITS: 5000 INJECTION, SOLUTION INTRAVENOUS; SUBCUTANEOUS at 05:07

## 2017-07-02 RX ADMIN — HEPARIN SODIUM 5000 UNITS: 5000 INJECTION, SOLUTION INTRAVENOUS; SUBCUTANEOUS at 02:07

## 2017-07-02 RX ADMIN — CHLORHEXIDINE GLUCONATE 15 ML: 1.2 RINSE ORAL at 12:07

## 2017-07-02 RX ADMIN — STANDARDIZED SENNA CONCENTRATE AND DOCUSATE SODIUM 2 TABLET: 8.6; 5 TABLET, FILM COATED ORAL at 09:07

## 2017-07-02 RX ADMIN — FENTANYL CITRATE 25 MCG: 50 INJECTION INTRAMUSCULAR; INTRAVENOUS at 02:07

## 2017-07-02 RX ADMIN — HEPARIN SODIUM 5000 UNITS: 5000 INJECTION, SOLUTION INTRAVENOUS; SUBCUTANEOUS at 09:07

## 2017-07-02 RX ADMIN — LEVOTHYROXINE SODIUM 75 MCG: 75 TABLET ORAL at 05:07

## 2017-07-02 RX ADMIN — HYDRALAZINE HYDROCHLORIDE 10 MG: 20 INJECTION INTRAMUSCULAR; INTRAVENOUS at 04:07

## 2017-07-02 RX ADMIN — MINERAL OIL AND WHITE PETROLATUM: 150; 830 OINTMENT OPHTHALMIC at 05:07

## 2017-07-02 RX ADMIN — MINERAL OIL AND WHITE PETROLATUM: 150; 830 OINTMENT OPHTHALMIC at 02:07

## 2017-07-02 RX ADMIN — POTASSIUM & SODIUM PHOSPHATES POWDER PACK 280-160-250 MG 2 PACKET: 280-160-250 PACK at 05:07

## 2017-07-02 RX ADMIN — INSULIN ASPART 4 UNITS: 100 INJECTION, SOLUTION INTRAVENOUS; SUBCUTANEOUS at 12:07

## 2017-07-02 RX ADMIN — INSULIN DETEMIR 25 UNITS: 100 INJECTION, SOLUTION SUBCUTANEOUS at 09:07

## 2017-07-02 RX ADMIN — FENTANYL CITRATE 25 MCG: 50 INJECTION INTRAMUSCULAR; INTRAVENOUS at 04:07

## 2017-07-02 RX ADMIN — POTASSIUM & SODIUM PHOSPHATES POWDER PACK 280-160-250 MG 2 PACKET: 280-160-250 PACK at 10:07

## 2017-07-02 RX ADMIN — BISACODYL 10 MG: 10 SUPPOSITORY RECTAL at 09:07

## 2017-07-02 RX ADMIN — CHLORHEXIDINE GLUCONATE 15 ML: 1.2 RINSE ORAL at 05:07

## 2017-07-02 RX ADMIN — Medication 1 TABLET: at 09:07

## 2017-07-02 RX ADMIN — INSULIN ASPART 4 UNITS: 100 INJECTION, SOLUTION INTRAVENOUS; SUBCUTANEOUS at 04:07

## 2017-07-02 RX ADMIN — PANTOPRAZOLE SODIUM 40 MG: 40 GRANULE, DELAYED RELEASE ORAL at 09:07

## 2017-07-02 RX ADMIN — POLYETHYLENE GLYCOL 3350 17 G: 17 POWDER, FOR SOLUTION ORAL at 09:07

## 2017-07-02 RX ADMIN — HYDRALAZINE HYDROCHLORIDE 10 MG: 20 INJECTION INTRAMUSCULAR; INTRAVENOUS at 11:07

## 2017-07-02 RX ADMIN — SERTRALINE HYDROCHLORIDE 50 MG: 50 TABLET ORAL at 09:07

## 2017-07-02 RX ADMIN — HYDRALAZINE HYDROCHLORIDE 10 MG: 20 INJECTION INTRAMUSCULAR; INTRAVENOUS at 07:07

## 2017-07-02 RX ADMIN — MINERAL OIL AND WHITE PETROLATUM: 150; 830 OINTMENT OPHTHALMIC at 09:07

## 2017-07-02 NOTE — PLAN OF CARE
Problem: Patient Care Overview  Goal: Plan of Care Review  Outcome: Ongoing (interventions implemented as appropriate)  POC reviewed with pt and family at 1400. Wife verbalized understanding. Questions and concerns addressed. Hyrdralyzine administered X2 for hypertension, 24 hr urine lab initiated. Pt progressing toward goals. Will continue to monitor. See flowsheets for full assessment and VS info.

## 2017-07-02 NOTE — PROGRESS NOTES
Progress Note  Neurological ICU    Admit Date: 6/14/2017   LOS: 18 days     SUBJECTIVE:     Follow-up For: axonal gbs    Continuous Infusions:   Scheduled Meds:   aspirin  325 mg Per NG tube Daily    bisacodyl  10 mg Rectal Daily    chlorhexidine  15 mL Mouth/Throat QID    folic acid-vit B6-vit B12 2.5-25-2 mg  1 tablet Per NG tube Daily    heparin (porcine)  5,000 Units Subcutaneous Q8H    insulin detemir  25 Units Subcutaneous BID    levothyroxine  75 mcg Per NG tube Daily    metoprolol tartrate  25 mg Per OG tube BID    pantoprazole  40 mg Per NG tube Daily    polyethylene glycol  17 g Per NG tube Daily    predniSONE  60 mg Oral Daily    senna-docusate 8.6-50 mg  2 tablet Per OG tube BID    sertraline  50 mg Per NG tube Daily    white petrolatum-mineral oil   Both Eyes QHS    white petrolatum-mineral oil   Both Eyes Q8H     PRN Meds:sodium chloride, sodium chloride, sodium chloride, acetaminophen, dextrose 50%, fentaNYL, fentaNYL, glucagon (human recombinant), heparin, porcine (PF), hydrALAZINE, insulin aspart, magnesium oxide, magnesium oxide, midazolam (PF), ondansetron, potassium chloride 10%, potassium chloride 10%, potassium chloride 10%, potassium, sodium phosphates, potassium, sodium phosphates, potassium, sodium phosphates, rocuronium    Review of patient's allergies indicates:  No Known Allergies    OBJECTIVE:     Vital Signs (Most Recent)  Temp: 98.4 °F (36.9 °C) (07/02/17 1105)  Pulse: 63 (07/02/17 1405)  Resp: 20 (07/02/17 1405)  BP: (!) 131/57 (a-line) (07/01/17 1502)  SpO2: 99 % (07/02/17 1405)    Vital Signs Range (Last 24H):  Temp:  [98.4 °F (36.9 °C)-98.5 °F (36.9 °C)]   Pulse:  [55-85]   Resp:  [20]   SpO2:  [98 %-100 %]   Arterial Line BP: (133-200)/(52-84)     I & O (Last 24H):  Intake/Output Summary (Last 24 hours) at 07/02/17 1513  Last data filed at 07/02/17 1405   Gross per 24 hour   Intake             1920 ml   Output             2092 ml   Net             -172 ml      Ventilator Data (Last 24H):     Vent Mode: A/C  Oxygen Concentration (%):  [40] 40  Resp Rate Total:  [20 br/min] 20 br/min  Vt Set:  [550 mL] 550 mL  PEEP/CPAP:  [8 cmH20] 8 cmH20  Pressure Support:  [0 cmH20] 0 cmH20  Mean Airway Pressure:  [12 lrW20-95 cmH20] 13 cmH20    Hemodynamic Parameters (Last 24H):       Physical Exam:  General: well developed  Lungs:  clear to auscultation bilaterally  Cardiovascular: Heart: regular rate and rhythm. Chest Wall: not examined. Extremities: no cyanosis or edema, or clubbing. Pulses: 2+ and symmetric.  Abdomen/Rectal: Abdomen: soft, non-tender non-distented; bowel sounds normal; no masses,  no organomegaly. Rectal: not examined  Neurologic: Mental status: alertness: lethargic  Cranial nerves: III,IV,VI: extraocular muscles trace horizontal eye movement  Motor:no movement    Lines/Drains:       Peripheral IV - Single Lumen 06/27/17 1557 Left Forearm (Active)   Site Assessment Clean;Dry;Intact 7/2/2017 11:05 AM   Line Status Blood return noted;Flushed;Saline locked 7/2/2017 11:05 AM   Dressing Status Clean;Dry;Intact 7/2/2017 11:05 AM   Dressing Intervention Dressing reinforced 7/1/2017 11:02 AM   Dressing Change Due 07/01/17 7/2/2017 11:05 AM   Site Change Due 07/01/17 7/2/2017 11:05 AM   Reason Not Rotated Poor venous access 7/2/2017 11:05 AM   Number of days: 4            Midline Catheter Insertion/Assessment  - Single Lumen 06/22/17 1219 Left basilic vein (medial side of arm) 18g x 10cm (Active)   Site Assessment Clean;Dry;Intact;No redness;No swelling 7/2/2017 11:05 AM   IV Device Securement catheter securement device 7/2/2017 11:05 AM   Line Status Occluded 7/2/2017 11:05 AM   Dressing Status Clean;Dry;Intact 7/2/2017 11:05 AM   Dressing Intervention Dressing reinforced 7/1/2017 11:02 AM   Dressing Change Due 07/06/17 7/2/2017 11:05 AM   Site Change Due 07/06/17 7/2/2017 11:05 AM   Reason Not Rotated Not due 7/2/2017 11:05 AM   Number of days: 10            Arterial  Line 06/25/17 1117 Left Radial (Active)   Site Assessment Clean;Dry;Intact;No redness;No swelling 7/2/2017 11:05 AM   Line Status Pulsatile blood flow 7/2/2017 11:05 AM   Art Line Waveform Appropriate 7/2/2017 11:05 AM   Arterial Line Interventions Zeroed and calibrated;Leveled;Connections checked and tightened;Flushed per protocol;Line pulled back 7/2/2017 11:05 AM   Color/Movement/Sensation Capillary refill less than 3 sec 7/2/2017 11:05 AM   Dressing Type Transparent 7/2/2017 11:05 AM   Dressing Status Biopatch in place;Clean;Dry;Intact 7/2/2017 11:05 AM   Dressing Intervention Dressing reinforced 7/1/2017 11:02 AM   Dressing Change Due 07/02/17 7/2/2017 11:05 AM   Number of days: 7            Gastrostomy/Enterostomy 06/29/17 1338 Percutaneous endoscopic gastrostomy (PEG) LUQ (Active)   Securement anchored to abdomen w/ adhesive device 7/2/2017 11:05 AM   Interventions Prior to Feeding patency checked 7/2/2017 11:05 AM   Suction Setting/Drainage Method dependent drainage 7/2/2017 11:05 AM   Drainage green 7/2/2017 11:05 AM   Feeding Type continuous 7/2/2017 11:05 AM   Clamp Status/Tolerance unclamped 7/2/2017 11:05 AM   Feeding Action feeding continued 7/2/2017 11:05 AM   Dressing dry and intact 7/2/2017 11:05 AM   Insertion Site dry 7/2/2017 11:05 AM   Site Care device rotatated 7/2/2017 11:05 AM   Flush/Irrigation flushed w/;water;no resistance met 7/2/2017 11:05 AM   Dressing Change Due 07/02/17 7/2/2017 11:05 AM   Current Rate (mL/hr) 55 mL/hr 7/2/2017 11:05 AM   Goal Rate (mL/hr) 55 mL/hr 7/2/2017 11:05 AM   Intake (mL) 200 mL 7/1/2017  7:25 PM   Water Bolus (mL) 200 mL 7/2/2017  7:05 AM   Tube Output(mL)(Include Discarded Residual) 10 mL 6/30/2017  1:00 PM   Tube Feeding Intake (mL) 55 7/2/2017  2:05 PM   Number of days: 3            Urethral Catheter 06/28/17 7780 (Active)   Site Assessment Clean;Intact 7/2/2017 11:05 AM   Collection Container Urimeter 7/2/2017 11:05 AM   Securement Method secured to top  "of thigh w/ leg strap 7/2/2017 11:05 AM   Catheter Care Performed yes 7/2/2017 11:05 AM   Reason for Continuing Urinary Catheterization Urinary retention 7/2/2017 11:05 AM   CAUTI Prevention Bundle StatLock in place w 1" slack 7/2/2017 11:05 AM   Output (mL) 125 mL 7/2/2017  2:05 PM   Number of days: 3       Laboratory (Last 24H):  CBC:    Recent Labs  Lab 07/02/17  0948   WBC 11.73   HGB 8.0*   HCT 25.3*        CMP:    Recent Labs  Lab 07/02/17  0145   CALCIUM 8.4*   ALBUMIN 2.2*   PROT 5.0*   *   K 4.2   CO2 32*      BUN 28*   CREATININE 0.5   ALKPHOS 79   ALT 70*   AST 36   BILITOT 0.3     BMP:   Recent Labs  Lab 07/02/17  0145   *   *   K 4.2      CO2 32*   BUN 28*   CREATININE 0.5   CALCIUM 8.4*   MG 2.2     Coagulation:   Recent Labs  Lab 06/29/17  1831   APTT 23.0     Cardiac Markers: No results for input(s): CKMB, TROPONINT, MYOGLOBIN in the last 168 hours.  ABGs:   Recent Labs  Lab 07/02/17  0531   PH 7.521*   PCO2 45.1*   HCO3 36.9*   POCSATURATED 99   BE 14       Chest X-Ray: I personally reviewed the films and findings are:, normal    Diagnostic Results:  No Further    ASSESSMENT/PLAN:     Preventive Measures: Nutrition: Goal: met, Stress Ulcer: continue prophyllaxis, DVT: continue prophyllaxis        Plan:  Neuro: no change  Pulmonary: continues on a/c but could tolerate simv trial early this week  Cardiac: no pressors,no dysautonomia  Fluids/Electrolytes/Nutrition/GI: enteral water and tfs   Issue of cadmium toxicity brought up by family on multiple occasions, cadmium level in blood nl at 0.2, only urine levels high    Counseling/Consultation:I discussed the patient's condition/prognosis with the family.    Critical Care Time greater than: f/u 3  "

## 2017-07-03 LAB
ACHR BIND AB SER-SCNC: 0 NMOL/L
ALBUMIN SERPL BCP-MCNC: 2.2 G/DL
ALLENS TEST: ABNORMAL
ALLENS TEST: ABNORMAL
ALP SERPL-CCNC: 78 U/L
ALT SERPL W/O P-5'-P-CCNC: 74 U/L
AMPHIPHYSIN AB TITR SER: NEGATIVE TITER
ANION GAP SERPL CALC-SCNC: 7 MMOL/L
APTT BLDCRRT: 24.1 SEC
AST SERPL-CCNC: 36 U/L
BACTERIA SPEC AEROBE CULT: NORMAL
BASOPHILS # BLD AUTO: 0 K/UL
BASOPHILS # BLD AUTO: 0.01 K/UL
BASOPHILS NFR BLD: 0 %
BASOPHILS NFR BLD: 0.1 %
BILIRUB SERPL-MCNC: 0.4 MG/DL
BUN SERPL-MCNC: 26 MG/DL
CALCIUM SERPL-MCNC: 8.2 MG/DL
CHLORIDE SERPL-SCNC: 106 MMOL/L
CO2 SERPL-SCNC: 31 MMOL/L
CREAT SERPL-MCNC: 0.5 MG/DL
CV2 IGG TITR SER: NEGATIVE TITER
DELSYS: ABNORMAL
DELSYS: ABNORMAL
DIFFERENTIAL METHOD: ABNORMAL
EOSINOPHIL # BLD AUTO: 0 K/UL
EOSINOPHIL NFR BLD: 0 %
EOSINOPHIL NFR BLD: 0.1 %
ERYTHROCYTE [DISTWIDTH] IN BLOOD BY AUTOMATED COUNT: 16.6 %
ERYTHROCYTE [DISTWIDTH] IN BLOOD BY AUTOMATED COUNT: 16.6 %
ERYTHROCYTE [DISTWIDTH] IN BLOOD BY AUTOMATED COUNT: 16.7 %
ERYTHROCYTE [SEDIMENTATION RATE] IN BLOOD BY WESTERGREN METHOD: 20 MM/H
ERYTHROCYTE [SEDIMENTATION RATE] IN BLOOD BY WESTERGREN METHOD: 20 MM/H
EST. GFR  (AFRICAN AMERICAN): >60 ML/MIN/1.73 M^2
EST. GFR  (NON AFRICAN AMERICAN): >60 ML/MIN/1.73 M^2
FACT X PPP CHRO-ACNC: 0.46 IU/ML
FIO2: 40
FIO2: 40
GLIAL NUC TYPE 1 AB TITR SER: NEGATIVE TITER
GLUCOSE SERPL-MCNC: 133 MG/DL
HCO3 UR-SCNC: 31.8 MMOL/L (ref 24–28)
HCO3 UR-SCNC: 35.8 MMOL/L (ref 24–28)
HCT VFR BLD AUTO: 24.9 %
HCT VFR BLD AUTO: 25.7 %
HCT VFR BLD AUTO: 26 %
HGB BLD-MCNC: 7.9 G/DL
HGB BLD-MCNC: 8.1 G/DL
HGB BLD-MCNC: 8.1 G/DL
HGB BLD-MCNC: 8.3 G/DL
HGB BLD-MCNC: 8.3 G/DL
HU1 AB TITR SER: NEGATIVE TITER
HU2 AB TITR SER IF: NEGATIVE TITER
HU3 AB TITR SER: NEGATIVE TITER
IMMUNOLOGIST REVIEW: NORMAL
LYMPHOCYTES # BLD AUTO: 0.4 K/UL
LYMPHOCYTES # BLD AUTO: 0.5 K/UL
LYMPHOCYTES # BLD AUTO: 0.8 K/UL
LYMPHOCYTES # BLD AUTO: 0.8 K/UL
LYMPHOCYTES # BLD AUTO: 0.9 K/UL
LYMPHOCYTES NFR BLD: 4.1 %
LYMPHOCYTES NFR BLD: 4.1 %
LYMPHOCYTES NFR BLD: 6.3 %
LYMPHOCYTES NFR BLD: 6.3 %
LYMPHOCYTES NFR BLD: 7 %
MAGNESIUM SERPL-MCNC: 1.9 MG/DL
MCH RBC QN AUTO: 31.7 PG
MCH RBC QN AUTO: 31.8 PG
MCH RBC QN AUTO: 31.8 PG
MCH RBC QN AUTO: 31.9 PG
MCH RBC QN AUTO: 32.7 PG
MCHC RBC AUTO-ENTMCNC: 31.5 %
MCHC RBC AUTO-ENTMCNC: 31.5 %
MCHC RBC AUTO-ENTMCNC: 31.7 %
MCHC RBC AUTO-ENTMCNC: 31.9 %
MCHC RBC AUTO-ENTMCNC: 32.3 %
MCV RBC AUTO: 100 FL
MCV RBC AUTO: 100 FL
MCV RBC AUTO: 101 FL
MIN VOL: 11.5
MIN VOL: 11.6
MODE: ABNORMAL
MODE: ABNORMAL
MONOCYTES # BLD AUTO: 0.3 K/UL
MONOCYTES # BLD AUTO: 0.3 K/UL
MONOCYTES # BLD AUTO: 0.6 K/UL
MONOCYTES # BLD AUTO: 0.7 K/UL
MONOCYTES # BLD AUTO: 0.7 K/UL
MONOCYTES NFR BLD: 2.5 %
MONOCYTES NFR BLD: 2.7 %
MONOCYTES NFR BLD: 4.8 %
MONOCYTES NFR BLD: 5.2 %
MONOCYTES NFR BLD: 5.2 %
NACHR AB SER-SCNC: 0 NMOL/L
NEUTROPHILS # BLD AUTO: 10.9 K/UL
NEUTROPHILS # BLD AUTO: 11.1 K/UL
NEUTROPHILS # BLD AUTO: 11.1 K/UL
NEUTROPHILS # BLD AUTO: 11.3 K/UL
NEUTROPHILS # BLD AUTO: 9.6 K/UL
NEUTROPHILS NFR BLD: 87.6 %
NEUTROPHILS NFR BLD: 88.2 %
NEUTROPHILS NFR BLD: 88.2 %
NEUTROPHILS NFR BLD: 92.8 %
NEUTROPHILS NFR BLD: 92.9 %
PAVAL REFLEX TEST ADDED: NORMAL
PCA-1 AB TITR SER: NEGATIVE TITER
PCA-2 AB TITR SER: NEGATIVE TITER
PCA-TR AB TITR SER: NEGATIVE TITER
PCO2 BLDA: 40.3 MMHG (ref 35–45)
PCO2 BLDA: 43.4 MMHG (ref 35–45)
PEEP: 8
PEEP: 8
PH SMN: 7.51 [PH] (ref 7.35–7.45)
PH SMN: 7.52 [PH] (ref 7.35–7.45)
PHOSPHATE SERPL-MCNC: 2.6 MG/DL
PIP: 21
PIP: 21
PLATELET # BLD AUTO: 208 K/UL
PLATELET # BLD AUTO: 212 K/UL
PLATELET # BLD AUTO: 212 K/UL
PLATELET # BLD AUTO: 215 K/UL
PLATELET # BLD AUTO: 223 K/UL
PMV BLD AUTO: 10.6 FL
PMV BLD AUTO: 10.9 FL
PMV BLD AUTO: 11 FL
PMV BLD AUTO: 11 FL
PMV BLD AUTO: 12 FL
PO2 BLDA: 101 MMHG (ref 80–100)
PO2 BLDA: 125 MMHG (ref 80–100)
POC BE: 13 MMOL/L
POC BE: 9 MMOL/L
POC SATURATED O2: 98 % (ref 95–100)
POC SATURATED O2: 99 % (ref 95–100)
POC TCO2: 33 MMOL/L (ref 23–27)
POC TCO2: 37 MMOL/L (ref 23–27)
POCT GLUCOSE: 137 MG/DL (ref 70–110)
POCT GLUCOSE: 144 MG/DL (ref 70–110)
POCT GLUCOSE: 145 MG/DL (ref 70–110)
POCT GLUCOSE: 146 MG/DL (ref 70–110)
POCT GLUCOSE: 147 MG/DL (ref 70–110)
POCT GLUCOSE: 159 MG/DL (ref 70–110)
POCT GLUCOSE: 233 MG/DL (ref 70–110)
POTASSIUM SERPL-SCNC: 3.9 MMOL/L
PROT SERPL-MCNC: 5.1 G/DL
PROVIDER CREDENTIALS: ABNORMAL
PROVIDER NOTIFIED: ABNORMAL
RBC # BLD AUTO: 2.49 M/UL
RBC # BLD AUTO: 2.54 M/UL
RBC # BLD AUTO: 2.55 M/UL
RBC # BLD AUTO: 2.55 M/UL
RBC # BLD AUTO: 2.6 M/UL
SAMPLE: ABNORMAL
SAMPLE: ABNORMAL
SITE: ABNORMAL
SITE: ABNORMAL
SODIUM SERPL-SCNC: 144 MMOL/L
SP02: 99
SP02: 99
STRIA MUS AB TITR SER: NEGATIVE TITER
VERBAL RESULT READBACK PERFORMED: YES
VGCC-N BIND AB SER-SCNC: 0 NMOL/L
VGCC-P/Q BIND AB SER-SCNC: 0 NMOL/L
VGKC AB SER-SCNC: 0 NMOL/L
VT: 550
VT: 550
WBC # BLD AUTO: 10.31 K/UL
WBC # BLD AUTO: 11.74 K/UL
WBC # BLD AUTO: 12.63 K/UL
WBC # BLD AUTO: 12.63 K/UL
WBC # BLD AUTO: 12.84 K/UL

## 2017-07-03 PROCEDURE — 25000003 PHARM REV CODE 250: Performed by: PSYCHIATRY & NEUROLOGY

## 2017-07-03 PROCEDURE — 63600175 PHARM REV CODE 636 W HCPCS: Performed by: PSYCHIATRY & NEUROLOGY

## 2017-07-03 PROCEDURE — 84100 ASSAY OF PHOSPHORUS: CPT

## 2017-07-03 PROCEDURE — 25000003 PHARM REV CODE 250: Performed by: PHYSICIAN ASSISTANT

## 2017-07-03 PROCEDURE — 94761 N-INVAS EAR/PLS OXIMETRY MLT: CPT

## 2017-07-03 PROCEDURE — 83735 ASSAY OF MAGNESIUM: CPT

## 2017-07-03 PROCEDURE — 37799 UNLISTED PX VASCULAR SURGERY: CPT

## 2017-07-03 PROCEDURE — 82803 BLOOD GASES ANY COMBINATION: CPT

## 2017-07-03 PROCEDURE — 99900035 HC TECH TIME PER 15 MIN (STAT)

## 2017-07-03 PROCEDURE — 27000221 HC OXYGEN, UP TO 24 HOURS

## 2017-07-03 PROCEDURE — 27200966 HC CLOSED SUCTION SYSTEM

## 2017-07-03 PROCEDURE — 99900026 HC AIRWAY MAINTENANCE (STAT)

## 2017-07-03 PROCEDURE — 20000000 HC ICU ROOM

## 2017-07-03 PROCEDURE — 85730 THROMBOPLASTIN TIME PARTIAL: CPT

## 2017-07-03 PROCEDURE — 85520 HEPARIN ASSAY: CPT

## 2017-07-03 PROCEDURE — 82300 ASSAY OF CADMIUM: CPT

## 2017-07-03 PROCEDURE — 99900022

## 2017-07-03 PROCEDURE — 99291 CRITICAL CARE FIRST HOUR: CPT | Mod: ,,, | Performed by: PSYCHIATRY & NEUROLOGY

## 2017-07-03 PROCEDURE — 85025 COMPLETE CBC W/AUTO DIFF WBC: CPT | Mod: 91

## 2017-07-03 PROCEDURE — 25000003 PHARM REV CODE 250: Performed by: NURSE PRACTITIONER

## 2017-07-03 PROCEDURE — 94003 VENT MGMT INPAT SUBQ DAY: CPT

## 2017-07-03 PROCEDURE — 80053 COMPREHEN METABOLIC PANEL: CPT

## 2017-07-03 RX ORDER — HEPARIN SODIUM,PORCINE/D5W 25000/250
17 INTRAVENOUS SOLUTION INTRAVENOUS CONTINUOUS
Status: DISCONTINUED | OUTPATIENT
Start: 2017-07-03 | End: 2017-07-06

## 2017-07-03 RX ADMIN — INSULIN DETEMIR 25 UNITS: 100 INJECTION, SOLUTION SUBCUTANEOUS at 08:07

## 2017-07-03 RX ADMIN — LEVOTHYROXINE SODIUM 75 MCG: 75 TABLET ORAL at 05:07

## 2017-07-03 RX ADMIN — FENTANYL CITRATE 25 MCG: 50 INJECTION INTRAMUSCULAR; INTRAVENOUS at 04:07

## 2017-07-03 RX ADMIN — METOPROLOL TARTRATE 25 MG: 25 TABLET ORAL at 08:07

## 2017-07-03 RX ADMIN — CHLORHEXIDINE GLUCONATE 15 ML: 1.2 RINSE ORAL at 12:07

## 2017-07-03 RX ADMIN — MINERAL OIL AND WHITE PETROLATUM: 150; 830 OINTMENT OPHTHALMIC at 05:07

## 2017-07-03 RX ADMIN — ASPIRIN 325 MG ORAL TABLET 325 MG: 325 PILL ORAL at 08:07

## 2017-07-03 RX ADMIN — PANTOPRAZOLE SODIUM 40 MG: 40 GRANULE, DELAYED RELEASE ORAL at 08:07

## 2017-07-03 RX ADMIN — Medication 1 TABLET: at 08:07

## 2017-07-03 RX ADMIN — POTASSIUM & SODIUM PHOSPHATES POWDER PACK 280-160-250 MG 2 PACKET: 280-160-250 PACK at 05:07

## 2017-07-03 RX ADMIN — MINERAL OIL AND WHITE PETROLATUM: 150; 830 OINTMENT OPHTHALMIC at 09:07

## 2017-07-03 RX ADMIN — HYDRALAZINE HYDROCHLORIDE 10 MG: 20 INJECTION INTRAMUSCULAR; INTRAVENOUS at 09:07

## 2017-07-03 RX ADMIN — CHLORHEXIDINE GLUCONATE 15 ML: 1.2 RINSE ORAL at 05:07

## 2017-07-03 RX ADMIN — HEPARIN SODIUM 5000 UNITS: 5000 INJECTION, SOLUTION INTRAVENOUS; SUBCUTANEOUS at 05:07

## 2017-07-03 RX ADMIN — SERTRALINE HYDROCHLORIDE 50 MG: 50 TABLET ORAL at 08:07

## 2017-07-03 RX ADMIN — HYDRALAZINE HYDROCHLORIDE 10 MG: 20 INJECTION INTRAMUSCULAR; INTRAVENOUS at 06:07

## 2017-07-03 RX ADMIN — PREDNISONE 60 MG: 20 TABLET ORAL at 08:07

## 2017-07-03 RX ADMIN — POTASSIUM & SODIUM PHOSPHATES POWDER PACK 280-160-250 MG 2 PACKET: 280-160-250 PACK at 08:07

## 2017-07-03 RX ADMIN — MINERAL OIL AND WHITE PETROLATUM: 150; 830 OINTMENT OPHTHALMIC at 03:07

## 2017-07-03 RX ADMIN — HEPARIN SODIUM AND DEXTROSE 17 UNITS/KG/HR: 10000; 5 INJECTION INTRAVENOUS at 12:07

## 2017-07-03 RX ADMIN — METOPROLOL TARTRATE 25 MG: 25 TABLET ORAL at 09:07

## 2017-07-03 RX ADMIN — INSULIN ASPART 4 UNITS: 100 INJECTION, SOLUTION INTRAVENOUS; SUBCUTANEOUS at 04:07

## 2017-07-03 NOTE — ASSESSMENT & PLAN NOTE
Post IVIG x 5 days  Post steroids x 5 days  Post PLEX x 5 days  Exam stable  Will need repeat IVIG vs PLEX in 2-3 weeks

## 2017-07-03 NOTE — PLAN OF CARE
Problem: Patient Care Overview  Goal: Plan of Care Review  Outcome: Ongoing (interventions implemented as appropriate)  POC reviewed with pt at 0500. Pt able to communicate by wiggle jaw appropriately to command.  No acute events overnight. Pt progressing toward goals. Will continue to monitor. See flowsheets for full assessment and VS info

## 2017-07-03 NOTE — PROGRESS NOTES
[facility]  Neurocritical Care  Progress Note    Admit Date: 6/14/2017  Service Date: 07/03/2017  Length of Stay: 19    Subjective:     Chief Complaint: Guillain-[place] syndrome    History of Present Illness: 72 year old male with PMHx CAD, skin cancer, hypothyroidism, and HLP who was transferred from OSH for further evaluation and management of rapidly progressive ascending paralysis.  No family present during exam, history obtained primarily from chart.  Patient was on a camping trip to [place] and [place] recently. When he returned he started to have paraesthesias of the feet and hand that quickly progressed to legs weakness. No clear hx of tick bites or skin lesions.  Additionally, a few days prior to admission to OSH he had prostate biopsy and was treated with cipro.  Pt presented to OSH and was diagnosed with GBS and was started on IVIG, he completed 5 days of treatment which ended on 6/12/17.  Pt also being treated for pneumonia.  After admission his weakness got worse to the point that he had to be intubated.  Pt has had poor neurologic exam since.   - LP was done in OSH and was reported as 0 rbc, 0 wbc, protein 34, glucose 97.  Lyme ab panel was sent and results are pending.  MRI Brain done today shows non enhancing lesion abutting the right optic nerve.  Neurosurgery consulted for evaluation.    MRI of the brain:  Bilateral supraclinoid internal carotid artery aneurysms.  On the right there is a 10 mm aneurysm in separate 2 mm aneurysm.  On the left the supraclinoid ICA aneurysm measures about 7 x 6 mm.3.6 cm nonenhancing mass centered over the right greater and lesser wings of the sphenoid abutting the ICA, A1 and M1 segments, displacing the right optic nerve.  Differential considerations include a thrombosed aneurysm associated with right ICA aneurysm aneurysm,  exophytic hamartoma, or or an exophytic nonenhancing primary brain tumor such as low-grade glioma or  oligodendroglioma.    Hospital Course: 6/15 transferred [address] for GBS. :Initial LP with protein of 34., glucose 97, WBC ) .IVIG x 5(finished 6/12).  6/16 NSGY consulted for  /lesion noted on MRI, Awaitng EMG  6/18 Lyme negative, multiple CSF labs pending  6/21:Pending CTA and continuous EEG.   6/22: No clinical changes. CTA   left  superior hypophyseal aneurysm again measures approximately 7.0 x 6.0 mm. The right superior hypophyseal artery aneurysm again measures 10.0 x 7.0 mm.  There is a second superiorly projecting aneurysm of the right supraclinoid ICA measuring 2.0 mm. EEG: slowing. Sphenoid aneurysm. Pending EMG /NCS. PIC line is kinked.  CXR -R-PIC line kinked.   6/23 EMG performed, awaiting results.  6/24 Awaiting EMG report from 6/23. Insulin and TF adjusted for hyperglycemia.  6/25  A-line placed. Lateral eye movements noted when on sedation vacation.  6/26: gen surg consulted for peg and trach  6/28 Pt went to runs of [facility] ; PEG and Trach delayed until pt stable/ STAT MRI ordered  6/29 Trach and PEG today; Restarted Heparin gtt 6 hrs post procedure ; MRI stable     Review of Symptoms:   Unable to obtain, neuromuscular weakness    Physical Exam:  GA:comfortable, no acute distress.   HEENT: No scleral icterus or JVD.   Pulmonary: Clear to auscultation A/L. No wheezing, crackles, or rhonchi.  Cardiac: RRR S1 & S2 w/o rubs/murmurs/gallops.   Abdominal: Bowel sounds present x 4. No appreciable hepatosplenomegaly.  Skin: rash on abdomen stable  Pulses: 2+ DP bilat    Neuro:  --sedation: none  --GCS: [id number]  --Mental Status:  Unable to fully obtain, COMA  --facial diplegia  --Pupils 3-->2mm, PERRL. Dysconjugate gaze  --brainstem: no cough no gag  --Motor: intermittently able to move left eye to commands in horizontal plane and move jaw minimally side to side  --sensory: unable to obtain  --Reflexes: areflexic  --Gait: deferred      Recent Labs  Lab 07/03/17  1214   WBC 11.74   RBC 2.54*   HGB 8.3*   HCT  25.7*      *   MCH 32.7*   MCHC 32.3       Recent Labs  Lab 07/03/17  0230   CALCIUM 8.2*   PROT 5.1*      K 3.9   CO2 31*      BUN 26*   CREATININE 0.5   ALKPHOS 78   ALT 74*   AST 36   BILITOT 0.4       Recent Labs  Lab 07/03/17  1214   APTT 24.1     Vent Mode: A/C  Oxygen Concentration (%):  [40] 40  Resp Rate Total:  [20 br/min] 20 br/min  Vt Set:  [550 mL] 550 mL  PEEP/CPAP:  [8 cmH20] 8 cmH20  Pressure Support:  [0 cmH20] 0 cmH20  Mean Airway Pressure:  [13 cmH20] 13 cmH20      I have personally reviewed all labs, imaging, and studies today      Assessment/Plan:     Neuro   * Guillain-[place] syndrome    Post IVIG x 5 days  Post steroids x 5 days  Post PLEX x 5 days  Exam stable  Will need repeat IVIG vs PLEX in 2-3 weeks                  Brain aneurysm    Incidental  followup with vascular neurology/NSGY in one month after discharge          Flaccid quadriplegia    See GBS        Pulmonary   Acute respiratory failure with hypoxia and hypercapnia    Neuromuscular respiratory failure  Now post trach and peg        Cardiac   Right subclavian vein thrombosis    Resume heparin coumadin bridge        Hem/Onc   Brain mass    Incidental  Follow with neurosurgery post discharge        Endocrine   Acquired hypothyroidism    On synthroid        Fluids/Electrolytes/Nutrition/GI   Hyperglycemia    iss  detemir  aspart        Other   [place] coma scale total score 3-8    Severe GBS            Prophylaxis:  Venous Thromboembolism: chemical  Stress Ulcer: H2B  Ventilator Pneumonia: yes     Activity Orders          Up with assistance starting at 06/14 0111        Full Code    Yahir Rodriguez MD  Neurocritical Care  [facility]    Cc time 35 minutes

## 2017-07-03 NOTE — PLAN OF CARE
07/03/17 0828   Discharge Reassessment   Assessment Type Discharge Planning Reassessment   Can the patient answer the patient profile reliably? No, cognitively impaired  (patient express self with wiggle to side of face)   How does the patient rate their overall health at the present time? (gregor)   Describe the patient's ability to walk at the present time. Does not walk or unable to take any steps at all   How often would a person be available to care for the patient? Whenever needed   Number of comorbid conditions (as recorded on the chart) Two   During the past month, has the patient often been bothered by feeling down, depressed or hopeless? (gregor)   During the past month, has the patient often been bothered by little interest or pleasure in doing things? (gregor)   Discharge plan remains the same: Yes   Provided patient/caregiver education on the expected discharge date and the discharge plan Yes   Discharge Plan A Long-term acute care facility (LTAC)   Discharge Plan B Skilled Nursing Facility   Change in patient condition or support system No   Patient choice form signed by patient/caregiver N/A   Explained to the the patient/caregiver why the discharge planned changed: No   Involved the patient/caregiver in establishing a new discharge plan: No       Discharge plan: LTAC  Shauna Contreras   [phone]

## 2017-07-03 NOTE — PLAN OF CARE
SW received message from Christine with [employer] regarding update on Pt status and need for docs in order to submit for auth Wed.    TONE sent requested docs via [employer] and left return message.    Deisi Jeffery LMSW  Neurocritical Care   [facility]  [id number]

## 2017-07-03 NOTE — PLAN OF CARE
Problem: Patient Care Overview  Goal: Plan of Care Review  Outcome: Ongoing (interventions implemented as appropriate)  Plan of care reviewed with pt and pt's wife, Fallon.  Pt's wife verbalized understanding of the plan of care.  Heparin gtt restarted as ordered, antixa pending.  Pt remained free of any falls or injuries during this shift, skin integrity was maintained.  Art line dsg/tubing changed.  24 hr urine completed.  Will continue to monitor.

## 2017-07-04 PROBLEM — T56.3X1A: Status: ACTIVE | Noted: 2017-07-04

## 2017-07-04 LAB
ALBUMIN SERPL BCP-MCNC: 2.2 G/DL
ALLENS TEST: ABNORMAL
ALLENS TEST: ABNORMAL
ALP SERPL-CCNC: 87 U/L
ALT SERPL W/O P-5'-P-CCNC: 94 U/L
AMORPH CRY UR QL COMP ASSIST: ABNORMAL
ANION GAP SERPL CALC-SCNC: 5 MMOL/L
AST SERPL-CCNC: 51 U/L
BACTERIA #/AREA URNS AUTO: ABNORMAL /HPF
BASOPHILS # BLD AUTO: 0.01 K/UL
BASOPHILS NFR BLD: 0.1 %
BILIRUB SERPL-MCNC: 0.4 MG/DL
BILIRUB UR QL STRIP: NEGATIVE
BUN SERPL-MCNC: 25 MG/DL
CALCIUM SERPL-MCNC: 8.1 MG/DL
CHLORIDE SERPL-SCNC: 102 MMOL/L
CLARITY UR REFRACT.AUTO: ABNORMAL
CO2 SERPL-SCNC: 33 MMOL/L
COLOR UR AUTO: YELLOW
CREAT SERPL-MCNC: 0.5 MG/DL
DELSYS: ABNORMAL
DELSYS: ABNORMAL
DIFFERENTIAL METHOD: ABNORMAL
EOSINOPHIL # BLD AUTO: 0 K/UL
EOSINOPHIL NFR BLD: 0 %
EOSINOPHIL NFR BLD: 0 %
EOSINOPHIL NFR BLD: 0.1 %
EOSINOPHIL NFR BLD: 0.1 %
ERYTHROCYTE [DISTWIDTH] IN BLOOD BY AUTOMATED COUNT: 16.4 %
ERYTHROCYTE [DISTWIDTH] IN BLOOD BY AUTOMATED COUNT: 16.6 %
ERYTHROCYTE [DISTWIDTH] IN BLOOD BY AUTOMATED COUNT: 16.7 %
ERYTHROCYTE [DISTWIDTH] IN BLOOD BY AUTOMATED COUNT: 16.7 %
ERYTHROCYTE [SEDIMENTATION RATE] IN BLOOD BY WESTERGREN METHOD: 16 MM/H
ERYTHROCYTE [SEDIMENTATION RATE] IN BLOOD BY WESTERGREN METHOD: 20 MM/H
EST. GFR  (AFRICAN AMERICAN): >60 ML/MIN/1.73 M^2
EST. GFR  (NON AFRICAN AMERICAN): >60 ML/MIN/1.73 M^2
FACT X PPP CHRO-ACNC: 0.61 IU/ML
FIO2: 40
FIO2: 40
GLUCOSE SERPL-MCNC: 141 MG/DL
GLUCOSE UR QL STRIP: NEGATIVE
HCO3 UR-SCNC: 33.2 MMOL/L (ref 24–28)
HCO3 UR-SCNC: 36.1 MMOL/L (ref 24–28)
HCT VFR BLD AUTO: 24.6 %
HCT VFR BLD AUTO: 25.2 %
HCT VFR BLD AUTO: 26.8 %
HCT VFR BLD AUTO: 26.8 %
HGB BLD-MCNC: 8 G/DL
HGB BLD-MCNC: 8.2 G/DL
HGB BLD-MCNC: 8.7 G/DL
HGB BLD-MCNC: 8.7 G/DL
HGB UR QL STRIP: ABNORMAL
HYALINE CASTS UR QL AUTO: 4 /LPF
INR PPP: 1.1
KETONES UR QL STRIP: NEGATIVE
LEUKOCYTE ESTERASE UR QL STRIP: ABNORMAL
LYMPHOCYTES # BLD AUTO: 0.4 K/UL
LYMPHOCYTES # BLD AUTO: 0.9 K/UL
LYMPHOCYTES NFR BLD: 3.3 %
LYMPHOCYTES NFR BLD: 5.9 %
LYMPHOCYTES NFR BLD: 5.9 %
LYMPHOCYTES NFR BLD: 6.8 %
MAGNESIUM SERPL-MCNC: 1.9 MG/DL
MCH RBC QN AUTO: 32.2 PG
MCH RBC QN AUTO: 32.4 PG
MCH RBC QN AUTO: 32.6 PG
MCH RBC QN AUTO: 32.6 PG
MCHC RBC AUTO-ENTMCNC: 32.5 %
MCV RBC AUTO: 100 FL
MCV RBC AUTO: 99 FL
MICROSCOPIC COMMENT: ABNORMAL
MIN VOL: 11.6
MODE: ABNORMAL
MODE: ABNORMAL
MONOCYTES # BLD AUTO: 0.3 K/UL
MONOCYTES # BLD AUTO: 0.5 K/UL
MONOCYTES # BLD AUTO: 0.5 K/UL
MONOCYTES # BLD AUTO: 0.6 K/UL
MONOCYTES NFR BLD: 2.4 %
MONOCYTES NFR BLD: 3.6 %
MONOCYTES NFR BLD: 3.6 %
MONOCYTES NFR BLD: 4.1 %
NEUTROPHILS # BLD AUTO: 11.7 K/UL
NEUTROPHILS # BLD AUTO: 12.5 K/UL
NEUTROPHILS # BLD AUTO: 13.4 K/UL
NEUTROPHILS # BLD AUTO: 13.4 K/UL
NEUTROPHILS NFR BLD: 88.1 %
NEUTROPHILS NFR BLD: 89.5 %
NEUTROPHILS NFR BLD: 89.5 %
NEUTROPHILS NFR BLD: 93.3 %
NITRITE UR QL STRIP: NEGATIVE
PCO2 BLDA: 44.9 MMHG (ref 35–45)
PCO2 BLDA: 48.7 MMHG (ref 35–45)
PEEP: 8
PEEP: 8
PH SMN: 7.44 [PH] (ref 7.35–7.45)
PH SMN: 7.51 [PH] (ref 7.35–7.45)
PH UR STRIP: 7 [PH] (ref 5–8)
PHOSPHATE SERPL-MCNC: 3.6 MG/DL
PIP: 21
PLATELET # BLD AUTO: 220 K/UL
PLATELET # BLD AUTO: 222 K/UL
PLATELET # BLD AUTO: 243 K/UL
PLATELET # BLD AUTO: 243 K/UL
PMV BLD AUTO: 10.5 FL
PMV BLD AUTO: 10.7 FL
PMV BLD AUTO: 11.2 FL
PMV BLD AUTO: 11.2 FL
PO2 BLDA: 126 MMHG (ref 80–100)
PO2 BLDA: 162 MMHG (ref 80–100)
POC BE: 13 MMOL/L
POC BE: 9 MMOL/L
POC SATURATED O2: 99 % (ref 95–100)
POC SATURATED O2: 99 % (ref 95–100)
POC TCO2: 35 MMOL/L (ref 23–27)
POC TCO2: 37 MMOL/L (ref 23–27)
POCT GLUCOSE: 140 MG/DL (ref 70–110)
POCT GLUCOSE: 155 MG/DL (ref 70–110)
POCT GLUCOSE: 159 MG/DL (ref 70–110)
POCT GLUCOSE: 169 MG/DL (ref 70–110)
POCT GLUCOSE: 184 MG/DL (ref 70–110)
POTASSIUM SERPL-SCNC: 4.1 MMOL/L
PROT SERPL-MCNC: 5.3 G/DL
PROT UR QL STRIP: NEGATIVE
PROTHROMBIN TIME: 12 SEC
RBC # BLD AUTO: 2.47 M/UL
RBC # BLD AUTO: 2.55 M/UL
RBC # BLD AUTO: 2.67 M/UL
RBC # BLD AUTO: 2.67 M/UL
RBC #/AREA URNS AUTO: 71 /HPF (ref 0–4)
SAMPLE: ABNORMAL
SAMPLE: ABNORMAL
SITE: ABNORMAL
SITE: ABNORMAL
SODIUM SERPL-SCNC: 140 MMOL/L
SP GR UR STRIP: 1.02 (ref 1–1.03)
SP02: 100
SP02: 99
URN SPEC COLLECT METH UR: ABNORMAL
UROBILINOGEN UR STRIP-ACNC: 4 EU/DL
VT: 550
VT: 550
WBC # BLD AUTO: 13.32 K/UL
WBC # BLD AUTO: 13.41 K/UL
WBC # BLD AUTO: 15 K/UL
WBC # BLD AUTO: 15 K/UL
WBC #/AREA URNS AUTO: >100 /HPF (ref 0–5)
YEAST UR QL AUTO: ABNORMAL

## 2017-07-04 PROCEDURE — 25000003 PHARM REV CODE 250: Performed by: PSYCHIATRY & NEUROLOGY

## 2017-07-04 PROCEDURE — 85025 COMPLETE CBC W/AUTO DIFF WBC: CPT | Mod: 91

## 2017-07-04 PROCEDURE — 85520 HEPARIN ASSAY: CPT

## 2017-07-04 PROCEDURE — 63600175 PHARM REV CODE 636 W HCPCS: Performed by: PSYCHIATRY & NEUROLOGY

## 2017-07-04 PROCEDURE — 37799 UNLISTED PX VASCULAR SURGERY: CPT

## 2017-07-04 PROCEDURE — 25000003 PHARM REV CODE 250: Performed by: PHYSICIAN ASSISTANT

## 2017-07-04 PROCEDURE — 99900026 HC AIRWAY MAINTENANCE (STAT)

## 2017-07-04 PROCEDURE — 94003 VENT MGMT INPAT SUBQ DAY: CPT

## 2017-07-04 PROCEDURE — 83735 ASSAY OF MAGNESIUM: CPT

## 2017-07-04 PROCEDURE — 27000221 HC OXYGEN, UP TO 24 HOURS

## 2017-07-04 PROCEDURE — 99900035 HC TECH TIME PER 15 MIN (STAT)

## 2017-07-04 PROCEDURE — 85610 PROTHROMBIN TIME: CPT

## 2017-07-04 PROCEDURE — 20000000 HC ICU ROOM

## 2017-07-04 PROCEDURE — 80053 COMPREHEN METABOLIC PANEL: CPT

## 2017-07-04 PROCEDURE — 99291 CRITICAL CARE FIRST HOUR: CPT | Mod: ,,, | Performed by: PSYCHIATRY & NEUROLOGY

## 2017-07-04 PROCEDURE — 84100 ASSAY OF PHOSPHORUS: CPT

## 2017-07-04 PROCEDURE — 81001 URINALYSIS AUTO W/SCOPE: CPT

## 2017-07-04 PROCEDURE — 25000003 PHARM REV CODE 250: Performed by: NURSE PRACTITIONER

## 2017-07-04 PROCEDURE — 82803 BLOOD GASES ANY COMBINATION: CPT

## 2017-07-04 RX ORDER — SODIUM CHLORIDE 9 MG/ML
INJECTION, SOLUTION INTRAVENOUS CONTINUOUS
Status: DISCONTINUED | OUTPATIENT
Start: 2017-07-04 | End: 2017-07-06

## 2017-07-04 RX ORDER — WARFARIN SODIUM 5 MG/1
5 TABLET ORAL DAILY
Status: DISCONTINUED | OUTPATIENT
Start: 2017-07-04 | End: 2017-07-06

## 2017-07-04 RX ADMIN — WARFARIN SODIUM 5 MG: 5 TABLET ORAL at 05:07

## 2017-07-04 RX ADMIN — METOPROLOL TARTRATE 25 MG: 25 TABLET ORAL at 08:07

## 2017-07-04 RX ADMIN — INSULIN ASPART 4 UNITS: 100 INJECTION, SOLUTION INTRAVENOUS; SUBCUTANEOUS at 12:07

## 2017-07-04 RX ADMIN — FENTANYL CITRATE 25 MCG: 50 INJECTION INTRAMUSCULAR; INTRAVENOUS at 05:07

## 2017-07-04 RX ADMIN — SERTRALINE HYDROCHLORIDE 50 MG: 50 TABLET ORAL at 08:07

## 2017-07-04 RX ADMIN — MINERAL OIL AND WHITE PETROLATUM: 150; 830 OINTMENT OPHTHALMIC at 02:07

## 2017-07-04 RX ADMIN — SODIUM CHLORIDE 500 ML: 0.9 INJECTION, SOLUTION INTRAVENOUS at 09:07

## 2017-07-04 RX ADMIN — SODIUM CHLORIDE: 0.9 INJECTION, SOLUTION INTRAVENOUS at 09:07

## 2017-07-04 RX ADMIN — ASPIRIN 325 MG ORAL TABLET 325 MG: 325 PILL ORAL at 08:07

## 2017-07-04 RX ADMIN — CHLORHEXIDINE GLUCONATE 15 ML: 1.2 RINSE ORAL at 12:07

## 2017-07-04 RX ADMIN — MINERAL OIL AND WHITE PETROLATUM: 150; 830 OINTMENT OPHTHALMIC at 09:07

## 2017-07-04 RX ADMIN — HEPARIN SODIUM AND DEXTROSE 17 UNITS/KG/HR: 10000; 5 INJECTION INTRAVENOUS at 09:07

## 2017-07-04 RX ADMIN — PANTOPRAZOLE SODIUM 40 MG: 40 GRANULE, DELAYED RELEASE ORAL at 08:07

## 2017-07-04 RX ADMIN — CHLORHEXIDINE GLUCONATE 15 ML: 1.2 RINSE ORAL at 05:07

## 2017-07-04 RX ADMIN — METOPROLOL TARTRATE 25 MG: 25 TABLET ORAL at 09:07

## 2017-07-04 RX ADMIN — HYDRALAZINE HYDROCHLORIDE 10 MG: 20 INJECTION INTRAMUSCULAR; INTRAVENOUS at 04:07

## 2017-07-04 RX ADMIN — HYDRALAZINE HYDROCHLORIDE 10 MG: 20 INJECTION INTRAMUSCULAR; INTRAVENOUS at 11:07

## 2017-07-04 RX ADMIN — PREDNISONE 60 MG: 20 TABLET ORAL at 08:07

## 2017-07-04 RX ADMIN — LEVOTHYROXINE SODIUM 75 MCG: 75 TABLET ORAL at 05:07

## 2017-07-04 RX ADMIN — MINERAL OIL AND WHITE PETROLATUM: 150; 830 OINTMENT OPHTHALMIC at 05:07

## 2017-07-04 RX ADMIN — HYDRALAZINE HYDROCHLORIDE 10 MG: 20 INJECTION INTRAMUSCULAR; INTRAVENOUS at 07:07

## 2017-07-04 RX ADMIN — Medication 1 TABLET: at 08:07

## 2017-07-04 RX ADMIN — HEPARIN SODIUM AND DEXTROSE 17 UNITS/KG/HR: 10000; 5 INJECTION INTRAVENOUS at 03:07

## 2017-07-04 NOTE — ASSESSMENT & PLAN NOTE
Post IVIG x 5 days  Post steroids x 5 days  Post PLEX x 5 days  Exam improved today, more robust eye movements and chin movements  Will need repeat IVIG vs PLEX in 2-3 weeks

## 2017-07-04 NOTE — PROGRESS NOTES
[facility]  Neurocritical Care  Progress Note    Admit Date: 6/14/2017  Service Date: 07/04/2017  Length of Stay: 20    Subjective:     Chief Complaint: Guillain-[place] syndrome    History of Present Illness: 72 year old male with PMHx CAD, skin cancer, hypothyroidism, and HLP who was transferred from OSH for further evaluation and management of rapidly progressive ascending paralysis.  No family present during exam, history obtained primarily from chart.  Patient was on a camping trip to [place] and [place] recently. When he returned he started to have paraesthesias of the feet and hand that quickly progressed to legs weakness. No clear hx of tick bites or skin lesions.  Additionally, a few days prior to admission to OSH he had prostate biopsy and was treated with cipro.  Pt presented to OSH and was diagnosed with GBS and was started on IVIG, he completed 5 days of treatment which ended on 6/12/17.  Pt also being treated for pneumonia.  After admission his weakness got worse to the point that he had to be intubated.  Pt has had poor neurologic exam since.   - LP was done in OSH and was reported as 0 rbc, 0 wbc, protein 34, glucose 97.  Lyme ab panel was sent and results are pending.  MRI Brain done today shows non enhancing lesion abutting the right optic nerve.  Neurosurgery consulted for evaluation.    MRI of the brain:  Bilateral supraclinoid internal carotid artery aneurysms.  On the right there is a 10 mm aneurysm in separate 2 mm aneurysm.  On the left the supraclinoid ICA aneurysm measures about 7 x 6 mm.3.6 cm nonenhancing mass centered over the right greater and lesser wings of the sphenoid abutting the ICA, A1 and M1 segments, displacing the right optic nerve.  Differential considerations include a thrombosed aneurysm associated with right ICA aneurysm aneurysm,  exophytic hamartoma, or or an exophytic nonenhancing primary brain tumor such as low-grade glioma or  oligodendroglioma.    Hospital Course: 6/15 transferred [address] for GBS. :Initial LP with protein of 34., glucose 97, WBC ) .IVIG x 5(finished 6/12).  6/16 NSGY consulted for  /lesion noted on MRI, Awaitng EMG  6/18 Lyme negative, multiple CSF labs pending  6/21:Pending CTA and continuous EEG.   6/22: No clinical changes. CTA   left  superior hypophyseal aneurysm again measures approximately 7.0 x 6.0 mm. The right superior hypophyseal artery aneurysm again measures 10.0 x 7.0 mm.  There is a second superiorly projecting aneurysm of the right supraclinoid ICA measuring 2.0 mm. EEG: slowing. Sphenoid aneurysm. Pending EMG /NCS. PIC line is kinked.  CXR -R-PIC line kinked.   6/23 EMG performed, awaiting results.  6/24 Awaiting EMG report from 6/23. Insulin and TF adjusted for hyperglycemia.  6/25  A-line placed. Lateral eye movements noted when on sedation vacation.  6/26: gen surg consulted for peg and trach  6/28 Pt went to runs of [facility] ; PEG and Trach delayed until pt stable/ STAT MRI ordered  6/29 Trach and PEG today; Restarted Heparin gtt 6 hrs post procedure ; MRI stable     Review of Symptoms:   Unable to obtain, severe GBS    Physical Exam:  GA: comfortable, no acute distress.   HEENT: No scleral icterus or JVD.   Pulmonary: Clear to auscultation A/L. No wheezing, crackles, or rhonchi.  Cardiac: RRR S1 & S2 w/o rubs/murmurs/gallops.   Abdominal: Bowel sounds present x 4. No appreciable hepatosplenomegaly.  Skin: No jaundice, rashes, or visible lesions.  Pulses: 2+ Dp bilat    Neuro:  --sedation: none  --GCS: [id number]  --Mental Status: follows commands to move eyes and chin   --facial diplegia  --Pupils 3-->2mm, PERRL.   --brainstem: no cough and gag  --Motor: no response to pain throughout  --sensory: see motor  --Reflexes: a reflexic  --Gait: deferred      Recent Labs  Lab 07/04/17  0822   WBC 13.32*   RBC 2.55*   HGB 8.2*   HCT 25.2*      MCV 99*   MCH 32.2*   MCHC 32.5       Recent Labs  Lab  07/04/17  0109   CALCIUM 8.1*   PROT 5.3*      K 4.1   CO2 33*      BUN 25*   CREATININE 0.5   ALKPHOS 87   ALT 94*   AST 51*   BILITOT 0.4       Recent Labs  Lab 07/03/17  1214   APTT 24.1     Vent Mode: A/C  Oxygen Concentration (%):  [40] 40  Resp Rate Total:  [20 br/min] 20 br/min  Vt Set:  [550 mL] 550 mL  PEEP/CPAP:  [8 cmH20] 8 cmH20  Pressure Support:  [0 cmH20] 0 cmH20  Mean Airway Pressure:  [13 cmH20] 13 cmH20      I have personally reviewed all labs, imaging, and studies today    Assessment/Plan:     Neuro   * Guillain-[place] syndrome    Post IVIG x 5 days  Post steroids x 5 days  Post PLEX x 5 days  Exam improved today, more robust eye movements and chin movements  Will need repeat IVIG vs PLEX in 2-3 weeks                  Brain aneurysm    Incidental  followup with vascular neurology/NSGY in one month after discharge          Flaccid quadriplegia    See GBS        Pulmonary   Acute respiratory failure with hypoxia and hypercapnia    Neuromuscular respiratory failure  Now post trach and peg        Cardiac   Right subclavian vein thrombosis    Resume heparin coumadin bridge  Start coumadin today 5mg po daily        Hem/Onc   Brain mass    Incidental  Follow with neurosurgery post discharge        Endocrine   Acquired hypothyroidism    On synthroid        Fluids/Electrolytes/Nutrition/GI   Slow transit constipation    Mag citrate  mirlax  Senna docusate  Bisacodyl suppository prn  Soap garrison enema rpn        Hyperglycemia    iss  detemir  aspart        Hypovolemia    IVF  bolus        Other   Cadmium toxicity    Chronic  No acute adverse effects (respiratory and renal)        [place] coma scale total score 3-8    Severe GBS            Discussion with wife at bedside regarding cadmium. Blood levels not consistent with acute toxicity. Urine indicative of chronic exposure. Cadmium toxicity associated with respiratory and renal injury which patient not suffering from. Likely incidental finding.  Cadmium toxicity is symptomatic treatment    Prophylaxis:  Venous Thromboembolism: chemical  Stress Ulcer: H2B PPI  Ventilator Pneumonia: yes     Activity Orders          Up with assistance starting at 06/14 0111        Full Code    Yahir Rodriguez MD  Neurocritical Care  [facility] time 31 minutes

## 2017-07-04 NOTE — PLAN OF CARE
Problem: Patient Care Overview  Goal: Plan of Care Review  Outcome: Ongoing (interventions implemented as appropriate)  POC reviewed with pt and family at 1400. Pts wife verbalized understanding. Pt unable to verbalize understanding d/t trach and GB. Pt able to move eyes side to side and can wiggle jaw. Still no movement in all extremities. Gudino catheter changed out today and UA was done. MD aware of frequent dark brown/red BMs. Pt progressing toward goals. Will continue to monitor. See flowsheets for full assessment and VS info.

## 2017-07-05 PROBLEM — L08.9 PUSTULES DETERMINED BY EXAMINATION: Status: ACTIVE | Noted: 2017-07-05

## 2017-07-05 LAB
ALBUMIN SERPL BCP-MCNC: 2.1 G/DL
ALLENS TEST: ABNORMAL
ALP SERPL-CCNC: 83 U/L
ALT SERPL W/O P-5'-P-CCNC: 109 U/L
ANION GAP SERPL CALC-SCNC: 5 MMOL/L
ARSENIC 24H UR-MRATE: NOT DETECTED MCG/SPEC (ref 0–35)
ARSENIC UR-MCNC: <15 MCG/L (ref 0–35)
AST SERPL-CCNC: 45 U/L
BASOPHILS # BLD AUTO: 0.01 K/UL
BASOPHILS NFR BLD: 0.1 %
BILIRUB SERPL-MCNC: 0.4 MG/DL
BUN SERPL-MCNC: 23 MG/DL
CADMIUM 24H UR-MRATE: 5.1 MCG/SPEC (ref 0–1.3)
CADMIUM UR-MCNC: 3.3 MCG/L (ref 0–1.3)
CALCIUM SERPL-MCNC: 8 MG/DL
CHLORIDE SERPL-SCNC: 102 MMOL/L
CO2 SERPL-SCNC: 31 MMOL/L
COLLECT DURATION TIME UR: 24 H
CREAT SERPL-MCNC: 0.4 MG/DL
DELSYS: ABNORMAL
DIFFERENTIAL METHOD: ABNORMAL
EOSINOPHIL # BLD AUTO: 0 K/UL
EOSINOPHIL NFR BLD: 0 %
EOSINOPHIL NFR BLD: 0.1 %
ERYTHROCYTE [DISTWIDTH] IN BLOOD BY AUTOMATED COUNT: 16.4 %
ERYTHROCYTE [DISTWIDTH] IN BLOOD BY AUTOMATED COUNT: 16.4 %
ERYTHROCYTE [DISTWIDTH] IN BLOOD BY AUTOMATED COUNT: 16.5 %
ERYTHROCYTE [DISTWIDTH] IN BLOOD BY AUTOMATED COUNT: 16.6 %
ERYTHROCYTE [SEDIMENTATION RATE] IN BLOOD BY WESTERGREN METHOD: 16 MM/H
EST. GFR  (AFRICAN AMERICAN): >60 ML/MIN/1.73 M^2
EST. GFR  (NON AFRICAN AMERICAN): >60 ML/MIN/1.73 M^2
FACT X PPP CHRO-ACNC: 0.62 IU/ML
FIO2: 40
GLUCOSE SERPL-MCNC: 143 MG/DL
HCO3 UR-SCNC: 33.4 MMOL/L (ref 24–28)
HCT VFR BLD AUTO: 23.9 %
HCT VFR BLD AUTO: 24.4 %
HCT VFR BLD AUTO: 24.4 %
HCT VFR BLD AUTO: 24.7 %
HCT VFR BLD AUTO: 24.8 %
HGB BLD-MCNC: 7.6 G/DL
HGB BLD-MCNC: 7.8 G/DL
HGB BLD-MCNC: 7.9 G/DL
INR PPP: 1.2
LEAD 24H UR-MRATE: NOT DETECTED MCG/SPEC (ref 0–4)
LEAD UR-MCNC: <1 MCG/L (ref 0–4)
LYMPHOCYTES # BLD AUTO: 0.5 K/UL
LYMPHOCYTES # BLD AUTO: 0.6 K/UL
LYMPHOCYTES # BLD AUTO: 0.6 K/UL
LYMPHOCYTES # BLD AUTO: 0.7 K/UL
LYMPHOCYTES NFR BLD: 3.6 %
LYMPHOCYTES NFR BLD: 4.1 %
LYMPHOCYTES NFR BLD: 4.1 %
LYMPHOCYTES NFR BLD: 4.9 %
MAGNESIUM SERPL-MCNC: 1.9 MG/DL
MCH RBC QN AUTO: 31.6 PG
MCH RBC QN AUTO: 31.7 PG
MCH RBC QN AUTO: 32.1 PG
MCH RBC QN AUTO: 32.1 PG
MCHC RBC AUTO-ENTMCNC: 31.6 %
MCHC RBC AUTO-ENTMCNC: 31.9 %
MCHC RBC AUTO-ENTMCNC: 32 %
MCHC RBC AUTO-ENTMCNC: 32 %
MCV RBC AUTO: 100 FL
MERCURY 24H UR-MRATE: NOT DETECTED MCG/SPEC (ref 0–9)
MERCURY UR-MCNC: <1 MCG/L (ref 0–9)
MIN VOL: 9
MODE: ABNORMAL
MONOCYTES # BLD AUTO: 0.2 K/UL
MONOCYTES # BLD AUTO: 0.5 K/UL
MONOCYTES NFR BLD: 1.7 %
MONOCYTES NFR BLD: 3.2 %
MONOCYTES NFR BLD: 3.2 %
MONOCYTES NFR BLD: 3.5 %
NEUTROPHILS # BLD AUTO: 13.3 K/UL
NEUTROPHILS # BLD AUTO: 13.5 K/UL
NEUTROPHILS NFR BLD: 90.4 %
NEUTROPHILS NFR BLD: 91.4 %
NEUTROPHILS NFR BLD: 91.4 %
NEUTROPHILS NFR BLD: 93 %
PCO2 BLDA: 51.4 MMHG (ref 35–45)
PEEP: 8
PH SMN: 7.42 [PH] (ref 7.35–7.45)
PHOSPHATE SERPL-MCNC: 3.3 MG/DL
PIP: 20
PLATELET # BLD AUTO: 221 K/UL
PLATELET # BLD AUTO: 225 K/UL
PLATELET # BLD AUTO: 227 K/UL
PLATELET # BLD AUTO: 227 K/UL
PMV BLD AUTO: 10.7 FL
PMV BLD AUTO: 10.8 FL
PO2 BLDA: 133 MMHG (ref 80–100)
POC BE: 9 MMOL/L
POC SATURATED O2: 99 % (ref 95–100)
POC TCO2: 35 MMOL/L (ref 23–27)
POCT GLUCOSE: 136 MG/DL (ref 70–110)
POCT GLUCOSE: 147 MG/DL (ref 70–110)
POCT GLUCOSE: 149 MG/DL (ref 70–110)
POCT GLUCOSE: 187 MG/DL (ref 70–110)
POCT GLUCOSE: 190 MG/DL (ref 70–110)
POTASSIUM SERPL-SCNC: 4.2 MMOL/L
PROT SERPL-MCNC: 5 G/DL
PROTHROMBIN TIME: 12.4 SEC
RBC # BLD AUTO: 2.43 M/UL
RBC # BLD AUTO: 2.43 M/UL
RBC # BLD AUTO: 2.47 M/UL
RBC # BLD AUTO: 2.49 M/UL
SAMPLE: ABNORMAL
SITE: ABNORMAL
SODIUM SERPL-SCNC: 138 MMOL/L
SP02: 100
SPECIMEN VOL UR: 1525 ML
VT: 550
WBC # BLD AUTO: 14.29 K/UL
WBC # BLD AUTO: 14.74 K/UL
WBC # BLD AUTO: 14.74 K/UL
WBC # BLD AUTO: 14.97 K/UL

## 2017-07-05 PROCEDURE — 99291 CRITICAL CARE FIRST HOUR: CPT | Mod: ,,, | Performed by: PSYCHIATRY & NEUROLOGY

## 2017-07-05 PROCEDURE — 99900035 HC TECH TIME PER 15 MIN (STAT)

## 2017-07-05 PROCEDURE — 63600175 PHARM REV CODE 636 W HCPCS: Performed by: PSYCHIATRY & NEUROLOGY

## 2017-07-05 PROCEDURE — 85025 COMPLETE CBC W/AUTO DIFF WBC: CPT | Mod: 91

## 2017-07-05 PROCEDURE — 83735 ASSAY OF MAGNESIUM: CPT

## 2017-07-05 PROCEDURE — 80053 COMPREHEN METABOLIC PANEL: CPT

## 2017-07-05 PROCEDURE — 63600175 PHARM REV CODE 636 W HCPCS: Performed by: NURSE PRACTITIONER

## 2017-07-05 PROCEDURE — 82803 BLOOD GASES ANY COMBINATION: CPT

## 2017-07-05 PROCEDURE — 94003 VENT MGMT INPAT SUBQ DAY: CPT

## 2017-07-05 PROCEDURE — 25000003 PHARM REV CODE 250: Performed by: PHYSICIAN ASSISTANT

## 2017-07-05 PROCEDURE — 85610 PROTHROMBIN TIME: CPT

## 2017-07-05 PROCEDURE — 85018 HEMOGLOBIN: CPT

## 2017-07-05 PROCEDURE — 25000003 PHARM REV CODE 250: Performed by: NURSE PRACTITIONER

## 2017-07-05 PROCEDURE — 20000000 HC ICU ROOM

## 2017-07-05 PROCEDURE — 37799 UNLISTED PX VASCULAR SURGERY: CPT

## 2017-07-05 PROCEDURE — 85520 HEPARIN ASSAY: CPT

## 2017-07-05 PROCEDURE — 25000003 PHARM REV CODE 250: Performed by: PSYCHIATRY & NEUROLOGY

## 2017-07-05 PROCEDURE — 85014 HEMATOCRIT: CPT

## 2017-07-05 PROCEDURE — 84100 ASSAY OF PHOSPHORUS: CPT

## 2017-07-05 PROCEDURE — 99900026 HC AIRWAY MAINTENANCE (STAT)

## 2017-07-05 RX ORDER — LISINOPRIL 20 MG/1
20 TABLET ORAL DAILY
Status: DISCONTINUED | OUTPATIENT
Start: 2017-07-06 | End: 2017-07-07

## 2017-07-05 RX ORDER — CIPROFLOXACIN 500 MG/1
500 TABLET ORAL EVERY 12 HOURS
Status: DISCONTINUED | OUTPATIENT
Start: 2017-07-05 | End: 2017-07-05

## 2017-07-05 RX ORDER — PANTOPRAZOLE SODIUM 40 MG/1
40 FOR SUSPENSION ORAL 2 TIMES DAILY
Status: DISCONTINUED | OUTPATIENT
Start: 2017-07-05 | End: 2017-07-07

## 2017-07-05 RX ORDER — CIPROFLOXACIN 500 MG/1
500 TABLET ORAL EVERY 12 HOURS
Status: DISCONTINUED | OUTPATIENT
Start: 2017-07-05 | End: 2017-07-06

## 2017-07-05 RX ADMIN — METOPROLOL TARTRATE 25 MG: 25 TABLET ORAL at 08:07

## 2017-07-05 RX ADMIN — CHLORHEXIDINE GLUCONATE 15 ML: 1.2 RINSE ORAL at 05:07

## 2017-07-05 RX ADMIN — CIPROFLOXACIN HYDROCHLORIDE 500 MG: 500 TABLET, FILM COATED ORAL at 09:07

## 2017-07-05 RX ADMIN — STANDARDIZED SENNA CONCENTRATE AND DOCUSATE SODIUM 2 TABLET: 8.6; 5 TABLET, FILM COATED ORAL at 09:07

## 2017-07-05 RX ADMIN — CHLORHEXIDINE GLUCONATE 15 ML: 1.2 RINSE ORAL at 12:07

## 2017-07-05 RX ADMIN — MINERAL OIL AND WHITE PETROLATUM: 150; 830 OINTMENT OPHTHALMIC at 09:07

## 2017-07-05 RX ADMIN — HYDRALAZINE HYDROCHLORIDE 10 MG: 20 INJECTION INTRAMUSCULAR; INTRAVENOUS at 02:07

## 2017-07-05 RX ADMIN — PREDNISONE 60 MG: 20 TABLET ORAL at 08:07

## 2017-07-05 RX ADMIN — PANTOPRAZOLE SODIUM 40 MG: 40 GRANULE, DELAYED RELEASE ORAL at 08:07

## 2017-07-05 RX ADMIN — HYDRALAZINE HYDROCHLORIDE 10 MG: 20 INJECTION INTRAMUSCULAR; INTRAVENOUS at 07:07

## 2017-07-05 RX ADMIN — CHLORHEXIDINE GLUCONATE 15 ML: 1.2 RINSE ORAL at 11:07

## 2017-07-05 RX ADMIN — WARFARIN SODIUM 5 MG: 5 TABLET ORAL at 05:07

## 2017-07-05 RX ADMIN — ASPIRIN 325 MG ORAL TABLET 325 MG: 325 PILL ORAL at 08:07

## 2017-07-05 RX ADMIN — PANTOPRAZOLE SODIUM 40 MG: 40 GRANULE, DELAYED RELEASE ORAL at 09:07

## 2017-07-05 RX ADMIN — MINERAL OIL AND WHITE PETROLATUM: 150; 830 OINTMENT OPHTHALMIC at 05:07

## 2017-07-05 RX ADMIN — LEVOTHYROXINE SODIUM 75 MCG: 75 TABLET ORAL at 05:07

## 2017-07-05 RX ADMIN — INSULIN ASPART 4 UNITS: 100 INJECTION, SOLUTION INTRAVENOUS; SUBCUTANEOUS at 03:07

## 2017-07-05 RX ADMIN — CIPROFLOXACIN HYDROCHLORIDE 500 MG: 500 TABLET, FILM COATED ORAL at 10:07

## 2017-07-05 RX ADMIN — FENTANYL CITRATE 25 MCG: 50 INJECTION INTRAMUSCULAR; INTRAVENOUS at 11:07

## 2017-07-05 RX ADMIN — MINERAL OIL AND WHITE PETROLATUM: 150; 830 OINTMENT OPHTHALMIC at 02:07

## 2017-07-05 RX ADMIN — SERTRALINE HYDROCHLORIDE 50 MG: 50 TABLET ORAL at 08:07

## 2017-07-05 RX ADMIN — FENTANYL CITRATE 25 MCG: 50 INJECTION INTRAMUSCULAR; INTRAVENOUS at 09:07

## 2017-07-05 RX ADMIN — Medication 1 TABLET: at 08:07

## 2017-07-05 RX ADMIN — FENTANYL CITRATE 25 MCG: 50 INJECTION INTRAMUSCULAR; INTRAVENOUS at 04:07

## 2017-07-05 RX ADMIN — INSULIN ASPART 4 UNITS: 100 INJECTION, SOLUTION INTRAVENOUS; SUBCUTANEOUS at 11:07

## 2017-07-05 NOTE — PLAN OF CARE
Problem: Patient Care Overview  Goal: Plan of Care Review  Outcome: Ongoing (interventions implemented as appropriate)  POC reviewed with pt and wife at 0500. Pt.'s wife states complete understanding. Pt. Continues to respond by wiggling jaw back and forth.   All questions and concerns addressed. No acute events overnight. Pt progressing toward goals. Will continue to monitor. See flowsheets for full assessment and VS info

## 2017-07-05 NOTE — ASSESSMENT & PLAN NOTE
Post IVIG x 5 days  Post steroids x 5 days  Post PLEX x 5 days  Exam improved today, more robust eye movements and chin movements  Will need repeat IVIG vs PLEX in 2-3 weeks from last exchange

## 2017-07-05 NOTE — PLAN OF CARE
TONE met with Pt wife at bedside. She reported Pt MD stated Pt will be here another 2-3 weeks and will need more IVIG or Plasma. SW will check with LTAC to verify if they can provide this or not.     TONE contacted PA for clarification on the discharge date. Pt will need IVIG or Plasma in two weeks, but does not need to stay here for that.     TONE will follow up with LTAC tomorrow AM regarding the above.\    Deisi Jeffery LMSW  Neurocritical Care   [facility]  [id number]

## 2017-07-05 NOTE — PROGRESS NOTES
[facility]  Neurocritical Care  Progress Note    Admit Date: 6/14/2017  Service Date: 07/05/2017  Length of Stay: 21    Subjective:     Chief Complaint: Guillain-[place] syndrome    History of Present Illness: 72 year old male with PMHx CAD, skin cancer, hypothyroidism, and HLP who was transferred from OSH for further evaluation and management of rapidly progressive ascending paralysis.  No family present during exam, history obtained primarily from chart.  Patient was on a camping trip to [place] and [place] recently. When he returned he started to have paraesthesias of the feet and hand that quickly progressed to legs weakness. No clear hx of tick bites or skin lesions.  Additionally, a few days prior to admission to OSH he had prostate biopsy and was treated with cipro.  Pt presented to OSH and was diagnosed with GBS and was started on IVIG, he completed 5 days of treatment which ended on 6/12/17.  Pt also being treated for pneumonia.  After admission his weakness got worse to the point that he had to be intubated.  Pt has had poor neurologic exam since.   - LP was done in OSH and was reported as 0 rbc, 0 wbc, protein 34, glucose 97.  Lyme ab panel was sent and results are pending.  MRI Brain done today shows non enhancing lesion abutting the right optic nerve.  Neurosurgery consulted for evaluation.    MRI of the brain:  Bilateral supraclinoid internal carotid artery aneurysms.  On the right there is a 10 mm aneurysm in separate 2 mm aneurysm.  On the left the supraclinoid ICA aneurysm measures about 7 x 6 mm.3.6 cm nonenhancing mass centered over the right greater and lesser wings of the sphenoid abutting the ICA, A1 and M1 segments, displacing the right optic nerve.  Differential considerations include a thrombosed aneurysm associated with right ICA aneurysm aneurysm,  exophytic hamartoma, or or an exophytic nonenhancing primary brain tumor such as low-grade glioma or  oligodendroglioma.    Hospital Course: 6/15 transferred [address] for GBS. :Initial LP with protein of 34., glucose 97, WBC ) .IVIG x 5(finished 6/12).  6/16 NSGY consulted for  /lesion noted on MRI, Awaitng EMG  6/18 Lyme negative, multiple CSF labs pending  6/21:Pending CTA and continuous EEG.   6/22: No clinical changes. CTA   left  superior hypophyseal aneurysm again measures approximately 7.0 x 6.0 mm. The right superior hypophyseal artery aneurysm again measures 10.0 x 7.0 mm.  There is a second superiorly projecting aneurysm of the right supraclinoid ICA measuring 2.0 mm. EEG: slowing. Sphenoid aneurysm. Pending EMG /NCS. PIC line is kinked.  CXR -R-PIC line kinked.   6/23 EMG performed, awaiting results.  6/24 Awaiting EMG report from 6/23. Insulin and TF adjusted for hyperglycemia.  6/25  A-line placed. Lateral eye movements noted when on sedation vacation.  6/26: gen surg consulted for peg and trach  6/28 Pt went to runs of [facility] ; PEG and Trach delayed until pt stable/ STAT MRI ordered  6/29 Trach and PEG today; Restarted Heparin gtt 6 hrs post procedure ; MRI stable     Review of Symptoms:   Unable to obtain, neuromuscular weakness     Physical Exam:  GA:comfortable, no acute distress.   HEENT: No scleral icterus or JVD.   Pulmonary: Clear to auscultation A/L. No wheezing, crackles, or rhonchi.  Cardiac: RRR S1 & S2 w/o rubs/murmurs/gallops.   Abdominal: Bowel sounds present x 4. No appreciable hepatosplenomegaly.  Skin: rash on abdomen stable  Pulses: 2+ DP bilat     Neuro:  --sedation: none  --GCS: [id number]  --Mental Status:  Unable to fully obtain, COMA  --facial diplegia  --Pupils 3-->2mm, PERRL. Dysconjugate gaze  --brainstem: no cough no gag  --Motor: intermittently able to move left eye to commands in horizontal plane and move jaw minimally side to side  --sensory: unable to obtain  --Reflexes: areflexic  --Gait: deferred  Assessment/Plan:     Neuro   * Guillain-[place] syndrome    Post IVIG x 5  days  Post steroids x 5 days  Post PLEX x 5 days  Exam improved today, more robust eye movements and chin movements  Will need repeat IVIG vs PLEX in 2-3 weeks from last exchange                  Brain aneurysm    Incidental  followup with vascular neurology/NSGY in one month after discharge          Flaccid quadriplegia    See GBS        Pulmonary   Acute respiratory failure with hypoxia and hypercapnia    Neuromuscular respiratory failure  Now post trach and peg        Cardiac   Right subclavian vein thrombosis    heparin coumadin bridge   coumadin today 5mg po daily  Daily INR        Hem/Onc   Brain mass    Incidental  Follow with neurosurgery post discharge        Endocrine   Acquired hypothyroidism    On synthroid        Fluids/Electrolytes/Nutrition/GI   Hyperglycemia    iss  detemir  aspart        Musculoskeletal and Integument   Pustules determined by examination    Dermatology following  Pseudomonas   cipro 500 bid x7-10days per dermatology        Other   Cadmium toxicity    Chronic  No acute adverse effects (respiratory and renal)        Ely coma scale total score 3-8    Severe GBS            Prophylaxis:  Venous Thromboembolism: chemical  Stress Ulcer: H2B  Ventilator Pneumonia: yes     Activity Orders          Up with assistance starting at 06/14 0111        Full Code    Yahir Rodriguez MD  Neurocritical Care  [facility] time 30 minutes

## 2017-07-05 NOTE — PROGRESS NOTES
S: No acute events over night , no fever, patient is still non interactive.    O:   Vitals:    07/05/17 0905   BP:    Pulse: (!) 58   Resp: 16   Temp:      General: patient is non verbal,has a trach in place.  HEENT: anicteric sclerae, well injected conjunctivae  Skin: multiple erythematous papules and pustules over the lower abdomen, groin and upper thighs.           Meds:     aspirin  325 mg Per NG tube Daily    chlorhexidine  15 mL Mouth/Throat QID    folic acid-vit B6-vit B12 2.5-25-2 mg  1 tablet Per NG tube Daily    insulin detemir  20 Units Subcutaneous BID    levothyroxine  75 mcg Per NG tube Daily    metoprolol tartrate  25 mg Per OG tube BID    pantoprazole  40 mg Per NG tube Daily    polyethylene glycol  17 g Per NG tube Daily    predniSONE  60 mg Oral Daily    senna-docusate 8.6-50 mg  2 tablet Per OG tube BID    sertraline  50 mg Per NG tube Daily    warfarin  5 mg Oral Daily    white petrolatum-mineral oil   Both Eyes QHS    white petrolatum-mineral oil   Both Eyes Q8H       Assessment and Plan:     73 yo M with GBS and cadmium toxicity started to have multiple pustules and  and erythematous papules of one day duration      DDX: folliculitis vs a drug reaction vs early acute generalized pustular eruption (AGEP) vs miliaria pustulosa    Skin culture from the pustule was positive for pseudomonas aeruginosa sensitive to ciprofloxacin.  We recommend to start patient on cipro 500 mg bid x7-10 days, Cr=0.4.    Biopsy results are still pending, we will continue to follow.    Patient was discussed with Dr. Solares and plan was discussed with the primary team.    Gayle Ojeda  PGY II Dermatology

## 2017-07-05 NOTE — PLAN OF CARE
Problem: Patient Care Overview  Goal: Plan of Care Review  Outcome: Ongoing (interventions implemented as appropriate)  POC reviewed with pt and family at 1400. Pt's wife verbalized understanding. Questions and concerns addressed. No acute events today. Pt still able to wiggle chin, and look side to side when prompted. Pt progressing toward goals. Will continue to monitor. See flowsheets for full assessment and VS info.

## 2017-07-06 PROBLEM — K62.5 BRBPR (BRIGHT RED BLOOD PER RECTUM): Status: ACTIVE | Noted: 2017-07-06

## 2017-07-06 PROBLEM — I10 ESSENTIAL HYPERTENSION: Status: ACTIVE | Noted: 2017-07-06

## 2017-07-06 PROBLEM — R00.1 BRADYCARDIA: Status: ACTIVE | Noted: 2017-07-06

## 2017-07-06 PROBLEM — G70.9 ACUTE NEUROMUSCULAR RESPIRATORY FAILURE: Status: ACTIVE | Noted: 2017-06-15

## 2017-07-06 PROBLEM — J96.90 ACUTE NEUROMUSCULAR RESPIRATORY FAILURE: Status: ACTIVE | Noted: 2017-06-15

## 2017-07-06 LAB
ALBUMIN SERPL BCP-MCNC: 2.1 G/DL
ALLENS TEST: ABNORMAL
ALP SERPL-CCNC: 85 U/L
ALT SERPL W/O P-5'-P-CCNC: 94 U/L
ANION GAP SERPL CALC-SCNC: 8 MMOL/L
AST SERPL-CCNC: 39 U/L
BASOPHILS # BLD AUTO: 0 K/UL
BASOPHILS # BLD AUTO: 0.01 K/UL
BASOPHILS NFR BLD: 0 %
BASOPHILS NFR BLD: 0.1 %
BILIRUB SERPL-MCNC: 0.5 MG/DL
BUN SERPL-MCNC: 17 MG/DL
CALCIUM SERPL-MCNC: 8.1 MG/DL
CHLORIDE SERPL-SCNC: 99 MMOL/L
CO2 SERPL-SCNC: 29 MMOL/L
CREAT SERPL-MCNC: 0.4 MG/DL
DELSYS: ABNORMAL
DIFFERENTIAL METHOD: ABNORMAL
EOSINOPHIL # BLD AUTO: 0 K/UL
EOSINOPHIL NFR BLD: 0 %
ERYTHROCYTE [DISTWIDTH] IN BLOOD BY AUTOMATED COUNT: 16.3 %
ERYTHROCYTE [DISTWIDTH] IN BLOOD BY AUTOMATED COUNT: 16.3 %
ERYTHROCYTE [DISTWIDTH] IN BLOOD BY AUTOMATED COUNT: 16.4 %
ERYTHROCYTE [DISTWIDTH] IN BLOOD BY AUTOMATED COUNT: 16.4 %
ERYTHROCYTE [DISTWIDTH] IN BLOOD BY AUTOMATED COUNT: 16.5 %
ERYTHROCYTE [SEDIMENTATION RATE] IN BLOOD BY WESTERGREN METHOD: 18 MM/H
EST. GFR  (AFRICAN AMERICAN): >60 ML/MIN/1.73 M^2
EST. GFR  (NON AFRICAN AMERICAN): >60 ML/MIN/1.73 M^2
FACT X PPP CHRO-ACNC: 0.43 IU/ML
FIO2: 40
GLUCOSE SERPL-MCNC: 57 MG/DL
HCO3 UR-SCNC: 34.9 MMOL/L (ref 24–28)
HCT VFR BLD AUTO: 21.9 %
HCT VFR BLD AUTO: 23.3 %
HCT VFR BLD AUTO: 23.3 %
HCT VFR BLD AUTO: 23.7 %
HCT VFR BLD AUTO: 24.6 %
HGB BLD-MCNC: 7.1 G/DL
HGB BLD-MCNC: 7.7 G/DL
HGB BLD-MCNC: 7.8 G/DL
HGB BLD-MCNC: 7.8 G/DL
HGB BLD-MCNC: 7.9 G/DL
INR PPP: 1.4
LYMPHOCYTES # BLD AUTO: 0.5 K/UL
LYMPHOCYTES # BLD AUTO: 0.6 K/UL
LYMPHOCYTES # BLD AUTO: 0.7 K/UL
LYMPHOCYTES # BLD AUTO: 0.7 K/UL
LYMPHOCYTES # BLD AUTO: 0.9 K/UL
LYMPHOCYTES NFR BLD: 3.5 %
LYMPHOCYTES NFR BLD: 4.2 %
LYMPHOCYTES NFR BLD: 4.9 %
LYMPHOCYTES NFR BLD: 5.6 %
LYMPHOCYTES NFR BLD: 6.2 %
MAGNESIUM SERPL-MCNC: 1.7 MG/DL
MCH RBC QN AUTO: 31.7 PG
MCH RBC QN AUTO: 31.7 PG
MCH RBC QN AUTO: 31.9 PG
MCH RBC QN AUTO: 32.1 PG
MCH RBC QN AUTO: 32.2 PG
MCHC RBC AUTO-ENTMCNC: 32.1 %
MCHC RBC AUTO-ENTMCNC: 32.4 %
MCHC RBC AUTO-ENTMCNC: 32.5 %
MCHC RBC AUTO-ENTMCNC: 33.5 %
MCHC RBC AUTO-ENTMCNC: 33.5 %
MCV RBC AUTO: 96 FL
MCV RBC AUTO: 96 FL
MCV RBC AUTO: 98 FL
MCV RBC AUTO: 98 FL
MCV RBC AUTO: 99 FL
MIN VOL: 10
MODE: ABNORMAL
MONOCYTES # BLD AUTO: 0.2 K/UL
MONOCYTES # BLD AUTO: 0.3 K/UL
MONOCYTES # BLD AUTO: 0.4 K/UL
MONOCYTES NFR BLD: 1.7 %
MONOCYTES NFR BLD: 2.2 %
MONOCYTES NFR BLD: 2.5 %
MONOCYTES NFR BLD: 2.8 %
MONOCYTES NFR BLD: 3.1 %
NEUTROPHILS # BLD AUTO: 11.4 K/UL
NEUTROPHILS # BLD AUTO: 12.8 K/UL
NEUTROPHILS # BLD AUTO: 12.8 K/UL
NEUTROPHILS # BLD AUTO: 13.2 K/UL
NEUTROPHILS # BLD AUTO: 13.7 K/UL
NEUTROPHILS NFR BLD: 90.5 %
NEUTROPHILS NFR BLD: 90.6 %
NEUTROPHILS NFR BLD: 91.2 %
NEUTROPHILS NFR BLD: 92.6 %
NEUTROPHILS NFR BLD: 93.9 %
PCO2 BLDA: 41.9 MMHG (ref 35–45)
PEEP: 5
PH SMN: 7.53 [PH] (ref 7.35–7.45)
PHOSPHATE SERPL-MCNC: 2.6 MG/DL
PIP: 18
PLATELET # BLD AUTO: 178 K/UL
PLATELET # BLD AUTO: 208 K/UL
PLATELET # BLD AUTO: 208 K/UL
PLATELET # BLD AUTO: 216 K/UL
PLATELET # BLD AUTO: 217 K/UL
PMV BLD AUTO: 10.7 FL
PMV BLD AUTO: 10.8 FL
PMV BLD AUTO: 10.8 FL
PMV BLD AUTO: 10.9 FL
PMV BLD AUTO: 10.9 FL
PO2 BLDA: 106 MMHG (ref 80–100)
POC BE: 12 MMOL/L
POC SATURATED O2: 99 % (ref 95–100)
POC TCO2: 36 MMOL/L (ref 23–27)
POCT GLUCOSE: 100 MG/DL (ref 70–110)
POCT GLUCOSE: 107 MG/DL (ref 70–110)
POCT GLUCOSE: 154 MG/DL (ref 70–110)
POCT GLUCOSE: 53 MG/DL (ref 70–110)
POCT GLUCOSE: 59 MG/DL (ref 70–110)
POCT GLUCOSE: 64 MG/DL (ref 70–110)
POCT GLUCOSE: 86 MG/DL (ref 70–110)
POCT GLUCOSE: 90 MG/DL (ref 70–110)
POCT GLUCOSE: 99 MG/DL (ref 70–110)
POTASSIUM SERPL-SCNC: 3.5 MMOL/L
POTASSIUM SERPL-SCNC: 3.9 MMOL/L
PROT SERPL-MCNC: 5.1 G/DL
PROTHROMBIN TIME: 13.9 SEC
RBC # BLD AUTO: 2.24 M/UL
RBC # BLD AUTO: 2.42 M/UL
RBC # BLD AUTO: 2.43 M/UL
RBC # BLD AUTO: 2.43 M/UL
RBC # BLD AUTO: 2.48 M/UL
SAMPLE: ABNORMAL
SITE: ABNORMAL
SODIUM SERPL-SCNC: 136 MMOL/L
SP02: 100
VT: 550
WBC # BLD AUTO: 12.54 K/UL
WBC # BLD AUTO: 13.59 K/UL
WBC # BLD AUTO: 14.02 K/UL
WBC # BLD AUTO: 14.23 K/UL
WBC # BLD AUTO: 15.11 K/UL

## 2017-07-06 PROCEDURE — 94003 VENT MGMT INPAT SUBQ DAY: CPT

## 2017-07-06 PROCEDURE — 99233 SBSQ HOSP IP/OBS HIGH 50: CPT | Mod: ,,, | Performed by: PSYCHIATRY & NEUROLOGY

## 2017-07-06 PROCEDURE — 97803 MED NUTRITION INDIV SUBSEQ: CPT

## 2017-07-06 PROCEDURE — 93010 ELECTROCARDIOGRAM REPORT: CPT | Mod: ,,, | Performed by: INTERNAL MEDICINE

## 2017-07-06 PROCEDURE — 80053 COMPREHEN METABOLIC PANEL: CPT

## 2017-07-06 PROCEDURE — 63600175 PHARM REV CODE 636 W HCPCS: Performed by: PSYCHIATRY & NEUROLOGY

## 2017-07-06 PROCEDURE — 27000221 HC OXYGEN, UP TO 24 HOURS

## 2017-07-06 PROCEDURE — 25000003 PHARM REV CODE 250: Performed by: PSYCHIATRY & NEUROLOGY

## 2017-07-06 PROCEDURE — 85610 PROTHROMBIN TIME: CPT

## 2017-07-06 PROCEDURE — 93005 ELECTROCARDIOGRAM TRACING: CPT

## 2017-07-06 PROCEDURE — 99900035 HC TECH TIME PER 15 MIN (STAT)

## 2017-07-06 PROCEDURE — 25000003 PHARM REV CODE 250: Performed by: PHYSICIAN ASSISTANT

## 2017-07-06 PROCEDURE — 27200966 HC CLOSED SUCTION SYSTEM

## 2017-07-06 PROCEDURE — 20000000 HC ICU ROOM

## 2017-07-06 PROCEDURE — 85520 HEPARIN ASSAY: CPT

## 2017-07-06 PROCEDURE — 25000003 PHARM REV CODE 250: Performed by: NURSE PRACTITIONER

## 2017-07-06 PROCEDURE — 99900026 HC AIRWAY MAINTENANCE (STAT)

## 2017-07-06 PROCEDURE — 84100 ASSAY OF PHOSPHORUS: CPT

## 2017-07-06 PROCEDURE — 83735 ASSAY OF MAGNESIUM: CPT

## 2017-07-06 PROCEDURE — 84132 ASSAY OF SERUM POTASSIUM: CPT

## 2017-07-06 PROCEDURE — 94761 N-INVAS EAR/PLS OXIMETRY MLT: CPT

## 2017-07-06 PROCEDURE — 85025 COMPLETE CBC W/AUTO DIFF WBC: CPT

## 2017-07-06 RX ADMIN — HEPARIN SODIUM AND DEXTROSE 17 UNITS/KG/HR: 10000; 5 INJECTION INTRAVENOUS at 06:07

## 2017-07-06 RX ADMIN — CHLORHEXIDINE GLUCONATE 15 ML: 1.2 RINSE ORAL at 12:07

## 2017-07-06 RX ADMIN — POLYETHYLENE GLYCOL 3350 17 G: 17 POWDER, FOR SOLUTION ORAL at 08:07

## 2017-07-06 RX ADMIN — MINERAL OIL AND WHITE PETROLATUM: 150; 830 OINTMENT OPHTHALMIC at 05:07

## 2017-07-06 RX ADMIN — PIPERACILLIN AND TAZOBACTAM 4.5 G: 4; .5 INJECTION, POWDER, LYOPHILIZED, FOR SOLUTION INTRAVENOUS; PARENTERAL at 11:07

## 2017-07-06 RX ADMIN — STANDARDIZED SENNA CONCENTRATE AND DOCUSATE SODIUM 2 TABLET: 8.6; 5 TABLET, FILM COATED ORAL at 09:07

## 2017-07-06 RX ADMIN — PIPERACILLIN AND TAZOBACTAM 4.5 G: 4; .5 INJECTION, POWDER, LYOPHILIZED, FOR SOLUTION INTRAVENOUS; PARENTERAL at 08:07

## 2017-07-06 RX ADMIN — PANTOPRAZOLE SODIUM 40 MG: 40 GRANULE, DELAYED RELEASE ORAL at 08:07

## 2017-07-06 RX ADMIN — STANDARDIZED SENNA CONCENTRATE AND DOCUSATE SODIUM 2 TABLET: 8.6; 5 TABLET, FILM COATED ORAL at 08:07

## 2017-07-06 RX ADMIN — PREDNISONE 60 MG: 20 TABLET ORAL at 08:07

## 2017-07-06 RX ADMIN — CHLORHEXIDINE GLUCONATE 15 ML: 1.2 RINSE ORAL at 11:07

## 2017-07-06 RX ADMIN — MAGNESIUM OXIDE TAB 400 MG (241.3 MG ELEMENTAL MG) 800 MG: 400 (241.3 MG) TAB at 05:07

## 2017-07-06 RX ADMIN — DEXTROSE MONOHYDRATE 12.5 G: 25 INJECTION, SOLUTION INTRAVENOUS at 08:07

## 2017-07-06 RX ADMIN — DEXTROSE MONOHYDRATE 12.5 G: 25 INJECTION, SOLUTION INTRAVENOUS at 04:07

## 2017-07-06 RX ADMIN — CHLORHEXIDINE GLUCONATE 15 ML: 1.2 RINSE ORAL at 05:07

## 2017-07-06 RX ADMIN — LEVOTHYROXINE SODIUM 75 MCG: 75 TABLET ORAL at 05:07

## 2017-07-06 RX ADMIN — DEXTROSE MONOHYDRATE 12.5 G: 25 INJECTION, SOLUTION INTRAVENOUS at 12:07

## 2017-07-06 RX ADMIN — POTASSIUM CHLORIDE 40 MEQ: 20 SOLUTION ORAL at 05:07

## 2017-07-06 RX ADMIN — Medication 1 TABLET: at 08:07

## 2017-07-06 RX ADMIN — MAGNESIUM OXIDE TAB 400 MG (241.3 MG ELEMENTAL MG) 800 MG: 400 (241.3 MG) TAB at 08:07

## 2017-07-06 RX ADMIN — POTASSIUM & SODIUM PHOSPHATES POWDER PACK 280-160-250 MG 2 PACKET: 280-160-250 PACK at 08:07

## 2017-07-06 RX ADMIN — HYDRALAZINE HYDROCHLORIDE 10 MG: 20 INJECTION INTRAMUSCULAR; INTRAVENOUS at 05:07

## 2017-07-06 RX ADMIN — SERTRALINE HYDROCHLORIDE 50 MG: 50 TABLET ORAL at 09:07

## 2017-07-06 RX ADMIN — MINERAL OIL AND WHITE PETROLATUM: 150; 830 OINTMENT OPHTHALMIC at 01:07

## 2017-07-06 RX ADMIN — PANTOPRAZOLE SODIUM 40 MG: 40 GRANULE, DELAYED RELEASE ORAL at 09:07

## 2017-07-06 RX ADMIN — MINERAL OIL AND WHITE PETROLATUM: 150; 830 OINTMENT OPHTHALMIC at 09:07

## 2017-07-06 RX ADMIN — POTASSIUM & SODIUM PHOSPHATES POWDER PACK 280-160-250 MG 2 PACKET: 280-160-250 PACK at 05:07

## 2017-07-06 NOTE — PROGRESS NOTES
Progress Note  Neurocritical Care    Admit Date: 6/14/2017  Service Date: 07/06/2017   Length of Stay: 22    Chief Complaint: Guillain-[place] syndrome    History of Present Illness: 72 y man who presents with rapidly progressive ascending paralysis. Found GBS. Past CAD, skin cancer, hypothyroidism. Incidental brain aneurysms.     Hospital Course: 6/7-6/12 IVIG  6/15 transfer to [facility]  6/16 NSGY consulted for lesion on MR  6/18 repeat LP with high protein  6/22 CTA multiple aneurysms  6/23 EMG diffuse sensorineural polyneuropathy  6/23 RUE DVT-> heparin drip  6/28 runs of VT  6/29 trach/PEG     Interval History: Some rectal bleeding. Stable HGB. Bradycardia. Family refuses ciprofloxacin as concern it contributed to GBS previously.     Review of Systems: Unable to obtain a complete ROS due to level of consciousness, paralysis.     Vitals:   Temp: 98 °F (36.7 °C) (07/06/17 0705)  Pulse: (!) 56 (07/06/17 0805)  Resp: 18 (07/06/17 0805)  BP: 120/61 (07/06/17 0705)  SpO2: 100 % (07/06/17 0805)    Temp:  [97.7 °F (36.5 °C)-98.2 °F (36.8 °C)] 98 °F (36.7 °C)  Pulse:  [] 56  Resp:  [0-18] 18  SpO2:  [100 %] 100 %  BP: (120-170)/(61-71) 120/61  Arterial Line BP: (115-168)/(56-75) 115/60    Vent Mode: A/C  Oxygen Concentration (%):  [40] 40  Resp Rate Total:  [16 br/min-18 br/min] 18 br/min  Vt Set:  [550 mL] 550 mL  PEEP/CPAP:  [5 cmH20] 5 cmH20  Pressure Support:  [0 cmH20] 0 cmH20  Mean Airway Pressure:  [9 cmH20-9.8 cmH20] 9.3 cmH20    07/05 0701 - 07/06 0700  In: 3796.6 [I.V.:2511.6]  Out: 2730 [Urine:2730]     Examination:   Constitutional: Well-nourished and -developed. No apparent distress.   Eyes: Conjunctiva clear, anicteric, + scleral edema. Lids no lesions.  Head/Ears/Nose/Mouth/Throat/Neck: Moist mucous membranes. External ears, nose atraumatic. Mouth open.  Cardiovascular: Regular rhythm. No murmurs. + leg edema.  Respiratory: Comfortable respirations. Clear to auscultation.  Gastrointestinal: No hernia.  Soft, nondistended, nontender. + bowel sounds.    Neurologic:  -GCS [id number]  -Does not open eyes to pain. On request can barely move eyes to L >R.  -Cranial nerves impaired, particularly pupils reactive, no corneals, minimal L gaze to command but no oculocephalics, facial diplegia, no gag.  -Motor no movement, flaccid quadriplegia.  -Sensation absent.  -Reflexes absent in arms, legs.    Medications:   Continuous  sodium chloride 0.9% Last Rate: 75 mL/hr at 07/06/17 0500   heparin (porcine) in D5W Last Rate: 17 Units/kg/hr (07/06/17 0601)   Scheduled  aspirin 325 mg Daily   chlorhexidine 15 mL QID   ciprofloxacin HCl 500 mg Q12H   folic acid-vit B6-vit B12 2.5-25-2 mg 1 tablet Daily   insulin detemir 20 Units BID   levothyroxine 75 mcg Daily   lisinopril 20 mg Daily   metoprolol tartrate 25 mg BID   pantoprazole 40 mg BID   polyethylene glycol 17 g Daily   predniSONE 60 mg Daily   senna-docusate 8.6-50 mg 2 tablet BID   sertraline 50 mg Daily   warfarin 5 mg Daily   white petrolatum-mineral oil  QHS   white petrolatum-mineral oil  Q8H   PRN  sodium chloride  Q24H PRN   sodium chloride  Q24H PRN   sodium chloride  Q24H PRN   acetaminophen 650 mg Q6H PRN   dextrose 50% 12.5 g PRN   fentaNYL 200 mcg On Call Procedure   fentaNYL 25 mcg Q2H PRN   glucagon (human recombinant) 1 mg PRN   heparin, porcine (PF) 1,000 Units PRN   hydrALAZINE 10 mg Q6H PRN   insulin aspart 1-10 Units Q4H PRN   magnesium oxide 800 mg PRN   magnesium oxide 800 mg PRN   midazolam (PF) 6 mg On Call Procedure   ondansetron 4 mg Q8H PRN   potassium chloride 10% 40 mEq PRN   potassium chloride 10% 40 mEq PRN   potassium chloride 10% 60 mEq PRN   potassium, sodium phosphates 2 packet PRN   potassium, sodium phosphates 2 packet PRN   potassium, sodium phosphates 2 packet PRN   rocuronium 50 mg On Call Procedure      Today I independently reviewed pertinent medications, lines/drains/airways, imaging, lab results, microbiology results, notably:      HGB  7.1, 7.53/42/106/12, Na 136    Sputum, skin cultures Pseudomonas    Assessment/Plan:  Neuro   * Guillain-[place] syndrome    -post IVIG, PLEX, and steroids (indication was unclear)  -post trach/PEG  -plan repeat IVIG 0.4 mg/kg IV x5 d on 7/10  -stop prednisone  -sertraline        Flaccid quadriplegia    -see GBS        Pulmonary   Acute neuromuscular respiratory failure    -from GBS  -remains severe with total ventilator dependence        Cardiac   Essential hypertension    -MAP >65  -lisinopril        Right subclavian vein thrombosis    -heparin-> warfarin        Hem/Onc    -HGB >7        Fluids/Electrolytes/Nutrition/GI    -TF  -stop NS        BRBPR (bright red blood per rectum)    -monitor, currently no need to reverse anticoagulation or consider colonoscopy        Hyperglycemia    -detemir, ISS        Musculoskeletal and Integument   Pustules determined by examination    -change from ciprofloxacin to Zosyn for Pseudomonas in sputum, skin x7 d        Other   Bradycardia    -ECG, hold metoprolol        Juan coma scale total score 3-8    -see GBS          Prophylaxis:  Venous Thromboembolism: mechanical chemical  Stress Ulcer: PPI  Ventilator Pneumonia: yes     Activity Orders          Up with assistance starting at 06/14 0111        Full Code      ADDENDUM  Patient had hematochezia. On reevaluation, stop warfarin and heparin but do not acutely reverse at this time. If BP drops, HGB drops, or hematochezia worsens, may need to reverse with vitamin K, FFP, and consult GI for EGD v colonoscopy.

## 2017-07-06 NOTE — ASSESSMENT & PLAN NOTE
-post IVIG, PLEX, and steroids (indication was unclear)  -post trach/PEG 6/29  -plan repeat IVIG 0.4 mg/kg IV x5 d on 7/10  -stopped prednisone  -sertraline 50mg daily  -6/23 EMG - diffuse sensorial polyneuropathy

## 2017-07-06 NOTE — PLAN OF CARE
SW attempted to meet with Pt family at bedside. MD/RN staff in room. SW will try again tomorrow in order to update Pt on LTAC issues and placement progression.    Deisi Jeffery LMSW  Neurocritical Care   [facility]  [id number]

## 2017-07-06 NOTE — PROGRESS NOTES
[facility]  Adult Nutrition  Progress Note    SUMMARY     Recommendations    Recommendation/Intervention:   1. Resume TF once medically able - Glucerna 1.5 at 55mL/hr.   2. If BG remains WNL or low, recommend modifying to Isosource 1.5, goal rate of 55mL/hr.   3. If unable to resume TF, may consider TPN. If warranted, recommend 5/15 at 85mL/hr with 250mL 20% IVFE daily.    -Will provide 1948kcal, 102g protein, and 2040mL fluid.   Will monitor.   Goals: Pt to tolerate >90% EEN and EPN via TF  Nutrition Goal Status: goal not met  Communication of RD Recs: reviewed with RN    Reason for Assessment    Reason for Assessment: RD follow-up  Diagnosis: other (see comments) (acute ascending paralysis, sphenoid mass)  Relevent Medical History: CHARLES, CAD, skin cancer, HLP     Nutrition Discharge Planning: unable to determine at this time.     Nutrition Prescription Ordered    Current Diet Order: NPO  Nutrition Order Comments: TF on hold  Current Nutrition Support Formula Ordered: Glucerna 1.5  Current Nutrition Support Rate Ordered: 55 (ml)  Current Nutrition Support Frequency Ordered: mL/hr    Evaluation of Received Nutrients/Fluid Intake    Enteral Calories (kcal): 1980  Enteral Protein (gm): 109  Enteral (Free Water) Fluid (mL): 1002  Tolerance: TF held  % Intake of Estimated Energy Needs: 0 - 25 %  % Meal Intake: NPO     Nutrition Risk Screen     Nutrition Risk Screen: no indicators present    Nutrition/Diet History    Typical Food/Fluid Intake: Pt s/p trach/PEG placement. Still on vent. TF held at this time.   Factors Affecting Nutritional Intake: NPO, on mechanical ventilation    Labs/Tests/Procedures/Meds    Pertinent Labs Reviewed: reviewed  Pertinent Labs Comments: Glu 57, Ca 8.1, P 2.6, Alb 2.1  Pertinent Medications Reviewed: reviewed  Pertinent Medications Comments: folic acid-B6-B12, insulin, prednisone, coumadin    Physical Findings    Overall Physical Appearance: overweight, on ventilator  "support  Tubes: gastrostomy tube  Skin: intact    Anthropometrics    Temp: 98 °F (36.7 °C)  Height: 6' 1" (185.4 cm)  Weight Method: Bed Scale  Weight: 95.7 kg (210 lb 15.7 oz)  Ideal Body Weight (IBW), Male: 184 lb  % Ideal Body Weight, Male (lb): 114.66   BMI (Calculated): 27.9  BMI Grade: 25 - 29.9 - overweight    Estimated/Assessed Needs    Weight Used For Calorie Calculations: 86.4 kg (190 lb 7.6 oz)   Energy Calorie Requirements (kcal): 1950  Energy Need Method: [facility]  Weight Used For Protein Calculations: 86.4 kg (190 lb 7.6 oz)  Protein Requirements: 104-130g (1.2-1.5g/kg)  Fluid Need Method: RDA Method    Assessment and Plan    Nutr dx/problem: Inadequate energy intake  Related to/etiology: inability to consume sufficient energy  As evidenced by: NPO requiring alternative means of nutrition.  Status: continues    Monitor and Evaluation    Food and Nutrient Intake: enteral nutrition intake  Food and Nutrient Adminstration: enteral and parenteral nutrition administration  Anthropometric Measurements: weight, weight change, body mass index  Biochemical Data, Medical Tests and Procedures: electrolyte and renal panel, gastrointestinal profile, glucose/endocrine profile, inflammatory profile, lipid profile  Nutrition-Focused Physical Findings: overall appearance    Nutrition Risk    Level of Risk: other (see comments) (2X/week)    Nutrition Follow-Up    RD Follow-up?: Yes  "

## 2017-07-06 NOTE — PLAN OF CARE
Problem: Patient Care Overview  Goal: Plan of Care Review  Outcome: Ongoing (interventions implemented as appropriate)  POC reviewed with pt and family at 1400. Spouse verbalized understanding. Questions and concerns addressed. Bloody bowel movements X2.  Heparin drip stopped and warfarin d/c.  Continue to monitor H&H. Pt progressing toward goals. Will continue to monitor. See flowsheets for full assessment and VS info.

## 2017-07-06 NOTE — PLAN OF CARE
TONE reached out to [facility] regarding Pt need for either IVIG or Plasma exchange in two-three weeks. Left message. Reached out to Darlin with [facility] regarding their ability to administer these treatments, waiting on her to check. Reached out to Dolly with [facility] regarding this issue. Left message.     TONE contacted Gloria with [facility] ([phone], option 2, ext [phone]) regarding her questions about intubation date, weaning trails, and vent expectations. TONE reported she would work with  to answer these questions with documentation. Reported INR has not been theraputic- MD to report on this today and Pt may be ready to leave tomorrow. Also advised waiting on three facilities to respond about IVIG vs. Plasma exchange.     TONE received answers for [facility] from [facility] and contacted [facility] to report. She stated that she will be meeting with the medical director today to go through the case and determine if Pt will receive auth. She will contact this SW with the determination. TONE asked about the possibly of the faciltiy receiving payment for the IVIG or Plasma exchanges. She asked supervisor who reported they do not auth carve outs (i.e. No).     Deisi Jeffery, LENORA  Neurocritical Care   [facility]  [id number]

## 2017-07-06 NOTE — NURSING
John HODGES notified of bloody Bm.  Notified RN to send a stat H&H.  Notified that a stat CBC is pending. Awaiting new orders.

## 2017-07-06 NOTE — PROGRESS NOTES
Per day shift, pt had several bloody bowel movements throughout the day with no pertinent intervention from NCC team. Pt had another bowel movement that had bright red streaks in it with some darker red clots. NCC notified. Instructed by TRACIE Jaeger to stop tube feeds, obtain H & H and change frequency of protonix to every 12 hrs twice daily. Pt still remains on a heparin gtt at this time. Will continue to monitor and update as necessary.

## 2017-07-06 NOTE — NURSING
Rita HODGES notified of blood tinged stool from recent bowel movement.  No new orders at this time.  VSS, will continue to monitor.

## 2017-07-06 NOTE — PLAN OF CARE
Problem: Patient Care Overview  Goal: Plan of Care Review  Outcome: Ongoing (interventions implemented as appropriate)  No acute events occurred throughout the shift. See flowsheets for assessments and VS. Plan of care reviewed with Rosalio Cam and Rosalio Cam's family. All questions answered in turn. Pt progressing towards goals as noted. Pt still remains on a heparin gtt and normal saline. Will continue to monitor.

## 2017-07-06 NOTE — NURSING
Rita HODGES notified of sustained HR in the 40s (42-48) with slight deviation in blood pressure.  MAPs continue to be sustained above 65.  EKG ordered.  Will continue to monitor.

## 2017-07-06 NOTE — PLAN OF CARE
07/06/17 0722   Discharge Reassessment   Assessment Type Discharge Planning Reassessment   Can the patient answer the patient profile reliably? No, cognitively impaired   How does the patient rate their overall health at the present time? (gregor)   Describe the patient's ability to walk at the present time. Does not walk or unable to take any steps at all   How often would a person be available to care for the patient? Whenever needed   Number of comorbid conditions (as recorded on the chart) Two   During the past month, has the patient often been bothered by feeling down, depressed or hopeless? (gregor)   During the past month, has the patient often been bothered by little interest or pleasure in doing things? (gregor)   Discharge plan remains the same: Yes   Provided patient/caregiver education on the expected discharge date and the discharge plan No   Discharge Plan A Long-term acute care facility (LTAC)   Discharge Plan B Skilled Nursing Facility   Change in patient condition or support system No   Patient choice form signed by patient/caregiver N/A   Explained to the the patient/caregiver why the discharge planned changed: No   Involved the patient/caregiver in establishing a new discharge plan: No       Discharge plan LTAC pending therapeutic INR.    Shauna Contreras   [id number]

## 2017-07-06 NOTE — NURSING
Kevin KHAN at bedside due to recent report of blood stools.  Informed RN to stop heparin drip and hold afternoon dose of warfarin. Will continue to monitor.

## 2017-07-07 PROBLEM — K92.1 HEMATOCHEZIA: Status: ACTIVE | Noted: 2017-07-06

## 2017-07-07 LAB
ALBUMIN SERPL BCP-MCNC: 2 G/DL
ALLENS TEST: ABNORMAL
ALP SERPL-CCNC: 86 U/L
ALT SERPL W/O P-5'-P-CCNC: 86 U/L
ANION GAP SERPL CALC-SCNC: 7 MMOL/L
AST SERPL-CCNC: 39 U/L
BASOPHILS # BLD AUTO: 0 K/UL
BASOPHILS # BLD AUTO: 0.01 K/UL
BASOPHILS NFR BLD: 0 %
BASOPHILS NFR BLD: 0.1 %
BILIRUB SERPL-MCNC: 0.5 MG/DL
BUN SERPL-MCNC: 18 MG/DL
CALCIUM SERPL-MCNC: 8.1 MG/DL
CHLORIDE SERPL-SCNC: 100 MMOL/L
CO2 SERPL-SCNC: 29 MMOL/L
CREAT SERPL-MCNC: 0.4 MG/DL
DELSYS: ABNORMAL
DIFFERENTIAL METHOD: ABNORMAL
DIFFERENTIAL METHOD: ABNORMAL
EOSINOPHIL # BLD AUTO: 0 K/UL
EOSINOPHIL # BLD AUTO: 0 K/UL
EOSINOPHIL NFR BLD: 0 %
EOSINOPHIL NFR BLD: 0 %
ERYTHROCYTE [DISTWIDTH] IN BLOOD BY AUTOMATED COUNT: 16.8 %
ERYTHROCYTE [DISTWIDTH] IN BLOOD BY AUTOMATED COUNT: 17 %
ERYTHROCYTE [SEDIMENTATION RATE] IN BLOOD BY WESTERGREN METHOD: 18 MM/H
EST. GFR  (AFRICAN AMERICAN): >60 ML/MIN/1.73 M^2
EST. GFR  (NON AFRICAN AMERICAN): >60 ML/MIN/1.73 M^2
FIO2: 40
GLUCOSE SERPL-MCNC: 107 MG/DL
HCO3 UR-SCNC: 33.3 MMOL/L (ref 24–28)
HCT VFR BLD AUTO: 21.9 %
HCT VFR BLD AUTO: 22.6 %
HGB BLD-MCNC: 7.2 G/DL
HGB BLD-MCNC: 7.5 G/DL
INR PPP: 1.5
LYMPHOCYTES # BLD AUTO: 0.6 K/UL
LYMPHOCYTES # BLD AUTO: 0.7 K/UL
LYMPHOCYTES NFR BLD: 3.5 %
LYMPHOCYTES NFR BLD: 5 %
MAGNESIUM SERPL-MCNC: 1.8 MG/DL
MCH RBC QN AUTO: 31.9 PG
MCH RBC QN AUTO: 32.5 PG
MCHC RBC AUTO-ENTMCNC: 32.9 %
MCHC RBC AUTO-ENTMCNC: 33.2 %
MCV RBC AUTO: 97 FL
MCV RBC AUTO: 98 FL
MODE: ABNORMAL
MONOCYTES # BLD AUTO: 0.4 K/UL
MONOCYTES # BLD AUTO: 0.5 K/UL
MONOCYTES NFR BLD: 2.9 %
MONOCYTES NFR BLD: 3.2 %
NEUTROPHILS # BLD AUTO: 12.2 K/UL
NEUTROPHILS # BLD AUTO: 15.6 K/UL
NEUTROPHILS NFR BLD: 91.3 %
NEUTROPHILS NFR BLD: 92.3 %
PCO2 BLDA: 42.8 MMHG (ref 35–45)
PEEP: 5
PH SMN: 7.5 [PH] (ref 7.35–7.45)
PHOSPHATE SERPL-MCNC: 3.7 MG/DL
PLATELET # BLD AUTO: 227 K/UL
PLATELET # BLD AUTO: 236 K/UL
PMV BLD AUTO: 10.6 FL
PMV BLD AUTO: 11.3 FL
PO2 BLDA: 105 MMHG (ref 80–100)
POC BE: 10 MMOL/L
POC SATURATED O2: 98 % (ref 95–100)
POC TCO2: 35 MMOL/L (ref 23–27)
POCT GLUCOSE: 109 MG/DL (ref 70–110)
POCT GLUCOSE: 117 MG/DL (ref 70–110)
POCT GLUCOSE: 120 MG/DL (ref 70–110)
POTASSIUM SERPL-SCNC: 4 MMOL/L
PROT SERPL-MCNC: 5 G/DL
PROTHROMBIN TIME: 15.1 SEC
RBC # BLD AUTO: 2.26 M/UL
RBC # BLD AUTO: 2.31 M/UL
SAMPLE: ABNORMAL
SITE: ABNORMAL
SODIUM SERPL-SCNC: 136 MMOL/L
SP02: 100
VT: 550
WBC # BLD AUTO: 13.33 K/UL
WBC # BLD AUTO: 16.93 K/UL

## 2017-07-07 PROCEDURE — 20000000 HC ICU ROOM

## 2017-07-07 PROCEDURE — 27000221 HC OXYGEN, UP TO 24 HOURS

## 2017-07-07 PROCEDURE — 25000003 PHARM REV CODE 250: Performed by: NURSE PRACTITIONER

## 2017-07-07 PROCEDURE — 85025 COMPLETE CBC W/AUTO DIFF WBC: CPT | Mod: 91

## 2017-07-07 PROCEDURE — 84100 ASSAY OF PHOSPHORUS: CPT

## 2017-07-07 PROCEDURE — 82803 BLOOD GASES ANY COMBINATION: CPT

## 2017-07-07 PROCEDURE — 25000003 PHARM REV CODE 250: Performed by: PHYSICIAN ASSISTANT

## 2017-07-07 PROCEDURE — 27200966 HC CLOSED SUCTION SYSTEM

## 2017-07-07 PROCEDURE — 99233 SBSQ HOSP IP/OBS HIGH 50: CPT | Mod: ,,, | Performed by: PSYCHIATRY & NEUROLOGY

## 2017-07-07 PROCEDURE — 83735 ASSAY OF MAGNESIUM: CPT

## 2017-07-07 PROCEDURE — 94003 VENT MGMT INPAT SUBQ DAY: CPT

## 2017-07-07 PROCEDURE — 85610 PROTHROMBIN TIME: CPT

## 2017-07-07 PROCEDURE — 80053 COMPREHEN METABOLIC PANEL: CPT

## 2017-07-07 PROCEDURE — 37799 UNLISTED PX VASCULAR SURGERY: CPT

## 2017-07-07 PROCEDURE — 25000003 PHARM REV CODE 250: Performed by: PSYCHIATRY & NEUROLOGY

## 2017-07-07 PROCEDURE — 99900035 HC TECH TIME PER 15 MIN (STAT)

## 2017-07-07 PROCEDURE — 99900026 HC AIRWAY MAINTENANCE (STAT)

## 2017-07-07 PROCEDURE — 63600175 PHARM REV CODE 636 W HCPCS: Performed by: PSYCHIATRY & NEUROLOGY

## 2017-07-07 PROCEDURE — 63600175 PHARM REV CODE 636 W HCPCS: Performed by: PHYSICIAN ASSISTANT

## 2017-07-07 RX ORDER — LEVOTHYROXINE SODIUM 75 UG/1
75 TABLET ORAL DAILY
Status: DISCONTINUED | OUTPATIENT
Start: 2017-07-08 | End: 2017-07-14 | Stop reason: HOSPADM

## 2017-07-07 RX ORDER — AMOXICILLIN 250 MG
2 CAPSULE ORAL 2 TIMES DAILY
Status: DISCONTINUED | OUTPATIENT
Start: 2017-07-07 | End: 2017-07-14 | Stop reason: HOSPADM

## 2017-07-07 RX ORDER — POTASSIUM CHLORIDE 20 MEQ/15ML
40 SOLUTION ORAL
Status: DISCONTINUED | OUTPATIENT
Start: 2017-07-07 | End: 2017-07-14 | Stop reason: HOSPADM

## 2017-07-07 RX ORDER — LANOLIN ALCOHOL/MO/W.PET/CERES
800 CREAM (GRAM) TOPICAL
Status: DISCONTINUED | OUTPATIENT
Start: 2017-07-07 | End: 2017-07-14 | Stop reason: HOSPADM

## 2017-07-07 RX ORDER — SODIUM,POTASSIUM PHOSPHATES 280-250MG
2 POWDER IN PACKET (EA) ORAL
Status: DISCONTINUED | OUTPATIENT
Start: 2017-07-07 | End: 2017-07-14 | Stop reason: HOSPADM

## 2017-07-07 RX ORDER — POTASSIUM CHLORIDE 20 MEQ/15ML
60 SOLUTION ORAL
Status: DISCONTINUED | OUTPATIENT
Start: 2017-07-07 | End: 2017-07-14 | Stop reason: HOSPADM

## 2017-07-07 RX ORDER — PANTOPRAZOLE SODIUM 40 MG/1
40 FOR SUSPENSION ORAL 2 TIMES DAILY
Status: DISCONTINUED | OUTPATIENT
Start: 2017-07-07 | End: 2017-07-14 | Stop reason: HOSPADM

## 2017-07-07 RX ORDER — ACETAMINOPHEN 325 MG/1
650 TABLET ORAL EVERY 6 HOURS PRN
Status: DISCONTINUED | OUTPATIENT
Start: 2017-07-07 | End: 2017-07-14 | Stop reason: HOSPADM

## 2017-07-07 RX ORDER — POLYETHYLENE GLYCOL 3350 17 G/17G
17 POWDER, FOR SOLUTION ORAL DAILY
Status: DISCONTINUED | OUTPATIENT
Start: 2017-07-08 | End: 2017-07-14 | Stop reason: HOSPADM

## 2017-07-07 RX ORDER — ENOXAPARIN SODIUM 100 MG/ML
40 INJECTION SUBCUTANEOUS
Status: DISCONTINUED | OUTPATIENT
Start: 2017-07-07 | End: 2017-07-14 | Stop reason: HOSPADM

## 2017-07-07 RX ORDER — LISINOPRIL 20 MG/1
40 TABLET ORAL DAILY
Status: DISCONTINUED | OUTPATIENT
Start: 2017-07-08 | End: 2017-07-07

## 2017-07-07 RX ORDER — LISINOPRIL 20 MG/1
20 TABLET ORAL ONCE
Status: COMPLETED | OUTPATIENT
Start: 2017-07-07 | End: 2017-07-07

## 2017-07-07 RX ORDER — LISINOPRIL 20 MG/1
40 TABLET ORAL DAILY
Status: DISCONTINUED | OUTPATIENT
Start: 2017-07-08 | End: 2017-07-14 | Stop reason: HOSPADM

## 2017-07-07 RX ORDER — SERTRALINE HYDROCHLORIDE 50 MG/1
50 TABLET, FILM COATED ORAL DAILY
Status: DISCONTINUED | OUTPATIENT
Start: 2017-07-08 | End: 2017-07-14 | Stop reason: HOSPADM

## 2017-07-07 RX ADMIN — POLYETHYLENE GLYCOL 3350 17 G: 17 POWDER, FOR SOLUTION ORAL at 08:07

## 2017-07-07 RX ADMIN — SERTRALINE HYDROCHLORIDE 50 MG: 50 TABLET ORAL at 08:07

## 2017-07-07 RX ADMIN — PIPERACILLIN AND TAZOBACTAM 4.5 G: 4; .5 INJECTION, POWDER, LYOPHILIZED, FOR SOLUTION INTRAVENOUS; PARENTERAL at 04:07

## 2017-07-07 RX ADMIN — STANDARDIZED SENNA CONCENTRATE AND DOCUSATE SODIUM 2 TABLET: 8.6; 5 TABLET, FILM COATED ORAL at 09:07

## 2017-07-07 RX ADMIN — CHLORHEXIDINE GLUCONATE 15 ML: 1.2 RINSE ORAL at 12:07

## 2017-07-07 RX ADMIN — ACETAMINOPHEN 650 MG: 325 TABLET ORAL at 04:07

## 2017-07-07 RX ADMIN — CHLORHEXIDINE GLUCONATE 15 ML: 1.2 RINSE ORAL at 11:07

## 2017-07-07 RX ADMIN — PANTOPRAZOLE SODIUM 40 MG: 40 GRANULE, DELAYED RELEASE ORAL at 08:07

## 2017-07-07 RX ADMIN — PIPERACILLIN AND TAZOBACTAM 4.5 G: 4; .5 INJECTION, POWDER, LYOPHILIZED, FOR SOLUTION INTRAVENOUS; PARENTERAL at 11:07

## 2017-07-07 RX ADMIN — STANDARDIZED SENNA CONCENTRATE AND DOCUSATE SODIUM 2 TABLET: 8.6; 5 TABLET, FILM COATED ORAL at 08:07

## 2017-07-07 RX ADMIN — CHLORHEXIDINE GLUCONATE 15 ML: 1.2 RINSE ORAL at 05:07

## 2017-07-07 RX ADMIN — MINERAL OIL AND WHITE PETROLATUM: 150; 830 OINTMENT OPHTHALMIC at 09:07

## 2017-07-07 RX ADMIN — ENOXAPARIN SODIUM 40 MG: 100 INJECTION SUBCUTANEOUS at 11:07

## 2017-07-07 RX ADMIN — Medication 1 TABLET: at 08:07

## 2017-07-07 RX ADMIN — MINERAL OIL AND WHITE PETROLATUM: 150; 830 OINTMENT OPHTHALMIC at 02:07

## 2017-07-07 RX ADMIN — LEVOTHYROXINE SODIUM 75 MCG: 75 TABLET ORAL at 05:07

## 2017-07-07 RX ADMIN — LISINOPRIL 20 MG: 20 TABLET ORAL at 08:07

## 2017-07-07 RX ADMIN — FENTANYL CITRATE 25 MCG: 50 INJECTION INTRAMUSCULAR; INTRAVENOUS at 04:07

## 2017-07-07 RX ADMIN — MINERAL OIL AND WHITE PETROLATUM: 150; 830 OINTMENT OPHTHALMIC at 05:07

## 2017-07-07 RX ADMIN — HYDRALAZINE HYDROCHLORIDE 10 MG: 20 INJECTION INTRAMUSCULAR; INTRAVENOUS at 08:07

## 2017-07-07 RX ADMIN — INSULIN DETEMIR 10 UNITS: 100 INJECTION, SOLUTION SUBCUTANEOUS at 09:07

## 2017-07-07 RX ADMIN — HYDRALAZINE HYDROCHLORIDE 10 MG: 20 INJECTION INTRAMUSCULAR; INTRAVENOUS at 11:07

## 2017-07-07 RX ADMIN — PIPERACILLIN AND TAZOBACTAM 4.5 G: 4; .5 INJECTION, POWDER, LYOPHILIZED, FOR SOLUTION INTRAVENOUS; PARENTERAL at 08:07

## 2017-07-07 RX ADMIN — PANTOPRAZOLE SODIUM 40 MG: 40 GRANULE, DELAYED RELEASE ORAL at 09:07

## 2017-07-07 RX ADMIN — LISINOPRIL 20 MG: 20 TABLET ORAL at 11:07

## 2017-07-07 NOTE — PROGRESS NOTES
Progress Note  Neurocritical Care    Admit Date: 6/14/2017  Service Date: 07/07/2017   Length of Stay: 23    Chief Complaint: Guillain-Hendricks syndrome    History of Present Illness: 72 y man who presents with rapidly progressive ascending paralysis. Found GBS. Past CAD, skin cancer, hypothyroidism. Incidental brain aneurysms.     Hospital Course: 6/7-6/12 IVIG  6/15 transfer to [facility]  6/16 NSGY consulted for lesion on MR  6/18 repeat LP with high protein  6/22 CTA multiple aneurysms  6/23 EMG diffuse sensorineural polyneuropathy  6/23 RUE DVT-> heparin drip  6/28 runs of VT  6/29 trach/PEG  7/6 hematochezia     Interval History: Hematochezia-> held heparin, warfarin. No additional maroon stools overnight. Changed ciprofloxacin to Zosyn. Discussed future treatment plans with IVIG with family.    Review of Systems: Unable to obtain a complete ROS due to level of consciousness.     Vitals:   Temp: 98.5 °F (36.9 °C) (07/07/17 0705)  Pulse: 84 (07/07/17 0805)  Resp: 18 (07/07/17 0805)  BP: (!) 161/74 (07/07/17 0805)  SpO2: 100 % (07/07/17 0805)    Temp:  [93 °F (33.9 °C)-99.8 °F (37.7 °C)] 98.5 °F (36.9 °C)  Pulse:  [43-98] 84  Resp:  [18] 18  SpO2:  [100 %] 100 %  BP: (144-180)/(64-84) 161/74  Arterial Line BP: (112-184)/(51-84) 159/69    Vent Mode: A/C  Oxygen Concentration (%):  [40] 40  Resp Rate Total:  [18 br/min] 18 br/min  Vt Set:  [550 mL] 550 mL  PEEP/CPAP:  [5 cmH20] 5 cmH20  Pressure Support:  [0 cmH20] 0 cmH20  Mean Airway Pressure:  [9.1 cmH20-9.6 cmH20] 9.2 cmH20    07/06 0701 - 07/07 0700  In: 1822.1 [I.V.:652.1]  Out: 2635 [Urine:2635]     Examination:   Constitutional: Well-nourished and -developed. No apparent distress.   Eyes: Conjunctiva clear, anicteric, + scleral edema. Lids no lesions.  Head/Ears/Nose/Mouth/Throat/Neck: Moist mucous membranes. External ears, nose atraumatic. Mouth open.  Cardiovascular: Regular rhythm. No murmurs. + leg edema.  Respiratory: Comfortable respirations. Clear to  auscultation.  Gastrointestinal: No hernia. Soft, nondistended, nontender. + bowel sounds.     Neurologic:  -GCS [id number]  -Does not open eyes to pain. On request moves eyes and jaw slightly.  -Cranial nerves impaired, particularly pupils reactive, no corneals, L eye moves more than R but able to move slightly in all directions, facial diplegia, no gag.  -Motor no movement, flaccid quadriplegia.  -Sensation absent.  -Reflexes absent in arms, legs.    Medications:   Scheduled  chlorhexidine 15 mL QID   folic acid-vit B6-vit B12 2.5-25-2 mg 1 tablet Daily   insulin detemir 20 Units BID   levothyroxine 75 mcg Daily   lisinopril 20 mg Daily   pantoprazole 40 mg BID   piperacillin-tazobactam 4.5 g in dextrose 5 % 100 mL IVPB (ready to mix system) 4.5 g Q8H   polyethylene glycol 17 g Daily   senna-docusate 8.6-50 mg 2 tablet BID   sertraline 50 mg Daily   white petrolatum-mineral oil  Q8H   PRN  sodium chloride  Q24H PRN   sodium chloride  Q24H PRN   sodium chloride  Q24H PRN   acetaminophen 650 mg Q6H PRN   dextrose 50% 12.5 g PRN   fentaNYL 200 mcg On Call Procedure   fentaNYL 25 mcg Q2H PRN   glucagon (human recombinant) 1 mg PRN   heparin, porcine (PF) 1,000 Units PRN   hydrALAZINE 10 mg Q6H PRN   insulin aspart 1-10 Units Q4H PRN   magnesium oxide 800 mg PRN   magnesium oxide 800 mg PRN   midazolam (PF) 6 mg On Call Procedure   ondansetron 4 mg Q8H PRN   potassium chloride 10% 40 mEq PRN   potassium chloride 10% 40 mEq PRN   potassium chloride 10% 60 mEq PRN   potassium, sodium phosphates 2 packet PRN   potassium, sodium phosphates 2 packet PRN   potassium, sodium phosphates 2 packet PRN   rocuronium 50 mg On Call Procedure      Today I independently reviewed pertinent medications, lines/drains/airways, lab results, microbiology results, notably:      W 13, HGB 7.2, INR 1.5, BUN/Cr 18/0.4    Assessment/Plan:  Neuro   * Guillain-Fannin syndrome    -post IVIG, PLEX, and steroids (indication was unclear)  -post  trach/PEG  -plan repeat IVIG 0.4 mg/kg IV x5 d on 7/10  -stopped prednisone  -sertraline        Flaccid quadriplegia    -see GBS        Pulmonary   Acute neuromuscular respiratory failure    -from GBS  -remains severe with total ventilator dependence        Cardiac   Essential hypertension    -MAP >65  -increase lisinopril to 40 mg        Right subclavian vein thrombosis    -on balance, hold anticoagulation given hematochezia        Hem/Onc    -HGB >7        Fluids/Electrolytes/Nutrition/GI    -TF  -stop NS        Hematochezia    -monitor  -as outpatient may consider colonoscopy        Hyperglycemia    -decrease detemir to 10 units q12h as BG improving off prednisone        Musculoskeletal and Integument   Pustules determined by examination    -change from ciprofloxacin to Zosyn for Pseudomonas in sputum, skin x7 d        Other   Bradycardia    -ECG, hold metoprolol        Juan coma scale total score 3-8    -see GBS          -ready for discharge to LTACH     Prophylaxis:  Venous Thromboembolism: mechanical chemical  Stress Ulcer: PPI  Ventilator Pneumonia: yes     Activity Orders          Up with assistance starting at 06/14 0111        Full Code

## 2017-07-07 NOTE — PROGRESS NOTES
Could not get patient's temperature orally or axillary. Got a rectal temperature of 93 degrees F. Put a bear hugger on patient. Vital signs still remained stable. [facility] notified. Per TRACIE Diop, no further instructions. Will continue to monitor and update.

## 2017-07-07 NOTE — PLAN OF CARE
Problem: Patient Care Overview  Goal: Plan of Care Review  Outcome: Ongoing (interventions implemented as appropriate)  POC reviewed with pt and family. Pt family verbalized understanding. Questions and concerns addressed. Pt last temp was 99.8, bear hugger turned off. Pt progressing toward goals. Will continue to monitor. See flowsheets for full assessment and VS info

## 2017-07-07 NOTE — PLAN OF CARE
TONE received message from Gloria from [facility] reporting that she has called in an auth for [facility]. TONE will check with MD team regarding possibility to send Pt out in next few days to LTAC.    TONE advised by CM that [facility] cannot provide IVIG in house. Pt will have to stay for this treatment.     TONE met with Pt wife and discussed preferences at length. She has chosen [facility] as first choice and [facility] as second. She will look at [facility] as a choice. She has a lot of questions and concerns about the move to LTAC. Addressed within scope of social work practice and will let facilities answer the other questions she has directly.    Deisi Jeffery, LENORA  Neurocritical Care   [facility]  [id number]

## 2017-07-07 NOTE — PLAN OF CARE
Problem: Patient Care Overview  Goal: Plan of Care Review  Outcome: Ongoing (interventions implemented as appropriate)  POC reviewed with pt and family at 1400. Wife verbalized understanding. Questions and concerns addressed. No bloody stools today, tube feeds continued at goal.  Given PRN hydralyzine once for hypertension. Pt progressing toward goals. Will continue to monitor. See flowsheets for full assessment and VS info.

## 2017-07-08 PROBLEM — G90.1 DYSAUTONOMIA: Status: ACTIVE | Noted: 2017-07-08

## 2017-07-08 LAB
ALBUMIN SERPL BCP-MCNC: 2 G/DL
ALLENS TEST: ABNORMAL
ALP SERPL-CCNC: 84 U/L
ALT SERPL W/O P-5'-P-CCNC: 76 U/L
ANION GAP SERPL CALC-SCNC: 8 MMOL/L
AST SERPL-CCNC: 31 U/L
BASOPHILS # BLD AUTO: 0 K/UL
BASOPHILS NFR BLD: 0 %
BILIRUB SERPL-MCNC: 0.4 MG/DL
BUN SERPL-MCNC: 20 MG/DL
CALCIUM SERPL-MCNC: 8 MG/DL
CHLORIDE SERPL-SCNC: 97 MMOL/L
CO2 SERPL-SCNC: 30 MMOL/L
CREAT SERPL-MCNC: 0.4 MG/DL
DELSYS: ABNORMAL
DIFFERENTIAL METHOD: ABNORMAL
EOSINOPHIL # BLD AUTO: 0 K/UL
EOSINOPHIL NFR BLD: 0 %
ERYTHROCYTE [DISTWIDTH] IN BLOOD BY AUTOMATED COUNT: 17.1 %
EST. GFR  (AFRICAN AMERICAN): >60 ML/MIN/1.73 M^2
EST. GFR  (NON AFRICAN AMERICAN): >60 ML/MIN/1.73 M^2
GLUCOSE SERPL-MCNC: 107 MG/DL
HCO3 UR-SCNC: 34 MMOL/L (ref 24–28)
HCT VFR BLD AUTO: 22.7 %
HGB BLD-MCNC: 7.4 G/DL
INR PPP: 1.5
LYMPHOCYTES # BLD AUTO: 0.9 K/UL
LYMPHOCYTES NFR BLD: 8.1 %
MAGNESIUM SERPL-MCNC: 2 MG/DL
MCH RBC QN AUTO: 32 PG
MCHC RBC AUTO-ENTMCNC: 32.6 %
MCV RBC AUTO: 98 FL
MONOCYTES # BLD AUTO: 0.3 K/UL
MONOCYTES NFR BLD: 2.9 %
NEUTROPHILS # BLD AUTO: 10.2 K/UL
NEUTROPHILS NFR BLD: 87.9 %
PCO2 BLDA: 43.2 MMHG (ref 35–45)
PH SMN: 7.5 [PH] (ref 7.35–7.45)
PHOSPHATE SERPL-MCNC: 3 MG/DL
PLATELET # BLD AUTO: 211 K/UL
PMV BLD AUTO: 10.8 FL
PO2 BLDA: 128 MMHG (ref 80–100)
POC BE: 11 MMOL/L
POC SATURATED O2: 99 % (ref 95–100)
POC TCO2: 35 MMOL/L (ref 23–27)
POCT GLUCOSE: 102 MG/DL (ref 70–110)
POCT GLUCOSE: 111 MG/DL (ref 70–110)
POCT GLUCOSE: 125 MG/DL (ref 70–110)
POCT GLUCOSE: 139 MG/DL (ref 70–110)
POCT GLUCOSE: 142 MG/DL (ref 70–110)
POTASSIUM SERPL-SCNC: 3.8 MMOL/L
PROT SERPL-MCNC: 5.1 G/DL
PROTHROMBIN TIME: 14.9 SEC
RBC # BLD AUTO: 2.31 M/UL
SAMPLE: ABNORMAL
SITE: ABNORMAL
SODIUM SERPL-SCNC: 135 MMOL/L
WBC # BLD AUTO: 11.55 K/UL

## 2017-07-08 PROCEDURE — 80053 COMPREHEN METABOLIC PANEL: CPT

## 2017-07-08 PROCEDURE — 63600175 PHARM REV CODE 636 W HCPCS: Performed by: PSYCHIATRY & NEUROLOGY

## 2017-07-08 PROCEDURE — 99900035 HC TECH TIME PER 15 MIN (STAT)

## 2017-07-08 PROCEDURE — 94761 N-INVAS EAR/PLS OXIMETRY MLT: CPT

## 2017-07-08 PROCEDURE — 25000003 PHARM REV CODE 250: Performed by: PHYSICIAN ASSISTANT

## 2017-07-08 PROCEDURE — 25000003 PHARM REV CODE 250: Performed by: NURSE PRACTITIONER

## 2017-07-08 PROCEDURE — 20000000 HC ICU ROOM

## 2017-07-08 PROCEDURE — 99233 SBSQ HOSP IP/OBS HIGH 50: CPT | Mod: ,,, | Performed by: NURSE PRACTITIONER

## 2017-07-08 PROCEDURE — 37799 UNLISTED PX VASCULAR SURGERY: CPT

## 2017-07-08 PROCEDURE — 25000003 PHARM REV CODE 250: Performed by: PSYCHIATRY & NEUROLOGY

## 2017-07-08 PROCEDURE — 85025 COMPLETE CBC W/AUTO DIFF WBC: CPT

## 2017-07-08 PROCEDURE — 94003 VENT MGMT INPAT SUBQ DAY: CPT

## 2017-07-08 PROCEDURE — 85610 PROTHROMBIN TIME: CPT

## 2017-07-08 PROCEDURE — 83735 ASSAY OF MAGNESIUM: CPT

## 2017-07-08 PROCEDURE — 99900026 HC AIRWAY MAINTENANCE (STAT)

## 2017-07-08 PROCEDURE — 82803 BLOOD GASES ANY COMBINATION: CPT

## 2017-07-08 PROCEDURE — 63600175 PHARM REV CODE 636 W HCPCS: Performed by: PHYSICIAN ASSISTANT

## 2017-07-08 PROCEDURE — 27000221 HC OXYGEN, UP TO 24 HOURS

## 2017-07-08 PROCEDURE — 84100 ASSAY OF PHOSPHORUS: CPT

## 2017-07-08 RX ADMIN — LEVOTHYROXINE SODIUM 75 MCG: 75 TABLET ORAL at 05:07

## 2017-07-08 RX ADMIN — SERTRALINE HYDROCHLORIDE 50 MG: 50 TABLET ORAL at 08:07

## 2017-07-08 RX ADMIN — Medication 1 TABLET: at 08:07

## 2017-07-08 RX ADMIN — PANTOPRAZOLE SODIUM 40 MG: 40 GRANULE, DELAYED RELEASE ORAL at 08:07

## 2017-07-08 RX ADMIN — HYDRALAZINE HYDROCHLORIDE 10 MG: 20 INJECTION INTRAMUSCULAR; INTRAVENOUS at 12:07

## 2017-07-08 RX ADMIN — INSULIN DETEMIR 10 UNITS: 100 INJECTION, SOLUTION SUBCUTANEOUS at 08:07

## 2017-07-08 RX ADMIN — FENTANYL CITRATE 25 MCG: 50 INJECTION INTRAMUSCULAR; INTRAVENOUS at 12:07

## 2017-07-08 RX ADMIN — ENOXAPARIN SODIUM 40 MG: 100 INJECTION SUBCUTANEOUS at 09:07

## 2017-07-08 RX ADMIN — MINERAL OIL AND WHITE PETROLATUM: 150; 830 OINTMENT OPHTHALMIC at 09:07

## 2017-07-08 RX ADMIN — MINERAL OIL AND WHITE PETROLATUM: 150; 830 OINTMENT OPHTHALMIC at 01:07

## 2017-07-08 RX ADMIN — LISINOPRIL 40 MG: 20 TABLET ORAL at 08:07

## 2017-07-08 RX ADMIN — HYDRALAZINE HYDROCHLORIDE 10 MG: 20 INJECTION INTRAMUSCULAR; INTRAVENOUS at 09:07

## 2017-07-08 RX ADMIN — PIPERACILLIN AND TAZOBACTAM 4.5 G: 4; .5 INJECTION, POWDER, LYOPHILIZED, FOR SOLUTION INTRAVENOUS; PARENTERAL at 11:07

## 2017-07-08 RX ADMIN — STANDARDIZED SENNA CONCENTRATE AND DOCUSATE SODIUM 2 TABLET: 8.6; 5 TABLET, FILM COATED ORAL at 08:07

## 2017-07-08 RX ADMIN — CHLORHEXIDINE GLUCONATE 15 ML: 1.2 RINSE ORAL at 11:07

## 2017-07-08 RX ADMIN — CHLORHEXIDINE GLUCONATE 15 ML: 1.2 RINSE ORAL at 05:07

## 2017-07-08 RX ADMIN — PIPERACILLIN AND TAZOBACTAM 4.5 G: 4; .5 INJECTION, POWDER, LYOPHILIZED, FOR SOLUTION INTRAVENOUS; PARENTERAL at 08:07

## 2017-07-08 RX ADMIN — MINERAL OIL AND WHITE PETROLATUM: 150; 830 OINTMENT OPHTHALMIC at 05:07

## 2017-07-08 RX ADMIN — CHLORHEXIDINE GLUCONATE 15 ML: 1.2 RINSE ORAL at 12:07

## 2017-07-08 RX ADMIN — PIPERACILLIN AND TAZOBACTAM 4.5 G: 4; .5 INJECTION, POWDER, LYOPHILIZED, FOR SOLUTION INTRAVENOUS; PARENTERAL at 04:07

## 2017-07-08 RX ADMIN — INSULIN ASPART 4 UNITS: 100 INJECTION, SOLUTION INTRAVENOUS; SUBCUTANEOUS at 03:07

## 2017-07-08 RX ADMIN — POLYETHYLENE GLYCOL 3350 17 G: 17 POWDER, FOR SOLUTION ORAL at 08:07

## 2017-07-08 NOTE — PLAN OF CARE
Problem: Patient Care Overview  Goal: Plan of Care Review  Outcome: Ongoing (interventions implemented as appropriate)  Plan of care reviewed with patient's wife. Patient's wife verbalized understanding of the plan of care. Patient is trached and ventilated. Tube feeding is still ongoing via PEG. Please see flow sheet for detailed nursing assessment and VS. Will continue to monitor.

## 2017-07-08 NOTE — PROGRESS NOTES
Fluctuating bpr, heart rate, temp with highs and lows. 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 Supportive care.  Gudino catheter due to high bladder residual

## 2017-07-08 NOTE — PROGRESS NOTES
[facility]  Neurocritical Care  Progress Note    Admit Date: 6/14/2017  Service Date: 07/08/2017  Length of Stay: 24    Subjective:     Chief Complaint: Guillain-Fishers syndrome    History of Present Illness: 72 y man who presents with rapidly progressive ascending paralysis. Found GBS. Past CAD, skin cancer, hypothyroidism. Incidental brain aneurysms.    Hospital Course: 6/7-6/12 IVIG  6/15 transfer to [facility]  6/16 NSGY consulted for lesion on MR  6/18 repeat LP with high protein  6/22 CTA multiple aneurysms  6/23 EMG diffuse sensorineural polyneuropathy  6/23 RUE DVT-> heparin drip  6/28 runs of VT  6/29 trach/PEG  7/6 hematochezia    Interval History: Fluctuating bpr, heart rate, temp with highs and lows. 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 Supportive care.    Review of Systems:Unable to obtain a complete ROS due to level of consciousness    Vitals:   Temp: 98.8 °F (37.1 °C) (07/08/17 1800)  Pulse: 78 (07/08/17 1800)  Resp: 18 (07/08/17 1800)  BP: (!) 163/74 (07/08/17 1800)  SpO2: 100 % (07/08/17 1800)    Temp:  [96.5 °F (35.8 °C)-98.8 °F (37.1 °C)] 98.8 °F (37.1 °C)  Pulse:  [59-89] 78  Resp:  [18] 18  SpO2:  [99 %-100 %] 100 %  BP: (141-183)/(60-86) 163/74  Arterial Line BP: (110-172)/(51-74) 140/66         Vent Mode: A/C  Oxygen Concentration (%):  [40] 40  Resp Rate Total:  [18 br/min] 18 br/min  Vt Set:  [550 mL] 550 mL  PEEP/CPAP:  [5 cmH20] 5 cmH20  Pressure Support:  [0 cmH20] 0 cmH20  Mean Airway Pressure:  [9.4 cmH20-10 cmH20] 10 cmH20    07/07 0701 - 07/08 0700  In: 2720 [I.V.:245]  Out: 1958 [Urine:1958]     Examination:   Constitutional: Well-nourished and -developed. No apparent distress.   Eyes: Conjunctiva clear, anicteric. Lids no lesions.  Head/Ears/Nose/Mouth/Throat/Neck: Moist mucous membranes. External ears, nose atraumatic.   Cardiovascular: Regular rhythm. No murmurs. No leg edema.  Respiratory: Comfortable respirations. Clear to  auscultation.  Gastrointestinal: No hernia. Soft, nondistended, nontender. + bowel sounds.    Neurologic:  -GCS [id number]  -doesn't open eyes to pain. Moves eyes on request  -Cranial nerves impaired pupils reactive 3+, -corneals, - gag, facial diplegia  -Motor no movement  -Sensation absent and reflexes absent in arms and legs  Unable to test orientation, language, memory, judgment, insight, fund of knowledge, hearing, shoulder shrug, tongue protrusion, coordination, gait due to level of consciousness.    Medications:   Continuous Scheduled  chlorhexidine 15 mL QID   enoxparin 40 mg Q24H   folic acid-vit B6-vit B12 2.5-25-2 mg 1 tablet Daily   insulin detemir 10 Units BID   levothyroxine 75 mcg Daily   lisinopril 40 mg Daily   pantoprazole 40 mg BID   piperacillin-tazobactam 4.5 g in dextrose 5 % 100 mL IVPB (ready to mix system) 4.5 g Q8H   polyethylene glycol 17 g Daily   senna-docusate 8.6-50 mg 2 tablet BID   sertraline 50 mg Daily   white petrolatum-mineral oil  Q8H   PRN  sodium chloride  Q24H PRN   sodium chloride  Q24H PRN   sodium chloride  Q24H PRN   acetaminophen 650 mg Q6H PRN   dextrose 50% 12.5 g PRN   fentaNYL 200 mcg On Call Procedure   fentaNYL 25 mcg Q2H PRN   glucagon (human recombinant) 1 mg PRN   heparin, porcine (PF) 1,000 Units PRN   hydrALAZINE 10 mg Q6H PRN   insulin aspart 1-10 Units Q4H PRN   magnesium oxide 800 mg PRN   magnesium oxide 800 mg PRN   midazolam (PF) 6 mg On Call Procedure   ondansetron 4 mg Q8H PRN   potassium chloride 10% 40 mEq PRN   potassium chloride 10% 40 mEq PRN   potassium chloride 10% 60 mEq PRN   potassium, sodium phosphates 2 packet PRN   potassium, sodium phosphates 2 packet PRN   potassium, sodium phosphates 2 packet PRN   rocuronium 50 mg On Call Procedure      Today I independently reviewed pertinent medications, lines/drains/airways, imaging,   Assessment/Plan:     Neuro   * Guillain-Monarch syndrome    -post IVIG, PLEX, and steroids (indication was  unclear)  -post trach/PEG 6/29  -plan repeat IVIG 0.4 mg/kg IV x5 d on 7/10  -stopped prednisone  -sertraline 50mg daily  -6/23 EMG - diffuse sensorial polyneuropathy        Brain aneurysm    Incidental  followup with vascular neurology/NSGY in one month after discharge          Flaccid quadriplegia    -see GBS        Pulmonary   Acute neuromuscular respiratory failure    -from GBS  -remains severe with total ventilator dependence        Cardiac   Essential hypertension    -MAP >65; SBP <160  -increase lisinopril to 40 mg  Prn hydralazine        Right subclavian vein thrombosis    -on balance, hold anticoagulation given hematochezia        Hem/Onc    -HGB >7        Endocrine   Acquired hypothyroidism    On synthroid 75mcg daily        Fluids/Electrolytes/Nutrition/GI    -TF glucerna  -stop NS  Peg 6/29        Hematochezia    -monitor  -as outpatient may consider colonoscopy        Hyperglycemia    -decrease detemir to 10 units q12h as BG improving off prednisone        Musculoskeletal and Integument   Pustules determined by examination    -change from ciprofloxacin to Zosyn for Pseudomonas in sputum, skin x7 d        Other   Bradycardia    -ECG, hold metoprolol        Juan coma scale total score 3-8    -see GBS            Prophylaxis:  Venous Thromboembolism: mechanical chemical  Stress Ulcer: PPI  Ventilator Pneumonia: yes     Activity Orders          Up with assistance starting at 06/14 0111        Full Code     I have spent 35 min with this patient, with over 50% of this time spent coordinating care and speaking with the family    Lisa Gutierrez NP  Neurocritical Care  [facility]

## 2017-07-08 NOTE — PROGRESS NOTES
Notified critical care team, spoke to Chanelle. Arterial line seems to have a wide gap with the  blood pressure cuff. Seen by chanelle HODGES. No new orders made. Will continue to monitor.

## 2017-07-09 LAB
ABO + RH BLD: NORMAL
ALBUMIN SERPL BCP-MCNC: 1.9 G/DL
ALLENS TEST: ABNORMAL
ALP SERPL-CCNC: 80 U/L
ALT SERPL W/O P-5'-P-CCNC: 73 U/L
ANION GAP SERPL CALC-SCNC: 6 MMOL/L
ANISOCYTOSIS BLD QL SMEAR: SLIGHT
AST SERPL-CCNC: 44 U/L
BASO STIPL BLD QL SMEAR: ABNORMAL
BASOPHILS # BLD AUTO: 0 K/UL
BASOPHILS # BLD AUTO: ABNORMAL K/UL
BASOPHILS NFR BLD: 0 %
BASOPHILS NFR BLD: 0 %
BILIRUB SERPL-MCNC: 0.4 MG/DL
BLD GP AB SCN CELLS X3 SERPL QL: NORMAL
BLD PROD TYP BPU: NORMAL
BLOOD UNIT EXPIRATION DATE: NORMAL
BLOOD UNIT TYPE CODE: 9500
BLOOD UNIT TYPE: NORMAL
BUN SERPL-MCNC: 17 MG/DL
CALCIUM SERPL-MCNC: 7.8 MG/DL
CHLORIDE SERPL-SCNC: 97 MMOL/L
CO2 SERPL-SCNC: 31 MMOL/L
CODING SYSTEM: NORMAL
CREAT SERPL-MCNC: 0.4 MG/DL
DELSYS: ABNORMAL
DIFFERENTIAL METHOD: ABNORMAL
DIFFERENTIAL METHOD: ABNORMAL
DISPENSE STATUS: NORMAL
EOSINOPHIL # BLD AUTO: 0 K/UL
EOSINOPHIL # BLD AUTO: ABNORMAL K/UL
EOSINOPHIL NFR BLD: 0 %
EOSINOPHIL NFR BLD: 0 %
ERYTHROCYTE [DISTWIDTH] IN BLOOD BY AUTOMATED COUNT: 16.8 %
ERYTHROCYTE [DISTWIDTH] IN BLOOD BY AUTOMATED COUNT: 16.9 %
ERYTHROCYTE [SEDIMENTATION RATE] IN BLOOD BY WESTERGREN METHOD: 18 MM/H
EST. GFR  (AFRICAN AMERICAN): >60 ML/MIN/1.73 M^2
EST. GFR  (NON AFRICAN AMERICAN): >60 ML/MIN/1.73 M^2
FIO2: 40
GIANT PLATELETS BLD QL SMEAR: PRESENT
GLUCOSE SERPL-MCNC: 121 MG/DL
HCO3 UR-SCNC: 32.8 MMOL/L (ref 24–28)
HCT VFR BLD AUTO: 21.5 %
HCT VFR BLD AUTO: 22.9 %
HCT VFR BLD AUTO: 25.4 %
HGB BLD-MCNC: 6.9 G/DL
HGB BLD-MCNC: 7.2 G/DL
HGB BLD-MCNC: 8.2 G/DL
HYPOCHROMIA BLD QL SMEAR: ABNORMAL
INR PPP: 1.3
LYMPHOCYTES # BLD AUTO: 1 K/UL
LYMPHOCYTES # BLD AUTO: ABNORMAL K/UL
LYMPHOCYTES NFR BLD: 6 %
LYMPHOCYTES NFR BLD: 8.1 %
MAGNESIUM SERPL-MCNC: 2 MG/DL
MCH RBC QN AUTO: 30.9 PG
MCH RBC QN AUTO: 31.5 PG
MCHC RBC AUTO-ENTMCNC: 31.4 %
MCHC RBC AUTO-ENTMCNC: 32.1 %
MCV RBC AUTO: 98 FL
MCV RBC AUTO: 98 FL
MIN VOL: 10
MODE: ABNORMAL
MONOCYTES # BLD AUTO: 0.4 K/UL
MONOCYTES # BLD AUTO: ABNORMAL K/UL
MONOCYTES NFR BLD: 3.6 %
MONOCYTES NFR BLD: 5 %
NEUTROPHILS # BLD AUTO: 10.4 K/UL
NEUTROPHILS NFR BLD: 70 %
NEUTROPHILS NFR BLD: 87 %
NEUTS BAND NFR BLD MANUAL: 19 %
PCO2 BLDA: 44.4 MMHG (ref 35–45)
PEEP: 5
PH SMN: 7.48 [PH] (ref 7.35–7.45)
PHOSPHATE SERPL-MCNC: 3 MG/DL
PIP: 18
PLATELET # BLD AUTO: 211 K/UL
PLATELET # BLD AUTO: 222 K/UL
PLATELET BLD QL SMEAR: ABNORMAL
PMV BLD AUTO: 10.3 FL
PMV BLD AUTO: 10.6 FL
PO2 BLDA: 123 MMHG (ref 80–100)
POC BE: 9 MMOL/L
POC SATURATED O2: 99 % (ref 95–100)
POC TCO2: 34 MMOL/L (ref 23–27)
POCT GLUCOSE: 102 MG/DL (ref 70–110)
POCT GLUCOSE: 122 MG/DL (ref 70–110)
POCT GLUCOSE: 128 MG/DL (ref 70–110)
POCT GLUCOSE: 144 MG/DL (ref 70–110)
POCT GLUCOSE: 164 MG/DL (ref 70–110)
POCT GLUCOSE: 169 MG/DL (ref 70–110)
POIKILOCYTOSIS BLD QL SMEAR: SLIGHT
POLYCHROMASIA BLD QL SMEAR: ABNORMAL
POTASSIUM SERPL-SCNC: 4 MMOL/L
PROT SERPL-MCNC: 4.9 G/DL
PROTHROMBIN TIME: 13 SEC
RBC # BLD AUTO: 2.19 M/UL
RBC # BLD AUTO: 2.33 M/UL
SAMPLE: ABNORMAL
SITE: ABNORMAL
SODIUM SERPL-SCNC: 134 MMOL/L
SP02: 100
STOMATOCYTES BLD QL SMEAR: PRESENT
TOXIC GRANULES BLD QL SMEAR: PRESENT
TRANS ERYTHROCYTES VOL PATIENT: NORMAL ML
VT: 550
WBC # BLD AUTO: 11.43 K/UL
WBC # BLD AUTO: 11.92 K/UL

## 2017-07-09 PROCEDURE — 83735 ASSAY OF MAGNESIUM: CPT

## 2017-07-09 PROCEDURE — 36430 TRANSFUSION BLD/BLD COMPNT: CPT

## 2017-07-09 PROCEDURE — 85018 HEMOGLOBIN: CPT

## 2017-07-09 PROCEDURE — 94761 N-INVAS EAR/PLS OXIMETRY MLT: CPT

## 2017-07-09 PROCEDURE — 99233 SBSQ HOSP IP/OBS HIGH 50: CPT | Mod: ,,, | Performed by: PSYCHIATRY & NEUROLOGY

## 2017-07-09 PROCEDURE — 99900026 HC AIRWAY MAINTENANCE (STAT)

## 2017-07-09 PROCEDURE — 27200966 HC CLOSED SUCTION SYSTEM

## 2017-07-09 PROCEDURE — 25000003 PHARM REV CODE 250: Performed by: PHYSICIAN ASSISTANT

## 2017-07-09 PROCEDURE — 80053 COMPREHEN METABOLIC PANEL: CPT

## 2017-07-09 PROCEDURE — P9021 RED BLOOD CELLS UNIT: HCPCS

## 2017-07-09 PROCEDURE — 84100 ASSAY OF PHOSPHORUS: CPT

## 2017-07-09 PROCEDURE — 25000003 PHARM REV CODE 250: Performed by: NURSE PRACTITIONER

## 2017-07-09 PROCEDURE — 85014 HEMATOCRIT: CPT

## 2017-07-09 PROCEDURE — 27000221 HC OXYGEN, UP TO 24 HOURS

## 2017-07-09 PROCEDURE — 20000000 HC ICU ROOM

## 2017-07-09 PROCEDURE — 63600175 PHARM REV CODE 636 W HCPCS: Performed by: PHYSICIAN ASSISTANT

## 2017-07-09 PROCEDURE — 63600175 PHARM REV CODE 636 W HCPCS: Performed by: PSYCHIATRY & NEUROLOGY

## 2017-07-09 PROCEDURE — 25000003 PHARM REV CODE 250: Performed by: PSYCHIATRY & NEUROLOGY

## 2017-07-09 PROCEDURE — 86920 COMPATIBILITY TEST SPIN: CPT

## 2017-07-09 PROCEDURE — 85610 PROTHROMBIN TIME: CPT

## 2017-07-09 PROCEDURE — 85025 COMPLETE CBC W/AUTO DIFF WBC: CPT | Mod: 91

## 2017-07-09 PROCEDURE — 94003 VENT MGMT INPAT SUBQ DAY: CPT

## 2017-07-09 PROCEDURE — 25000003 PHARM REV CODE 250

## 2017-07-09 PROCEDURE — 86901 BLOOD TYPING SEROLOGIC RH(D): CPT

## 2017-07-09 PROCEDURE — 86900 BLOOD TYPING SEROLOGIC ABO: CPT

## 2017-07-09 RX ORDER — LABETALOL HYDROCHLORIDE 5 MG/ML
INJECTION, SOLUTION INTRAVENOUS
Status: COMPLETED
Start: 2017-07-09 | End: 2017-07-09

## 2017-07-09 RX ORDER — HYDROCODONE BITARTRATE AND ACETAMINOPHEN 500; 5 MG/1; MG/1
TABLET ORAL
Status: DISCONTINUED | OUTPATIENT
Start: 2017-07-09 | End: 2017-07-14 | Stop reason: HOSPADM

## 2017-07-09 RX ORDER — LABETALOL HYDROCHLORIDE 5 MG/ML
10 INJECTION, SOLUTION INTRAVENOUS EVERY 4 HOURS PRN
Status: DISCONTINUED | OUTPATIENT
Start: 2017-07-09 | End: 2017-07-14 | Stop reason: HOSPADM

## 2017-07-09 RX ADMIN — MINERAL OIL AND WHITE PETROLATUM: 150; 830 OINTMENT OPHTHALMIC at 09:07

## 2017-07-09 RX ADMIN — CHLORHEXIDINE GLUCONATE 15 ML: 1.2 RINSE ORAL at 12:07

## 2017-07-09 RX ADMIN — LABETALOL HYDROCHLORIDE 10 MG: 5 INJECTION, SOLUTION INTRAVENOUS at 05:07

## 2017-07-09 RX ADMIN — LABETALOL HYDROCHLORIDE 10 MG: 5 INJECTION, SOLUTION INTRAVENOUS at 10:07

## 2017-07-09 RX ADMIN — LEVOTHYROXINE SODIUM 75 MCG: 75 TABLET ORAL at 05:07

## 2017-07-09 RX ADMIN — LISINOPRIL 40 MG: 20 TABLET ORAL at 09:07

## 2017-07-09 RX ADMIN — SERTRALINE HYDROCHLORIDE 50 MG: 50 TABLET ORAL at 09:07

## 2017-07-09 RX ADMIN — PANTOPRAZOLE SODIUM 40 MG: 40 GRANULE, DELAYED RELEASE ORAL at 08:07

## 2017-07-09 RX ADMIN — PANTOPRAZOLE SODIUM 40 MG: 40 GRANULE, DELAYED RELEASE ORAL at 09:07

## 2017-07-09 RX ADMIN — HYDRALAZINE HYDROCHLORIDE 10 MG: 20 INJECTION INTRAMUSCULAR; INTRAVENOUS at 07:07

## 2017-07-09 RX ADMIN — STANDARDIZED SENNA CONCENTRATE AND DOCUSATE SODIUM 2 TABLET: 8.6; 5 TABLET, FILM COATED ORAL at 09:07

## 2017-07-09 RX ADMIN — PIPERACILLIN AND TAZOBACTAM 4.5 G: 4; .5 INJECTION, POWDER, LYOPHILIZED, FOR SOLUTION INTRAVENOUS; PARENTERAL at 07:07

## 2017-07-09 RX ADMIN — INSULIN ASPART 2 UNITS: 100 INJECTION, SOLUTION INTRAVENOUS; SUBCUTANEOUS at 11:07

## 2017-07-09 RX ADMIN — HYDRALAZINE HYDROCHLORIDE 10 MG: 20 INJECTION INTRAMUSCULAR; INTRAVENOUS at 05:07

## 2017-07-09 RX ADMIN — STANDARDIZED SENNA CONCENTRATE AND DOCUSATE SODIUM 2 TABLET: 8.6; 5 TABLET, FILM COATED ORAL at 08:07

## 2017-07-09 RX ADMIN — POLYETHYLENE GLYCOL 3350 17 G: 17 POWDER, FOR SOLUTION ORAL at 09:07

## 2017-07-09 RX ADMIN — PIPERACILLIN AND TAZOBACTAM 4.5 G: 4; .5 INJECTION, POWDER, LYOPHILIZED, FOR SOLUTION INTRAVENOUS; PARENTERAL at 03:07

## 2017-07-09 RX ADMIN — MINERAL OIL AND WHITE PETROLATUM: 150; 830 OINTMENT OPHTHALMIC at 05:07

## 2017-07-09 RX ADMIN — FENTANYL CITRATE 25 MCG: 50 INJECTION INTRAMUSCULAR; INTRAVENOUS at 03:07

## 2017-07-09 RX ADMIN — CHLORHEXIDINE GLUCONATE 15 ML: 1.2 RINSE ORAL at 05:07

## 2017-07-09 RX ADMIN — INSULIN DETEMIR 10 UNITS: 100 INJECTION, SOLUTION SUBCUTANEOUS at 08:07

## 2017-07-09 RX ADMIN — Medication 1 TABLET: at 09:07

## 2017-07-09 RX ADMIN — FENTANYL CITRATE 25 MCG: 50 INJECTION INTRAMUSCULAR; INTRAVENOUS at 12:07

## 2017-07-09 RX ADMIN — PIPERACILLIN AND TAZOBACTAM 4.5 G: 4; .5 INJECTION, POWDER, LYOPHILIZED, FOR SOLUTION INTRAVENOUS; PARENTERAL at 12:07

## 2017-07-09 RX ADMIN — CHLORHEXIDINE GLUCONATE 15 ML: 1.2 RINSE ORAL at 11:07

## 2017-07-09 RX ADMIN — INSULIN DETEMIR 10 UNITS: 100 INJECTION, SOLUTION SUBCUTANEOUS at 10:07

## 2017-07-09 RX ADMIN — LABETALOL HYDROCHLORIDE 10 MG: 5 INJECTION, SOLUTION INTRAVENOUS at 01:07

## 2017-07-09 RX ADMIN — HYDRALAZINE HYDROCHLORIDE 10 MG: 20 INJECTION INTRAMUSCULAR; INTRAVENOUS at 11:07

## 2017-07-09 RX ADMIN — FENTANYL CITRATE 25 MCG: 50 INJECTION INTRAMUSCULAR; INTRAVENOUS at 08:07

## 2017-07-09 RX ADMIN — ENOXAPARIN SODIUM 40 MG: 100 INJECTION SUBCUTANEOUS at 10:07

## 2017-07-09 RX ADMIN — MINERAL OIL AND WHITE PETROLATUM: 150; 830 OINTMENT OPHTHALMIC at 01:07

## 2017-07-09 NOTE — PLAN OF CARE
Problem: Patient Care Overview  Goal: Plan of Care Review  Outcome: Ongoing (interventions implemented as appropriate)  POC reviewed with pt and pt's wife at 1400. Pt unable to verbalize an understanding, pt's wife verbalizes an understanding, . Questions and concerns addressed. Pt does not respond  to pain, pt display intermittent jaw movement, pupils 3-4 brisk, sbp intermittently greater than 160, pt received hydralazine X 1dose  and labetalol [id number] with mild relief obtained and fentanyl X 2 doses with mild relief obtained, 1 unit PRBC's transfused Hgb currently 8.2 and Hct 25.4  Skin remains intact, No acute events overnight. Pt progressing toward goals. Will continue to monitor. See flowsheets for full assessment and VS info

## 2017-07-09 NOTE — PLAN OF CARE
Problem: Patient Care Overview  Goal: Plan of Care Review  Outcome: Ongoing (interventions implemented as appropriate)  No acute events throughout shift, VS and assessment per flow sheet, patient progressing towards goals as tolerated, plan of care reviewed with Rosalio Cam and his son at bedside, all concerns addressed, will continue to monitor.

## 2017-07-09 NOTE — PROGRESS NOTES
ICU Progress Note  Neurocritical Care    Admit Date: 6/14/2017  LOS: 25    CC: Guillain-Newton syndrome    Code Status: Full Code     SUBJECTIVE:     Interval History/Significant Events: no acute events overnight    Review of Symptoms: trach, cannot participate    Medications:  Continuous Infusions:     Scheduled Meds:   chlorhexidine  15 mL Mouth/Throat QID    enoxparin  40 mg Subcutaneous Q24H    folic acid-vit B6-vit B12 2.5-25-2 mg  1 tablet Per G Tube Daily    insulin detemir  10 Units Subcutaneous BID    levothyroxine  75 mcg Per G Tube Daily    lisinopril  40 mg Per G Tube Daily    pantoprazole  40 mg Per G Tube BID    piperacillin-tazobactam 4.5 g in dextrose 5 % 100 mL IVPB (ready to mix system)  4.5 g Intravenous Q8H    polyethylene glycol  17 g Per G Tube Daily    senna-docusate 8.6-50 mg  2 tablet Per G Tube BID    sertraline  50 mg Per G Tube Daily    white petrolatum-mineral oil   Both Eyes Q8H     PRN Meds:.sodium chloride, sodium chloride, sodium chloride, acetaminophen, dextrose 50%, fentaNYL, fentaNYL, glucagon (human recombinant), heparin, porcine (PF), hydrALAZINE, insulin aspart, magnesium oxide, magnesium oxide, midazolam (PF), ondansetron, potassium chloride 10%, potassium chloride 10%, potassium chloride 10%, potassium, sodium phosphates, potassium, sodium phosphates, potassium, sodium phosphates, rocuronium    OBJECTIVE:   Vital Signs (Most Recent):   Temp: 99.1 °F (37.3 °C) (07/09/17 0305)  Pulse: 73 (07/09/17 0718)  Resp: 18 (07/09/17 0718)  BP: (!) 156/70 (07/09/17 0600)  SpO2: 100 % (07/09/17 0718)    Vital Signs (24h Range):   Temp:  [96.5 °F (35.8 °C)-99.1 °F (37.3 °C)] 99.1 °F (37.3 °C)  Pulse:  [] 73  Resp:  [18] 18  SpO2:  [100 %] 100 %  BP: (141-211)/(60-94) 156/70  Arterial Line BP: (110-146)/(47-66) 126/47    ICP/CPP (Last 24h):        I & O (Last 24h):     Intake/Output Summary (Last 24 hours) at 07/09/17 0842  Last data filed at 07/09/17 0600   Gross per 24  hour   Intake             2350 ml   Output             1900 ml   Net              450 ml     Physical Exam:  GA: trach, Attempts to track with eyes,   HEENT: No scleral icterus or JVD.   Pulmonary: Clear to auscultation Anterior. No wheezing, crackles, or rhonchi.  Cardiac: RRR S1 & S2 w/o rubs/murmurs/gallops.   Abdominal: Bowel sounds present x 4.     Neuro:  --GCS: E4VT M3  --Mental Status: trach, Attempts to track with eyes and follow commands with eyes only on passive opening of eyelid. Able to move lower jaw to command. Does not withdraw to painful stimmuli.   --CN II-XII unable to fully assess   --Pupils 3mm, PERRL.   -- No Corneal reflex, no gag, no cough intact.  -- Minimal to no withdrawal in all 4 extremities  Areflexia bilateral ue and le    Vent Data:   Vent Mode: A/C  Oxygen Concentration (%):  [40] 40  Resp Rate Total:  [18 br/min] 18 br/min  Vt Set:  [550 mL] 550 mL  PEEP/CPAP:  [5 cmH20] 5 cmH20  Pressure Support:  [0 cmH20] 0 cmH20  Mean Airway Pressure:  [9 cmH20-10 cmH20] 9.5 cmH20    Lines/Drains/Airway:        Surgical Airway 06/29/17 1318 Shiley Cuffed (Active)   Status Secured 6/30/2017  3:28 PM   Site Assessment Red;Bleeding 6/30/2017  3:28 PM   Site Care Cleansed;Dried;Dressing applied 6/30/2017 11:02 AM   Inner Cannula Care Changed/new 6/30/2017 11:02 AM   Ties Assessment Changed;Clean;Intact;Secure 6/30/2017  3:28 PM             Arterial Line 06/25/17 1117 Left Radial (Active)   Site Assessment Clean;Dry;Intact;No redness;No swelling 6/30/2017  3:00 PM   Line Status Pulsatile blood flow 6/30/2017  3:00 PM   Art Line Waveform Appropriate 6/30/2017  3:00 PM   Arterial Line Interventions Zeroed and calibrated;Leveled;Flushed per protocol 6/30/2017  3:00 PM   Color/Movement/Sensation Capillary refill less than 3 sec 6/30/2017  3:00 PM   Dressing Type Transparent 6/30/2017  3:00 PM   Dressing Status Biopatch in place;Clean;Dry;Intact 6/30/2017  3:00 PM   Dressing Intervention Dressing  "reinforced 6/30/2017  3:00 PM   Dressing Change Due 07/02/17 6/30/2017  3:00 PM           Gastrostomy/Enterostomy 06/29/17 1338 Percutaneous endoscopic gastrostomy (PEG) LUQ (Active)   Securement other (see comments) 6/30/2017  3:00 PM   Interventions Prior to Feeding patency checked 6/30/2017  3:00 PM   Suction Setting/Drainage Method dependent drainage 6/30/2017  3:00 PM   Drainage green 6/30/2017  3:00 PM   Clamp Status/Tolerance unclamped 6/30/2017  3:00 PM   Feeding Action feeding held 6/30/2017  3:00 PM   Dressing dry and intact 6/30/2017  3:00 PM   Insertion Site dry;no warmth;no drainage;no tenderness;no swelling 6/30/2017  3:00 PM   Site Care device rotatated;sterile 4 x 4 gauze dressing applied 6/30/2017  3:00 AM   Intake (mL) 15 mL 6/30/2017  5:00 PM   Tube Output(mL)(Include Discarded Residual) 10 mL 6/30/2017  1:00 PM            Urethral Catheter 06/28/17 1830 (Active)   Site Assessment Clean;Intact;Dry 6/30/2017  3:00 PM   Collection Container Urimeter 6/30/2017  3:00 PM   Securement Method secured to top of thigh w/ adhesive device 6/30/2017  3:00 PM   Catheter Care Performed yes 6/30/2017  3:00 PM   Reason for Continuing Urinary Catheterization Urinary retention 6/30/2017  3:00 PM   CAUTI Prevention Bundle StatLock in place w 1" slack;Intact seal between catheter & drainage tubing;Drainage bag off the floor;Green sheeting clip in use;No dependent loops or kinks;Drainage bag not overfilled (<2/3 full);Drainage bag below bladder 6/30/2017  3:00 PM   Output (mL) 175 mL 6/30/2017  5:00 PM     Nutrition/Tube Feeds (if NPO state why): tube feeds     Labs:  ABG:     Recent Labs  Lab 07/09/17  0606   PH 7.476*   PO2 123*   PCO2 44.4   HCO3 32.8*   POCSATURATED 99   BE 9     BMP:    Recent Labs  Lab 07/09/17  0204   *   K 4.0   CL 97   CO2 31*   BUN 17   CREATININE 0.4*   *   MG 2.0   PHOS 3.0     LFT:   Lab Results   Component Value Date    AST 44 (H) 07/09/2017    ALT 73 (H) 07/09/2017    " ALKPHOS 80 07/09/2017    BILITOT 0.4 07/09/2017    ALBUMIN 1.9 (L) 07/09/2017    PROT 4.9 (L) 07/09/2017     CBC:   Lab Results   Component Value Date    WBC 11.92 07/09/2017    HGB 7.2 (L) 07/09/2017    HCT 22.9 (L) 07/09/2017    MCV 98 07/09/2017     07/09/2017     Microbiology x 7d:   Microbiology Results (last 7 days)     Procedure Component Value Units Date/Time    Aerobic culture [[id number]]  (Susceptibility) Collected:  06/30/17 1726    Order Status:  Completed Specimen:  Skin from Abdomen Updated:  07/03/17 1126     Aerobic Bacterial Culture --     PSEUDOMONAS AERUGINOSA  Many          Imaging:    I personally reviewed the above image.    ASSESSMENT/PLAN:     Active Hospital Problems    Diagnosis    *Guillain-Burton syndrome    Dysautonomia     Likely due to GBS      Bradycardia    Essential hypertension    Hematochezia    Pustules determined by examination    Cadmium toxicity    Anemia of chronic disease    Dysphagia, oropharyngeal    Acute encephalopathy    Slow transit constipation    Hypotension    Fever    Hyperglycemia    Right subclavian vein thrombosis    Encephalopathy    Brain mass    Acquired hypothyroidism    Acute neuromuscular respiratory failure    Bronson coma scale total score 3-8    Hypovolemia    Flaccid quadriplegia    Brain aneurysm      Neuro:   Sever sensory motor neuropathy  S/p PLEX and IVIG for possibile GBS variant.   Dysautonomia (fluctuating HR, BPr, temp) likely due to GBS.   Unclear possible exposure.  Plan for repeat IVIG 7/10    Pulmonary:   Neuromuscular failure  Cont mech vent  CXR/ABG    Cardiac:   Close monitoring.   No extreme fluctuations overnight    Renal:    Making urine    ID:   Afebrile, normal wbc    Hem/Onc:   Anemia unclear etiology  Send stool occult, gastric lavage  Transfuse 1 unit of PRBC    Endocrine:    BS stable on insulin  levothyroxine    Fluids/Electrolytes/Nutrition/GI:   tf  Lines:  Art +  CVC NA  ETT trach  Gudino + for  urinary retention  NG NA  PEG +    Proph:  DVT:scd/lovenox  Constipation:  Last output:bowel regimen  PUP:protonix  VAP:peridex    Uninterrupted Critical Care/Counseling Time (not including procedures):: III    Elio Shah MD  Neurocritical care attending

## 2017-07-10 PROBLEM — E11.9 TYPE 2 DIABETES MELLITUS: Status: ACTIVE | Noted: 2017-07-10

## 2017-07-10 LAB
ALBUMIN SERPL BCP-MCNC: 2.1 G/DL
ALLENS TEST: ABNORMAL
ALP SERPL-CCNC: 90 U/L
ALT SERPL W/O P-5'-P-CCNC: 75 U/L
ANION GAP SERPL CALC-SCNC: 8 MMOL/L
AST SERPL-CCNC: 48 U/L
BASOPHILS # BLD AUTO: 0.01 K/UL
BASOPHILS NFR BLD: 0.1 %
BILIRUB SERPL-MCNC: 0.6 MG/DL
BUN SERPL-MCNC: 14 MG/DL
CALCIUM SERPL-MCNC: 8.3 MG/DL
CHLORIDE SERPL-SCNC: 96 MMOL/L
CO2 SERPL-SCNC: 30 MMOL/L
CREAT SERPL-MCNC: 0.4 MG/DL
DELSYS: ABNORMAL
DIFFERENTIAL METHOD: ABNORMAL
EOSINOPHIL # BLD AUTO: 0 K/UL
EOSINOPHIL NFR BLD: 0.1 %
ERYTHROCYTE [DISTWIDTH] IN BLOOD BY AUTOMATED COUNT: 16.9 %
ERYTHROCYTE [SEDIMENTATION RATE] IN BLOOD BY WESTERGREN METHOD: 18 MM/H
EST. GFR  (AFRICAN AMERICAN): >60 ML/MIN/1.73 M^2
EST. GFR  (NON AFRICAN AMERICAN): >60 ML/MIN/1.73 M^2
FIO2: 40
GLUCOSE SERPL-MCNC: 124 MG/DL
HCO3 UR-SCNC: 33.6 MMOL/L (ref 24–28)
HCT VFR BLD AUTO: 26 %
HGB BLD-MCNC: 8.4 G/DL
INR PPP: 1.2
LYMPHOCYTES # BLD AUTO: 0.8 K/UL
LYMPHOCYTES NFR BLD: 5.2 %
MAGNESIUM SERPL-MCNC: 2 MG/DL
MCH RBC QN AUTO: 31.2 PG
MCHC RBC AUTO-ENTMCNC: 32.3 %
MCV RBC AUTO: 97 FL
MODE: ABNORMAL
MONOCYTES # BLD AUTO: 0.4 K/UL
MONOCYTES NFR BLD: 2.8 %
NEUTROPHILS # BLD AUTO: 14.5 K/UL
NEUTROPHILS NFR BLD: 90.7 %
PCO2 BLDA: 46.9 MMHG (ref 35–45)
PEEP: 5
PH SMN: 7.46 [PH] (ref 7.35–7.45)
PHOSPHATE SERPL-MCNC: 2.9 MG/DL
PLATELET # BLD AUTO: 220 K/UL
PMV BLD AUTO: 10.7 FL
PO2 BLDA: 133 MMHG (ref 80–100)
POC BE: 10 MMOL/L
POC SATURATED O2: 99 % (ref 95–100)
POC TCO2: 35 MMOL/L (ref 23–27)
POCT GLUCOSE: 115 MG/DL (ref 70–110)
POCT GLUCOSE: 126 MG/DL (ref 70–110)
POCT GLUCOSE: 129 MG/DL (ref 70–110)
POCT GLUCOSE: 148 MG/DL (ref 70–110)
POTASSIUM SERPL-SCNC: 4 MMOL/L
PROT SERPL-MCNC: 5.4 G/DL
PROTHROMBIN TIME: 12.7 SEC
RBC # BLD AUTO: 2.69 M/UL
SAMPLE: ABNORMAL
SITE: ABNORMAL
SODIUM SERPL-SCNC: 134 MMOL/L
VT: 550
WBC # BLD AUTO: 15.94 K/UL

## 2017-07-10 PROCEDURE — 99900026 HC AIRWAY MAINTENANCE (STAT)

## 2017-07-10 PROCEDURE — 63600175 PHARM REV CODE 636 W HCPCS: Performed by: PSYCHIATRY & NEUROLOGY

## 2017-07-10 PROCEDURE — 25000003 PHARM REV CODE 250: Performed by: PHYSICIAN ASSISTANT

## 2017-07-10 PROCEDURE — 37799 UNLISTED PX VASCULAR SURGERY: CPT

## 2017-07-10 PROCEDURE — 20000000 HC ICU ROOM

## 2017-07-10 PROCEDURE — 85610 PROTHROMBIN TIME: CPT

## 2017-07-10 PROCEDURE — 82803 BLOOD GASES ANY COMBINATION: CPT

## 2017-07-10 PROCEDURE — 80053 COMPREHEN METABOLIC PANEL: CPT

## 2017-07-10 PROCEDURE — 94003 VENT MGMT INPAT SUBQ DAY: CPT

## 2017-07-10 PROCEDURE — 99233 SBSQ HOSP IP/OBS HIGH 50: CPT | Mod: ,,, | Performed by: PHYSICIAN ASSISTANT

## 2017-07-10 PROCEDURE — 97803 MED NUTRITION INDIV SUBSEQ: CPT

## 2017-07-10 PROCEDURE — 63600175 PHARM REV CODE 636 W HCPCS: Performed by: PHYSICIAN ASSISTANT

## 2017-07-10 PROCEDURE — 25000003 PHARM REV CODE 250: Performed by: PSYCHIATRY & NEUROLOGY

## 2017-07-10 PROCEDURE — 25000003 PHARM REV CODE 250: Performed by: NURSE PRACTITIONER

## 2017-07-10 PROCEDURE — 85025 COMPLETE CBC W/AUTO DIFF WBC: CPT

## 2017-07-10 PROCEDURE — 84100 ASSAY OF PHOSPHORUS: CPT

## 2017-07-10 PROCEDURE — 25000003 PHARM REV CODE 250: Performed by: ANESTHESIOLOGY

## 2017-07-10 PROCEDURE — 83735 ASSAY OF MAGNESIUM: CPT

## 2017-07-10 PROCEDURE — 27000221 HC OXYGEN, UP TO 24 HOURS

## 2017-07-10 PROCEDURE — 99900035 HC TECH TIME PER 15 MIN (STAT)

## 2017-07-10 RX ORDER — DIPHENHYDRAMINE HYDROCHLORIDE 50 MG/ML
25 INJECTION INTRAMUSCULAR; INTRAVENOUS
Status: DISCONTINUED | OUTPATIENT
Start: 2017-07-10 | End: 2017-07-14 | Stop reason: HOSPADM

## 2017-07-10 RX ORDER — SODIUM CHLORIDE 9 MG/ML
INJECTION, SOLUTION INTRAVENOUS CONTINUOUS
Status: DISCONTINUED | OUTPATIENT
Start: 2017-07-10 | End: 2017-07-13

## 2017-07-10 RX ORDER — ACETAMINOPHEN 325 MG/1
325 TABLET ORAL EVERY 4 HOURS PRN
Status: DISCONTINUED | OUTPATIENT
Start: 2017-07-10 | End: 2017-07-14 | Stop reason: HOSPADM

## 2017-07-10 RX ADMIN — PANTOPRAZOLE SODIUM 40 MG: 40 GRANULE, DELAYED RELEASE ORAL at 08:07

## 2017-07-10 RX ADMIN — PIPERACILLIN AND TAZOBACTAM 4.5 G: 4; .5 INJECTION, POWDER, LYOPHILIZED, FOR SOLUTION INTRAVENOUS; PARENTERAL at 11:07

## 2017-07-10 RX ADMIN — FENTANYL CITRATE 25 MCG: 50 INJECTION INTRAMUSCULAR; INTRAVENOUS at 12:07

## 2017-07-10 RX ADMIN — HYDRALAZINE HYDROCHLORIDE 10 MG: 20 INJECTION INTRAMUSCULAR; INTRAVENOUS at 09:07

## 2017-07-10 RX ADMIN — Medication 1 TABLET: at 08:07

## 2017-07-10 RX ADMIN — CHLORHEXIDINE GLUCONATE 15 ML: 1.2 RINSE ORAL at 05:07

## 2017-07-10 RX ADMIN — LEVOTHYROXINE SODIUM 75 MCG: 75 TABLET ORAL at 05:07

## 2017-07-10 RX ADMIN — POLYETHYLENE GLYCOL 3350 17 G: 17 POWDER, FOR SOLUTION ORAL at 08:07

## 2017-07-10 RX ADMIN — HUMAN IMMUNOGLOBULIN G 35 G: 20 LIQUID INTRAVENOUS at 09:07

## 2017-07-10 RX ADMIN — STANDARDIZED SENNA CONCENTRATE AND DOCUSATE SODIUM 2 TABLET: 8.6; 5 TABLET, FILM COATED ORAL at 08:07

## 2017-07-10 RX ADMIN — LABETALOL HYDROCHLORIDE 10 MG: 5 INJECTION, SOLUTION INTRAVENOUS at 03:07

## 2017-07-10 RX ADMIN — STANDARDIZED SENNA CONCENTRATE AND DOCUSATE SODIUM 2 TABLET: 8.6; 5 TABLET, FILM COATED ORAL at 09:07

## 2017-07-10 RX ADMIN — CHLORHEXIDINE GLUCONATE 15 ML: 1.2 RINSE ORAL at 11:07

## 2017-07-10 RX ADMIN — MINERAL OIL AND WHITE PETROLATUM: 150; 830 OINTMENT OPHTHALMIC at 09:07

## 2017-07-10 RX ADMIN — MINERAL OIL AND WHITE PETROLATUM: 150; 830 OINTMENT OPHTHALMIC at 05:07

## 2017-07-10 RX ADMIN — MINERAL OIL AND WHITE PETROLATUM: 150; 830 OINTMENT OPHTHALMIC at 01:07

## 2017-07-10 RX ADMIN — ACETAMINOPHEN 325 MG: 325 TABLET ORAL at 09:07

## 2017-07-10 RX ADMIN — HYDRALAZINE HYDROCHLORIDE 10 MG: 20 INJECTION INTRAMUSCULAR; INTRAVENOUS at 12:07

## 2017-07-10 RX ADMIN — INSULIN DETEMIR 10 UNITS: 100 INJECTION, SOLUTION SUBCUTANEOUS at 08:07

## 2017-07-10 RX ADMIN — PIPERACILLIN AND TAZOBACTAM 4.5 G: 4; .5 INJECTION, POWDER, LYOPHILIZED, FOR SOLUTION INTRAVENOUS; PARENTERAL at 03:07

## 2017-07-10 RX ADMIN — SODIUM CHLORIDE: 0.9 INJECTION, SOLUTION INTRAVENOUS at 11:07

## 2017-07-10 RX ADMIN — DIPHENHYDRAMINE HYDROCHLORIDE 25 MG: 50 INJECTION, SOLUTION INTRAMUSCULAR; INTRAVENOUS at 09:07

## 2017-07-10 RX ADMIN — LISINOPRIL 40 MG: 20 TABLET ORAL at 08:07

## 2017-07-10 RX ADMIN — PIPERACILLIN AND TAZOBACTAM 4.5 G: 4; .5 INJECTION, POWDER, LYOPHILIZED, FOR SOLUTION INTRAVENOUS; PARENTERAL at 09:07

## 2017-07-10 RX ADMIN — ENOXAPARIN SODIUM 40 MG: 100 INJECTION SUBCUTANEOUS at 09:07

## 2017-07-10 RX ADMIN — SERTRALINE HYDROCHLORIDE 50 MG: 50 TABLET ORAL at 08:07

## 2017-07-10 RX ADMIN — PANTOPRAZOLE SODIUM 40 MG: 40 GRANULE, DELAYED RELEASE ORAL at 09:07

## 2017-07-10 NOTE — PROGRESS NOTES
ICU Attending Note  Neurocritical Care    [id number]    -IVIG 0.4 g/kg daily x5 d  -lisinopril  -NS 75 mL/h while on IVIG  -Zosyn for Peudomonas  -if leukocytosis worsens or fever develops, pan-culture  -HGB >7   -detemir 10 units daily, IS  -TF  -enoxaparin, pantoprazole  -remove AL  -ready for LTACH if LTACH is able to coordinate immunotherapy

## 2017-07-10 NOTE — ASSESSMENT & PLAN NOTE
-Begin new round of IVIG 0.4 mg/kg today, x5d  -post IVIG, PLEX, and steroids (indication was unclear)  -post trach/PEG 6/29  -stopped prednisone  -sertraline 50mg daily  -6/23 EMG - diffuse sensorial polyneuropathy  -Ready for LTAC placement

## 2017-07-10 NOTE — PLAN OF CARE
Problem: Patient Care Overview  Goal: Plan of Care Review  Outcome: Ongoing (interventions implemented as appropriate)  Plan of care reviewed with spouse at bedside. Patient's spouse verbalized understanding of the plan of care. Patient on trach and still ventilated. Started on IV IG today. Arterial line removed per order. Patient progressing towards goals of care. Please see flow sheet for detailed nursing assessment and VS. Will continue to monitor.

## 2017-07-10 NOTE — PLAN OF CARE
07/10/17 0738   Discharge Reassessment   Assessment Type Discharge Planning Reassessment   Can the patient answer the patient profile reliably? No, cognitively impaired   How does the patient rate their overall health at the present time? (gregor)   Describe the patient's ability to walk at the present time. Does not walk or unable to take any steps at all   How often would a person be available to care for the patient? Whenever needed   Number of comorbid conditions (as recorded on the chart) Two   During the past month, has the patient often been bothered by feeling down, depressed or hopeless? (gregor)   During the past month, has the patient often been bothered by little interest or pleasure in doing things? (gregor)   Discharge plan remains the same: Yes   Provided patient/caregiver education on the expected discharge date and the discharge plan No   Discharge Plan A Long-term acute care facility (LTAC)   Discharge Plan B Skilled Nursing Facility   Change in patient condition or support system No   Patient choice form signed by patient/caregiver N/A   Explained to the the patient/caregiver why the discharge planned changed: No   Involved the patient/caregiver in establishing a new discharge plan: No       Discharge plan LTAC when medically stable.    Shauna Contreras [facility]  [id number]

## 2017-07-10 NOTE — PLAN OF CARE
Problem: Patient Care Overview  Goal: Plan of Care Review  Outcome: Ongoing (interventions implemented as appropriate)  No acute events throughout shift, VS and assessment per flow sheet, patient progressing towards goals as tolerated, plan of care reviewed with Rosalio Cam and family, all concerns addressed, will continue to monitor.

## 2017-07-10 NOTE — ASSESSMENT & PLAN NOTE
Hemoglobin A1c 6.2  - Likely worsened 2/2 steroids  - Decrease detemir dose to 10u daily (from BID) as steroids now off  - SSI

## 2017-07-10 NOTE — PROGRESS NOTES
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 Found GBS. Past CAD, skin cancer, hypothyroidism. Incidental brain aneurysms.    Hospital Course: 6/7-6/12 IVIG  6/15 transfer to [facility]  6/16 NSGY consulted for lesion on MR  6/18 repeat LP with high protein  6/22 CTA multiple aneurysms  6/23 EMG diffuse sensorineural polyneuropathy  6/23 RUE DVT-> heparin drip  6/28 runs of VT  6/29 trach/PEG  7/6 hematochezia  7/10 IVIG    Interval History: Begin IVIG again today for 5 day course. Ready for LTAC if able to continue IVIG. No change in neuro exam.     Review of Systems: Unable to obtain a complete ROS due to level of consciousness.     Vitals:   Temp: 98.6 °F (37 °C) (07/10/17 1500)  Pulse: 79 (07/10/17 1800)  Resp: 18 (07/10/17 1800)  BP: (!) 157/76 (07/10/17 1800)  SpO2: 100 % (07/10/17 1800)    Temp:  [97.4 °F (36.3 °C)-98.6 °F (37 °C)] 98.6 °F (37 °C)  Pulse:  [60-87] 79  Resp:  [17-38] 18  SpO2:  [100 %] 100 %  BP: (124-200)/() 157/76  Arterial Line BP: (125-180)/(53-80) 157/63         Vent Mode: A/C  Oxygen Concentration (%):  [40] 40  Resp Rate Total:  [18 br/min] 18 br/min  Vt Set:  [550 mL] 550 mL  PEEP/CPAP:  [5 cmH20] 5 cmH20  Pressure Support:  [0 cmH20] 0 cmH20  Mean Airway Pressure:  [9 cmH20-10 cmH20] 9.8 cmH20    07/09 0701 - 07/10 0700  In: 3073.8 [I.V.:150]  Out: 2960 [Urine:2960]     Examination:   --GCS: E4VT M3  --Mental Status: trach, Attempts to track with eyes and follow commands with eyes only on passive opening of eyelid. Able to move lower jaw to command. Does not withdraw to painful stimmuli.   --CN II-XII unable to fully assess   --Pupils 3mm, PERRL.   --No Corneal reflex, no gag, no cough intact.  --Minimal to no withdrawal in all 4  extremities  --Areflexia bilateral ue and le    Medications:   Continuous  sodium chloride 0.9% Last Rate: 75 mL/hr at 07/10/17 1800   Scheduled  chlorhexidine 15 mL QID   diphenhydrAMINE 25 mg Q24H   enoxparin 40 mg Q24H   folic acid-vit B6-vit B12 2.5-25-2 mg 1 tablet Daily   Immune Globulin G (IGG)-PRO-IGA 10 % injection (Privigen) 400 mg/kg/day (Dosing Weight) Q24H   [START ON 7/11/2017] insulin detemir 10 Units Daily   levothyroxine 75 mcg Daily   lisinopril 40 mg Daily   pantoprazole 40 mg BID   piperacillin-tazobactam 4.5 g in dextrose 5 % 100 mL IVPB (ready to mix system) 4.5 g Q8H   polyethylene glycol 17 g Daily   senna-docusate 8.6-50 mg 2 tablet BID   sertraline 50 mg Daily   white petrolatum-mineral oil  Q8H   PRN  sodium chloride  Q24H PRN   sodium chloride  Q24H PRN   sodium chloride  Q24H PRN   sodium chloride  Q24H PRN   acetaminophen 325 mg Q4H PRN   acetaminophen 650 mg Q6H PRN   dextrose 50% 12.5 g PRN   fentaNYL 200 mcg On Call Procedure   fentaNYL 25 mcg Q2H PRN   glucagon (human recombinant) 1 mg PRN   heparin, porcine (PF) 1,000 Units PRN   hydrALAZINE 10 mg Q6H PRN   insulin aspart 1-10 Units Q4H PRN   labetalol 10 mg Q4H PRN   magnesium oxide 800 mg PRN   magnesium oxide 800 mg PRN   midazolam (PF) 6 mg On Call Procedure   ondansetron 4 mg Q8H PRN   potassium chloride 10% 40 mEq PRN   potassium chloride 10% 40 mEq PRN   potassium chloride 10% 60 mEq PRN   potassium, sodium phosphates 2 packet PRN   potassium, sodium phosphates 2 packet PRN   potassium, sodium phosphates 2 packet PRN   rocuronium 50 mg On Call Procedure      Today I independently reviewed pertinent medications, lines/drains/airways, imaging, lab results,     Assessment/Plan:     Neuro   * Guillain-[place] syndrome    -Begin new round of IVIG 0.4 mg/kg today, x5d  -post IVIG, PLEX, and steroids (indication was unclear)  -post trach/PEG 6/29  -stopped prednisone  -sertraline 50mg daily  -6/23 EMG - diffuse sensorial  polyneuropathy  -Ready for LTAC placement        Brain aneurysm    Incidental  followup with vascular neurology/NSGY in one month after discharge          Pulmonary   Acute neuromuscular respiratory failure    -from GBS  -remains severe with total ventilator dependence  -s/p tracheostomy         Cardiac   Essential hypertension    -MAP >65; SBP <160  -continue lisinopril to 40 mg  Prn hydralazine        Endocrine   Type 2 diabetes mellitus    Hemoglobin A1c 6.2  - Likely worsened 2/2 steroids  - Decrease detemir dose to 10u daily (from BID) as steroids now off  - SSI         Acquired hypothyroidism    Continue synthroid 75mcg daily        Musculoskeletal and Integument   Pustules determined by examination    -Zosyn for Pseudomonas in sputum, skin x7 d        Other   Anemia of chronic disease    -s/p 1 u pRBC yesterday              Prophylaxis:  Venous Thromboembolism: mechanical chemical  Stress Ulcer: PPI  Ventilator Pneumonia: yes     Activity Orders          Up with assistance starting at 06/14 0111        Full Code    Brittnee Salas PA-C  Neurocritical Care  [facility]

## 2017-07-10 NOTE — PROGRESS NOTES
[facility]  Adult Nutrition  Consult Note    SUMMARY     Recommendations    Recommendation/Intervention:   Continue Glucerna 1.5 @ goal rate 55mL/hr.   -Hold for residuals >500mL.     RD to monitor.      Goals: Pt to tolerate >90% EEN and EPN via TF  Nutrition Goal Status: goal met  Communication of RD Recs: reviewed with RN    Reason for Assessment    Reason for Assessment: RD follow-up  Diagnosis: other (see comments) (acute ascending paralysis, sphenoid mass)  Relevent Medical History: CHARLES, CAD, skin cancer, HLP         General Information Comments: s/p trach/PEG. Pt receiving IVIG therapy. Tolerating TF with no residuals.    Nutrition Discharge Planning: optimal nutrition via PEG with tolerance. Goal rate TBD    Nutrition Prescription Ordered    Current Diet Order: NPO  Nutrition Order Comments: TF at goal  Current Nutrition Support Formula Ordered: Glucerna 1.5  Current Nutrition Support Rate Ordered: 55 (ml)  Current Nutrition Support Frequency Ordered: mL/hr        Evaluation of Received Nutrients/Fluid Intake    Enteral Calories (kcal): 1980  Enteral Protein (gm): 109  Enteral (Free Water) Fluid (mL): 1002     Energy Calories Required: meeting needs  % Kcal Needs: 97   Protein Required: meeting needs  % Protein Needs: 100     Other Fluid (mL): 800      Fluid Required: meeting needs  Comments: +I/O, good UOP  Tolerance: tolerating  % Intake of Estimated Energy Needs: 75 - 100 %  % Meal Intake: NPO     Nutrition Risk Screen     Nutrition Risk Screen: no indicators present    Nutrition/Diet History       Typical Food/Fluid Intake: Pt on mech vent.           Factors Affecting Nutritional Intake: NPO, on mechanical ventilation                Labs/Tests/Procedures/Meds       Pertinent Labs Reviewed: reviewed  Pertinent Labs Comments: Na 134. POCT 120s-160s  Pertinent Medications Reviewed: reviewed  Pertinent Medications Comments: folic acid, insulin    Physical Findings    Overall Physical  "Appearance: overweight, on ventilator support  Tubes: gastrostomy tube     Skin: intact    Anthropometrics    Temp: 98.3 °F (36.8 °C)     Height: 6' 1" (185.4 cm)  Weight Method: Bed Scale  Weight: 90.7 kg (199 lb 15.3 oz)  Ideal Body Weight (IBW), Male: 184 lb     % Ideal Body Weight, Male (lb): 114.66 lb     BMI (Calculated): 27.9  BMI Grade: 25 - 29.9 - overweight     Estimated/Assessed Needs    Weight Used For Calorie Calculations: 86.4 kg (190 lb 7.6 oz)      Energy Calorie Requirements (kcal): 1916  Energy Need Method: [facility]     RMR (Morton-St. Jeor Equation): 1698.39        Weight Used For Protein Calculations: 86.4 kg (190 lb 7.6 oz)  Protein Requirements: 104-130g (1.2-1.5g/kg)  Fluid Need Method: RDA Method   RDA Method (mL): 1916      CHO Requirement: 50% of total kcals     Assessment and Plan    Nutr dx/problem: Inadequate energy intake  Related to/etiology: inability to consume sufficient energy  As evidenced by: NPO requiring alternative means of nutrition.  Status: continues      Monitor and Evaluation    Food and Nutrient Intake: enteral nutrition intake  Food and Nutrient Adminstration: enteral and parenteral nutrition administration        Anthropometric Measurements: weight, weight change, body mass index  Biochemical Data, Medical Tests and Procedures: electrolyte and renal panel, gastrointestinal profile, glucose/endocrine profile, inflammatory profile, lipid profile  Nutrition-Focused Physical Findings: overall appearance    Nutrition Risk    Level of Risk: other (see comments) (f/u 1x/week)    Nutrition Follow-Up    RD Follow-up?: Yes          "

## 2017-07-11 PROBLEM — K92.1 HEMATOCHEZIA: Status: ACTIVE | Noted: 2017-07-11

## 2017-07-11 PROBLEM — E87.3 METABOLIC ALKALOSIS: Status: ACTIVE | Noted: 2017-07-11

## 2017-07-11 PROBLEM — R00.1 BRADYCARDIA: Status: ACTIVE | Noted: 2017-07-11

## 2017-07-11 PROBLEM — I10 ESSENTIAL HYPERTENSION: Status: ACTIVE | Noted: 2017-07-11

## 2017-07-11 LAB
ALBUMIN SERPL BCP-MCNC: 2.2 G/DL
ALLENS TEST: ABNORMAL
ALP SERPL-CCNC: 91 U/L
ALT SERPL W/O P-5'-P-CCNC: 76 U/L
ANION GAP SERPL CALC-SCNC: 7 MMOL/L
AST SERPL-CCNC: 49 U/L
BASOPHILS # BLD AUTO: 0 K/UL
BASOPHILS NFR BLD: 0 %
BILIRUB SERPL-MCNC: 0.6 MG/DL
BUN SERPL-MCNC: 12 MG/DL
CALCIUM SERPL-MCNC: 8.4 MG/DL
CHLORIDE SERPL-SCNC: 96 MMOL/L
CO2 SERPL-SCNC: 30 MMOL/L
CREAT SERPL-MCNC: 0.5 MG/DL
DELSYS: ABNORMAL
DIFFERENTIAL METHOD: ABNORMAL
EOSINOPHIL # BLD AUTO: 0 K/UL
EOSINOPHIL NFR BLD: 0.1 %
ERYTHROCYTE [DISTWIDTH] IN BLOOD BY AUTOMATED COUNT: 16.9 %
ERYTHROCYTE [SEDIMENTATION RATE] IN BLOOD BY WESTERGREN METHOD: 18 MM/H
EST. GFR  (AFRICAN AMERICAN): >60 ML/MIN/1.73 M^2
EST. GFR  (NON AFRICAN AMERICAN): >60 ML/MIN/1.73 M^2
FIO2: 40
GLUCOSE SERPL-MCNC: 145 MG/DL
HCO3 UR-SCNC: 32.1 MMOL/L (ref 24–28)
HCT VFR BLD AUTO: 26.5 %
HGB BLD-MCNC: 8.7 G/DL
INR PPP: 1.2
LYMPHOCYTES # BLD AUTO: 0.7 K/UL
LYMPHOCYTES NFR BLD: 8.5 %
MAGNESIUM SERPL-MCNC: 2 MG/DL
MCH RBC QN AUTO: 31.8 PG
MCHC RBC AUTO-ENTMCNC: 32.8 %
MCV RBC AUTO: 97 FL
MIN VOL: 10.4
MODE: ABNORMAL
MONOCYTES # BLD AUTO: 0.5 K/UL
MONOCYTES NFR BLD: 6 %
NEUTROPHILS # BLD AUTO: 7.1 K/UL
NEUTROPHILS NFR BLD: 83.6 %
PCO2 BLDA: 42.4 MMHG (ref 35–45)
PEEP: 5
PH SMN: 7.49 [PH] (ref 7.35–7.45)
PHOSPHATE SERPL-MCNC: 2.5 MG/DL
PIP: 18
PLATELET # BLD AUTO: 214 K/UL
PMV BLD AUTO: 10.1 FL
PO2 BLDA: 104 MMHG (ref 80–100)
POC BE: 9 MMOL/L
POC SATURATED O2: 98 % (ref 95–100)
POC TCO2: 33 MMOL/L (ref 23–27)
POCT GLUCOSE: 136 MG/DL (ref 70–110)
POCT GLUCOSE: 137 MG/DL (ref 70–110)
POCT GLUCOSE: 146 MG/DL (ref 70–110)
POCT GLUCOSE: 151 MG/DL (ref 70–110)
POCT GLUCOSE: 159 MG/DL (ref 70–110)
POTASSIUM SERPL-SCNC: 3.7 MMOL/L
PROT SERPL-MCNC: 6.2 G/DL
PROTHROMBIN TIME: 12.6 SEC
RBC # BLD AUTO: 2.74 M/UL
SAMPLE: ABNORMAL
SITE: ABNORMAL
SODIUM SERPL-SCNC: 133 MMOL/L
SP02: 100
VT: 550
WBC # BLD AUTO: 8.49 K/UL

## 2017-07-11 PROCEDURE — 99233 SBSQ HOSP IP/OBS HIGH 50: CPT | Mod: ,,, | Performed by: PHYSICIAN ASSISTANT

## 2017-07-11 PROCEDURE — 25000003 PHARM REV CODE 250: Performed by: NURSE PRACTITIONER

## 2017-07-11 PROCEDURE — 85025 COMPLETE CBC W/AUTO DIFF WBC: CPT

## 2017-07-11 PROCEDURE — 82803 BLOOD GASES ANY COMBINATION: CPT

## 2017-07-11 PROCEDURE — 85610 PROTHROMBIN TIME: CPT

## 2017-07-11 PROCEDURE — 94761 N-INVAS EAR/PLS OXIMETRY MLT: CPT

## 2017-07-11 PROCEDURE — 27000221 HC OXYGEN, UP TO 24 HOURS

## 2017-07-11 PROCEDURE — 94003 VENT MGMT INPAT SUBQ DAY: CPT

## 2017-07-11 PROCEDURE — 84100 ASSAY OF PHOSPHORUS: CPT

## 2017-07-11 PROCEDURE — 63600175 PHARM REV CODE 636 W HCPCS: Performed by: PSYCHIATRY & NEUROLOGY

## 2017-07-11 PROCEDURE — 25000003 PHARM REV CODE 250: Performed by: ANESTHESIOLOGY

## 2017-07-11 PROCEDURE — 25000003 PHARM REV CODE 250: Performed by: PHYSICIAN ASSISTANT

## 2017-07-11 PROCEDURE — 83735 ASSAY OF MAGNESIUM: CPT

## 2017-07-11 PROCEDURE — 20000000 HC ICU ROOM

## 2017-07-11 PROCEDURE — 27200966 HC CLOSED SUCTION SYSTEM

## 2017-07-11 PROCEDURE — 63600175 PHARM REV CODE 636 W HCPCS: Performed by: PHYSICIAN ASSISTANT

## 2017-07-11 PROCEDURE — 99900026 HC AIRWAY MAINTENANCE (STAT)

## 2017-07-11 PROCEDURE — 80053 COMPREHEN METABOLIC PANEL: CPT

## 2017-07-11 PROCEDURE — 36600 WITHDRAWAL OF ARTERIAL BLOOD: CPT

## 2017-07-11 RX ADMIN — MINERAL OIL AND WHITE PETROLATUM: 150; 830 OINTMENT OPHTHALMIC at 05:07

## 2017-07-11 RX ADMIN — HUMAN IMMUNOGLOBULIN G 35 G: 20 LIQUID INTRAVENOUS at 11:07

## 2017-07-11 RX ADMIN — POTASSIUM CHLORIDE 40 MEQ: 20 SOLUTION ORAL at 05:07

## 2017-07-11 RX ADMIN — CHLORHEXIDINE GLUCONATE 15 ML: 1.2 RINSE ORAL at 12:07

## 2017-07-11 RX ADMIN — LISINOPRIL 40 MG: 20 TABLET ORAL at 09:07

## 2017-07-11 RX ADMIN — Medication 1 TABLET: at 09:07

## 2017-07-11 RX ADMIN — STANDARDIZED SENNA CONCENTRATE AND DOCUSATE SODIUM 2 TABLET: 8.6; 5 TABLET, FILM COATED ORAL at 09:07

## 2017-07-11 RX ADMIN — POTASSIUM & SODIUM PHOSPHATES POWDER PACK 280-160-250 MG 2 PACKET: 280-160-250 PACK at 11:07

## 2017-07-11 RX ADMIN — HYDRALAZINE HYDROCHLORIDE 10 MG: 20 INJECTION INTRAMUSCULAR; INTRAVENOUS at 09:07

## 2017-07-11 RX ADMIN — MINERAL OIL AND WHITE PETROLATUM: 150; 830 OINTMENT OPHTHALMIC at 09:07

## 2017-07-11 RX ADMIN — ENOXAPARIN SODIUM 40 MG: 100 INJECTION SUBCUTANEOUS at 09:07

## 2017-07-11 RX ADMIN — CHLORHEXIDINE GLUCONATE 15 ML: 1.2 RINSE ORAL at 05:07

## 2017-07-11 RX ADMIN — SERTRALINE HYDROCHLORIDE 50 MG: 50 TABLET ORAL at 11:07

## 2017-07-11 RX ADMIN — PIPERACILLIN AND TAZOBACTAM 4.5 G: 4; .5 INJECTION, POWDER, LYOPHILIZED, FOR SOLUTION INTRAVENOUS; PARENTERAL at 03:07

## 2017-07-11 RX ADMIN — PANTOPRAZOLE SODIUM 40 MG: 40 GRANULE, DELAYED RELEASE ORAL at 09:07

## 2017-07-11 RX ADMIN — INSULIN ASPART 4 UNITS: 100 INJECTION, SOLUTION INTRAVENOUS; SUBCUTANEOUS at 09:07

## 2017-07-11 RX ADMIN — LEVOTHYROXINE SODIUM 75 MCG: 75 TABLET ORAL at 05:07

## 2017-07-11 RX ADMIN — PIPERACILLIN AND TAZOBACTAM 4.5 G: 4; .5 INJECTION, POWDER, LYOPHILIZED, FOR SOLUTION INTRAVENOUS; PARENTERAL at 09:07

## 2017-07-11 RX ADMIN — SODIUM CHLORIDE: 0.9 INJECTION, SOLUTION INTRAVENOUS at 12:07

## 2017-07-11 RX ADMIN — POLYETHYLENE GLYCOL 3350 17 G: 17 POWDER, FOR SOLUTION ORAL at 09:07

## 2017-07-11 RX ADMIN — DIPHENHYDRAMINE HYDROCHLORIDE 25 MG: 50 INJECTION, SOLUTION INTRAMUSCULAR; INTRAVENOUS at 09:07

## 2017-07-11 RX ADMIN — PIPERACILLIN AND TAZOBACTAM 4.5 G: 4; .5 INJECTION, POWDER, LYOPHILIZED, FOR SOLUTION INTRAVENOUS; PARENTERAL at 04:07

## 2017-07-11 RX ADMIN — POTASSIUM & SODIUM PHOSPHATES POWDER PACK 280-160-250 MG 2 PACKET: 280-160-250 PACK at 05:07

## 2017-07-11 RX ADMIN — MINERAL OIL AND WHITE PETROLATUM: 150; 830 OINTMENT OPHTHALMIC at 03:07

## 2017-07-11 NOTE — PROGRESS NOTES
ICU Attending Note  Neurocritical Care    [id number]    -IVIG 0.4 g/kg x5 d  -lisinopril  -NS 75 mL/h  -Zosyn  -detemir, ISS  -LE USN and consider IVCF if large DVT is identified; family does not wish to reanticoagulate  -TF  -ready for LTACH

## 2017-07-11 NOTE — PLAN OF CARE
Problem: Patient Care Overview  Goal: Plan of Care Review  Outcome: Ongoing (interventions implemented as appropriate)  POC reviewed with pt and family this morning at 0600.  All family Questions and concerns addressed. No acute events overnight. Pt progressing toward goals. Will continue to monitor. See flowsheets for full assessment and VS info

## 2017-07-11 NOTE — ASSESSMENT & PLAN NOTE
Hemoglobin A1c 6.2  - Likely worsened 2/2 steroids  - Continue detemir 10u daily, good glycemic control   - SSI

## 2017-07-11 NOTE — PROGRESS NOTES
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 Found GBS. Past CAD, skin cancer, hypothyroidism. Incidental brain aneurysms.    Hospital Course: 6/7-6/12 IVIG  6/15 transfer to [facility]  6/16 NSGY consulted for lesion on MR  6/18 repeat LP with high protein  6/22 CTA multiple aneurysms  6/23 EMG diffuse sensorineural polyneuropathy  6/23 RUE DVT-> heparin drip  6/28 runs of VT  6/29 trach/PEG  7/6 hematochezia  7/10-7/14 IVIG     Interval History: Began IVIG yesterday. Plan to continue today and next 3 days for 5 day course. Lower extremity dopplers today. LTAC will not be able to take patient while on IVIG, will plan to transfer patient over the weekend once course complete.     Review of Systems: Unable to obtain a complete ROS due to level of consciousness.     Vitals:   Temp: 98.1 °F (36.7 °C) (07/11/17 0500)  Pulse: 61 (07/11/17 1349)  Resp: 16 (07/11/17 1349)  BP: 126/68 (07/11/17 1349)  SpO2: 100 % (07/11/17 1349)    Temp:  [98 °F (36.7 °C)-98.6 °F (37 °C)] 98.1 °F (36.7 °C)  Pulse:  [54-90] 61  Resp:  [16-18] 16  SpO2:  [100 %] 100 %  BP: (126-180)/(68-88) 126/68         Vent Mode: A/C  Oxygen Concentration (%):  [40] 40  Resp Rate Total:  [16 br/min-18 br/min] 16 br/min  Vt Set:  [550 mL] 550 mL  PEEP/CPAP:  [5 cmH20] 5 cmH20  Pressure Support:  [0 cmH20] 0 cmH20  Mean Airway Pressure:  [9.1 uvX70-05 cmH20] 9.1 cmH20    07/10 0701 - 07/11 0700  In: 3715 [I.V.:1035]  Out: 5150 [Urine:5150]     Examination:   Constitutional: Well-nourished and -developed. No apparent distress.   Eyes: Conjunctiva clear, anicteric. Lids no lesions.  Head/Ears/Nose/Mouth/Throat/Neck: Trach in place. Moist mucous membranes. External ears, nose atraumatic.   Cardiovascular: Regular  rhythm. No murmurs. No leg edema.  Respiratory: On mechanical ventilation, good compliance. Comfortable respirations. Clear to auscultation.  Gastrointestinal: PEG in place. No hernia. Soft, nondistended, nontender. + bowel sounds.    Neurologic:  -GCS E1V1M6  -Motor does not move extremities. Follows commands with EOM when eyelids opened passively.   -PERRL  -NO corneal, cough, gag reflexes   -Areflexic     Medications:   Continuous  sodium chloride 0.9% Last Rate: 75 mL/hr at 07/11/17 1253   Scheduled  chlorhexidine 15 mL QID   diphenhydrAMINE 25 mg Q24H   enoxparin 40 mg Q24H   folic acid-vit B6-vit B12 2.5-25-2 mg 1 tablet Daily   Immune Globulin G (IGG)-PRO-IGA 10 % injection (Privigen) 400 mg/kg/day (Dosing Weight) Q24H   insulin detemir 10 Units Daily   levothyroxine 75 mcg Daily   lisinopril 40 mg Daily   pantoprazole 40 mg BID   piperacillin-tazobactam 4.5 g in dextrose 5 % 100 mL IVPB (ready to mix system) 4.5 g Q8H   polyethylene glycol 17 g Daily   senna-docusate 8.6-50 mg 2 tablet BID   sertraline 50 mg Daily   white petrolatum-mineral oil  Q8H   PRN  sodium chloride  Q24H PRN   sodium chloride  Q24H PRN   sodium chloride  Q24H PRN   sodium chloride  Q24H PRN   acetaminophen 325 mg Q4H PRN   acetaminophen 650 mg Q6H PRN   dextrose 50% 12.5 g PRN   fentaNYL 200 mcg On Call Procedure   fentaNYL 25 mcg Q2H PRN   glucagon (human recombinant) 1 mg PRN   heparin, porcine (PF) 1,000 Units PRN   hydrALAZINE 10 mg Q6H PRN   insulin aspart 1-10 Units Q4H PRN   labetalol 10 mg Q4H PRN   magnesium oxide 800 mg PRN   magnesium oxide 800 mg PRN   midazolam (PF) 6 mg On Call Procedure   ondansetron 4 mg Q8H PRN   potassium chloride 10% 40 mEq PRN   potassium chloride 10% 40 mEq PRN   potassium chloride 10% 60 mEq PRN   potassium, sodium phosphates 2 packet PRN   potassium, sodium phosphates 2 packet PRN   potassium, sodium phosphates 2 packet PRN   rocuronium 50 mg On Call Procedure      Today I independently reviewed  pertinent medications, lines/drains/airways, imaging, lab results,     Assessment/Plan:     Neuro   * Guillain-[place] syndrome    -Continue round of IVIG 0.4 mg/kg today, x5d, day 2 today   -post IVIG, PLEX, and steroids (indication was unclear)  -post trach/PEG 6/29  -stopped prednisone  -sertraline 50mg daily  -6/23 EMG - diffuse sensorial polyneuropathy  -Ready for LTAC placement        Brain aneurysm    Incidental  followup with vascular neurology/NSGY in one month after discharge          Pulmonary   Acute neuromuscular respiratory failure    -from GBS  -remains severe with total ventilator dependence  -s/p tracheostomy         Cardiac   Essential hypertension    -MAP >65; SBP <160  -continue lisinopril to 40 mg  Prn hydralazine        Endocrine   Type 2 diabetes mellitus    Hemoglobin A1c 6.2  - Likely worsened 2/2 steroids  - Continue detemir 10u daily, good glycemic control   - SSI         Acquired hypothyroidism    Continue synthroid 75mcg daily        Fluids/Electrolytes/Nutrition/GI   Metabolic alkalosis    -- IVF started yesterday, plan to continue today  -- Continue daily ABG         Musculoskeletal and Integument   Pustules determined by examination    -Zosyn for Pseudomonas in sputum, skin x7 d        Other   [place] coma scale total score 3-8    -see GBS            Prophylaxis:  Venous Thromboembolism: mechanical chemical  Stress Ulcer: PPI  Ventilator Pneumonia: yes     Activity Orders          Up with assistance starting at 06/14 0111        Full Code    Brittnee Salas PA-C  Neurocritical Care  [facility]

## 2017-07-11 NOTE — ASSESSMENT & PLAN NOTE
-Continue round of IVIG 0.4 mg/kg today, x5d, day 2 today   -post IVIG, PLEX, and steroids (indication was unclear)  -post trach/PEG 6/29  -stopped prednisone  -sertraline 50mg daily  -6/23 EMG - diffuse sensorial polyneuropathy  -Ready for LTAC placement

## 2017-07-11 NOTE — PLAN OF CARE
TONE contacted Annette to obtain transportation quotes for [place] [facility] [place] so that the Pt can have IVIG treatments. She reported the cost would be: $4868.88 for all three days to and from each facility.    TONE spoke with Alejandrina snell [facility] regarding Pt update. She reported they can take Pt on a Saturday and will see if [employer] will agree to move the auth date.    Deisi Jeffery, LENORA  Neurocritical Care   [facility]  [phone]

## 2017-07-12 LAB
ABO + RH BLD: NORMAL
ALBUMIN SERPL BCP-MCNC: 2 G/DL
ALLENS TEST: ABNORMAL
ALP SERPL-CCNC: 78 U/L
ALT SERPL W/O P-5'-P-CCNC: 71 U/L
ANION GAP SERPL CALC-SCNC: 4 MMOL/L
ANISOCYTOSIS BLD QL SMEAR: SLIGHT
AST SERPL-CCNC: 41 U/L
BASO STIPL BLD QL SMEAR: ABNORMAL
BASOPHILS # BLD AUTO: ABNORMAL K/UL
BASOPHILS NFR BLD: 0 %
BILIRUB SERPL-MCNC: 0.4 MG/DL
BLD GP AB SCN CELLS X3 SERPL QL: NORMAL
BUN SERPL-MCNC: 15 MG/DL
CALCIUM SERPL-MCNC: 8.1 MG/DL
CHLORIDE SERPL-SCNC: 97 MMOL/L
CO2 SERPL-SCNC: 31 MMOL/L
CREAT SERPL-MCNC: 0.4 MG/DL
DELSYS: ABNORMAL
DIFFERENTIAL METHOD: ABNORMAL
EOSINOPHIL # BLD AUTO: ABNORMAL K/UL
EOSINOPHIL NFR BLD: 0 %
ERYTHROCYTE [DISTWIDTH] IN BLOOD BY AUTOMATED COUNT: 16.6 %
ERYTHROCYTE [SEDIMENTATION RATE] IN BLOOD BY WESTERGREN METHOD: 16 MM/H
EST. GFR  (AFRICAN AMERICAN): >60 ML/MIN/1.73 M^2
EST. GFR  (NON AFRICAN AMERICAN): >60 ML/MIN/1.73 M^2
FIO2: 40
GLUCOSE SERPL-MCNC: 148 MG/DL
HCO3 UR-SCNC: 31.1 MMOL/L (ref 24–28)
HCT VFR BLD AUTO: 23 %
HGB BLD-MCNC: 7.5 G/DL
INR PPP: 1.1
LYMPHOCYTES # BLD AUTO: ABNORMAL K/UL
LYMPHOCYTES NFR BLD: 7.5 %
MAGNESIUM SERPL-MCNC: 1.9 MG/DL
MCH RBC QN AUTO: 31.6 PG
MCHC RBC AUTO-ENTMCNC: 32.6 %
MCV RBC AUTO: 97 FL
MIN VOL: 9
MODE: ABNORMAL
MONOCYTES # BLD AUTO: ABNORMAL K/UL
MONOCYTES NFR BLD: 4.5 %
MYELOCYTES NFR BLD MANUAL: 1.5 %
NEUTROPHILS NFR BLD: 84.5 %
NEUTS BAND NFR BLD MANUAL: 2 %
NRBC BLD-RTO: ABNORMAL /100 WBC
PCO2 BLDA: 43.4 MMHG (ref 35–45)
PEEP: 5
PH SMN: 7.46 [PH] (ref 7.35–7.45)
PHOSPHATE SERPL-MCNC: 2.5 MG/DL
PHOSPHATE SERPL-MCNC: 2.6 MG/DL
PIP: 17
PLATELET # BLD AUTO: 203 K/UL
PLATELET BLD QL SMEAR: ABNORMAL
PMV BLD AUTO: 10.3 FL
PO2 BLDA: 106 MMHG (ref 80–100)
POC BE: 7 MMOL/L
POC SATURATED O2: 98 % (ref 95–100)
POC TCO2: 32 MMOL/L (ref 23–27)
POCT GLUCOSE: 120 MG/DL (ref 70–110)
POCT GLUCOSE: 157 MG/DL (ref 70–110)
POCT GLUCOSE: 169 MG/DL (ref 70–110)
POCT GLUCOSE: 191 MG/DL (ref 70–110)
POLYCHROMASIA BLD QL SMEAR: ABNORMAL
POTASSIUM SERPL-SCNC: 3.8 MMOL/L
POTASSIUM SERPL-SCNC: 4.3 MMOL/L
PROT SERPL-MCNC: 6 G/DL
PROTHROMBIN TIME: 11.4 SEC
RBC # BLD AUTO: 2.37 M/UL
SAMPLE: ABNORMAL
SITE: ABNORMAL
SODIUM SERPL-SCNC: 132 MMOL/L
SP02: 100
VT: 550
WBC # BLD AUTO: 6.01 K/UL

## 2017-07-12 PROCEDURE — 80053 COMPREHEN METABOLIC PANEL: CPT

## 2017-07-12 PROCEDURE — 94003 VENT MGMT INPAT SUBQ DAY: CPT

## 2017-07-12 PROCEDURE — 84132 ASSAY OF SERUM POTASSIUM: CPT

## 2017-07-12 PROCEDURE — 25000003 PHARM REV CODE 250: Performed by: ANESTHESIOLOGY

## 2017-07-12 PROCEDURE — 94761 N-INVAS EAR/PLS OXIMETRY MLT: CPT

## 2017-07-12 PROCEDURE — 85007 BL SMEAR W/DIFF WBC COUNT: CPT

## 2017-07-12 PROCEDURE — 63600175 PHARM REV CODE 636 W HCPCS: Performed by: PHYSICIAN ASSISTANT

## 2017-07-12 PROCEDURE — 99233 SBSQ HOSP IP/OBS HIGH 50: CPT | Mod: ,,, | Performed by: PSYCHIATRY & NEUROLOGY

## 2017-07-12 PROCEDURE — 27000221 HC OXYGEN, UP TO 24 HOURS

## 2017-07-12 PROCEDURE — 25000003 PHARM REV CODE 250: Performed by: PHYSICIAN ASSISTANT

## 2017-07-12 PROCEDURE — 86901 BLOOD TYPING SEROLOGIC RH(D): CPT

## 2017-07-12 PROCEDURE — 85027 COMPLETE CBC AUTOMATED: CPT

## 2017-07-12 PROCEDURE — 84100 ASSAY OF PHOSPHORUS: CPT | Mod: 91

## 2017-07-12 PROCEDURE — 20000000 HC ICU ROOM

## 2017-07-12 PROCEDURE — 99900026 HC AIRWAY MAINTENANCE (STAT)

## 2017-07-12 PROCEDURE — 25000003 PHARM REV CODE 250: Performed by: PSYCHIATRY & NEUROLOGY

## 2017-07-12 PROCEDURE — 25000003 PHARM REV CODE 250: Performed by: NURSE PRACTITIONER

## 2017-07-12 PROCEDURE — 63600175 PHARM REV CODE 636 W HCPCS: Performed by: PSYCHIATRY & NEUROLOGY

## 2017-07-12 PROCEDURE — 86900 BLOOD TYPING SEROLOGIC ABO: CPT

## 2017-07-12 PROCEDURE — 83735 ASSAY OF MAGNESIUM: CPT

## 2017-07-12 PROCEDURE — 85610 PROTHROMBIN TIME: CPT

## 2017-07-12 PROCEDURE — 84100 ASSAY OF PHOSPHORUS: CPT

## 2017-07-12 RX ORDER — ACYCLOVIR 50 MG/G
OINTMENT TOPICAL
Status: DISCONTINUED | OUTPATIENT
Start: 2017-07-12 | End: 2017-07-14 | Stop reason: HOSPADM

## 2017-07-12 RX ADMIN — ENOXAPARIN SODIUM 40 MG: 100 INJECTION SUBCUTANEOUS at 10:07

## 2017-07-12 RX ADMIN — CHLORHEXIDINE GLUCONATE 15 ML: 1.2 RINSE ORAL at 12:07

## 2017-07-12 RX ADMIN — ACYCLOVIR: 50 OINTMENT TOPICAL at 05:07

## 2017-07-12 RX ADMIN — SERTRALINE HYDROCHLORIDE 50 MG: 50 TABLET ORAL at 08:07

## 2017-07-12 RX ADMIN — ACYCLOVIR: 50 OINTMENT TOPICAL at 09:07

## 2017-07-12 RX ADMIN — HUMAN IMMUNOGLOBULIN G 35 G: 20 LIQUID INTRAVENOUS at 10:07

## 2017-07-12 RX ADMIN — PANTOPRAZOLE SODIUM 40 MG: 40 GRANULE, DELAYED RELEASE ORAL at 08:07

## 2017-07-12 RX ADMIN — MINERAL OIL AND WHITE PETROLATUM: 150; 830 OINTMENT OPHTHALMIC at 05:07

## 2017-07-12 RX ADMIN — Medication 1 TABLET: at 08:07

## 2017-07-12 RX ADMIN — CHLORHEXIDINE GLUCONATE 15 ML: 1.2 RINSE ORAL at 05:07

## 2017-07-12 RX ADMIN — INSULIN ASPART 4 UNITS: 100 INJECTION, SOLUTION INTRAVENOUS; SUBCUTANEOUS at 12:07

## 2017-07-12 RX ADMIN — INSULIN ASPART 4 UNITS: 100 INJECTION, SOLUTION INTRAVENOUS; SUBCUTANEOUS at 08:07

## 2017-07-12 RX ADMIN — SODIUM CHLORIDE TAB 1 GM 2 G: 1 TAB at 02:07

## 2017-07-12 RX ADMIN — MINERAL OIL AND WHITE PETROLATUM: 150; 830 OINTMENT OPHTHALMIC at 02:07

## 2017-07-12 RX ADMIN — CHLORHEXIDINE GLUCONATE 15 ML: 1.2 RINSE ORAL at 11:07

## 2017-07-12 RX ADMIN — LEVOTHYROXINE SODIUM 75 MCG: 75 TABLET ORAL at 05:07

## 2017-07-12 RX ADMIN — ACYCLOVIR: 50 OINTMENT TOPICAL at 11:07

## 2017-07-12 RX ADMIN — SODIUM CHLORIDE TAB 1 GM 2 G: 1 TAB at 09:07

## 2017-07-12 RX ADMIN — POTASSIUM & SODIUM PHOSPHATES POWDER PACK 280-160-250 MG 2 PACKET: 280-160-250 PACK at 05:07

## 2017-07-12 RX ADMIN — LISINOPRIL 40 MG: 20 TABLET ORAL at 08:07

## 2017-07-12 RX ADMIN — DIPHENHYDRAMINE HYDROCHLORIDE 25 MG: 50 INJECTION, SOLUTION INTRAMUSCULAR; INTRAVENOUS at 10:07

## 2017-07-12 RX ADMIN — PIPERACILLIN AND TAZOBACTAM 4.5 G: 4; .5 INJECTION, POWDER, LYOPHILIZED, FOR SOLUTION INTRAVENOUS; PARENTERAL at 11:07

## 2017-07-12 RX ADMIN — PIPERACILLIN AND TAZOBACTAM 4.5 G: 4; .5 INJECTION, POWDER, LYOPHILIZED, FOR SOLUTION INTRAVENOUS; PARENTERAL at 09:07

## 2017-07-12 RX ADMIN — PANTOPRAZOLE SODIUM 40 MG: 40 GRANULE, DELAYED RELEASE ORAL at 09:07

## 2017-07-12 RX ADMIN — PIPERACILLIN AND TAZOBACTAM 4.5 G: 4; .5 INJECTION, POWDER, LYOPHILIZED, FOR SOLUTION INTRAVENOUS; PARENTERAL at 04:07

## 2017-07-12 RX ADMIN — MINERAL OIL AND WHITE PETROLATUM: 150; 830 OINTMENT OPHTHALMIC at 09:07

## 2017-07-12 NOTE — PLAN OF CARE
Problem: Patient Care Overview  Goal: Plan of Care Review  POC reviewed with pt and wife at 2200 last night. Pt. Is unarrousable at this time. All questions and concerns from wife addressed. No acute events overnight. Pt progressing toward goals. Will continue to monitor. See flowsheets for full assessment and VS info

## 2017-07-12 NOTE — PLAN OF CARE
SW met with Pt wife and family at bedside. Discussed LTAC and which she has a preference for. She is unsure at this time and will have an answer tomorrow.    Deisi Jeffery LMSW  Neurocritical Care   [facility]  [phone]

## 2017-07-12 NOTE — ASSESSMENT & PLAN NOTE
-PLEX, Steroids, and continue IVIG 0.4 mg/kg daily x5 days(day 4/5 IVIG)  -Trach/PEG 6/29  -sertraline for depression  -ready for LTAC placement on Friday morning

## 2017-07-12 NOTE — NURSING
Pt. Temp decreased from 97.5 to 94.8. Temp probe has been inserted for monitoring. Bear hugger applied as an intervention. Neuro contacted and made aware of change. No new orders at this time.

## 2017-07-12 NOTE — PROGRESS NOTES
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 Found GBS. Past CAD, skin cancer, hypothyroidism. Incidental brain aneurysms.     Hospital Course: 6/7-6/12 IVIG  6/15 transfer to [facility]  6/16 NSGY consulted for lesion on MR  6/18 repeat LP with high protein  6/22 CTA multiple aneurysms  6/23 EMG diffuse sensorineural polyneuropathy  6/23 RUE DVT-> heparin drip  6/28 runs of VT  6/29 trach/PEG  7/6 hematochezia  7/10-7/14 IVIG      Interval History: IVIG. LE USN.    Review of Systems: Unable to obtain a complete ROS due to level of consciousness.     Vitals:   Temp: 97.5 °F (36.4 °C) (07/12/17 0700)  Pulse: 62 (07/12/17 0700)  Resp: 16 (07/12/17 0700)  BP: (!) 150/73 (07/12/17 0700)  SpO2: 100 % (07/12/17 0700)    Temp:  [97.5 °F (36.4 °C)-98 °F (36.7 °C)] 97.5 °F (36.4 °C)  Pulse:  [48-96] 62  Resp:  [16-19] 16  SpO2:  [100 %] 100 %  BP: ()/(53-94) 150/73    Vent Mode: A/C  Oxygen Concentration (%):  [40] 40  Resp Rate Total:  [16 br/min-18 br/min] 16 br/min  Vt Set:  [550 mL] 550 mL  PEEP/CPAP:  [5 cmH20] 5 cmH20  Pressure Support:  [0 cmH20] 0 cmH20  Mean Airway Pressure:  [8.9 cmH20-9.8 cmH20] 9 cmH20    07/11 0701 - 07/12 0700  In: 4371.3 [I.V.:1541.3]  Out: 3360 [Urine:3360]     Examination:   Constitutional: Well-nourished and -developed. No apparent distress.   Eyes: Conjunctiva clear, anicteric, + scleral edema. Lids no lesions.  Head/Ears/Nose/Mouth/Throat/Neck: Moist mucous membranes. External ears, nose atraumatic. Mouth open.  Cardiovascular: Regular rhythm. No murmurs. No leg edema.  Respiratory: Comfortable respirations. Clear to auscultation.  Gastrointestinal: No hernia. Soft, nondistended, nontender. + bowel sounds.     Neurologic:  -GCS V2F1OH2  -Does not open eyes to pain. On request moves eyes  and jaw slightly.  -Cranial nerves impaired, particularly pupils reactive, no corneals, L eye moves more than R but able to move slightly in all directions, facial diplegia, no gag.  -Motor no movement, flaccid quadriplegia.  -Sensation absent.  -Reflexes absent in arms, legs.    Medications:   Continuous  sodium chloride 0.9% Last Rate: Stopped (07/12/17 0426)   Scheduled  chlorhexidine 15 mL QID   diphenhydrAMINE 25 mg Q24H   enoxparin 40 mg Q24H   folic acid-vit B6-vit B12 2.5-25-2 mg 1 tablet Daily   Immune Globulin G (IGG)-PRO-IGA 10 % injection (Privigen) 400 mg/kg/day (Dosing Weight) Q24H   insulin detemir 10 Units Daily   levothyroxine 75 mcg Daily   lisinopril 40 mg Daily   pantoprazole 40 mg BID   piperacillin-tazobactam 4.5 g in dextrose 5 % 100 mL IVPB (ready to mix system) 4.5 g Q8H   polyethylene glycol 17 g Daily   senna-docusate 8.6-50 mg 2 tablet BID   sertraline 50 mg Daily   sodium chloride 2 g Q8H   white petrolatum-mineral oil  Q8H   PRN  sodium chloride  Q24H PRN   sodium chloride  Q24H PRN   sodium chloride  Q24H PRN   sodium chloride  Q24H PRN   acetaminophen 325 mg Q4H PRN   acetaminophen 650 mg Q6H PRN   dextrose 50% 12.5 g PRN   fentaNYL 200 mcg On Call Procedure   fentaNYL 25 mcg Q2H PRN   glucagon (human recombinant) 1 mg PRN   heparin, porcine (PF) 1,000 Units PRN   hydrALAZINE 10 mg Q6H PRN   insulin aspart 1-10 Units Q4H PRN   labetalol 10 mg Q4H PRN   magnesium oxide 800 mg PRN   magnesium oxide 800 mg PRN   midazolam (PF) 6 mg On Call Procedure   ondansetron 4 mg Q8H PRN   potassium chloride 10% 40 mEq PRN   potassium chloride 10% 40 mEq PRN   potassium chloride 10% 60 mEq PRN   potassium, sodium phosphates 2 packet PRN   potassium, sodium phosphates 2 packet PRN   potassium, sodium phosphates 2 packet PRN   rocuronium 50 mg On Call Procedure      Today I independently reviewed pertinent medications, lines/drains/airways, imaging, lab results, notably:      HGB 7.5, Na 132,  7.46/43/106/7    LE USN no DVT    Assessment/Plan:  Neuro   * Guillain-[place] syndrome    -post IVIG, PLEX, and steroids (indication was unclear)  -post trach/PEG 6/29  -continue round of IVIG 0.4 mg/kg daily x5 d  -sertraline  -ready for LTAC placement on Friday morning        Flaccid quadriplegia    -see GBS        Pulmonary   Acute neuromuscular respiratory failure    -from GBS  -remains severe with total ventilator dependence        Cardiac   Essential hypertension    -MAP >65; SBP <180  -lisinopril        Right subclavian vein thrombosis    -on balance, hold anticoagulation given hematochezia        Hem/Onc    -HGB >7        Endocrine   Type 2 diabetes mellitus    -detemir, ISS        Fluids/Electrolytes/Nutrition/GI    -TF          -I answered his wife's questions at the bedside.    Prophylaxis:  Venous Thromboembolism: mechanical chemical  Stress Ulcer: PPI  Ventilator Pneumonia: yes     Activity Orders          Up with assistance starting at 06/14 0111        Full Code

## 2017-07-13 PROBLEM — I10 ESSENTIAL HYPERTENSION: Status: RESOLVED | Noted: 2017-07-11 | Resolved: 2017-07-13

## 2017-07-13 PROBLEM — I95.9 HYPOTENSION: Status: RESOLVED | Noted: 2017-06-25 | Resolved: 2017-07-13

## 2017-07-13 LAB
ALBUMIN SERPL BCP-MCNC: 2.1 G/DL
ALLENS TEST: ABNORMAL
ALP SERPL-CCNC: 83 U/L
ALT SERPL W/O P-5'-P-CCNC: 78 U/L
ANION GAP SERPL CALC-SCNC: 4 MMOL/L
ANISOCYTOSIS BLD QL SMEAR: SLIGHT
AST SERPL-CCNC: 41 U/L
BASO STIPL BLD QL SMEAR: ABNORMAL
BASOPHILS # BLD AUTO: ABNORMAL K/UL
BASOPHILS NFR BLD: 0 %
BILIRUB SERPL-MCNC: 0.3 MG/DL
BUN SERPL-MCNC: 15 MG/DL
CALCIUM SERPL-MCNC: 8.3 MG/DL
CHLORIDE SERPL-SCNC: 97 MMOL/L
CO2 SERPL-SCNC: 30 MMOL/L
CREAT SERPL-MCNC: 0.5 MG/DL
DELSYS: ABNORMAL
DIFFERENTIAL METHOD: ABNORMAL
EOSINOPHIL # BLD AUTO: ABNORMAL K/UL
EOSINOPHIL NFR BLD: 0.5 %
ERYTHROCYTE [DISTWIDTH] IN BLOOD BY AUTOMATED COUNT: 16.5 %
ERYTHROCYTE [SEDIMENTATION RATE] IN BLOOD BY WESTERGREN METHOD: 16 MM/H
EST. GFR  (AFRICAN AMERICAN): >60 ML/MIN/1.73 M^2
EST. GFR  (NON AFRICAN AMERICAN): >60 ML/MIN/1.73 M^2
FIO2: 40
GIANT PLATELETS BLD QL SMEAR: PRESENT
GLUCOSE SERPL-MCNC: 156 MG/DL
HCO3 UR-SCNC: 33.3 MMOL/L (ref 24–28)
HCT VFR BLD AUTO: 24.9 %
HGB BLD-MCNC: 8 G/DL
INR PPP: 1.1
LYMPHOCYTES # BLD AUTO: ABNORMAL K/UL
LYMPHOCYTES NFR BLD: 5.5 %
MAGNESIUM SERPL-MCNC: 1.8 MG/DL
MCH RBC QN AUTO: 31.7 PG
MCHC RBC AUTO-ENTMCNC: 32.1 %
MCV RBC AUTO: 99 FL
METAMYELOCYTES NFR BLD MANUAL: 0.5 %
MIN VOL: 9.07
MODE: ABNORMAL
MONOCYTES # BLD AUTO: ABNORMAL K/UL
MONOCYTES NFR BLD: 2 %
MYELOCYTES NFR BLD MANUAL: 1.5 %
NEUTROPHILS NFR BLD: 89 %
NEUTS BAND NFR BLD MANUAL: 1 %
PCO2 BLDA: 47.5 MMHG (ref 35–45)
PEEP: 5
PH SMN: 7.45 [PH] (ref 7.35–7.45)
PHOSPHATE SERPL-MCNC: 2.9 MG/DL
PIP: 20
PLATELET # BLD AUTO: 218 K/UL
PLATELET BLD QL SMEAR: ABNORMAL
PMV BLD AUTO: 10.3 FL
PO2 BLDA: 120 MMHG (ref 80–100)
POC BE: 9 MMOL/L
POC SATURATED O2: 99 % (ref 95–100)
POC TCO2: 35 MMOL/L (ref 23–27)
POCT GLUCOSE: 147 MG/DL (ref 70–110)
POCT GLUCOSE: 148 MG/DL (ref 70–110)
POCT GLUCOSE: 152 MG/DL (ref 70–110)
POCT GLUCOSE: 182 MG/DL (ref 70–110)
POLYCHROMASIA BLD QL SMEAR: ABNORMAL
POTASSIUM SERPL-SCNC: 4.6 MMOL/L
PROT SERPL-MCNC: 6.8 G/DL
PROTHROMBIN TIME: 11.7 SEC
RBC # BLD AUTO: 2.52 M/UL
SAMPLE: ABNORMAL
SITE: ABNORMAL
SODIUM SERPL-SCNC: 131 MMOL/L
SP02: 99
TOXIC GRANULES BLD QL SMEAR: PRESENT
VT: 550
WBC # BLD AUTO: 8.05 K/UL

## 2017-07-13 PROCEDURE — 25000003 PHARM REV CODE 250: Performed by: NURSE PRACTITIONER

## 2017-07-13 PROCEDURE — 99900026 HC AIRWAY MAINTENANCE (STAT)

## 2017-07-13 PROCEDURE — 63600175 PHARM REV CODE 636 W HCPCS: Performed by: PSYCHIATRY & NEUROLOGY

## 2017-07-13 PROCEDURE — 27000221 HC OXYGEN, UP TO 24 HOURS

## 2017-07-13 PROCEDURE — 82803 BLOOD GASES ANY COMBINATION: CPT

## 2017-07-13 PROCEDURE — 36600 WITHDRAWAL OF ARTERIAL BLOOD: CPT

## 2017-07-13 PROCEDURE — 94761 N-INVAS EAR/PLS OXIMETRY MLT: CPT

## 2017-07-13 PROCEDURE — 25000003 PHARM REV CODE 250: Performed by: ANESTHESIOLOGY

## 2017-07-13 PROCEDURE — 85027 COMPLETE CBC AUTOMATED: CPT

## 2017-07-13 PROCEDURE — 85610 PROTHROMBIN TIME: CPT

## 2017-07-13 PROCEDURE — 99233 SBSQ HOSP IP/OBS HIGH 50: CPT | Mod: ,,, | Performed by: NURSE PRACTITIONER

## 2017-07-13 PROCEDURE — 20000000 HC ICU ROOM

## 2017-07-13 PROCEDURE — 83735 ASSAY OF MAGNESIUM: CPT

## 2017-07-13 PROCEDURE — 84100 ASSAY OF PHOSPHORUS: CPT

## 2017-07-13 PROCEDURE — 85007 BL SMEAR W/DIFF WBC COUNT: CPT

## 2017-07-13 PROCEDURE — 63600175 PHARM REV CODE 636 W HCPCS: Performed by: PHYSICIAN ASSISTANT

## 2017-07-13 PROCEDURE — 25000003 PHARM REV CODE 250: Performed by: PSYCHIATRY & NEUROLOGY

## 2017-07-13 PROCEDURE — 25000003 PHARM REV CODE 250: Performed by: PHYSICIAN ASSISTANT

## 2017-07-13 PROCEDURE — 80053 COMPREHEN METABOLIC PANEL: CPT

## 2017-07-13 RX ORDER — LANOLIN ALCOHOL/MO/W.PET/CERES
800 CREAM (GRAM) TOPICAL
Refills: 0 | Status: ON HOLD | COMMUNITY
Start: 2017-07-13 | End: 2017-09-12 | Stop reason: HOSPADM

## 2017-07-13 RX ORDER — LISINOPRIL 40 MG/1
40 TABLET ORAL DAILY
Refills: 0 | Status: ON HOLD
Start: 2017-07-13 | End: 2017-09-12 | Stop reason: HOSPADM

## 2017-07-13 RX ORDER — SERTRALINE HYDROCHLORIDE 50 MG/1
50 TABLET, FILM COATED ORAL DAILY
Refills: 0 | Status: ON HOLD
Start: 2017-07-13 | End: 2017-09-12

## 2017-07-13 RX ORDER — AMOXICILLIN 250 MG
2 CAPSULE ORAL 2 TIMES DAILY
Refills: 0 | Status: ON HOLD
Start: 2017-07-13 | End: 2017-09-12 | Stop reason: HOSPADM

## 2017-07-13 RX ORDER — PANTOPRAZOLE SODIUM 20 MG/1
20 TABLET, DELAYED RELEASE ORAL
Qty: 60 TABLET | Refills: 0 | Status: ON HOLD
Start: 2017-07-13 | End: 2017-09-12 | Stop reason: HOSPADM

## 2017-07-13 RX ORDER — AMLODIPINE BESYLATE 5 MG/1
5 TABLET ORAL DAILY
Refills: 0 | Status: ON HOLD
Start: 2017-07-14 | End: 2017-09-12 | Stop reason: HOSPADM

## 2017-07-13 RX ORDER — DIPHENHYDRAMINE HYDROCHLORIDE 50 MG/ML
25 INJECTION INTRAMUSCULAR; INTRAVENOUS EVERY 6 HOURS
Start: 2017-07-13 | End: 2017-07-14 | Stop reason: HOSPADM

## 2017-07-13 RX ORDER — AMLODIPINE BESYLATE 5 MG/1
5 TABLET ORAL DAILY
Status: DISCONTINUED | OUTPATIENT
Start: 2017-07-14 | End: 2017-07-14 | Stop reason: HOSPADM

## 2017-07-13 RX ORDER — ENOXAPARIN SODIUM 100 MG/ML
40 INJECTION SUBCUTANEOUS DAILY
Status: ON HOLD
Start: 2017-07-13 | End: 2017-09-12 | Stop reason: HOSPADM

## 2017-07-13 RX ORDER — AMLODIPINE BESYLATE 5 MG/1
5 TABLET ORAL DAILY
Status: DISCONTINUED | OUTPATIENT
Start: 2017-07-13 | End: 2017-07-13

## 2017-07-13 RX ORDER — CHLORHEXIDINE GLUCONATE ORAL RINSE 1.2 MG/ML
15 SOLUTION DENTAL 4 TIMES DAILY
Refills: 0 | Status: ON HOLD
Start: 2017-07-13 | End: 2017-09-12 | Stop reason: HOSPADM

## 2017-07-13 RX ORDER — ACYCLOVIR 50 MG/G
OINTMENT TOPICAL
Status: ON HOLD
Start: 2017-07-13 | End: 2017-09-12 | Stop reason: HOSPADM

## 2017-07-13 RX ORDER — LEVOTHYROXINE SODIUM 75 UG/1
75 TABLET ORAL DAILY
Refills: 0 | Status: ON HOLD
Start: 2017-07-13 | End: 2017-09-12

## 2017-07-13 RX ADMIN — CHLORHEXIDINE GLUCONATE 15 ML: 1.2 RINSE ORAL at 06:07

## 2017-07-13 RX ADMIN — DIPHENHYDRAMINE HYDROCHLORIDE 25 MG: 50 INJECTION, SOLUTION INTRAMUSCULAR; INTRAVENOUS at 03:07

## 2017-07-13 RX ADMIN — SODIUM CHLORIDE TAB 1 GM 2 G: 1 TAB at 11:07

## 2017-07-13 RX ADMIN — AMLODIPINE BESYLATE 5 MG: 5 TABLET ORAL at 11:07

## 2017-07-13 RX ADMIN — MAGNESIUM OXIDE TAB 400 MG (241.3 MG ELEMENTAL MG) 800 MG: 400 (241.3 MG) TAB at 05:07

## 2017-07-13 RX ADMIN — ACYCLOVIR: 50 OINTMENT TOPICAL at 09:07

## 2017-07-13 RX ADMIN — MAGNESIUM OXIDE TAB 400 MG (241.3 MG ELEMENTAL MG) 800 MG: 400 (241.3 MG) TAB at 08:07

## 2017-07-13 RX ADMIN — PIPERACILLIN AND TAZOBACTAM 4.5 G: 4; .5 INJECTION, POWDER, LYOPHILIZED, FOR SOLUTION INTRAVENOUS; PARENTERAL at 09:07

## 2017-07-13 RX ADMIN — PIPERACILLIN AND TAZOBACTAM 4.5 G: 4; .5 INJECTION, POWDER, LYOPHILIZED, FOR SOLUTION INTRAVENOUS; PARENTERAL at 04:07

## 2017-07-13 RX ADMIN — LABETALOL HYDROCHLORIDE 10 MG: 5 INJECTION, SOLUTION INTRAVENOUS at 04:07

## 2017-07-13 RX ADMIN — PANTOPRAZOLE SODIUM 40 MG: 40 GRANULE, DELAYED RELEASE ORAL at 09:07

## 2017-07-13 RX ADMIN — SODIUM CHLORIDE TAB 1 GM 2 G: 1 TAB at 05:07

## 2017-07-13 RX ADMIN — LISINOPRIL 40 MG: 20 TABLET ORAL at 08:07

## 2017-07-13 RX ADMIN — CHLORHEXIDINE GLUCONATE 15 ML: 1.2 RINSE ORAL at 12:07

## 2017-07-13 RX ADMIN — FENTANYL CITRATE 25 MCG: 50 INJECTION INTRAMUSCULAR; INTRAVENOUS at 01:07

## 2017-07-13 RX ADMIN — HUMAN IMMUNOGLOBULIN G 35 G: 10 LIQUID INTRAVENOUS at 03:07

## 2017-07-13 RX ADMIN — FENTANYL CITRATE 25 MCG: 50 INJECTION INTRAMUSCULAR; INTRAVENOUS at 07:07

## 2017-07-13 RX ADMIN — MINERAL OIL AND WHITE PETROLATUM: 150; 830 OINTMENT OPHTHALMIC at 03:07

## 2017-07-13 RX ADMIN — MINERAL OIL AND WHITE PETROLATUM: 150; 830 OINTMENT OPHTHALMIC at 05:07

## 2017-07-13 RX ADMIN — PIPERACILLIN AND TAZOBACTAM 4.5 G: 4; .5 INJECTION, POWDER, LYOPHILIZED, FOR SOLUTION INTRAVENOUS; PARENTERAL at 12:07

## 2017-07-13 RX ADMIN — CHLORHEXIDINE GLUCONATE 15 ML: 1.2 RINSE ORAL at 05:07

## 2017-07-13 RX ADMIN — Medication 1 TABLET: at 08:07

## 2017-07-13 RX ADMIN — SERTRALINE HYDROCHLORIDE 50 MG: 50 TABLET ORAL at 09:07

## 2017-07-13 RX ADMIN — HYDRALAZINE HYDROCHLORIDE 10 MG: 20 INJECTION INTRAMUSCULAR; INTRAVENOUS at 05:07

## 2017-07-13 RX ADMIN — ACYCLOVIR: 50 OINTMENT TOPICAL at 05:07

## 2017-07-13 RX ADMIN — LEVOTHYROXINE SODIUM 75 MCG: 75 TABLET ORAL at 05:07

## 2017-07-13 RX ADMIN — MINERAL OIL AND WHITE PETROLATUM: 150; 830 OINTMENT OPHTHALMIC at 09:07

## 2017-07-13 RX ADMIN — SODIUM CHLORIDE TAB 1 GM 2 G: 1 TAB at 06:07

## 2017-07-13 RX ADMIN — ACYCLOVIR: 50 OINTMENT TOPICAL at 01:07

## 2017-07-13 RX ADMIN — ACYCLOVIR: 50 OINTMENT TOPICAL at 03:07

## 2017-07-13 RX ADMIN — ENOXAPARIN SODIUM 40 MG: 100 INJECTION SUBCUTANEOUS at 09:07

## 2017-07-13 NOTE — PROGRESS NOTES
[facility]  Neurocritical Care  Progress Note    Admit Date: 6/14/2017  Service Date: 07/13/2017  Length of Stay: 29    Subjective:     Chief Complaint: Guillain-[place] syndrome    History of Present Illness: 72 y man who presents with rapidly progressive ascending paralysis. 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 Past CAD, skin cancer, hypothyroidism. Incidental brain aneurysms.    Hospital Course: 6/7-6/12 IVIG  6/15 transfer to [facility]  6/16 NSGY consulted for lesion on MR  6/18 repeat LP with high protein  6/22 CTA multiple aneurysms  6/23 EMG diffuse sensorineural polyneuropathy  6/23 RUE DVT-> heparin drip  6/28 runs of VT  6/29 trach/PEG  7/6 hematochezia  7/10-7/14 IVIG   7/13 Day 4/5 IVIG    Interval History: No significant events over night    Review of Systems  Unable to obtain a complete ROS due to level of consciousness.  Objective:     Vitals:  Temp: 98.5 °F (36.9 °C) (07/13/17 1100)  Pulse: 67 (07/13/17 1200)  Resp: 16 (07/13/17 1200)  BP: 129/70 (07/13/17 1200)  SpO2: 99 % (07/13/17 1200)    Temp:  [98.1 °F (36.7 °C)-99.8 °F (37.7 °C)] 98.5 °F (36.9 °C)  Pulse:  [67-96] 67  Resp:  [16] 16  SpO2:  [96 %-100 %] 99 %  BP: ()/(55-93) 129/70         Vent Mode: A/C  Oxygen Concentration (%):  [40] 40  Resp Rate Total:  [16 br/min] 16 br/min  Vt Set:  [550 mL] 550 mL  PEEP/CPAP:  [5 cmH20] 5 cmH20  Pressure Support:  [0 cmH20] 0 cmH20  Mean Airway Pressure:  [8.8 cmH20-9.3 cmH20] 9.1 cmH20    07/12 0701 - 07/13 0700  In: 4670 [I.V.:1800]  Out: 2810 [Urine:2810]    Physical Exam  GA: S/p trach and peg, does not follow commands, open eyes spontaneously, or track. Appears comatose.  HEENT: No scleral icterus or JVD.   Pulmonary: Clear to auscultation A/L. No wheezing, crackles, or rhonchi, ventilator dependent  Cardiac: RRR S1 & S2 w/o rubs/murmurs/gallops.   Abdominal: Bowel sounds present x 4. No appreciable hepatosplenomegaly.  Skin: No jaundice, rashes, or visible lesions.  Neuro:  --GCS: E1 V1 M1 Unarousable,  appears asleep   --Mental Status: ANASTASIIA   --Pupils 3mm, PERRL.   --LUE strength: 1/5  --RUE strength: 1/5  --LLE strength: 1/5  --RLE strength: 1/5    Medications:  Continuous Scheduled  acyclovir 5%  6x Daily   [START ON 7/14/2017] amlodipine 5 mg Daily   chlorhexidine 15 mL QID   diphenhydrAMINE 25 mg Q24H   enoxparin 40 mg Q24H   folic acid-vit B6-vit B12 2.5-25-2 mg 1 tablet Daily   Immune Globulin G (IGG)-PRO-IGA 10 % injection (Privigen) 400 mg/kg/day (Dosing Weight) Q24H   insulin detemir 10 Units Daily   levothyroxine 75 mcg Daily   lisinopril 40 mg Daily   pantoprazole 40 mg BID   piperacillin-tazobactam 4.5 g in dextrose 5 % 100 mL IVPB (ready to mix system) 4.5 g Q8H   polyethylene glycol 17 g Daily   senna-docusate 8.6-50 mg 2 tablet BID   sertraline 50 mg Daily   sodium chloride 2 g Q6H   white petrolatum-mineral oil  Q8H   PRN  sodium chloride  Q24H PRN   sodium chloride  Q24H PRN   sodium chloride  Q24H PRN   sodium chloride  Q24H PRN   acetaminophen 325 mg Q4H PRN   acetaminophen 650 mg Q6H PRN   dextrose 50% 12.5 g PRN   fentaNYL 200 mcg On Call Procedure   fentaNYL 25 mcg Q2H PRN   glucagon (human recombinant) 1 mg PRN   heparin, porcine (PF) 1,000 Units PRN   hydrALAZINE 10 mg Q6H PRN   insulin aspart 1-10 Units Q4H PRN   labetalol 10 mg Q4H PRN   magnesium oxide 800 mg PRN   magnesium oxide 800 mg PRN   midazolam (PF) 6 mg On Call Procedure   ondansetron 4 mg Q8H PRN   potassium chloride 10% 40 mEq PRN   potassium chloride 10% 40 mEq PRN   potassium chloride 10% 60 mEq PRN   potassium, sodium phosphates 2 packet PRN   potassium, sodium phosphates 2 packet PRN   potassium, sodium phosphates 2 packet PRN   rocuronium 50 mg On Call Procedure     Today I personally reviewed pertinent medications, lines/drains/airways, lab results, notably:          Assessment/Plan:     Neuro   * Guillain-[place] syndrome    -PLEX, Steroids, and continue IVIG 0.4 mg/kg daily x5 days(day 4/5 IVIG)  -Trach/PEG  6/29  -sertraline for depression  -ready for LTAC placement on Friday morning        Brain aneurysm    Incidental brain aneurysm:  followup with vascular neurology/NSGY in one month after discharge          Flaccid quadriplegia    -continue GBS tx        Pulmonary   Acute neuromuscular respiratory failure    -from GBS  -remains severe with total ventilator dependence        Cardiac   Essential hypertension    -MAP >65; SBP <180  -lisinopril 40 mg daily  -amlodipine 5mg daily                  Hem/Onc    -HGB >7        Endocrine   Type 2 diabetes mellitus    -detemir, ISS        Acquired hypothyroidism    Continue synthroid 75mcg daily        Fluids/Electrolytes/Nutrition/GI    -TF        Hypovolemia    Resolved. Ivf dced            Dispo: Family updated per Dr. Brown and I in regards to prognosis and current treatment plan  Prophylaxis:  Venous Thromboembolism: chemical  Stress Ulcer: PPI  Ventilator Pneumonia: yes     Activity Orders          Up with assistance starting at 06/14 0111        Full Code    Karlo De La Cruz NP  Neurocritical Care  [facility]

## 2017-07-13 NOTE — PROGRESS NOTES
ICU Attending Note  Neurocritical Care    [id number]  Apnea x60 s when on Spontaneous    -IVIG x5 then LTACH  --180  -lisinopril, add amlodipine 5 mg  -stop IVF  -change free water to saline boluses  -increase salt tabs to 2 g q6h for hyponatremia  -HGB >7  -detemir, ISS  -TF  -prophylaxis

## 2017-07-13 NOTE — PLAN OF CARE
07/13/17 0729   Discharge Reassessment   Assessment Type Discharge Planning Reassessment   Can the patient answer the patient profile reliably? No, cognitively impaired   How does the patient rate their overall health at the present time? (gregor)   Describe the patient's ability to walk at the present time. Does not walk or unable to take any steps at all   How often would a person be available to care for the patient? Whenever needed   Number of comorbid conditions (as recorded on the chart) Two   During the past month, has the patient often been bothered by feeling down, depressed or hopeless? (gregor)   During the past month, has the patient often been bothered by little interest or pleasure in doing things? (gregor)   Discharge plan remains the same: Yes   Provided patient/caregiver education on the expected discharge date and the discharge plan No   Discharge Plan A Long-term acute care facility (LTAC)   Discharge Plan B Long-term acute care facility (LTAC)   Change in patient condition or support system No   Patient choice form signed by patient/caregiver No   Explained to the the patient/caregiver why the discharge planned changed: No   Involved the patient/caregiver in establishing a new discharge plan: No     Discharge plan LTAC placement once IVIG completed.    Shauna Contreras CM  [id number]

## 2017-07-13 NOTE — PLAN OF CARE
Problem: Patient Care Overview  Goal: Plan of Care Review  Outcome: Ongoing (interventions implemented as appropriate)  POC reviewed with pt and family at 1400. Pt wife verbalized understanding. Questions and concerns addressed. Pt unable to verbalize understanding at this time. No acute events today. Pt progressing toward goals.  Will continue to monitor. See flowsheets for full assessment and VS info.

## 2017-07-13 NOTE — PLAN OF CARE
Neuro Critical Care Team MD notified of IVIG previously running at incorrect rate upon arrival to shift; pump programed incorrectly upon administration. Pump programed correctly. Float/ZAN Ba notified  No new orders at this time.  Will continue to monitor.

## 2017-07-13 NOTE — PLAN OF CARE
Problem: Patient Care Overview  Goal: Plan of Care Review  Outcome: Ongoing (interventions implemented as appropriate)  POC reviewed with pt and family at 1400. Pt spouse verbalized understanding, pt unable to verbalize understanding. Shows no evidence of understanding. Questions and concerns of family addressed throughout shift. No acute events today. SBT initiated with MD at bedside pt apneic. Pt progressing toward goals. Will continue to monitor. See flowsheets for full assessment and VS info.

## 2017-07-13 NOTE — PLAN OF CARE
SHARAN phoned Alejandrina at [facility]  ([phone]) to inform her that patient will be ready for placement tomorrow after IVIG dose.  Per Alejandrina, the [employer] Auth was cancelled and must be re-applied for.  Alejandrina requested a current MAR, Labs, and progress notes which were sent to [facility] through [facility].     Vanessa Storey RN, CCRN-K, [facility]  Neuro-Critical Care   [phone]

## 2017-07-13 NOTE — PLAN OF CARE
TONE met with Pt wife to verify which LTAC she is most interested in going as Pt will be ready to leave tomorrow. She reported [facility] is her choice. TONE advised CM to contact the facility to make them aware to get auth.    Deisi Jeffery, LENORA  Neurocritical Care   [facility]  [phone]

## 2017-07-13 NOTE — PLAN OF CARE
Problem: Patient Care Overview  Goal: Plan of Care Review  POC reviewed with pt and wife last night at 2200.  All questions and concerns addressed. No acute events overnight. IVIG started at 0300, pt. Tolerating well. Pt progressing toward goals. Will continue to monitor. See flowsheets for full assessment and VS info

## 2017-07-14 VITALS
RESPIRATION RATE: 16 BRPM | SYSTOLIC BLOOD PRESSURE: 151 MMHG | WEIGHT: 182.75 LBS | TEMPERATURE: 100 F | HEIGHT: 73 IN | BODY MASS INDEX: 24.22 KG/M2 | DIASTOLIC BLOOD PRESSURE: 79 MMHG | OXYGEN SATURATION: 98 % | HEART RATE: 93 BPM

## 2017-07-14 LAB
ALBUMIN SERPL BCP-MCNC: 2.1 G/DL
ALP SERPL-CCNC: 83 U/L
ALT SERPL W/O P-5'-P-CCNC: 75 U/L
ANION GAP SERPL CALC-SCNC: 7 MMOL/L
ANISOCYTOSIS BLD QL SMEAR: SLIGHT
AST SERPL-CCNC: 42 U/L
BASO STIPL BLD QL SMEAR: ABNORMAL
BASOPHILS # BLD AUTO: 0.01 K/UL
BASOPHILS NFR BLD: 0.2 %
BILIRUB SERPL-MCNC: 0.4 MG/DL
BUN SERPL-MCNC: 13 MG/DL
CALCIUM SERPL-MCNC: 8.4 MG/DL
CHLORIDE SERPL-SCNC: 96 MMOL/L
CO2 SERPL-SCNC: 29 MMOL/L
CREAT SERPL-MCNC: 0.5 MG/DL
DIFFERENTIAL METHOD: ABNORMAL
EOSINOPHIL # BLD AUTO: 0 K/UL
EOSINOPHIL NFR BLD: 0.2 %
ERYTHROCYTE [DISTWIDTH] IN BLOOD BY AUTOMATED COUNT: 16.4 %
EST. GFR  (AFRICAN AMERICAN): >60 ML/MIN/1.73 M^2
EST. GFR  (NON AFRICAN AMERICAN): >60 ML/MIN/1.73 M^2
GIANT PLATELETS BLD QL SMEAR: PRESENT
GLUCOSE SERPL-MCNC: 133 MG/DL
HCT VFR BLD AUTO: 25.4 %
HGB BLD-MCNC: 8.2 G/DL
INR PPP: 1.1
LYMPHOCYTES # BLD AUTO: 0.8 K/UL
LYMPHOCYTES NFR BLD: 14.2 %
MAGNESIUM SERPL-MCNC: 1.9 MG/DL
MCH RBC QN AUTO: 31.8 PG
MCHC RBC AUTO-ENTMCNC: 32.3 %
MCV RBC AUTO: 98 FL
MONOCYTES # BLD AUTO: 0.3 K/UL
MONOCYTES NFR BLD: 4.6 %
NEUTROPHILS # BLD AUTO: 4.5 K/UL
NEUTROPHILS NFR BLD: 80.8 %
PHOSPHATE SERPL-MCNC: 2.7 MG/DL
PLATELET # BLD AUTO: 209 K/UL
PLATELET BLD QL SMEAR: ABNORMAL
PMV BLD AUTO: 9.8 FL
POCT GLUCOSE: 120 MG/DL (ref 70–110)
POCT GLUCOSE: 138 MG/DL (ref 70–110)
POCT GLUCOSE: 143 MG/DL (ref 70–110)
POCT GLUCOSE: 164 MG/DL (ref 70–110)
POCT GLUCOSE: 168 MG/DL (ref 70–110)
POLYCHROMASIA BLD QL SMEAR: ABNORMAL
POTASSIUM SERPL-SCNC: 4.1 MMOL/L
PROT SERPL-MCNC: 7.2 G/DL
PROTHROMBIN TIME: 11.6 SEC
RBC # BLD AUTO: 2.58 M/UL
SODIUM SERPL-SCNC: 132 MMOL/L
VIT B1 SERPL-MCNC: 136 UG/L (ref 38–122)
WBC # BLD AUTO: 5.63 K/UL

## 2017-07-14 PROCEDURE — 99239 HOSP IP/OBS DSCHRG MGMT >30: CPT | Mod: ,,, | Performed by: PHYSICIAN ASSISTANT

## 2017-07-14 PROCEDURE — 85610 PROTHROMBIN TIME: CPT

## 2017-07-14 PROCEDURE — 85025 COMPLETE CBC W/AUTO DIFF WBC: CPT

## 2017-07-14 PROCEDURE — 27000221 HC OXYGEN, UP TO 24 HOURS

## 2017-07-14 PROCEDURE — 83735 ASSAY OF MAGNESIUM: CPT

## 2017-07-14 PROCEDURE — 25000003 PHARM REV CODE 250: Performed by: PSYCHIATRY & NEUROLOGY

## 2017-07-14 PROCEDURE — 94003 VENT MGMT INPAT SUBQ DAY: CPT

## 2017-07-14 PROCEDURE — 25000003 PHARM REV CODE 250: Performed by: ANESTHESIOLOGY

## 2017-07-14 PROCEDURE — 63600175 PHARM REV CODE 636 W HCPCS: Performed by: PHYSICIAN ASSISTANT

## 2017-07-14 PROCEDURE — 94761 N-INVAS EAR/PLS OXIMETRY MLT: CPT

## 2017-07-14 PROCEDURE — 27200966 HC CLOSED SUCTION SYSTEM

## 2017-07-14 PROCEDURE — 25000003 PHARM REV CODE 250: Performed by: NURSE PRACTITIONER

## 2017-07-14 PROCEDURE — 84100 ASSAY OF PHOSPHORUS: CPT

## 2017-07-14 PROCEDURE — 63600175 PHARM REV CODE 636 W HCPCS: Performed by: PSYCHIATRY & NEUROLOGY

## 2017-07-14 PROCEDURE — 99900035 HC TECH TIME PER 15 MIN (STAT)

## 2017-07-14 PROCEDURE — 25000003 PHARM REV CODE 250: Performed by: PHYSICIAN ASSISTANT

## 2017-07-14 PROCEDURE — 80053 COMPREHEN METABOLIC PANEL: CPT

## 2017-07-14 RX ADMIN — LABETALOL HYDROCHLORIDE 10 MG: 5 INJECTION, SOLUTION INTRAVENOUS at 02:07

## 2017-07-14 RX ADMIN — INSULIN ASPART 4 UNITS: 100 INJECTION, SOLUTION INTRAVENOUS; SUBCUTANEOUS at 03:07

## 2017-07-14 RX ADMIN — PANTOPRAZOLE SODIUM 40 MG: 40 GRANULE, DELAYED RELEASE ORAL at 08:07

## 2017-07-14 RX ADMIN — FENTANYL CITRATE 25 MCG: 50 INJECTION INTRAMUSCULAR; INTRAVENOUS at 03:07

## 2017-07-14 RX ADMIN — INSULIN ASPART 4 UNITS: 100 INJECTION, SOLUTION INTRAVENOUS; SUBCUTANEOUS at 08:07

## 2017-07-14 RX ADMIN — SODIUM CHLORIDE TAB 1 GM 2 G: 1 TAB at 12:07

## 2017-07-14 RX ADMIN — MINERAL OIL AND WHITE PETROLATUM: 150; 830 OINTMENT OPHTHALMIC at 02:07

## 2017-07-14 RX ADMIN — MINERAL OIL AND WHITE PETROLATUM: 150; 830 OINTMENT OPHTHALMIC at 05:07

## 2017-07-14 RX ADMIN — LABETALOL HYDROCHLORIDE 10 MG: 5 INJECTION, SOLUTION INTRAVENOUS at 07:07

## 2017-07-14 RX ADMIN — HYDRALAZINE HYDROCHLORIDE 10 MG: 20 INJECTION INTRAMUSCULAR; INTRAVENOUS at 03:07

## 2017-07-14 RX ADMIN — CHLORHEXIDINE GLUCONATE 15 ML: 1.2 RINSE ORAL at 12:07

## 2017-07-14 RX ADMIN — ENOXAPARIN SODIUM 40 MG: 100 INJECTION SUBCUTANEOUS at 10:07

## 2017-07-14 RX ADMIN — HUMAN IMMUNOGLOBULIN G 35 G: 10 LIQUID INTRAVENOUS at 02:07

## 2017-07-14 RX ADMIN — SODIUM CHLORIDE TAB 1 GM 2 G: 1 TAB at 05:07

## 2017-07-14 RX ADMIN — ACYCLOVIR: 50 OINTMENT TOPICAL at 10:07

## 2017-07-14 RX ADMIN — ACYCLOVIR: 50 OINTMENT TOPICAL at 05:07

## 2017-07-14 RX ADMIN — CHLORHEXIDINE GLUCONATE 15 ML: 1.2 RINSE ORAL at 05:07

## 2017-07-14 RX ADMIN — LEVOTHYROXINE SODIUM 75 MCG: 75 TABLET ORAL at 05:07

## 2017-07-14 RX ADMIN — SERTRALINE HYDROCHLORIDE 50 MG: 50 TABLET ORAL at 08:07

## 2017-07-14 RX ADMIN — ACYCLOVIR: 50 OINTMENT TOPICAL at 02:07

## 2017-07-14 RX ADMIN — AMLODIPINE BESYLATE 5 MG: 5 TABLET ORAL at 08:07

## 2017-07-14 RX ADMIN — LISINOPRIL 40 MG: 20 TABLET ORAL at 08:07

## 2017-07-14 RX ADMIN — Medication 1 TABLET: at 08:07

## 2017-07-14 RX ADMIN — PIPERACILLIN AND TAZOBACTAM 4.5 G: 4; .5 INJECTION, POWDER, LYOPHILIZED, FOR SOLUTION INTRAVENOUS; PARENTERAL at 05:07

## 2017-07-14 NOTE — PLAN OF CARE
TONE received message from [facility] reporting they have given auth. SW contacted [facility] to report this. Alejandrina will set up the room assignment and contact this SW with time Pt will be able to leave/arrive for their facility.     TONE spoke with Alejandrina from [facility]. She reported the Pt can leave anytime. They do not have a room number, it will be on the 3rd floor of the hospital. Admitting MD is Dr. Ron Zelaya. RN to call: [phone] to report.     TONE contacted [facility] and set up transportation. Placed envelope outside of Pt room and reported to family that Pt is ready to go.    Deisi Jeffery, LENORA  Neurocritical Care   [facility]  [phone]

## 2017-07-14 NOTE — PLAN OF CARE
Neuro Critical Care Team MD notified of 825cc UO for last 2 hours.  No new orders at this time.  Will continue to monitor.

## 2017-07-14 NOTE — PLAN OF CARE
Problem: Patient Care Overview  Goal: Plan of Care Review  Outcome: Ongoing (interventions implemented as appropriate)  POC reviewed with pt and family at 0500. Pt's family demonstrated and verbalized understanding, pt nonverbal. All questions and concerns addressed. No acute events noted at this time. Pt progressing toward goals. Will continue to monitor. See flowsheets for full assessment and VS info

## 2017-07-14 NOTE — PLAN OF CARE
[facility]    FACILITY TRANSFER ORDERS      Patient Name: Rosalio Cam  YOB: 1945    PCP: Sheila Vidal MD   PCP Address: [address] / [place] [address]  PCP Phone Number: [phone]  PCP Fax: [phone]    Encounter Date: 07/14/2017    Admit to: [facility]    Vital Signs:  Routine    Diagnoses:   Active Hospital Problems    Diagnosis  POA    *Guillain-[place] syndrome [G61.0]  Yes    Bradycardia [R00.1]  Yes    Essential hypertension [I10]  Yes    Hematochezia [K92.1]  No    Metabolic alkalosis [E87.3]  Unknown    Type 2 diabetes mellitus [E11.9]  Yes    Dysautonomia [G90.9]  No     Likely due to GBS      Pustules determined by examination [L08.9]  No    Cadmium toxicity [T56.3X1A]  Yes    Anemia of chronic disease [D63.8]  No    Dysphagia, oropharyngeal [R13.12]  Yes    Acute encephalopathy [G93.40]  Unknown    Slow transit constipation [K59.01]  Clinically Undetermined    Fever [R50.9]  Unknown    Hyperglycemia [R73.9]  Unknown    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]  Unknown    Encephalopathy [G93.40]  Unknown    Brain mass [G93.9]  Yes    Acquired hypothyroidism [E03.9]  Yes    Acute neuromuscular respiratory failure [J96.90, G70.9]  Yes    [place] coma scale total score 3-8 [R40.2430]  Yes    Hypovolemia [E86.1]  Yes    Flaccid quadriplegia [G82.50]  Yes    Brain aneurysm [I67.1]  Yes      Resolved Hospital Problems    Diagnosis Date Resolved POA    Hypotension [I95.9] 07/13/2017 Unknown       Allergies:Review of patient's allergies indicates:  No Known Allergies       Diet: tube feedings: Glucerna 1.5 at 55cc/hr, full strength, no protein, start at 10cc/hr then advance by 10cc every 4 hours to goal rate reached. Hold for residuals greater than 500cc     Activities: Activity as tolerated      Labs: CBC and CMP Daily for 3 days, then at the discretion of staff physician at [facility]                CONSULTS:    Physical  Therapy to evaluate and treat. , Occupational Therapy to evaluate and treat., Speech Therapy to evaluate and treat for Language and Swallowing. and  to evaluate for community resources/long-range planning.     MISCELLANEOUS CARE:  Aspiration precautions   Assess 1) Evaluate bowel status every day. 2) Report any abdominal discomfort, pain, or distention. 3) If no bowel movement in 3 days, notify physician   Flush feeding tube with 30-50 mL water: 1) Before and after feedings. 2) After residual check. 3) After bag change. 4) After medication administration. 5) If tube becomes clogged, check for impaction in stub nose adapter and clean or replace. (use 30 mL syringe to irrigate Gastric or Jejunal tube with water).  Trach care: clean with chlorhexidine QID.        WOUND CARE ORDERS  None    Vent Mode: A/C  Oxygen Concentration (%):  [40] 40  Resp Rate Total:  [16 br/min] 16 br/min  Vt Set:  [550 mL] 550 mL  PEEP/CPAP:  [5 cmH20] 5 cmH20  Pressure Support:  [0 cmH20] 0 cmH20  Mean Airway Pressure:  [9.3 cmH20-9.9 cmH20] 9.5 cmH20      Medications: Review discharge medications with patient and family and provide education.      Current Discharge Medication List      START taking these medications    Details   acyclovir 5% (ZOVIRAX) 5 % ointment Apply topically 6 (six) times daily.      amlodipine (NORVASC) 5 MG tablet 1 tablet (5 mg total) by Per G Tube route once daily.  Refills: 0      chlorhexidine (PERIDEX) 0.12 % solution Use as directed 15 mLs in the mouth or throat 4 (four) times daily.  Refills: 0      enoxaparin (LOVENOX) 40 mg/0.4 mL Syrg Inject 0.4 mLs (40 mg total) into the skin Daily.      folic acid-vit B6-vit B12 2.5-25-2 mg (FOLBIC OR EQUIV) 2.5-25-2 mg Tab 1 tablet by Per G Tube route once daily.  Refills: 0      insulin detemir (LEVEMIR FLEXTOUCH) 100 unit/mL (3 mL) SubQ InPn pen Inject 10 Units into the skin once daily.  Refills: 0      lisinopril (PRINIVIL,ZESTRIL) 40 MG tablet 1 tablet  (40 mg total) by Per G Tube route once daily.  Refills: 0      !! magnesium oxide (MAG-OX) 400 mg tablet 2 tablets (800 mg total) by Per G Tube route as needed (if magnesium level is 1.5-1.8 mg/dL give 800 mg every 4 hours x 2 doses).  Refills: 0      !! magnesium oxide (MAG-OX) 400 mg tablet 2 tablets (800 mg total) by Per G Tube route as needed (if magnesium level is 1.4 mg/dL give 800 mg 4 hours x 3 doses).  Refills: 0      pantoprazole (PROTONIX) 20 MG tablet Take 1 tablet (20 mg total) by mouth 2 (two) times daily before meals.  Qty: 60 tablet, Refills: 0      senna-docusate 8.6-50 mg (PERICOLACE) 8.6-50 mg per tablet 2 tablets by Per G Tube route 2 (two) times daily.  Refills: 0      sertraline (ZOLOFT) 50 MG tablet 1 tablet (50 mg total) by Per G Tube route once daily.  Refills: 0      sodium chloride 1 gram tablet 2 tablets (2 g total) by Per G Tube route every 6 (six) hours.  Refills: 0       !! - Potential duplicate medications found. Please discuss with provider.      CONTINUE these medications which have CHANGED    Details   levothyroxine (SYNTHROID) 75 MCG tablet 1 tablet (75 mcg total) by Per G Tube route once daily.  Refills: 0         CONTINUE these medications which have NOT CHANGED    Details   pitavastatin 1 mg Tab tablet Take 1 mg by mouth once daily.         STOP taking these medications       clopidogrel (PLAVIX) 75 mg tablet Comments:   Reason for Stopping:         metoprolol succinate (TOPROL-XL) 25 MG 24 hr tablet Comments:   Reason for Stopping:         omega-3 acid ethyl esters (LOVAZA) 1 gram capsule Comments:   Reason for Stopping:              Follow up with Dr. Byrne ([facility]) or another Neuromuscular Specialist that may be closer to LTAC facility in 1 mo.      _________________________________  Brittnee Salas PA-C  07/14/2017

## 2017-07-14 NOTE — PROGRESS NOTES
Patient has redness and irritation under trach. The corner of the trach has irritated the patient's skin.  I informed the nurse and a mepilex was ordered and placed under the trach area.

## 2017-07-14 NOTE — PLAN OF CARE
[facility]    FACILITY TRANSFER ORDERS      Patient Name: Rosalio Cam  YOB: 1945    PCP: Sheila Vidal MD   PCP Address: [address] / [place] [address]  PCP Phone Number: [phone]  PCP Fax: [phone]    Encounter Date: 07/13/2017    Admit to: [facility]    Vital Signs:  Routine    Diagnoses:   Active Hospital Problems    Diagnosis  POA    *Guillain-[place] syndrome [G61.0]  Yes     Priority: 1 - High    Flaccid quadriplegia [G82.50]  Yes     Priority: 1 - High    Brain aneurysm [I67.1]  Yes     Priority: 1 - High    Bradycardia [R00.1]  Yes    Essential hypertension [I10]  Yes    Hematochezia [K92.1]  No    Metabolic alkalosis [E87.3]  Unknown    Type 2 diabetes mellitus [E11.9]  Yes    Dysautonomia [G90.9]  No     Likely due to GBS      Pustules determined by examination [L08.9]  No    Cadmium toxicity [T56.3X1A]  Yes    Anemia of chronic disease [D63.8]  No    Dysphagia, oropharyngeal [R13.12]  Yes    Acute encephalopathy [G93.40]  Unknown    Slow transit constipation [K59.01]  Clinically Undetermined    Fever [R50.9]  Unknown    Hyperglycemia [R73.9]  Unknown    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 Hold for residuals greater than 500cc    Activities: Activity as tolerated       Labs: CBC and CMP Daily for 3 days, then at the discretion of staff  physician at [facility]     CONSULTS:    Physical Therapy to evaluate and treat. , Occupational Therapy to evaluate and treat., Speech Therapy to evaluate and treat for Language and Swallowing. and  to evaluate for community resources/long-range planning.    MISCELLANEOUS CARE:  PEG Care: Clean site every 24 hours.  and Diabetes Care:   SN to perform and educate Diabetic management with blood glucose monitoring:, Fingerstick blood sugar AC and HS and Report CBG < 60 or > 350 to physician.    WOUND CARE ORDERS  None    Medications: Review discharge medications with patient and family and provide education.      Current Discharge Medication List      START taking these medications    Details   acyclovir 5% (ZOVIRAX) 5 % ointment Apply topically 6 (six) times daily.      amlodipine (NORVASC) 5 MG tablet 1 tablet (5 mg total) by Per G Tube route once daily.  Refills: 0      chlorhexidine (PERIDEX) 0.12 % solution Use as directed 15 mLs in the mouth or throat 4 (four) times daily.  Refills: 0      diphenhydrAMINE (BENADRYL) 50 mg/mL injection Inject 0.5 mLs (25 mg total) into the vein every 6 (six) hours.      enoxaparin (LOVENOX) 40 mg/0.4 mL Syrg Inject 0.4 mLs (40 mg total) into the skin Daily.      folic acid-vit B6-vit B12 2.5-25-2 mg (FOLBIC OR EQUIV) 2.5-25-2 mg Tab 1 tablet by Per G Tube route once daily.  Refills: 0      insulin detemir (LEVEMIR FLEXTOUCH) 100 unit/mL (3 mL) SubQ InPn pen Inject 10 Units into the skin once daily.  Refills: 0      lisinopril (PRINIVIL,ZESTRIL) 40 MG tablet 1 tablet (40 mg total) by Per G Tube route once daily.  Refills: 0      !! magnesium oxide (MAG-OX) 400 mg tablet 2 tablets (800 mg total) by Per G Tube route as needed (if magnesium level is 1.5-1.8 mg/dL give 800 mg every 4 hours x 2 doses).  Refills: 0      !! magnesium oxide (MAG-OX) 400 mg tablet 2 tablets (800 mg total) by Per G Tube route as needed (if magnesium level is 1.4 mg/dL give 800 mg 4 hours x  3 doses).  Refills: 0      pantoprazole (PROTONIX) 20 MG tablet Take 1 tablet (20 mg total) by mouth 2 (two) times daily before meals.  Qty: 60 tablet, Refills: 0      PIPERACILLIN SODIUM/TAZOBACTAM (PIPERACILLIN-TAZOBACTAM 4.5G/100ML D5W IVPB, READY TO MIX,) Inject 100 mLs (4.5 g total) into the vein every 8 (eight) hours.  Refills: 0      senna-docusate 8.6-50 mg (PERICOLACE) 8.6-50 mg per tablet 2 tablets by Per G Tube route 2 (two) times daily.  Refills: 0      sertraline (ZOLOFT) 50 MG tablet 1 tablet (50 mg total) by Per G Tube route once daily.  Refills: 0      sodium chloride 1 gram tablet 2 tablets (2 g total) by Per G Tube route every 6 (six) hours.  Refills: 0       !! - Potential duplicate medications found. Please discuss with provider.      CONTINUE these medications which have CHANGED    Details   levothyroxine (SYNTHROID) 75 MCG tablet 1 tablet (75 mcg total) by Per G Tube route once daily.  Refills: 0         CONTINUE these medications which have NOT CHANGED    Details   pitavastatin 1 mg Tab tablet Take 1 mg by mouth once daily.         STOP taking these medications       clopidogrel (PLAVIX) 75 mg tablet Comments:   Reason for Stopping:         metoprolol succinate (TOPROL-XL) 25 MG 24 hr tablet Comments:   Reason for Stopping:         omega-3 acid ethyl esters (LOVAZA) 1 gram capsule Comments:   Reason for Stopping:           Vent setting:   Mode: AC  Rate: 16  Peep: 5  Fio2: 40          _________________________________  Karlo De La Cruz NP  07/13/2017

## 2017-07-14 NOTE — PLAN OF CARE
Problem: Patient Care Overview  Goal: Plan of Care Review  Outcome: Ongoing (interventions implemented as appropriate)  POC reviewed with pt and family at 1500. Pt family verbalized understanding. Pt unable to verbalize understanding. Questions and concerns of family  addressed. No acute events today. Pt progressing toward goals. Waiting on transportation to LTAC, orders in . Will continue to monitor. See flowsheets for full assessment and VS info.

## 2017-07-14 NOTE — PROGRESS NOTES
ICU Attending Note  Neurocritical Care    Finished IVIG.    -no future IVIG or PLEX is planned at this time  -ready for transfer to LTACH  -Wife and daughter advised to establish care with either a local neurologist or our neuromuscular specialist Dr Byrne to coordinate future treatment plans. Prognosis was discussed.

## 2017-07-14 NOTE — PLAN OF CARE
TONE received message from Alejandrina with [facility] with questions about the tube feeds and labs. Sent todays notes and labs via [employer]. Left return message to report this was sent.     TONE updated Pt wife that this SW is waiting on auth from [employer]. Will give her 1.5 hour notice of Pt leaving so that she can let her kids know when to be here.    SW received call from Alejandrina from [facility] reporting they have submitted to [employer] but had some questions about the orders for tube feeding and the final date of the antiboitics. TONE advised CM and she advised PA. PA will fix the facility transfer orders.     TONE faxed updated facility transfer orders to [facility].    SW received call from Gloria with [employer] regarding questions about the vent weaning and other needs. She is now submitting to the Medical director and will let this SW know when the Pt is approved. TONE updated Alejandrina with [facility].    TONE put together [employer] envelope and set up transportation with [employer] for [facility].    Deisi Jeffery LMSW  Neurocritical Care   [facility]  [phone]

## 2017-07-15 PROBLEM — E87.1 HYPONATREMIA: Status: ACTIVE | Noted: 2017-07-15

## 2017-07-15 PROBLEM — D64.9 NORMOCYTIC ANEMIA: Status: ACTIVE | Noted: 2017-07-15

## 2017-07-15 PROBLEM — B96.5: Status: ACTIVE | Noted: 2017-07-15

## 2017-07-15 PROBLEM — J15.1 PSEUDOMONAS PNEUMONIA: Status: ACTIVE | Noted: 2017-07-15

## 2017-07-15 PROBLEM — Z93.0 TRACHEOSTOMY IN PLACE: Chronic | Status: ACTIVE | Noted: 2017-07-15

## 2017-07-15 PROBLEM — R79.89 ABNORMAL LFTS (LIVER FUNCTION TESTS): Status: ACTIVE | Noted: 2017-07-15

## 2017-07-15 PROBLEM — Z99.11 VENTILATOR DEPENDENCE: Status: ACTIVE | Noted: 2017-07-15

## 2017-07-15 PROBLEM — L73.9: Status: ACTIVE | Noted: 2017-07-15

## 2017-07-15 LAB — POCT GLUCOSE: 115 MG/DL (ref 70–110)

## 2017-07-15 NOTE — ASSESSMENT & PLAN NOTE
-Received PLEX, high dose steroids, 5d course of IVIG 0.4 mg/kg daily x2  -EMG diffuse sensory-motor peripheral neuropathy on 6/23; concomittant myopathy that could be contributing to symptoms not assessed secondary to poor patient participation. Patient to follow up with Dr. Byrne in Neuromuscular clinic in 4-6 weeks from discharge  -Trach/PEG 6/29  -sertraline for depression  -transfer to LTAC

## 2017-07-15 NOTE — PROGRESS NOTES
[facility] at bedside to transfer pt to ltac. Report given to emt. Pt no in acute distress.

## 2017-07-15 NOTE — PROGRESS NOTES
Pt being transported to LTAC facility in [place] via EMS. Trach suctioned and secured before leaving. Obturator and Ambu Bag plus Trach supplies sent with pt's family.

## 2017-07-16 PROBLEM — E87.5 HYPERKALEMIA: Status: ACTIVE | Noted: 2017-07-16

## 2017-07-16 NOTE — DISCHARGE SUMMARY
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] 06/10/2017 Yes    Bradycardia [R00.1] 07/11/2017 Yes    Essential hypertension [I10] 07/11/2017 Yes    Hematochezia [K92.1] 07/11/2017 No    Metabolic alkalosis [E87.3] 07/11/2017 Unknown    Type 2 diabetes mellitus [E11.9] 07/10/2017 Yes    Dysautonomia [G90.9] 07/08/2017 No    Pustules determined by examination [L08.9] 07/05/2017 No    Cadmium toxicity [T56.3X1A] 07/04/2017 Yes    Anemia of chronic disease [D63.8] 06/29/2017 No    Dysphagia, oropharyngeal [R13.12] 06/29/2017 Yes    Acute encephalopathy [G93.40]  Unknown    Slow transit constipation [K59.01] 06/26/2017 Clinically Undetermined    Fever [R50.9] 06/25/2017 Unknown    Hyperglycemia [R73.9] 06/23/2017 Unknown    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] 06/23/2017 Unknown    Encephalopathy [G93.40]  Unknown    Brain mass [G93.9] 06/16/2017 Yes    Acquired hypothyroidism [E03.9] 06/16/2017 Yes    Acute neuromuscular respiratory failure [J96.90, G70.9] 06/15/2017 Yes    Juan coma scale total score 3-8 [R40.2430] 06/15/2017 Yes    Hypovolemia [E86.1] 06/15/2017 Yes    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 Past CAD, skin cancer, hypothyroidism. Incidental brain aneurysms.    Hospital Course by Event: 6/7-6/12 IVIG  6/15 transfer to [facility]; MRI c/t/l spine: without cord enhancement. MRI brain with "3.6 cm exophytic lesion extending from the right inferior " "frontal lobe, unchanged from prior examination. Potentially a large exophytic hamartoma, however location is somewhat atypical as it is off midline and not in direct continuity with the tumor cinereum. Low-grade glioma not excluded". 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   6/16 NSGY consulted for lesion - no acute intervention - follow up in clinic for re-imaging.   6/17-6/25 PLEX qOD x5 rounds  6/18 repeat LP with albuminocytologic dissociation (1WBC; protein 114) consistent with GBS  6/22 CTA head done for better visualization of cerebral vasculature in setting of aneursyms identified on prior imaging - no acute intervention - to be followed in clinic.   6/23 EMG diffuse sensory-motor peripheral neuropathy; concomittant myopathy that could be contributing to symptoms not assessed secondary to poor patient participation. Patient to follow up with Dr. Byrne in Neuromuscular clinic in 4-6 weeks from discharge.   6/23 right subclavian thrombosis noted on upper extremity ultrasound and started on heparin drip  6/28 runs of VT  6/29 trach/PEG  6/30 Dermatology consulted for erythematous pustules involving the abdomen, pelvis and lower extremities; punch biopsy and culture done - dx of suppurative folliculitis; culture grew pseudomonas; was treated with 7 days of zosyn.   7/6 hematochezia; anticoagulation discontinued.   7/10-7/14 IVIG   7/14 Transfer to [facility]    Hospital Course by Problem:   Pustules determined by examination    6/30 Dermatology consulted for erythematous pustules involving the abdomen, pelvis and lower extremities; punch biopsy and culture done - dx of suppurative folliculitis; culture grew pseudomonas; was treated with 7 days of zosyn        Cadmium toxicity    Noted on blood heavy metal screen   Poison control contacted, informed that likely chronic, no intervention warranted   No acute adverse effects (respiratory and renal)        Right " subclavian vein thrombosis    -Noted 6/23 on UE u/s, started on anticoagulation which was then held on 7/6 2/2 hematochezia.   -Family requests no further full anticoagulation         Acquired hypothyroidism    Levothyroxine 75mcg daily continued throughout admission         * Guillain-[place] syndrome    -Received PLEX, high dose steroids, 5d course of IVIG 0.4 mg/kg daily x2  -EMG diffuse sensory-motor peripheral neuropathy on 6/23; concomittant myopathy that could be contributing to symptoms not assessed secondary to poor patient participation. Patient to follow up with Dr. Byrne in Neuromuscular clinic in 4-6 weeks from discharge  -Trach/PEG 6/29  -sertraline for depression  -transfer to LTAC          Significant Results:    Laboratory:  Lab Results   Component Value Date    HGBA1C 6.2 (H) 06/14/2017    TSH 3.480 06/17/2017       Pending Results: None    Consultations:  IP CONSULT TO NEUROLOGY  IP CONSULT TO VASCULAR (STROKE) NEUROLOGY  IP CONSULT TO [facility] APHERESIS SERVICE  IP CONSULT TO PICC TEAM (NIAS)  IP CONSULT TO DIETARY  IP CONSULT TO GENERAL SURGERY  IP CONSULT TO MIDLINE TEAM  IP CONSULT TO DERMATOLOGY      Procedures:   * No surgery found * by * Surgery not found *.      Medications:    Rosalio Cam   Home Medication Instructions TELLY:[id number]    Printed on:07/15/17 2139   Medication Information                      acyclovir 5% (ZOVIRAX) 5 % ointment  Apply topically 6 (six) times daily.             amlodipine (NORVASC) 5 MG tablet  1 tablet (5 mg total) by Per G Tube route once daily.             chlorhexidine (PERIDEX) 0.12 % solution  Use as directed 15 mLs in the mouth or throat 4 (four) times daily.             enoxaparin (LOVENOX) 40 mg/0.4 mL Syrg  Inject 0.4 mLs (40 mg total) into the skin Daily.             folic acid-vit B6-vit B12 2.5-25-2 mg (FOLBIC OR EQUIV) 2.5-25-2 mg Tab  1 tablet by Per G Tube route once daily.             insulin detemir (LEVEMIR FLEXTOUCH) 100 unit/mL (3  mL) SubQ InPn pen  Inject 10 Units into the skin once daily.             levothyroxine (SYNTHROID) 75 MCG tablet  1 tablet (75 mcg total) by Per G Tube route once daily.             lisinopril (PRINIVIL,ZESTRIL) 40 MG tablet  1 tablet (40 mg total) by Per G Tube route once daily.             magnesium oxide (MAG-OX) 400 mg tablet  2 tablets (800 mg total) by Per G Tube route as needed (if magnesium level is 1.5-1.8 mg/dL give 800 mg every 4 hours x 2 doses).             magnesium oxide (MAG-OX) 400 mg tablet  2 tablets (800 mg total) by Per G Tube route as needed (if magnesium level is 1.4 mg/dL give 800 mg 4 hours x 3 doses).             pantoprazole (PROTONIX) 20 MG tablet  Take 1 tablet (20 mg total) by mouth 2 (two) times daily before meals.             pitavastatin 1 mg Tab tablet  Take 1 mg by mouth once daily.             senna-docusate 8.6-50 mg (PERICOLACE) 8.6-50 mg per tablet  2 tablets by Per G Tube route 2 (two) times daily.             sertraline (ZOLOFT) 50 MG tablet  1 tablet (50 mg total) by Per G Tube route once daily.             sodium chloride 1 gram tablet  2 tablets (2 g total) by Per G Tube route every 6 (six) hours.               Diet: TF through PEG     Activity: As tolerated.     Disposition: Discharged to LTAC in stable condition.    Follow Up Plan:  Outpatient neuromuscular specialist in one month. Dr. Byrne would be good option in [facility], however may be reasonable to find specialist closer to LTAC/patient's home.     This discharge took more than 30 minutes to complete.    Brittnee Salas PA-C  Neurocritical Care  [facility]

## 2017-07-16 NOTE — ASSESSMENT & PLAN NOTE
-Noted 6/23 on UE u/s, started on anticoagulation which was then held on 7/6 2/2 hematochezia.   -Family requests no further full anticoagulation

## 2017-07-16 NOTE — ASSESSMENT & PLAN NOTE
Noted on blood heavy metal screen   Poison control contacted, informed that likely chronic, no intervention warranted   No acute adverse effects (respiratory and renal)

## 2017-07-16 NOTE — ASSESSMENT & PLAN NOTE
6/30 Dermatology consulted for erythematous pustules involving the abdomen, pelvis and lower extremities; punch biopsy and culture done - dx of suppurative folliculitis; culture grew pseudomonas; was treated with 7 days of zosyn

## 2017-07-17 PROBLEM — B96.5: Status: RESOLVED | Noted: 2017-07-15 | Resolved: 2017-07-17

## 2017-07-17 PROBLEM — E87.1 HYPONATREMIA: Status: RESOLVED | Noted: 2017-07-15 | Resolved: 2017-07-17

## 2017-07-17 PROBLEM — E87.5 HYPERKALEMIA: Status: RESOLVED | Noted: 2017-07-16 | Resolved: 2017-07-17

## 2017-07-17 PROBLEM — L73.9: Status: RESOLVED | Noted: 2017-07-15 | Resolved: 2017-07-17

## 2017-07-17 PROBLEM — E43 SEVERE MALNUTRITION: Status: ACTIVE | Noted: 2017-07-17

## 2017-07-21 PROBLEM — E87.0 HYPERNATREMIA: Status: ACTIVE | Noted: 2017-07-21

## 2017-07-21 PROBLEM — G62.81 CRITICAL ILLNESS POLYNEUROPATHY: Status: ACTIVE | Noted: 2017-07-21

## 2017-07-24 PROBLEM — I95.9 HYPOTENSION: Status: RESOLVED | Noted: 2017-07-24 | Resolved: 2017-07-13

## 2017-07-24 PROBLEM — E87.3 METABOLIC ALKALOSIS: Status: ACTIVE | Noted: 2017-07-24

## 2017-07-24 NOTE — PROCEDURES
DATE OF SERVICE:  06/28/2017    EEG#:  DA--1, AW--2, LF--3, QJ--4.    REFERRING PHYSICIAN:  Dr. Rodriguez.    INDICATION:  This EEG was performed to assess for subclinical seizures.    ICU EEG/VIDEO MONITORING REPORT    METHODOLOGY:  Electroencephalographic (EEG) is recorded with electrodes placed   according to the International 10-20 placement system.  Thirty two (32) channels   of digital signal (sampling rate of 512/sec), including T1 and T2, were   simultaneously recorded from the scalp and may include EKG, EMG, and/or eye   monitors.  Recording band pass was 0.1 to 512 Hz.  Digital video recording of   the patient is simultaneously recorded with the EEG.  The patient is instructed   to report clinical symptoms which may occur during the recording session.  EEG   and video recording are stored and archived in digital format.  Activation   procedures, which include photic stimulation, hyperventilation and instructing   patients to perform simple tasks, are done in selected patients.    The EEG is displayed on a monitor screen and can be reviewed using different   montages.  Computer-assisted analysis is employed to detect spike and   electrographic seizure activity.  The entire record is submitted for computer   analysis.  The entire recording is visually reviewed, and the times identified   by computer analysis as being spikes or seizures are reviewed again.    Compressed spectral analysis (CSA) is also performed on the activity recorded   from each individual channel.  This is displayed as a power display of   frequencies from 0 to 30 Hz over time.  The CSA is reviewed looking for   asymmetries in power between homologous areas of the scalp, then compared with   the original EEG recording.    Zam[employer]ee software was also utilized in the review of this study.  This software   suite analyzes the EEG recording in multiple domains.  Coherence and rhythmicity   are computed to identify EEG  sections which may contain organized seizures.    Each channel undergoes analysis to detect the presence of spike and sharp waves   which have special and morphological characteristics of epileptic activity.  The   routine EEG recording is converted from special into frequency domain.  This is   then displayed comparing homologous areas to identify areas of significant   asymmetry.  Algorithm to identify non-cortically generated artifact is used to   separate artifact from the EEG.    RECORDING TIMES:  Start on 06/28/2017 at 16:12:20.  Stop on 06/29/2017 at 07:00:2.  Restart on 06/29/2017 at 07:00:20.  Stop on 06/29/2017 at 16:23:16.  Restart on 06/29/2017 at 16:23:28.  End on 06/29/2017 at 20:02:04.  Total time of video EEG recording for this study was 28 hours and 20 minutes.    EEG FINDINGS:  The recording was obtained with a number of standard bipolar and   referential montages during obtunded state.  In this state, the background was   diffusely disorganized and comprised of an admixture of theta range frequencies   with occasional mixed delta range frequencies.  The background was punctuated by   symmetric sleep spindles.  Spontaneous variability was noted.  During   reactivity testing, intermixed alpha range frequencies were noted without   anterior to posterior gradient.  There were no interictal epileptiform   abnormalities and no clinical or electrographic seizures were recorded.    The EKG channel revealed sinus rhythm with occasional PVCs.    IMPRESSION:  This is an abnormal EEG during obtunded state.  Diffuse   disorganized slowing of the background is noted.    CLINICAL CORRELATION:  The patient is a 72-year-old male who is being treated   for Guillain-[place] syndrome.  This is an abnormal EEG during obtunded state.    The overall degree of slowing and disorganization is suggestive of a moderate   degree of encephalopathy, nonspecific to the cause.  There is no evidence of an   epileptic process on this  recording.  No seizures were recorded during this   study.      FAK/DENA  dd: 07/22/2017 12:42:02 (CDT)  td: 07/22/2017 13:06:45 (CDT)  Doc ID   #[id number]  Job ID #[id number]    CC:

## 2017-07-26 PROBLEM — N30.00 ACUTE CYSTITIS WITHOUT HEMATURIA: Status: ACTIVE | Noted: 2017-07-26

## 2017-08-04 PROBLEM — Z93.0 TRACHEOSTOMY STATUS: Chronic | Status: ACTIVE | Noted: 2017-08-04

## 2017-08-04 LAB
GQ1B GANGL AB SER QL: NORMAL
GQ1B GANGL AB SER QL: NORMAL

## 2017-08-12 PROBLEM — B37.2 CUTANEOUS CANDIDIASIS: Status: ACTIVE | Noted: 2017-08-12

## 2017-08-16 ENCOUNTER — TELEPHONE (OUTPATIENT)
Dept: PULMONOLOGY | Facility: CLINIC | Age: 72
End: 2017-08-16

## 2017-08-16 NOTE — TELEPHONE ENCOUNTER
----- Message from Charlene Esquivel sent at 8/16/2017  2:03 PM CDT -----  Contact: [facility] needed to know if insurance company can call Dr. Lackey in regards to pt above, [place] need to know what number can the  Be reached at besides the main number callback number of [place] is [phone]

## 2017-08-17 ENCOUNTER — TELEPHONE (OUTPATIENT)
Dept: PULMONOLOGY | Facility: CLINIC | Age: 72
End: 2017-08-17

## 2017-08-17 NOTE — TELEPHONE ENCOUNTER
Spoke With Dr Aleks Woodward physician manager  Informed him of slight progress on SIMV rate down from 15 to 12  Will require long term mech vent support  Overall health needs may make Home vent a challenge

## 2017-08-17 NOTE — TELEPHONE ENCOUNTER
----- Message from Berenice Santamaria sent at 8/16/2017  3:25 PM CDT -----  Contact: [facility]  Please call at [phone] in regards to getting a physician number which is for ger corey is dr. Juancho painter at [phone].

## 2017-08-20 PROBLEM — K59.00 CONSTIPATION: Status: ACTIVE | Noted: 2017-06-26

## 2017-09-28 PROBLEM — N30.00 ACUTE CYSTITIS WITHOUT HEMATURIA: Status: RESOLVED | Noted: 2017-07-26 | Resolved: 2017-09-28

## 2017-09-28 PROBLEM — B37.2 CUTANEOUS CANDIDIASIS: Status: RESOLVED | Noted: 2017-08-12 | Resolved: 2017-09-28

## 2017-09-28 PROBLEM — G90.1 DYSAUTONOMIA: Status: RESOLVED | Noted: 2017-07-08 | Resolved: 2017-09-28

## 2018-05-04 ENCOUNTER — TELEPHONE (OUTPATIENT)
Dept: CRITICAL CARE MEDICINE | Facility: HOSPITAL | Age: 73
End: 2018-05-04

## 2018-05-04 DIAGNOSIS — J15.212: Primary | ICD-10-CM

## 2018-05-07 ENCOUNTER — TELEPHONE (OUTPATIENT)
Dept: PULMONOLOGY | Facility: CLINIC | Age: 73
End: 2018-05-07

## 2018-05-07 DIAGNOSIS — G70.9 NEUROMUSCULAR RESPIRATORY WEAKNESS: Primary | ICD-10-CM

## 2018-05-07 DIAGNOSIS — J99 NEUROMUSCULAR RESPIRATORY WEAKNESS: Primary | ICD-10-CM

## 2018-05-07 NOTE — TELEPHONE ENCOUNTER
----- Message from Efrem De Jesus MD sent at 5/4/2018  5:38 PM CDT -----  CT chest without contrast - next 1-2 weeks)  ----- Message -----  From: Tanika Starks LPN  Sent: 5/4/2018   2:56 PM  To: Efrem De Jesus MD    Send what?  ----- Message -----  From: Efrem De Jesus MD  Sent: 5/4/2018   2:20 PM  To: Tanika Starks LPN    Pls send to [facility]

## 2018-05-08 ENCOUNTER — TELEPHONE (OUTPATIENT)
Dept: PULMONOLOGY | Facility: CLINIC | Age: 73
End: 2018-05-08

## 2018-05-08 NOTE — TELEPHONE ENCOUNTER
----- Message from Sunita Pichardo sent at 5/8/2018  1:22 PM CDT -----  Contact: [facility]   Tyrese  [facility]    Ph [phone]    Pt has CT scan appt at noon tomorrow 5/9 .       Tyrese needs call back urgent     Wants to know if you got an authorizations from his insurance??    Please call   Thanks

## 2018-05-08 NOTE — TELEPHONE ENCOUNTER
Left voicemail our department does not get prior authorizations. That PAs are done by a separate department. Advised we were not aware patient had scheduled his appointment.

## 2018-05-14 ENCOUNTER — HOSPITAL ENCOUNTER (OUTPATIENT)
Dept: RADIOLOGY | Facility: HOSPITAL | Age: 73
Discharge: HOME | End: 2018-05-14
Attending: INTERNAL MEDICINE
Payer: MEDICARE

## 2018-05-14 DIAGNOSIS — J99 NEUROMUSCULAR RESPIRATORY WEAKNESS: ICD-10-CM

## 2018-05-14 DIAGNOSIS — G70.9 NEUROMUSCULAR RESPIRATORY WEAKNESS: ICD-10-CM

## 2018-05-14 PROCEDURE — 71250 CT THORAX DX C-: CPT | Mod: 26,,, | Performed by: RADIOLOGY

## 2018-05-14 PROCEDURE — 71250 CT THORAX DX C-: CPT | Mod: TC,PO

## 2018-10-03 NOTE — PLAN OF CARE
TONE contacted Alejandrina with [facility] ([phone]) to report Pt is ready to go today. She reported that Christine did submit for auth on Monday siting Pt would be ready today. They will check with [employer] to see if auth was given and if so, they will be ready to take the Pt today.    Deisi Jeffery, LENORA  Neurocritical Care   [facility]  [id number]     Patient/Caregiver provided printed discharge information.

## 2019-03-06 ENCOUNTER — TELEPHONE (OUTPATIENT)
Dept: PULMONOLOGY | Facility: CLINIC | Age: 74
End: 2019-03-06

## 2019-03-06 NOTE — TELEPHONE ENCOUNTER
A voicemail was left at 3:32pm for patient Mr. Cam daughter Aisha. Informed patient that Dr. De Jesus would be able to see patient on April 15,16 or 19 at 3:00pm if that works for him. Left my name and call back number for Aisha to return my call at the office.

## 2019-05-28 NOTE — CONSULTS
Objective:


Objective:


Reviewed w/ RN - increased residuals, had vomiting on Saturday, had an "upset 

stomach yesterday," and pt reported abd pain - though tolerates at rate of 40.


Vital Signs:





                                   Vital Signs








  Date Time  Temp Pulse Resp B/P (MAP) Pulse Ox O2 Delivery O2 Flow Rate FiO2


 


5/28/19 08:04  78 24 92/42 (59) 92 Nasal Cannula 4.0 


 


5/28/19 07:00 97.7       





 97.7       








Imaging:


EGD 5/24


E--LA grade A esophagitis at 40cm.


G--Normal


D--patchy erythema, bulb


IMP: GERD


        Successful PEG





CXR 5/28


pending





PE:





GEN: NAD


LUNGS: NC 4L


HEART: RRR


ABD: BS+, S/ND, PEG in place


NEURO/PSYCH: drowsy





A/P:


Oropharyngeal dysphagia s/p PEG


Fever





--


Will review w/ Dr. Shelton.











DONNA ENRIQUE         May 28, 2019 10:34 [facility]  General Surgery  Consult Note    Patient Name: Rosalio Cam  MRN: [id number]  Code Status: Full Code  Admission Date: 6/14/2017  Hospital Length of Stay: 12 days  Attending Physician: Pepe Salgado MD  Primary Care Provider: Sheila Vidal MD    Patient information was obtained from spouse/SO, EMR and ER records.     Inpatient consult to General Surgery  Consult performed by: XENIA MUÑIZ  Consult ordered by: EMILY VAZ        Subjective:     Principal Problem: Guillain-[place] syndrome    History of Present Illness: 72 y.o. male admitted with acute ascending paralysis presumed to be GBS 6/14/2017. Remains ventilator dependent with minimal neurologic function (apparently some improvement past 48 hours with spontaneous eye / jaw movements). Tolerating TFs at goal with some bowel function; bowel reg + enemas for fecal loading of distal colon / rectum; + bowel function.  General surgery consulted for possible gastrostomy / tracheostomy tubes.    He is hemodynamically stable on no vasopressors. Vent settings 40% FiO2 / 8 peep.    Of note he is on heparin GTT for subclavian DVT. He is also on ASA 325mg QD for history of CAD s/p cardiac stents several years ago.    No prior abdominal surgeries.     No current facility-administered medications on file prior to encounter.      No current outpatient prescriptions on file prior to encounter.       Review of patient's allergies indicates:  No Known Allergies    History reviewed. No pertinent past medical history.  No past surgical history on file.  Family History     None        Social History Main Topics    Smoking status: Unknown If Ever Smoked    Smokeless tobacco: Not on file    Alcohol use Not on file    Drug use: Unknown    Sexual activity: Not on file     Review of Systems   Unable to perform ROS: Patient unresponsive     Objective:     Vital Signs (Most Recent):  Temp: 97.3 °F (36.3 °C) (06/26/17 1900)  Pulse: 72 (06/26/17  2000)  Resp: 18 (06/26/17 2000)  BP: (!) 151/67 (06/26/17 1200)  SpO2: 100 % (06/26/17 2000) Vital Signs (24h Range):  Temp:  [97.3 °F (36.3 °C)-99.1 °F (37.3 °C)] 97.3 °F (36.3 °C)  Pulse:  [] 72  Resp:  [18-19] 18  SpO2:  [93 %-100 %] 100 %  BP: (145-163)/(65-73) 151/67  Arterial Line BP: (127-176)/(44-70) 136/57     Weight: 88.4 kg (194 lb 14.2 oz)  Body mass index is 25.71 kg/m².    Physical Exam   Constitutional: He appears well-developed.   Intubated, sedated, unresponsive.   HENT:   Head: Normocephalic and atraumatic.   OG tube in place with TFs running at goal.     Neck: Normal range of motion. No tracheal deviation present.   Cardiovascular: Normal rate, regular rhythm and intact distal pulses.    Pulmonary/Chest: No respiratory distress. He has no wheezes.   Minimal vent settings.  Diminished breath sounds left lower zone.   Abdominal: Soft. He exhibits no distension. There is no guarding.       Significant Labs:  CBC:   Recent Labs  Lab 06/26/17  1129   WBC 16.11*   RBC 2.52*   HGB 8.3*   HCT 24.9*      MCV 99*   MCH 32.9*   MCHC 33.3     BMP:   Recent Labs  Lab 06/26/17  0214   *   *   K 4.1   *   CO2 28   BUN 35*   CREATININE 0.6   CALCIUM 8.4*   MG 2.4     LFTs:   Recent Labs  Lab 06/26/17  0214   ALT 42   AST 55*   ALKPHOS 32*   BILITOT 0.9   PROT 5.0*   ALBUMIN 3.7     Coagulation:   Recent Labs  Lab 06/26/17  1129   APTT 28.7       Significant Diagnostics:  CT: I have reviewed all pertinent results/findings within the past 24 hours and my personal findings are:  rectal stool burden. LLL consolidation.   Transverse colon lying anterior to stomach. NG tube in place.    Assessment/Plan:     * Guillain-[place] syndrome    Discussed indications for gastrostomy (peg vs open) and tracheostomy with patient's wife and family does desire to proceed.   Given colon overlying stomach, open G tube likely to be safe option than PEG.  Concomitant tracheostomy can be performed; minimal  vent settings.  Will d/w staff and schedule within next 48 hours.    Will need heparin held 6hr prior to procedure.          VTE Risk Mitigation         Ordered     High Risk of VTE  Once      06/14/17 7739          Thank you for your consult. I will follow-up with patient. Please contact us if you have any additional questions.    Padmaja Lomas MD  General Surgery  [facility]

## 2022-03-25 NOTE — PROGRESS NOTES
Spoke to Radiologist regarding AM X ray. ETT noted on x-ray to be in R main stem. Notified NCC and respiratory. ETT tube pulled back 2 cm per NCC/radiology. Repeat X-ray ordered. VSS no acute distress. Will continue to monitor.    Yes